# Patient Record
Sex: MALE | Race: WHITE
[De-identification: names, ages, dates, MRNs, and addresses within clinical notes are randomized per-mention and may not be internally consistent; named-entity substitution may affect disease eponyms.]

---

## 2017-04-20 ENCOUNTER — HOSPITAL ENCOUNTER (INPATIENT)
Dept: HOSPITAL 68 - ERH | Age: 72
LOS: 5 days | DRG: 190 | End: 2017-04-25
Admitting: INTERNAL MEDICINE
Payer: COMMERCIAL

## 2017-04-20 VITALS — HEIGHT: 72 IN | BODY MASS INDEX: 32.51 KG/M2 | WEIGHT: 240 LBS

## 2017-04-20 VITALS — SYSTOLIC BLOOD PRESSURE: 140 MMHG | DIASTOLIC BLOOD PRESSURE: 80 MMHG

## 2017-04-20 VITALS — DIASTOLIC BLOOD PRESSURE: 60 MMHG | SYSTOLIC BLOOD PRESSURE: 120 MMHG

## 2017-04-20 VITALS — SYSTOLIC BLOOD PRESSURE: 122 MMHG | DIASTOLIC BLOOD PRESSURE: 62 MMHG

## 2017-04-20 DIAGNOSIS — E03.9: ICD-10-CM

## 2017-04-20 DIAGNOSIS — I73.9: ICD-10-CM

## 2017-04-20 DIAGNOSIS — N18.3: ICD-10-CM

## 2017-04-20 DIAGNOSIS — F17.210: ICD-10-CM

## 2017-04-20 DIAGNOSIS — I13.0: ICD-10-CM

## 2017-04-20 DIAGNOSIS — J96.20: ICD-10-CM

## 2017-04-20 DIAGNOSIS — I25.10: ICD-10-CM

## 2017-04-20 DIAGNOSIS — J44.1: Primary | ICD-10-CM

## 2017-04-20 DIAGNOSIS — I25.5: ICD-10-CM

## 2017-04-20 DIAGNOSIS — I50.22: ICD-10-CM

## 2017-04-20 DIAGNOSIS — E66.01: ICD-10-CM

## 2017-04-20 DIAGNOSIS — Z95.1: ICD-10-CM

## 2017-04-20 LAB
ABSOLUTE GRANULOCYTE CT: 6.4 /CUMM (ref 1.4–6.5)
BASOPHILS # BLD: 0.1 /CUMM (ref 0–0.2)
BASOPHILS NFR BLD: 1 % (ref 0–2)
EOSINOPHIL # BLD: 0.2 /CUMM (ref 0–0.7)
EOSINOPHIL NFR BLD: 2.7 % (ref 0–5)
ERYTHROCYTE [DISTWIDTH] IN BLOOD BY AUTOMATED COUNT: 15.4 % (ref 11.5–14.5)
GRANULOCYTES NFR BLD: 79.5 % (ref 42.2–75.2)
HCT VFR BLD CALC: 39.3 % (ref 42–52)
LYMPHOCYTES # BLD: 0.8 /CUMM (ref 1.2–3.4)
MCH RBC QN AUTO: 33.1 PG (ref 27–31)
MCHC RBC AUTO-ENTMCNC: 32.9 G/DL (ref 33–37)
MCV RBC AUTO: 100.8 FL (ref 80–94)
MONOCYTES # BLD: 0.6 /CUMM (ref 0.1–0.6)
PLATELET # BLD: 161 /CUMM (ref 130–400)
PMV BLD AUTO: 7.3 FL (ref 7.4–10.4)
RED BLOOD CELL CT: 3.9 /CUMM (ref 4.7–6.1)
WBC # BLD AUTO: 8 /CUMM (ref 4.8–10.8)

## 2017-04-20 PROCEDURE — 2NASP: CPT

## 2017-04-20 NOTE — NUR
PT TO FLOOR VIA W/C WITH TRANSPORT. 
ALL PAPERWORK AND BELONGINGS SENT WITH PT.
CLINICAL STATUS UNCHANGED.

## 2017-04-20 NOTE — NUR
ASSUMED CARE, SITTING UP ON STRETCHER, AWAKE, ALERT.
PT IS ON 2.5L NC, SATS 94%. PT HAS NO PAIN/CHEST PAIN. VSS. AWAITING POC.
Informed waiting has been performed.

## 2017-04-20 NOTE — NUR
TRIAGE: PATIENT TO ER FROM HOME REPORTS "FEELING
TIRED AND WEAK," HX COPD, O2:86% RA, REPORTS "I
DON'T NEED ANY OXYGEN BUT I AM CLOSE TO IT MY
DOCTOR SAID." PATIENT REPORTS +EXERTIONAL SOB
TONIGHT AND AT BASELINE. PRODUCTIVE COUGH W/ CLEAR
/ YELLOW PRODUCTION. DENIES ANY CP. PLACED ON 2LC,
O2:91% W/ 2LNC. MD KOROMA TO BEDSIDE FOR EVAL
DURING TRIAGE.

## 2017-04-20 NOTE — NUR
IV EST #20 RIGHT HAND BY ELMIRA BOSS. BLOODWORK OBTAINED AND SENT TO LAB (LAV, SST,
BLUE, GREY, FIRST SET OF BLOOD CULTURES) BY ELMIRA BOSS. 
 
PER MD KOROMA DURING TRIAGE, POC: ADMIT TO HOSPITAL FOR COPD EXACERBATION.

## 2017-04-20 NOTE — NUR
PT ARRIVED TO FLOOR VIA WHEELCHAIR, AO, 2.5L NC, OOB INDEPENDENTLY, IV IN
PLACE, NO C/O PAIN, VSS, ADMISSION COMPLETE, ORIENTED TO ROOM AND CALL LIGHT.
WILL CONTINUE TO MONITOR.

## 2017-04-20 NOTE — NUR
PT C/O HEARTBURN, CALL PLACED TO INTERN TO MAKE AWARE, ORDER FOR TUMS PLACED
AND GIVEN AS ORDERED. PT CONTINUES TO C/O HEARTBURN "INDIGESTION" AFTER TUMS
GIVEN. 140/80 HR 74 RR 18 93% ON 2.5L DENIES SOB, DENIES CP, ASKING FOR
ANOTHER TUMS "ITS FROM MY SUPPER I HAD TONIGHT, IT HAPPENS AT HOME TOO" CALL
PLACED TO INTERN TO MAKE AWARE, NO NEW ORDERS AS OF RIGHT NOW. WILL CONTINUE
TO MONITOR

## 2017-04-20 NOTE — HISTORY & PHYSICAL
TRESSA GLASS,PEDRO 04/20/17 1154:
General Information and HPI
MD Statement:
I have seen and personally examined JULIO CÉSAR CRUZ JR and documented this H&P.
 
The patient is a 71 year old M who presented with a patient stated chief 
complaint of shortness of breath[].
 
Source of Information: patient, old records
Exam Limitations: no limitations
History of Present Illness:
70 YO M with PMH of 60 years of smoking, 1PPD now down to 6 cigarettes per day, 
CAD, CABG (1989), ischemic cardiomyopathy, HTN, CHF (EF 40%) s/p AICD, 
hypothyroidism, PVD s/p aortic aneurysm repair, carotid endarterectomy who 
presented to ED this morning from home complaing of shortness of breath for the 
past few weeks. Reports cough with minimal expectoration of scant yellow sputum.
 Denies fevers, chills, sick contacts. Denies chest pain, palpitations, 
lightheadness. Denies any leg swelling, orthopnea or pnd. This morning while at 
home with his wife his pulse oximetry was reading in the 80's which prompted his
wife to bring him to the hospital.
 
Allergies/Medications
Allergies:
Coded Allergies:
NO KNOWN ALLERGIES (04/05/15)
 
Home Med list
Acetaminophen 325 MG CAPSULE   1 CAP PO Q6 PRN PAIN  (Reported)
Albuterol Sulfate (Proair Hfa) 90 MCG HFA.AER.AD   2 PUF INH Q4 COPD
Aspirin (Aspirin*) 81 MG TAB.CHEW   1 TAB PO DAILY heart  (Reported)
Atorvastatin Calcium (Lipitor) 40 MG TABLET   1 TAB PO DAILY cholesterol  (
Reported)
Budesonide/Formoterol Fumarate (Symbicort 160-4.5 Mcg Inhaler) 160 MCG-4.5 MCG/
ACTUATION HFA.AER.AD   2 PUF INH BID COPD  (Reported)
Carvedilol (Coreg) 3.125 MG TABLET   6.25 MG PO BID HEART
Clopidogrel Bisulfate (Clopidogrel) 75 MG TABLET   1 TAB PO DAILY BLOOD THINNER 
(Reported)
Furosemide 20 MG TABLET   20 MG PO Q48 fluid overload
Gabapentin (Neurontin) 800 MG TABLET   1 TAB PO TID BACK PAIN  (Reported)
Isosorbide Mononitrate (Isosorbide Mononitrate ER) 60 MG TAB.ER.24H   1 TAB PO 
DAILY    (Reported)
Levothyroxine Sodium 0.1 MG TAB   1 TAB PO DAILY THYROID HEALTH  (Reported)
Ranolazine (Ranexa) 500 MG TAB.ER.12H   1 TAB PO BID heart  (Reported)
Tamsulosin HCl (Flomax) 0.4 MG CAP.ER.24H   1 CAP PO QHS prostate
     Please take at bed time to avoid low blood pressure during the day.
Tiotropium Bromide (Spiriva) 18 MCG CAP.W.DEV   1 CAP INH DAILY COPD  (Reported)
 
Compliance With Home Meds: GOOD
 
Past History
 
Travel History
Traveled to [place] past 21 day No
 
Medical History
Neurological: NONE
EENT: NONE
Cardiovascular: CARDIAC STENTS CABG CAROTID ARTERY BYPASS DEFIBRILLATOR
Respiratory: COPD
Gastrointestinal: NONE
Hepatic: NONE
Renal: NONE
Musculoskeletal: NONE
Psychiatric: NONE
Endocrine: hypothyroidism
Blood Disorders: DVT
Cancer(s): NONE
GYN/Reproductive: NONE
History of MRSA: No
History of VRE: No
History of CDIFF: No
Pneumonia Vaccine: 07/12/14
Influenza Vaccine: 10/01/16
 
Surgical History
Surgical History: CABG, CAROTID ARTECTOMY nerve thermoablation to reduce back 
pain 
 
ECHO Results (as available)
Date of last Echo 10/30/17
EF% 40
 
Past Family/Social History
 
Psychosocial History
Where do you live? Home
Who Do You Live With? spouse
Services at Home: None
Smoking Status: Current Everyday Smoker
ETOH Use: occasional use
 
Functional Ability
ADLs
Independent: dressing, eating, toileting, bathing. 
Ambulation: independent
 
Employment History
Employment Retired
Profession/Employer [employer]
 
Review of Systems
 
Review of Systems
Constitutional:
Denies: chills, fever, malaise, weakness. 
Cardiovascular:
Denies: chest pain, palpitations, peripheral edema. 
Respiratory:
Reports: cough, short of breath, sputum production, wheezing.  Denies: 
orthopnea. 
GI:
Reports: no symptoms. 
Genitourinary:
Reports: no symptoms. 
Musculoskeletal:
Reports: no symptoms. 
Skin:
Reports: no symptoms. 
Neurological/Psychological:
Reports: no symptoms. 
All Other Systems: Reviewed and Negative
 
Exam & Diagnostic Data
Last 24 Hrs of Vital Signs/I&O
 Vital Signs
 
 
Date Time Temp Pulse Resp B/P B/P Pulse O2 O2 Flow FiO2
 
     Mean Ox Delivery Rate 
 
04/20 1102 98.2 74 20 112/55  93 Nasal 2.5L 
 
       Cannula  
 
04/20 0851 98.2 69 20 108/60  94 Nasal 2.0L 
 
       Cannula  
 
04/20 0752      91 Nasal 0.5L 
 
       Cannula  
 
04/20 0726 98.7 69 20 110/62  94 Nasal 2.5L 
 
       Cannula  
 
04/20 0703      93 Nasal 2.0L 
 
       Cannula  
 
04/20 0652      97 Nasal 2.0L 
 
       Cannula  
 
04/20 0639 98.5 79 18 105/55  92 Nasal 2.0L 
 
       Cannula  
 
 
 Intake & Output
 
 
 04/20 1600 04/20 0800 04/20 0000
 
Intake Total   
 
Output Total   
 
Balance   
 
    
 
Patient  240 lb 
 
Weight   
 
Weight  Reported by Patient 
 
Measurement   
 
Method   
 
 
 
 
Physical Exam
General Appearance Alert, Oriented X3, Cooperative, No Acute Distress
Skin No Rashes, No Breakdown
HEENT Atraumatic, PERRLA, EOMI, Mucous Membr. moist/pink
Neck Supple, No JVD
Cardiovascular Regular Rate, Normal S1, Normal S2, No Murmurs
Lungs b/l wheeze
Abdomen Normal Bowel Sounds, Soft, No Tenderness
Neurological Normal Speech, Strength at 5/5 X4 Ext
Extremities Normal Pulses, trace edema
 
Diagnostic Data
EKG Results
SR 78, QTc 452
Unchanged nonspecific intraventricular delay
 
CXR Results
IMPRESSION:
Prominent cardiac silhouette and hilar vascular engorgement. No evidence of
overt edema.
 
 
Assessment/Plan
Assessment:
70 YO M with PMH of 60 years of smoking, 1PPD now down to 6 cigarettes per day, 
CAD, CABG (1989), ischemic cardiomyopathy, HTN, CHF (EF 40%) s/p AICD, 
hypothyroidism, PVD s/p aortic aneurysm repair, carotid endarterectomy who 
presented to ED this morning from home complaing of shortness of breath for the 
past few weeks. Found to be hypoxic in the field. He has started on treatment 
for his COPD and seems to be responding.
 
Admit to General Medicine
Will continue  IV Solumedrol 40mg daily
Monitor Oxygen saturations closely, keep O2 saturation >92%
TRC with nebulizers
recieved ceftriaxone and azithromycin in the ER, will hold off on further dosing
continue symbicort inhalers
education for smoking cessation
If symptoms do not improve, pulmonology consultation, Sees Dr. Rothman
 
Macrocytosis
appears to be at baseline
B12, 237, Folic Acid 9.4
Would check B12 levels
 
 
CKD
Appears to be at baseline
Has f/u appointment with nephrology next week
 
Hypothryoidism
continue synthroid
 
DVT ppx heparin sq
 
 
 
 
 
As Ranked By This Provider
Problem List:
 1. Chronic kidney disease
 
 2. Hypothyroidism
 
 3. COPD exacerbation
 
 
Core Measures/Miscellaneous
 
Acute Coronary Syndrome
ACS Diagnosis: No
 
Cerebrovascular Accident
CVA/TIA Diagnosis: No
 
Congestive Heart Failure
CHF Diagnosis: No
 
Venous Thromboembolism
VTE Risk Factors: Age > 40
[facility] d/t: No contraindications
No VTE Pharm Prophylaxis d/t: No contraindications
VTE Diagnosis: No
VTE Type: NONE
VTE Confirmed by (Test): NONE
 
Severe Sepsis
Severe Sepsis Present: No
 
Septic Shock
Septic Shock Present: No
 
Miscellaneous Documentation
Attending Case Discussed With:
ALLA COLORADO M.D
 
Primary Care Physician:
TRICIA GLASS,[facility]
 
Patient sees these Specialists
Pulmonology
Cardiology
Level of Patient Care: General Medicine
 
 
ALYSSIA GARCIA 04/20/17 1356:
Attending MD Review Statement
 
Attending Statement
Attending MD Statement: examined this patient, discuss w/resident/PA/NP, agreed 
w/resident/PA/NP, discussed with family, reviewed EMR data (avail), discussed 
with nursing, discussed with case mgmt, reviewed images, amended to note
Attending Assessment/Plan:
71 year old obese gentleman, current smoker, with past medical history 
significant for COPD, HTN, CAD s/p CABG with known vein graft occlusion, 
Ischemic CMP, chronic systolic CHF, s/p AICD  and PPM,  PVD s/p aortic aneurysm 
repair and femoral artery stent (2014), Right carotid endarterectomy, CKD stage 
3 comes with shortness of breath and repsond to steroid treatment. 
 
ASSESSMENT 
 
1. Acute on chronic respiratroy failure 
2. COPD exacerbation
3. Morbid obesity BMI 32.5
4. Tobacco abuse
5. HTN 
6. CAD s/p CABG
7. ischemic CMP
8. Chronic systolic CHF ef 35-40% compensated. 
9. PVD s/p aortic aneursym repair and femoral stent, right carotid 
endareterctomy.
10. Chronic kidney disease stage 3.
11  Elevated BNP. 
 
PLAN 
 
admit to inpatient medical services 
start i/v abx, oxygen supplemetation , TRC, nebs , symbicort. i/v steroids 40 
q12
serial cardiac enzymes , clinical signs suggestive of compensated Chf
resume home meds. Lasix 20 daily. 
gi/dvt prophyalxis
full code.

## 2017-04-20 NOTE — NUR
PLAN IS FOR ADMISSION.
RESTING COMFORTABLY.
OFFERS NO COMPLAINTS. DENIES ANY PAIN.
REMAINS ON 2.5L NC, SATS %.
Informed waiting has been performed.

## 2017-04-20 NOTE — RADIOLOGY REPORT
EXAMINATION:
XR PORTABLE CHEST
 
CLINICAL INFORMATION:
Dyspnea. Hypoxia.
 
COMPARISON:
Chest radiograph 09/27/2016.
 
TECHNIQUE:
Portable AP upright view of the chest was obtained.
 
FINDINGS:
The position of the left chest AICD is unchanged. Lung volumes are low. The
cardiac silhouette is enlarged which may be related to lung underexpansion
and AP technique. There is bilateral hilar vascular engorgement. No overt
interstitial or airspace edema. There are chronic changes of a median
sternotomy. Upper mediastinal contours are unremarkable. No acute osseous
finding.
 
IMPRESSION:
Prominent cardiac silhouette and hilar vascular engorgement. No evidence of
overt edema.

## 2017-04-20 NOTE — ED DYSPNEA/ASTHMA COMPLAINT
History of Present Illness
 
General
Chief Complaint: Dyspnea (COPD, CHF, Other)
Stated Complaint: PT C/O SOB 02 SAT 86%
Source: patient
Exam Limitations: no limitations
 
Vital Signs & Intake/Output
Vital Signs & Intake/Output
 Vital Signs
 
 
Date Time Temp Pulse Resp B/P B/P Pulse O2 O2 Flow FiO2
 
     Mean Ox Delivery Rate 
 
 0851 98.2 69 20 108/60  94 Nasal 2.0L 
 
       Cannula  
 
 0752      91 Nasal 0.5L 
 
       Cannula  
 
 0726 98.7 69 20 110/62  94 Nasal 2.5L 
 
       Cannula  
 
 0703      93 Nasal 2.0L 
 
       Cannula  
 
 0652      97 Nasal 2.0L 
 
       Cannula  
 
 0639 98.5 79 18 105/55  92 Nasal 2.0L 
 
       Cannula  
 
 
Allergies
Coded Allergies:
NO KNOWN ALLERGIES (04/05/15)
 
Reconcile Medications
Acetaminophen 325 MG CAPSULE   1 CAP PO Q6 PRN PAIN  (Reported)
Albuterol Sulfate (Proair Hfa) 90 MCG HFA.AER.AD   2 PUF INH Q4 COPD
Aspirin (Aspirin*) 81 MG TAB.CHEW   1 TAB PO DAILY heart  (Reported)
Atorvastatin Calcium (Lipitor) 40 MG TABLET   1 TAB PO DAILY cholesterol  (
Reported)
Budesonide/Formoterol Fumarate (Symbicort 160-4.5 Mcg Inhaler) 160 MCG-4.5 MCG/
ACTUATION HFA.AER.AD   2 PUF INH BID COPD  (Reported)
Carvedilol (Coreg) 3.125 MG TABLET   6.25 MG PO BID HEART
Clopidogrel Bisulfate (Clopidogrel) 75 MG TABLET   1 TAB PO DAILY BLOOD THINNER 
(Reported)
Furosemide 20 MG TABLET   20 MG PO Q48 fluid overload
Gabapentin (Neurontin) 800 MG TABLET   1 TAB PO TID BACK PAIN  (Reported)
Isosorbide Mononitrate (Isosorbide Mononitrate ER) 60 MG TAB.ER.24H   1 TAB PO 
DAILY    (Reported)
Levothyroxine Sodium 0.1 MG TAB   1 TAB PO DAILY THYROID HEALTH  (Reported)
Ranolazine (Ranexa) 500 MG TAB.ER.12H   1 TAB PO BID heart  (Reported)
Tamsulosin HCl (Flomax) 0.4 MG CAP.ER.24H   1 CAP PO QHS prostate
     Please take at bed time to avoid low blood pressure during the day.
Tiotropium Bromide (Spiriva) 18 MCG CAP.W.DEV   1 CAP INH DAILY COPD  (Reported)
 
Triage Nurses Notes Reviewed? yes
Onset: Gradual
Duration: hour(s):
Timing: recent history
Severity: moderate
Activities at Onset: none
Prior Episodes/Possible Cause: occasional episodes
Modifying Factors:
Improves With: rest. 
Associated Symptoms: cough, wheezing
HPI:
71-year-old gentleman with a past medical history of an ischemic cardiomyopathy 
(LVEF 45%) coronary artery disease status post bypass surgery with known vein 
graft occlusion, peripheral vascular disease, aortic aneurysm status post repair
, COPD, chronic CHF secondary to systolic dysfunction as well as an AICD 
implanted,
 
presents with dyspnea since last night.  He notes cough, productive of yellow 
phlegm, wheezing, but no fever, chest pain, chills, lower extremity edema.  He 
notes breathlessness and difficulty ambulating due to his dyspnea.
 
 
(GA GLASS,JARETT HEDRICK)
 
Past History
 
Medical History
Any Pertinent Medical History? see below for history
Neurological: NONE
EENT: NONE
Cardiovascular: CARDIAC STENTS CABG CAROTID ARTERY BYPASS DEFIBRILLATOR
Respiratory: COPD
Gastrointestinal: NONE
Hepatic: NONE
Renal: NONE
Musculoskeletal: NONE
Psychiatric: NONE
Endocrine: hypothyroidism
Blood Disorders: DVT
Cancer(s): NONE
GYN/Reproductive: NONE
History of MRSA: No
History of VRE: No
History of CDIFF: No
Pneumonia Vaccine: 14
Influenza Vaccine: 10/01/16
 
Surgical History
Surgical History: CABG, CAROTID ARTECTOMY nerve thermoablation to reduce back 
pain 
 
Psychosocial History
Who do you live with Spouse
Services at Home None
What is your primary language English
 
Family History
Hx Contributory? No
(JARETT NULL MD)
 
Review of Systems
 
 
Physical Exam
 
Physical Exam
General Appearance: well developed/nourished, mild distress
Head: atraumatic, normal appearance
Eyes:
Bilateral: normal appearance. 
Ears, Nose, Throat: normal pharynx, normal ENT inspection, perioral cyanosis
Neck: normal inspection, supple, full range of motion
Respiratory: chest non-tender, wheezing
Cardiovascular: regular rate/rhythm
Gastrointestinal: normal bowel sounds, soft, non-tender, no organomegaly
Extremities: normal inspection, normal capillary refill
Skin: distal cyanosis
 
Core Measures
ACS in differential dx? No
Severe Sepsis Present: No
Septic Shock Present: No
(GA GLASS,JARETT HEDRICK)
 
Progress
Differential Diagnosis: asthma, AMI, bronchitis, CHF, COPD, pneumonia
Plan of Care:
 Orders
 
 
Procedure Date/time Status
 
Heart Healthy Diet  L Active
 
BLOOD CULTURE 645 Active
 
BLOOD CULTURE 641 Active
 
TROPONIN LEVEL 634 Complete
 
COMPREHENSIVE METABOLIC PANEL 634 Complete
 
CBC WITHOUT DIFFERENTIAL 634 Complete
 
B-TYPE NATRIURETIC PEP (BNP) 634 Complete
 
 Active
 
 
 Laboratory Tests
 
 
 
17 0640:
Anion Gap 11, Estimated GFR 40  L, BUN/Creatinine Ratio 14.1, Glucose 141  H, 
Calcium 9.0, Total Bilirubin 0.6, AST 15  L, ALT 27, Alkaline Phosphatase 89, 
Troponin I 0.01, Pro-B-Natriuretic Pept 1560  H, Total Protein 6.8, Albumin 3.9,
Globulin 2.9, Albumin/Globulin Ratio 1.3, CBC w Diff NO MAN DIFF REQ, RBC 3.90  
L, .8  H, MCH 33.1  H, RDW 15.4  H, MPV 7.3  L, Gran % 79.5  H, 
Lymphocytes % 9.4  L, Monocytes % 7.4, Eosinophils % 2.7, Basophils % 1.0, 
Absolute Granulocytes 6.4, Absolute Lymphocytes 0.8  L, Absolute Monocytes 0.6, 
Absolute Eosinophils 0.2, Absolute Basophils 0.1, PUBS MCHC 32.9  L
 Microbiology
645  BLOOD: Blood Culture - RECD
640  BLOOD: Blood Culture - RECD
 
Diagnostic Imaging:
Viewed by Me: Radiology Read.  Discussed w/RAD: Radiology Read. 
Initial ED EKG: non specific conduction delay... no acute changes from prior
Hand-Off
   Endorsed To:
XAVI CORADO MD
   Endorsed Time: 0700
   Pending: labs, Xray
(GA GLASS,JARETT HEDRICK)
CXR Impression: PATIENT: JULIO CÉSAR CRUZ   MEDICAL RECORD NO: [id number] PRESENT 
AGE: 71  PATIENT ACCOUNT NO: [id number] : 43  LOCATION: [facility] ORDERING 
PHYSICIAN: JARETT NULL MD     SERVICE DATE:  EXAM TYPE: 
RAD - XRY-PORTABLE CHEST XRAY EXAMINATION: XR PORTABLE CHEST CLINICAL 
INFORMATION: Dyspnea. Hypoxia. COMPARISON: Chest radiograph 2016. 
TECHNIQUE: Portable AP upright view of the chest was obtained. FINDINGS: The 
position of the left chest AICD is unchanged. Lung volumes are low. The cardiac 
silhouette is enlarged which may be related to lung underexpansion and AP 
technique. There is bilateral hilar vascular engorgement. No overt interstitial 
or airspace edema. There are chronic changes of a median sternotomy. Upper 
mediastinal contours are unremarkable. No acute osseous finding. IMPRESSION: 
Prominent cardiac silhouette and hilar vascular engorgement. No evidence of 
overt edema. DICTATED BY: CAROLA ANDRE MD  DATE/TIME DICTATED:17 :RAD.[facility]  DATE/TIME TRANSCRIBED:17 
CONFIDENTIAL, DO NOT COPY WITHOUT APPROPRIATE AUTHORIZATION.  <Electronically 
signed in Other Vendor System>                                                  
                                     SIGNED BY: CAROLA ANDRE MD 17
Comments:
2017 7:31:15 AM patient signed out to me by Dr. Null at shift change 
over.
 
07:44 pt feels better and appears comfortable. lungs: bs diminished, no wheezes 
or rales. o2 sat 96% on 2lpm. weaned to 0.5 lpm with o2 sat stable. neb ordered.
question need to admit. will reeval. 
 
2017 8:30:29 AM patient's oxygen saturation dropped to 88% at rest on 0.5 L
/m.  I've increased him to 2 L with normalization O2 saturation.  Do not feel 
this patient is capable of returning home at this time given his hypoxia.
(MICKIE GLASS,XAVI CANSECO)
 
Departure
 
Departure
Disposition: STILL A PATIENT
Condition: Stable
Clinical Impression
Primary Impression: COPD exacerbation
Referrals:
NAI CLARKE MD (PCP/Family)
 
Departure Forms:
Customer Survey
General Discharge Information
(GA GLASS,JARETT HEDRICK)
 
Admission Note
Spoke With:
ALLA COLORADO M.D
Documentation of Exam:
Documentation of any treatments & extenuating circumstances including Concerns 
Regarding Discharge (functional status, medication knowledge or non-compliance, 
living conditions, etc.) that warrant an admission rather than observation: 
Patient has a history of COPD and presents with hypoxia and respiratory 
distress.  Despite nebulizer treatments he still requires oxygen supplementation
to maintain normal oxygen saturations.  He does not have home oxygen therapy at 
this time.  Given his hypoxia and dyspnea I do not feel he is a good candidate 
for outpatient management.  Compliance with outpatient treatment could worsen 
his hypoxia leading to chest pain and syncope, cardiac ischemia and respiratory 
arrest.  Feel he requires treatment with repeated bronchodilators, IV steroids 
and oxygen supplementation.  Pulmonary consultation should be considered.  This 
patient's advanced age and medical comorbidities I feel his treatment and 
recovery will likely be prolonged and complicated, requiring a multiple day 
hospitalization.
 
(MICKIE GLASS,XAVI CANSECO)
 
Critical Care Note
 
Critical Care Note
Critical Care Time: 30-74 min
(GA GLASS,JARETT HEDRICK)

## 2017-04-21 VITALS — SYSTOLIC BLOOD PRESSURE: 122 MMHG | DIASTOLIC BLOOD PRESSURE: 80 MMHG

## 2017-04-21 VITALS — DIASTOLIC BLOOD PRESSURE: 66 MMHG | SYSTOLIC BLOOD PRESSURE: 132 MMHG

## 2017-04-21 VITALS — SYSTOLIC BLOOD PRESSURE: 130 MMHG | DIASTOLIC BLOOD PRESSURE: 60 MMHG

## 2017-04-21 LAB
ABSOLUTE GRANULOCYTE CT: 11.2 /CUMM (ref 1.4–6.5)
BASOPHILS # BLD: 0 /CUMM (ref 0–0.2)
BASOPHILS NFR BLD: 0 % (ref 0–2)
EOSINOPHIL # BLD: 0 /CUMM (ref 0–0.7)
EOSINOPHIL NFR BLD: 0 % (ref 0–5)
ERYTHROCYTE [DISTWIDTH] IN BLOOD BY AUTOMATED COUNT: 14.9 % (ref 11.5–14.5)
GRANULOCYTES NFR BLD: 91 % (ref 42.2–75.2)
HCT VFR BLD CALC: 34.3 % (ref 42–52)
LYMPHOCYTES # BLD: 0.5 /CUMM (ref 1.2–3.4)
MCH RBC QN AUTO: 33.5 PG (ref 27–31)
MCHC RBC AUTO-ENTMCNC: 33 G/DL (ref 33–37)
MCV RBC AUTO: 101.3 FL (ref 80–94)
MONOCYTES # BLD: 0.6 /CUMM (ref 0.1–0.6)
PLATELET # BLD: 152 /CUMM (ref 130–400)
PMV BLD AUTO: 7.8 FL (ref 7.4–10.4)
RED BLOOD CELL CT: 3.39 /CUMM (ref 4.7–6.1)
WBC # BLD AUTO: 12.3 /CUMM (ref 4.8–10.8)

## 2017-04-21 NOTE — PN- HOUSESTAFF
[facility]
Subjective
Follow-up For:
COPD exacerbation
Subjective:
Afebrile, hemodynamically stable, no acute overnight every reported.  WBCs 
increased from 8 to 12, but that's most likely secondary to steroid.  Patient 
denies any other current active complaints
 
Review of Systems
Constitutional:
Reports: no symptoms. 
 
Objective
Last 24 Hrs of Vital Signs/I&O
 Vital Signs
 
 
Date Time Temp Pulse Resp B/P B/P Pulse O2 O2 Flow FiO2
 
     Mean Ox Delivery Rate 
 
 1331 98.3 80 20 132/66  92   
 
 1022 97.7 76 18 118/90     
 
 1021 97.7 76 18 118/90     
 
 1020 97.7 76 18 118/90     
 
 0800      90 Nasal 2.5L 
 
       Cannula  
 
 0757      94 Nasal 2.0L 
 
       Cannula  
 
 0621 97.9 87 20 130/60  93 Nasal 2.5L 
 
       Cannula  
 
 0000       Nasal 2.5L 
 
       Cannula  
 
 2210 98.3 81 20 140/80  90 Nasal  
 
       Cannula  
 
2053    140/80     
 
 205    140/80     
 
 2017      93 Nasal 2.0L 
 
       Cannula  
 
 
 Intake & Output
 
 
  1600  0800  0000
 
Intake Total 870 200 
 
Output Total 400 350 500
 
Balance 470 -150 -500
 
    
 
Intake,   
 
Intake, Oral 600 200 
 
Number 0  
 
Bowel   
 
Movements   
 
Output, Urine 400 350 500
 
 
 
 
Physical Exam
General Appearance: Alert, Oriented X3, Cooperative, No Acute Distress
Skin: No Rashes
HEENT: Atraumatic, PERRLA, EOMI, Mucous Membr. moist/pink
Cardiovascular: Regular Rate, Normal S1, Normal S2, No Murmurs
Lungs: wheezing over both lungs.
Abdomen: Normal Bowel Sounds, Soft, No Tenderness
Neurological: Normal Speech
Extremities: No Clubbing, No Cyanosis, No Edema
Current Medications:
 Current Medications
 
 
  Sig/Ly Start time  Last
 
Medication Dose Route Stop Time Status Admin
 
Acetaminophen 325 MG Q6P PRN  1145 AC 
 
  PO   
 
Albuterol Sulfate 3 ML EVERY 4 HRS/AWAKE  0800 AC 
 
  INH   1151
 
Albuterol Sulfate 3 ML EVERY 4 HRS/AWAKE ..  1404 DC 
 
  INH  0759  1408
 
Aspirin 81 MG DAILY  1134 AC 
 
  PO   1020
 
Atorvastatin Calcium 40 MG DAILY  1134 AC 
 
  PO   1021
 
Azithromycin 500 MG DAILY  1409 AC 
 
Sodium Chloride 250 ML IV   1033
 
Budesonide/ 2 PUF BID  1137 AC 
 
Formoterol Fumarate  INH   1036
 
Calcium Carbonate 500 MG DAILY  2045 AC 
 
  PO   205
 
Carvedilol 6.25 MG BID  1137 AC 
 
  PO   1021
 
Clopidogrel Bisulfate 75 MG DAILY  1135 AC 
 
  PO   1021
 
Furosemide 20 MG Q48  1000 AC 
 
  PO   
 
Gabapentin 800 MG Q12  2200 AC 
 
  PO   1021
 
Heparin Sodium  5,000 UNIT Q8  1400 AC 
 
(Porcine)  SC   1418
 
Isosorbide  60 MG DAILY  1137 AC 
 
Mononitrate  PO   1020
 
Levothyroxine Sodium 0.1 MG DAILY AC  1145 AC 
 
  PO   0558
 
Methylprednisolone 40 MG DAILY  1000 DC 
 
  IV   
 
Methylprednisolone 40 MG Q12  1000 AC 
 
  IV   1027
 
Omeprazole 20 MG ONCE ONE  2215 DC 
 
  PO  2216  2220
 
Patient Medication  1 ED .STK-MED ONE  1349 MN 
 
Teaching  ED  1350  
 
Ranolazine 500 MG BID  1135 AC 
 
  PO   1022
 
Tamsulosin HCl 0.4 MG AT BEDTIME 2200 AC 
 
  PO   
 
 
 
 
Last 24 Hrs of Lab/Kapil Results
Last 24 Hrs of Labs/Mics:
 Laboratory Tests
 
17 0615:
Anion Gap 6, Estimated GFR 40  L, BUN/Creatinine Ratio 18.2, CBC w Diff NO MAN 
DIFF REQ, RBC 3.39  L, .3  H, MCH 33.5  H, RDW 14.9  H, MPV 7.8, Gran % 
91.0  H, Lymphocytes % 4.2  L, Monocytes % 4.8, Eosinophils % 0, Basophils % 0  
L, Absolute Granulocytes 11.2  H, Absolute Lymphocytes 0.5  L, Absolute 
Monocytes 0.6, Absolute Eosinophils 0, Absolute Basophils 0, PUBS MCHC 33.0
 
17 2040:
Troponin I < 0.01
 
17 1715:
Troponin I < 0.01
 
 
Assessment/Plan
Assessment:
71/M with PMH of 60 years of smoking, 1PPD now down to 6 cigarettes per day, CAD
, CABG (), ischemic cardiomyopathy, HTN, CHF (EF 40%) s/p AICD, 
hypothyroidism, PVD s/p aortic aneurysm repair, carotid endarterectomy who 
presented to ED yesterday complaing of shortness of breath for the past few 
weeks. Found to be hypoxic in the field.
 
#COPD exacerbation
* Continue IV Solumedrol 40mg every 12
* Continue azithromycin IV to finish 5 days.
* Monitor Oxygen saturations closely, keep O2 saturation >92%
* TRC with nebulizers
* Counseled about smoking cessation
 
#CKD
At baseline
 
#Hypothryoidism
* continue synthroid
 
#Continue all other home meds
 
Regular diet
DVT ppx heparin sq
DNR/DNI
 
Problem List:
 1. COPD exacerbation
 
Pain Ratin
Pain Location:
na
Pain Goal: Remain pain free
Pain Plan:
NA
Tomorrow's Labs & Rationales:
CBC
 
 
ALYSSIA GARCIA 17 1118:
Attending MD Review Statement
 
Attending Statement
Attending MD Statement: examined this patient, discuss w/resident/PA/NP, agreed 
w/resident/PA/NP, discussed with family, reviewed EMR data (avail), discussed 
with nursing, discussed with case mgmt, reviewed images, amended to note
Attending Assessment/Plan:
71 year old obese gentleman, current smoker, with past medical history 
significant for COPD, HTN, CAD s/p CABG with known vein graft occlusion, 
Ischemic CMP, chronic systolic CHF, s/p AICD  and PPM,  PVD s/p aortic aneurysm 
repair and femoral artery stent (), Right carotid endarterectomy, CKD stage 
3 comes with shortness of breath and repsond to steroid treatment. 
 
ASSESSMENT 
 
1. Acute on chronic respiratroy failure 
2. COPD exacerbation
3. Morbid obesity BMI 32.5
4. Tobacco abuse
5. HTN 
6. CAD s/p CABG
7. ischemic CMP
8. Chronic systolic CHF ef 35-40% compensated. 
9. PVD s/p aortic aneursym repair and femoral stent, right carotid 
endareterctomy.
10. Chronic kidney disease stage 3.
11  Elevated BNP. 
12 Probable JEREMY
 
PLAN 
 
admit to inpatient medical services 
c/w i/v abx, oxygen supplemetation , TRC, nebs , symbicort. i/v steroids 40 q12 
and taper
serial cardiac enzymes , clinical signs suggestive of compensated Chf
resume home meds. Lasix 20 daily. 
trial of cpap at night
gi/dvt prophyalxis
full code.
 
Patient gives history that CPAP machine would come this week at home.

## 2017-04-22 VITALS — SYSTOLIC BLOOD PRESSURE: 120 MMHG | DIASTOLIC BLOOD PRESSURE: 60 MMHG

## 2017-04-22 VITALS — DIASTOLIC BLOOD PRESSURE: 68 MMHG | SYSTOLIC BLOOD PRESSURE: 130 MMHG

## 2017-04-22 VITALS — SYSTOLIC BLOOD PRESSURE: 110 MMHG | DIASTOLIC BLOOD PRESSURE: 70 MMHG

## 2017-04-22 NOTE — PN- HOUSESTAFF
ILENE GLASS,SARAH 17 1210:
Subjective
Follow-up For:
COPD exacerbation
Subjective:
Separation at bedside.  He was sitting up and on 2.5 L nasal cannula.  Please 
see that he felt better than yesterday however was still getting short of breath
with minimal exertion.  Patient sounds diffusely wheezy on physical exam.  We 
will resume IV steroids and continue to monitor.
 
Review of Systems
Constitutional:
Denies: diaphoresis, malaise, weakness. 
EENTM:
Reports: no symptoms. 
Cardiovascular:
Reports: no symptoms. 
Respiratory:
Reports: cough, short of breath, sputum production, wheezing. 
Gastrointestinal:
Reports: no symptoms. 
Genitourinary:
Reports: no symptoms. 
Musculoskeletal:
Reports: no symptoms. 
Skin:
Reports: no symptoms. 
 
Objective
Last 24 Hrs of Vital Signs/I&O
 Vital Signs
 
 
Date Time Temp Pulse Resp B/P B/P Pulse O2 O2 Flow FiO2
 
     Mean Ox Delivery Rate 
 
 1030  86  130/68     
 
 1030  86  130/68     
 
 1030  86  130/68     
 
 0820      96 Nasal 2.0L 
 
       Cannula  
 
 0800      94 Nasal 2.5L 
 
       Cannula  
 
 0710 98.1 86 24 130/68  92 Nasal 2.5L 
 
       Cannula  
 
 0000      90 Nasal 2.5L 
 
       Cannula  
 
 2320 97.1 83 20 122/80  90 Nasal  
 
       Cannula  
 
 2142    132/66     
 
 2142    132/66     
 
 1628      93 Nasal 2.0L 
 
       Cannula  
 
 1600      93 Nasal 2.5L 
 
       Cannula  
 
 1331 98.3 80 20 132/66  92   
 
 
 Intake & Output
 
 
  1600  0800  0000
 
Intake Total  200 300
 
Output Total  1375 
 
Balance  -1175 300
 
    
 
Intake, Oral  200 300
 
Output, Urine  1375 
 
 
 
 
Physical Exam
General Appearance: Alert, Oriented X3, Cooperative, No Acute Distress
HEENT: Atraumatic, PERRLA, EOMI
Neck: Supple
Cardiovascular: Regular Rate, Normal S1, Normal S2, No Murmurs
Lungs: diffuse end-expiratory wheezes.
Abdomen: Soft, No Tenderness
Neurological: Normal Speech
 
Assessment/Plan
Assessment:
71/M with PMH of 60 years of smoking, 1PPD now down to 6 cigarettes per day, CAD
, CABG (), ischemic cardiomyopathy, HTN, CHF (EF 40%) s/p AICD, 
hypothyroidism, PVD s/p aortic aneurysm repair, carotid endarterectomy who 
presented to ED yesterday complaing of shortness of breath for the past few 
weeks. Found to be hypoxic in the field.
 
#COPD exacerbation
* Pt on PO steroids 40mg--> Will escalate  back to IV solumedrol. 20mg NOW and 
40mg later tonight. Reassess tonight.
* Continue azithromycin IV to finish 5 days.
* Monitor Oxygen saturations closely, keep O2 saturation >92%
* TRC with nebulizers
* Counseled about smoking cessation
 
#CKD
At baseline
 
#Hypothryoidism
* continue synthroid
 
#Continue all other home meds
 
Regular diet
DVT ppx heparin sq
DNR/DNI
 
Problem List:
 1. COPD exacerbation
 
Pain Ratin
Pain Location:
none
Pain Goal: Remain pain free
Pain Plan:
none
Tomorrow's Labs & Rationales:
cbc
bep
 
 
 
RYLEY GLASS,TARIQ 17 1212:
Attending MD Review Statement
 
Attending Statement
Attending MD Statement: examined this patient, discuss w/resident/PA/NP, agreed 
w/resident/PA/NP, discussed with family, reviewed EMR data (avail), discussed 
with nursing, discussed with case mgmt, reviewed images, amended to note
Attending Assessment/Plan:
Patient resting in bed.  He is in mild distress.  Wasn't able to sleep last 
night because of cough and discomfort.  He still desaturating on oxygen on 
ambulation . Still wheezing on exam.  Leukocytosis noted, which is likely 
secondary to steroids.
Patient was switched to by mouth steroids.
Recommendations:
1.  Continue Zithromax.
2.  Follow-up cultures
3.  Switch to IV steroids 40 mg twice a day, we will reevaluate tomorrow.
4.  Continue with TRC nebs, oxygen by nasal cannula.

## 2017-04-23 VITALS — DIASTOLIC BLOOD PRESSURE: 70 MMHG | SYSTOLIC BLOOD PRESSURE: 140 MMHG

## 2017-04-23 VITALS — DIASTOLIC BLOOD PRESSURE: 64 MMHG | SYSTOLIC BLOOD PRESSURE: 118 MMHG

## 2017-04-23 VITALS — DIASTOLIC BLOOD PRESSURE: 60 MMHG | SYSTOLIC BLOOD PRESSURE: 124 MMHG

## 2017-04-23 LAB
ABSOLUTE GRANULOCYTE CT: 10.8 /CUMM (ref 1.4–6.5)
BASOPHILS # BLD: 0 /CUMM (ref 0–0.2)
BASOPHILS NFR BLD: 0.2 % (ref 0–2)
EOSINOPHIL # BLD: 0 /CUMM (ref 0–0.7)
EOSINOPHIL NFR BLD: 0.1 % (ref 0–5)
ERYTHROCYTE [DISTWIDTH] IN BLOOD BY AUTOMATED COUNT: 15.5 % (ref 11.5–14.5)
GRANULOCYTES NFR BLD: 93.8 % (ref 42.2–75.2)
HCT VFR BLD CALC: 35.7 % (ref 42–52)
LYMPHOCYTES # BLD: 0.4 /CUMM (ref 1.2–3.4)
MCH RBC QN AUTO: 33.6 PG (ref 27–31)
MCHC RBC AUTO-ENTMCNC: 33.2 G/DL (ref 33–37)
MCV RBC AUTO: 101.2 FL (ref 80–94)
MONOCYTES # BLD: 0.3 /CUMM (ref 0.1–0.6)
PLATELET # BLD: 154 /CUMM (ref 130–400)
PMV BLD AUTO: 7.8 FL (ref 7.4–10.4)
RED BLOOD CELL CT: 3.53 /CUMM (ref 4.7–6.1)
WBC # BLD AUTO: 11.5 /CUMM (ref 4.8–10.8)

## 2017-04-23 NOTE — PN- HOUSESTAFF
**See Addendum**
Subjective
Follow-up For:
COPD exacerbation
Subjective:
Afebrile, hemodynamically stable, no acute overnight every reported.  Patient 
denies any other current active complaints.  He is saturating lower 90s on room 
air at rest but dropped to 81% with ambulation.
 
Review of Systems
Constitutional:
Reports: no symptoms. 
 
Objective
Last 24 Hrs of Vital Signs/I&O
 Vital Signs
 
 
Date Time Temp Pulse Resp B/P B/P Pulse O2 O2 Flow FiO2
 
     Mean Ox Delivery Rate 
 
 0930  90  118/64     
 
 0930  90  118/64     
 
 0930  90  118/64     
 
 0846      95 Nasal 2.0L 
 
       Cannula  
 
 0800       Nasal 2.0L 
 
       Cannula  
 
 0629 98.2 90 22 118/64  91 Nasal  
 
       Cannula  
 
 0000      92 Nasal 2.0L 
 
       Cannula  
 
 2243 97.6 82 22 120/60  92 Nasal 2.0L 
 
       Cannula  
 
 2147  82  120/60     
 
 1635      90 Nasal 2.0L 
 
       Cannula  
 
 1559 97.6 84 22 110/70  93   
 
 1232      95 Nasal 2.0L 
 
       Cannula  
 
 
 Intake & Output
 
 
  1600  0800  0000
 
Intake Total  130 490
 
Output Total  500 1100
 
Balance  -370 -610
 
    
 
Intake, IV  10 10
 
Intake, Oral  120 480
 
Number  1 
 
Bowel   
 
Movements   
 
Output, Urine  500 1100
 
 
 
 
Physical Exam
General Appearance: Alert, Oriented X3, Cooperative, No Acute Distress
HEENT: Atraumatic, PERRLA, EOMI, Mucous Membr. moist/pink
Cardiovascular: Regular Rate, Normal S1, Normal S2, No Murmurs
Lungs: diminished air entry over both lungs, wheezing bilaterally but more in 
the right side
Abdomen: Normal Bowel Sounds, Soft, No Tenderness
Extremities: No Clubbing, No Cyanosis, No Edema
Current Medications:
 Current Medications
 
 
  Sig/Ly Start time  Last
 
Medication Dose Route Stop Time Status Admin
 
Acetaminophen 325 MG Q6P PRN  1145 AC 
 
  PO   
 
Albuterol Sulfate 3 ML EVERY 4 HRS/AWAKE  0800 AC 
 
  INH   0845
 
Aspirin 81 MG DAILY  1134 AC 
 
  PO   0930
 
Atorvastatin Calcium 40 MG DAILY  1134 AC 
 
  PO   0930
 
Azithromycin 500 MG DAILY  1409 AC 
 
Sodium Chloride 250 ML IV   0930
 
Budesonide/ 2 PUF BID  1137 AC 
 
Formoterol Fumarate  INH   0928
 
Calcium Carbonate 500 MG DAILY  2045 AC 
 
  PO   0929
 
Carvedilol 6.25 MG BID  1137 AC 
 
  PO   0930
 
Clopidogrel Bisulfate 75 MG DAILY  1135 AC 
 
  PO   0930
 
Furosemide 20 MG Q48  1000 AC 
 
  PO   1029
 
Gabapentin 800 MG Q12  2200 AC 
 
  PO   0930
 
Guaifenesin 600 MG Q12  1037 AC 
 
  PO   0930
 
Heparin Sodium  5,000 UNIT Q8  1400 AC 
 
(Porcine)  SC   0622
 
Isosorbide  60 MG DAILY  1137 AC 
 
Mononitrate  PO   0930
 
Levothyroxine Sodium 0.1 MG DAILY AC  1145 AC 
 
  PO   0622
 
Methylprednisolone 40 MG Q12  2200 AC 
 
  IV   0929
 
Methylprednisolone 20 MG ONCE ONE  1230 DC 
 
  IV  1231  1503
 
Prednisone 40 MG DAILY  1000 DC 
 
  PO   1029
 
Ranolazine 500 MG BID  1135 AC 
 
  PO   0930
 
Tamsulosin HCl 0.4 MG AT BEDTIME  220 AC 
 
  PO   2148
 
 
 
 
Last 24 Hrs of Lab/Kapil Results
Last 24 Hrs of Labs/Mics:
 Laboratory Tests
 
17 0735:
Anion Gap 6, Estimated GFR 43  L, BUN/Creatinine Ratio 21.9, CBC w Diff NO MAN 
DIFF REQ, RBC 3.53  L, .2  H, MCH 33.6  H, RDW 15.5  H, MPV 7.8, Gran % 
93.8  H, Lymphocytes % 3.1  L, Monocytes % 2.8, Eosinophils % 0.1, Basophils % 
0.2, Absolute Granulocytes 10.8  H, Absolute Lymphocytes 0.4  L, Absolute 
Monocytes 0.3, Absolute Eosinophils 0, Absolute Basophils 0, PUBS MCHC 33.2
 
 
Assessment/Plan
Assessment:
71/M with PMH of 60 years of smoking, 1PPD now down to 6 cigarettes per day, CAD
, CABG (), ischemic cardiomyopathy, HTN, CHF (EF 40%) s/p AICD, 
hypothyroidism, PVD s/p aortic aneurysm repair, carotid endarterectomy who 
presented to ED yesterday complaing of shortness of breath for the past few 
weeks. Found to be hypoxic in the field.
 
#COPD exacerbation
 Patient saturation drop to 80% with ambulation.
* Continue IV Solumedrol 40 mg twice a day
* Continue azithromycin IV to finish 5 days(tomorrow is the last day).
* Monitor Oxygen saturations closely, keep O2 saturation >92%
* TRC with nebulizers.
* Recheck oxygen saturation with ambulation, patient may need home oxygen
 
#CKD
At baseline
 
#Hypothryoidism
* continue synthroid
 
#Continue all other home meds
 
Regular diet
DVT ppx heparin sq
DNR/DNI
 
Problem List:
 1. COPD exacerbation
 
Pain Ratin
Pain Location:
NA
Pain Goal: Remain pain free
Pain Plan:
See A&P
Tomorrow's Labs & Rationales:
CBC

## 2017-04-23 NOTE — NUR
PT SITTING ON RA O2 @ 91%. AMBULATED PATIENT AROUND UNIT ON RA- O2 @ 81%. O2
PLACED ON TILL PATIENT O2@ 91% ON RA. MD FISCHER NOTIFIED. WILL CONTINUE TO
MONITOR.

## 2017-04-24 VITALS — DIASTOLIC BLOOD PRESSURE: 70 MMHG | SYSTOLIC BLOOD PRESSURE: 132 MMHG

## 2017-04-24 VITALS — DIASTOLIC BLOOD PRESSURE: 70 MMHG | SYSTOLIC BLOOD PRESSURE: 124 MMHG

## 2017-04-24 VITALS — DIASTOLIC BLOOD PRESSURE: 80 MMHG | SYSTOLIC BLOOD PRESSURE: 140 MMHG

## 2017-04-24 LAB
ABSOLUTE GRANULOCYTE CT: 8.3 /CUMM (ref 1.4–6.5)
BASOPHILS # BLD: 0 /CUMM (ref 0–0.2)
BASOPHILS NFR BLD: 0 % (ref 0–2)
EOSINOPHIL # BLD: 0 /CUMM (ref 0–0.7)
EOSINOPHIL NFR BLD: 0 % (ref 0–5)
ERYTHROCYTE [DISTWIDTH] IN BLOOD BY AUTOMATED COUNT: 15.4 % (ref 11.5–14.5)
GRANULOCYTES NFR BLD: 91.5 % (ref 42.2–75.2)
HCT VFR BLD CALC: 35.6 % (ref 42–52)
LYMPHOCYTES # BLD: 0.4 /CUMM (ref 1.2–3.4)
MCH RBC QN AUTO: 33.4 PG (ref 27–31)
MCHC RBC AUTO-ENTMCNC: 32.9 G/DL (ref 33–37)
MCV RBC AUTO: 101.5 FL (ref 80–94)
MONOCYTES # BLD: 0.4 /CUMM (ref 0.1–0.6)
PLATELET # BLD: 148 /CUMM (ref 130–400)
PMV BLD AUTO: 7.9 FL (ref 7.4–10.4)
RED BLOOD CELL CT: 3.51 /CUMM (ref 4.7–6.1)
WBC # BLD AUTO: 9.1 /CUMM (ref 4.8–10.8)

## 2017-04-24 NOTE — NUR
AT 0845 PT FOUND TO BE 87% AT REST ON 2L NC. INCREASED O2 TO 2.5L, PT SAT 93%.
RESPIRATORY IN ROOM TO GIVE NEB TREATMENT. AT 1100 PT FOUND TO BE 89% ON 2.5L
AT REST. INCREASED O2 TO 3L. WAITING FOR PT TO BE STABLE AT REST BEFORE
AMBULATORY SATS OBTAINED. WILL MONITOR.

## 2017-04-24 NOTE — DISCHARGE SUMMARY
Visit Information
 
Visit Dates
Admission Date:
04/20/17
 
Discharge Date:
04/25/17
 
 
Hospital Course
 
Course
Attending Physician:
ALYSSIA GARCIA MD
 
Primary Care Physician:
TRICIA GLASS,[facility] Course:
71 year old male with 60 year history of smoking of one PPD and PMH of CAD, CABG
(1989), ischemic cardiomyopathy, HTN, CHF (EF 40%) s/p AICD, hypothyroidism, PVD
s/p aortic aneurysm repair, carotid endarterectomy who presented to ED complaing
of progressive dyspnea that started few weeks prior to admission.  He was found 
to be hypoxic in the field prior to admission.
 
The following issue was addressed during this admission
 
#COPD exacerbation: Initially Patient was started on IV Solu-Medrol, IV 
azithromycin, nasal oxygen, TRC with nebulizer.  Soon after his symptoms started
to improve, we switch IV Solu Medrol to oral prednisone(tapering).  He finished 
a total 5 days of azithromycin.  On discharge patient was instructed to follow 
with his primary care doctor and with pulmonologist within 1 week.
 
#CKD: Creatinine was around baseline through out his admission.
 
#Hypothryoidism: We continue home dose of Synthroid
 
#We continued all other home meds
 
Allergies:
Coded Allergies:
NO KNOWN ALLERGIES (04/05/15)
 
 
Disposition Summary
 
Disposition
Principal Diagnosis:
COPD exacerbation
Additional Diagnosis:
Chronic kidney disease
Discharge Disposition: home or self care
 
Discharge Instructions
 
General Discharge Information
Code Status: Do Not Resucitate/Intubat
Patient's Diet:
Heart healthy
Patient's Activity:
As tolerated
Follow-Up Instructions/Appts:
Please follow-up with your primary care doctor within 1 week
Please follow-up with your pulmonologist within 1 week
 
 
Medications at Discharge
Discharge Medications:
Continue taking these medications:
Aspirin (Aspirin*) 81 MG TAB.CHEW
    1 Tablet ORAL DAILY
    Qty = 30
    Comments:
       Last Taken:4/25/17
             Time: 10AM
 
Atorvastatin Calcium (Lipitor) 40 MG TABLET
    1 Tablet ORAL DAILY
    Qty = 30
    Comments:
       Last Taken:4/25/17
             Time: 10AM
 
Ranolazine (Ranexa) 500 MG TAB.ER.12H
    1 Tablet ORAL TWICE DAILY
    Qty = 30
    Comments:
       Last Taken:4/25/17
             Time: 10AM
 
Clopidogrel Bisulfate (Clopidogrel) 75 MG TABLET
    1 Tablet ORAL DAILY
    Qty = 90
    Comments:
       Last Taken: 4/25/17
             Time: 10AM
 
Acetaminophen (Acetaminophen) 325 MG CAPSULE
    1 Capsule ORAL EVERY SIX HOURS as needed for PAIN
    Comments:
       Last Taken:NOT GIVEN THIS ADMISSION
             
 
Albuterol Sulfate (Proair Hfa) 90 MCG HFA.AER.AD
    2 Puff Inhale through mouth Every 4 hours
    Qty = 1
    Comments:
       NOT GIVEN THIS ADMISSION
 
Furosemide (Furosemide) 20 MG TABLET
    20 Milligram ORAL EVERY 48 HOURS (Every 2 days)
    Days = 30
    Comments:
       Last Taken:10/31/16
             Time:09:33A.M
 
Tamsulosin HCl (Flomax) 0.4 MG CAP.ER.24H
    1 Capsule ORAL TAKE AT BEDTIME
    Qty = 30
    Instructions:
       Please take at bed time to avoid low blood pressure during the day.
    Comments:
       Last Taken: 4/24/17
             Time: 10AM
 
Tiotropium Bromide (Spiriva) 18 MCG CAP.W.DEV
    1 Capsule Inhale through mouth DAILY
    Qty = 30
    Comments:
       Last Taken: NOT GIVEN THIS ADMISSION
             Time:
 
Carvedilol (Coreg) 3.125 MG TABLET
    6.25 Milligram ORAL TWICE DAILY
    Qty = 30
    Comments:
       Last Taken:4/25/17
             Time: 10AM
 
Isosorbide Mononitrate (Isosorbide Mononitrate ER) 60 MG TAB.ER.24H
    1 Tablet ORAL DAILY
    Qty = 90
    Comments:
       Last Taken: 4/25/17
             Time: 10AM
 
Budesonide/Formoterol Fumarate (Symbicort 160-4.5 Mcg Inhaler) 160 MCG-4.5 MCG/
ACTUATION HFA.AER.AD
    2 Puff Inhale through mouth TWICE DAILY
    Qty = 10
    Comments:
       Last Taken: 4/25/17
             Time: 10AM
 
Gabapentin (Neurontin) 800 MG TABLET
    1 Tablet ORAL THREE TIMES DAILY
    Qty = 90
    Comments:
       Last Taken: 4/25/17
             Time: 10AM
 
Start taking the following new medications:
Prednisone (Prednisone) 10 MG TABLET
    0 ORAL See Instructions
    Qty = 51
    No Refills
    Instructions:
       please take 60 mg on 4/26/17
       then take 50 mg for 3 days
       then take 40 mg for 3 days
       then take 30 mg for 3 days
       then take 20 mg for 3 days
       then take 10 mg for 3 days 
       then stop 
    Comments:
       Last Taken: 4/25/17
             Time: 10AM
 
 
Copies To:
YESSI GLASS,CHACHA MCGILL; TRICIA GLASS,NAI

## 2017-04-24 NOTE — NUR
PT HAS ABNORMALLY LARGE PURPLISH BRUISE TO R ABD. HEPARIN SQ DUE AT 2200.
INTERN KIIN MADE AWARE. PER INTERN, TO HOLD DOSE TONIGHT AND REASSESS IN AM.
WILL PASS ON TO NEXT SHIFT. WILL CONTINUE TO MONITOR.

## 2017-04-24 NOTE — NUR
OXYGEN SATURATION:
 
AT REST ON ROOM AIR PT SAT 84%. AFTER AMBULATING 15 FT PT SAT AT 79% AND
BECAME SHORT OF BREATH, NEEDED TO REST. PT 92% ON 3L O2 AT REST, ON AMBULATION
DESAT TO 88%, INCREASED TO 4LNC AND PT SAT REMAINED STABLE AT 90-91%. WILL
MONITOR.

## 2017-04-24 NOTE — PN- HOUSESTAFF
**See Addendum**
Subjective
Follow-up For:
COPD exacerbation
Subjective:
Afebrile, hemodynamically stable, no acute overnight every reported.  Patient 
denies any current complaints.  He is saturating lower 90s on room air at rest 
but dropped below 88% with ambulation.
 
Review of Systems
Constitutional:
Reports: no symptoms. 
 
Objective
Last 24 Hrs of Vital Signs/I&O
 Vital Signs
 
 
Date Time Temp Pulse Resp B/P B/P Pulse O2 O2 Flow FiO2
 
     Mean Ox Delivery Rate 
 
 1239      93 Nasal 3.0L 
 
       Cannula  
 
 0855    128/70     
 
 0855    128/70     
 
 0854    128/72     
 
 0800       Nasal 2.0L 
 
       Cannula  
 
 0650 98.0 68 22 124/70  95 Nasal 2.0L 
 
       Cannula  
 
 0000       Nasal 2.0L 
 
       Cannula  
 
 2251 98.8 75 22 124/60  91 Nasal  
 
       Cannula  
 
 2205    138/82     
 
 2204    138/82     
 
 2203    138/82     
 
 1820      90 Nasal 2.0L 
 
       Cannula  
 
 1553 97.9 67 20 140/70  96   
 
 
 Intake & Output
 
 
  1600  0800  0000
 
Intake Total   240
 
Output Total   
 
Balance   240
 
    
 
Intake, Oral   240
 
 
 
 
Physical Exam
General Appearance: Alert, Oriented X3, Cooperative, No Acute Distress
Skin: No Rashes
HEENT: Atraumatic, PERRLA, EOMI, Mucous Membr. moist/pink
Cardiovascular: Regular Rate, Normal S1, Normal S2, No Murmurs
Lungs: mild wheezing and decreased air entry over both lungs
Abdomen: Soft, No Tenderness
Neurological: Normal Speech
Extremities: No Clubbing, No Cyanosis, No Edema
Current Medications:
 Current Medications
 
 
  Sig/Ly Start time  Last
 
Medication Dose Route Stop Time Status Admin
 
Acetaminophen 325 MG Q6P PRN  1145 AC 
 
  PO   
 
Albuterol Sulfate 3 ML EVERY 4 HRS/AWAKE  0800 AC 
 
  INH   1237
 
Aspirin 81 MG DAILY  1134 AC 
 
  PO   0855
 
Atorvastatin Calcium 40 MG DAILY  1134 AC 
 
  PO   0855
 
Azithromycin 500 MG DAILY  1409 DC 
 
Sodium Chloride 250 ML IV   0854
 
Budesonide/ 2 PUF BID  1137 AC 
 
Formoterol Fumarate  INH   0907
 
Calcium Carbonate 500 MG DAILY  2045 AC 
 
  PO   0853
 
Carvedilol 6.25 MG BID  1137 AC 
 
  PO   0855
 
Clopidogrel Bisulfate 75 MG DAILY  1135 AC 
 
  PO   0854
 
Furosemide 20 MG Q48  1000 AC 
 
  PO   0855
 
Gabapentin 800 MG Q12  2200 AC 
 
  PO   0854
 
Guaifenesin 600 MG Q12  1037 AC 
 
  PO   0854
 
Heparin Sodium  5,000 UNIT Q8  1400 AC 
 
(Porcine)  SC   0520
 
Ipratropium [place] 2.5 ML EVERY 4 HRS/AWAKE  2000 AC 
 
  INH   1237
 
Isosorbide  60 MG DAILY  1137 AC 
 
Mononitrate  PO   0855
 
Levothyroxine Sodium 0.1 MG DAILY AC  1145 AC 
 
  PO   0521
 
Methylprednisolone 40 MG Q12  1000 DC 
 
  IV   0853
 
Methylprednisolone 40 MG Q8  2200 DC 
 
  IV   0520
 
Methylprednisolone 40 MG Q12  2200 DC 
 
  IV   0929
 
Prednisone 20 MG ONCE ONE  1045 DC 
 
  PO  1046  1153
 
Prednisone 60 MG ONCE ONE  1000 CAN 
 
  PO  1001  
 
Ranolazine 500 MG BID  1135 AC 
 
  PO   0854
 
Tamsulosin HCl 0.4 MG AT BEDTIME  2200 AC 
 
  PO   2204
 
 
 
 
Last 24 Hrs of Lab/Kapil Results
Last 24 Hrs of Labs/Mics:
 Laboratory Tests
 
17 0615:
Anion Gap 6, Estimated GFR 46  L, BUN/Creatinine Ratio 25.3  H, CBC w Diff NO 
MAN DIFF REQ, RBC 3.51  L, .5  H, MCH 33.4  H, RDW 15.4  H, MPV 7.9, Gran
% 91.5  H, Lymphocytes % 4.6  L, Monocytes % 3.9, Eosinophils % 0, Basophils % 0
 L, Absolute Granulocytes 8.3  H, Absolute Lymphocytes 0.4  L, Absolute 
Monocytes 0.4, Absolute Eosinophils 0, Absolute Basophils 0, PUBS MCHC 32.9  L
 
 
Assessment/Plan
Assessment:
71/M with PMH of 60 years of smoking, 1PPD now down to 6 cigarettes per day, CAD
, CABG (), ischemic cardiomyopathy, HTN, CHF (EF 40%) s/p AICD, 
hypothyroidism, PVD s/p aortic aneurysm repair, carotid endarterectomy who 
presented to ED yesterday complaing of shortness of breath for the past few 
weeks. Found to be hypoxic in the field.
 
#COPD exacerbation
 Patient saturation drop below 88% with ambulation.
* Switch IV Solumedrol 40 mg twice a day to prednisone 60 mg daily
* DC azithromycin 
* Monitor Oxygen saturations closely, keep O2 saturation >92%
* TRC with nebulizers.
* Recheck oxygen saturation with ambulation, patient may need home oxygen.
 
#CKD
At baseline
 
#Hypothryoidism
* continue synthroid
 
#Continue all other home meds
 
Regular diet
DVT ppx heparin sq
DNR/DNI
 
Problem List:
 1. COPD exacerbation
 
Pain Ratin
Pain Location:
NA
Pain Goal: Remain pain free
Pain Plan:
See A&P
Tomorrow's Labs & Rationales:
none as patient most likely will be discharged later today

## 2017-04-25 ENCOUNTER — HOSPITAL ENCOUNTER (EMERGENCY)
Dept: HOSPITAL 68 - ERH | Age: 72
End: 2017-04-25
Payer: COMMERCIAL

## 2017-04-25 VITALS — DIASTOLIC BLOOD PRESSURE: 76 MMHG | SYSTOLIC BLOOD PRESSURE: 122 MMHG

## 2017-04-25 VITALS — SYSTOLIC BLOOD PRESSURE: 148 MMHG | DIASTOLIC BLOOD PRESSURE: 76 MMHG

## 2017-04-25 VITALS — WEIGHT: 240 LBS | BODY MASS INDEX: 32.51 KG/M2 | HEIGHT: 72 IN

## 2017-04-25 VITALS — SYSTOLIC BLOOD PRESSURE: 122 MMHG | DIASTOLIC BLOOD PRESSURE: 76 MMHG

## 2017-04-25 DIAGNOSIS — R09.02: Primary | ICD-10-CM

## 2017-04-25 NOTE — PN- HOUSESTAFF
TOMÁS GLASS,KATHARINE 17 0657:
Subjective
Follow-up For:
COPD exacerbation 
Subjective:
Pt seen today, he was laying comfortably in bed.
 
Nurse reported that he desat to 84% when he walked to the bathroom without 
oxygen last night. He was placed back on 3L NC and maintained o2 sat of 92%. 
 
Nurse ambulated him today with 3l o2 via nc, he sat in the low 90s except when 
he started dancing around and he desat to 88%. he will go home with oxygen.
 
he did not use cpap to sleep last night.
 
will give 60 mg prednisone today and taper 10mg every 3 days. instructed pt to f
/u with pulm, Dr. Harris. 
 
 
Review of Systems
Constitutional:
Denies: chills, fever. 
EENTM:
Denies: visual changes. 
Cardiovascular:
Denies: chest pain. 
Respiratory:
Reports: cough, short of breath. 
Gastrointestinal:
Denies: abdominal pain. 
 
Objective
Last 24 Hrs of Vital Signs/I&O
 Vital Signs
 
 
Date Time Temp Pulse Resp B/P B/P Pulse O2 O2 Flow FiO2
 
     Mean Ox Delivery Rate 
 
 0904  90  122/ 0903  90  122/76     
 
 0903  90  122/76     
 
 0800       Nasal 3.0L 
 
       Cannula  
 
 0748      95 Nasal 3.0L 
 
       Cannula  
 
 0600 98.5 90 20 122/76  95 Nasal  
 
       Cannula  
 
 0000      93 Nasal 3.0L 
 
       Cannula  
 
 2200 98.2 88 20 140/80  93 Nasal 3.0L 
 
       Cannula  
 
 2052  88  140/70     
 
 2051  88  140/70     
 
 2051  88  140/70     
 
 1605      93 Nasal 3.0L 
 
       Cannula  
 
 1600       Nasal 3.0L 
 
       Cannula  
 
 1446 97.0 67 20 132/70  92 Nasal 3.0L 
 
       Cannula  
 
 1239      93 Nasal 3.0L 
 
       Cannula  
 
 
 Intake & Output
 
 
  1600  0800  0000
 
Intake Total  300 800
 
Output Total  550 400
 
Balance  -250 400
 
    
 
Intake, IV  0 
 
Intake, Oral  300 800
 
Number  0 
 
Bowel   
 
Movements   
 
Output, Urine  550 400
 
Patient 108.862 kg  
 
Weight   
 
 
 
 
Physical Exam
General Appearance: Alert, Oriented X3, Cooperative, No Acute Distress
Cardiovascular: Regular Rate, Normal S1, Normal S2
Lungs: mild exp wheezing 
Abdomen: Normal Bowel Sounds, Soft, No Tenderness
Current Medications:
 Current Medications
 
 
  Sig/Ly Start time  Last
 
Medication Dose Route Stop Time Status Admin
 
Acetaminophen 325 MG Q6P PRN  1145 AC 
 
  PO   
 
Albuterol Sulfate 3 ML EVERY 4 HRS/AWAKE  0800 AC 
 
  INH   1120
 
Aspirin 81 MG DAILY  1134 AC 
 
  PO   0903
 
Atorvastatin Calcium 40 MG DAILY  1134 AC 
 
  PO   0903
 
Budesonide/ 2 PUF BID  1137 AC 
 
Formoterol Fumarate  INH   0903
 
Calcium Carbonate 500 MG DAILY  2045 AC 
 
  PO   0904
 
Carvedilol 6.25 MG BID  1137 AC 
 
  PO   0903
 
Clopidogrel Bisulfate 75 MG DAILY  1135 AC 
 
  PO   0904
 
Furosemide 20 MG Q48  1000 AC 
 
  PO   0855
 
Gabapentin 800 MG Q12  2200 AC 
 
  PO   0904
 
Guaifenesin 600 MG Q12  1037 AC 
 
  PO   0903
 
Heparin Sodium  5,000 UNIT Q8  1400 AC 
 
(Porcine)  SC   1420
 
Ipratropium [place] 2.5 ML EVERY 4 HRS/AWAKE  2000 AC 
 
  INH   1120
 
Isosorbide  60 MG DAILY  1137 AC 
 
Mononitrate  PO   0903
 
Levothyroxine Sodium 0.1 MG DAILY AC  1145 AC 
 
  PO   0634
 
Patient Medication  1 ED .STK-MED ONE  1409 MA 
 
Teaching  ED  1410  
 
Prednisone 60 MG DAILY  1000 AC 
 
  PO   0904
 
Ranolazine 500 MG BID  1135 AC 
 
  PO   0904
 
Tamsulosin HCl 0.4 MG AT BEDTIME  2200 AC 
 
  PO   2051
 
 
 
 
Last 24 Hrs of Lab/Kapil Results
Last 24 Hrs of Labs/Mics:
 Laboratory Tests
 
17 0715:
Anion Gap 4  L, Estimated GFR 46  L, BUN/Creatinine Ratio 26.0  H
 
 
Assessment/Plan
Assessment:
71/M with PMH of 60 years of smoking, 1PPD now down to 6 cigarettes per day, CAD
, CABG (), ischemic cardiomyopathy, HTN, CHF (EF 40%) s/p AICD, 
hypothyroidism, PVD s/p aortic aneurysm repair, carotid endarterectomy who 
presented to ED yesterday complaing of shortness of breath for the past few 
weeks. Found to be hypoxic in the field.
 
#COPD exacerbation
- Patient saturation drop below 88% with ambulation.
* Continue prednisone 60 mg daily. Taper 10 mg every 3 days 
* DC azithromycin 
* Monitor Oxygen saturations closely, keep O2 saturation >92%
* TRC with nebulizers.
* Will go home with oxygen 
 
#CKD
At baseline
 
#Hypothryoidism
* continue synthroid
 
#Continue all other home meds
 
Regular diet
DVT ppx heparin sq and alps 
DNR/DNI
 
Problem List:
 1. COPD exacerbation
 
Pain Ratin
Pain Location:
none 
Pain Goal: Remain pain free
Pain Plan:
none 
Tomorrow's Labs & Rationales:
none 
DVT/Prophylaxis: mechanical, pharmacological
 
 
ALYSSIA GARCIA 17 1022:
Attending MD Review Statement
 
Attending Statement
Attending MD Statement: examined this patient, discuss w/resident/PA/NP, agreed 
w/resident/PA/NP, discussed with family, reviewed EMR data (avail), discussed 
with nursing, discussed with case mgmt, reviewed images, amended to note
Attending Assessment/Plan:
71 year old obese gentleman, current smoker, with past medical history 
significant for COPD, HTN, CAD s/p CABG with known vein graft occlusion, 
Ischemic CMP, chronic systolic CHF, s/p AICD  and PPM,  PVD s/p aortic aneurysm 
repair and femoral artery stent (), Right carotid endarterectomy, CKD stage 
3 comes with shortness of breath and repsond to steroid treatment. 
 
ASSESSMENT 
 
1. Acute on chronic respiratroy failure 
2. COPD exacerbation
3. Morbid obesity BMI 32.5
4. Tobacco abuse
5. HTN 
6. CAD s/p CABG
7. ischemic CMP
8. Chronic systolic CHF ef 35-40% compensated. 
9. PVD s/p aortic aneursym repair and femoral stent, right carotid 
endareterctomy.
10. Chronic kidney disease stage 3.
11  Elevated BNP. 
12 Probable JEREMY
 
PLAN 
 
admit to inpatient medical services 
complete abx, oxygen supplemetation , TRC, nebs , symbicort. taper steroids
serial cardiac enzymes negative, clinical signs suggestive of compensated Chf
resume home meds. Lasix 20 daily. 
trial of cpap at night
gi/dvt prophyalxis
full code.
walking pulse oximetry and possible d/c soon.
F/U O/P PCP and Pulmonary.

## 2017-04-25 NOTE — NUR
PT AT REST, 02 @ 90% ON 3L- AMBULATED PATIENT ON 3L NC O2 @ 90%. MD NOTIFIED,
WILL CONTINUE TO MONITOR.

## 2017-07-18 ENCOUNTER — HOSPITAL ENCOUNTER (INPATIENT)
Dept: HOSPITAL 68 - ERH | Age: 72
LOS: 3 days | DRG: 191 | End: 2017-07-21
Admitting: INTERNAL MEDICINE
Payer: COMMERCIAL

## 2017-07-18 VITALS — WEIGHT: 240 LBS | HEIGHT: 72 IN | BODY MASS INDEX: 32.51 KG/M2

## 2017-07-18 DIAGNOSIS — E03.9: ICD-10-CM

## 2017-07-18 DIAGNOSIS — Z95.5: ICD-10-CM

## 2017-07-18 DIAGNOSIS — Z95.810: ICD-10-CM

## 2017-07-18 DIAGNOSIS — R91.1: ICD-10-CM

## 2017-07-18 DIAGNOSIS — F17.210: ICD-10-CM

## 2017-07-18 DIAGNOSIS — K59.00: ICD-10-CM

## 2017-07-18 DIAGNOSIS — I25.5: ICD-10-CM

## 2017-07-18 DIAGNOSIS — I25.10: ICD-10-CM

## 2017-07-18 DIAGNOSIS — E87.1: ICD-10-CM

## 2017-07-18 DIAGNOSIS — I13.0: ICD-10-CM

## 2017-07-18 DIAGNOSIS — I35.0: ICD-10-CM

## 2017-07-18 DIAGNOSIS — N18.9: ICD-10-CM

## 2017-07-18 DIAGNOSIS — K57.90: ICD-10-CM

## 2017-07-18 DIAGNOSIS — I50.22: ICD-10-CM

## 2017-07-18 DIAGNOSIS — J44.1: Primary | ICD-10-CM

## 2017-07-18 DIAGNOSIS — I73.9: ICD-10-CM

## 2017-07-18 DIAGNOSIS — R55: ICD-10-CM

## 2017-07-18 DIAGNOSIS — Z95.1: ICD-10-CM

## 2017-07-18 LAB
ABSOLUTE GRANULOCYTE CT: 9.9 /CUMM (ref 1.4–6.5)
BASOPHILS # BLD: 0.1 /CUMM (ref 0–0.2)
BASOPHILS NFR BLD: 0.7 % (ref 0–2)
EOSINOPHIL # BLD: 0 /CUMM (ref 0–0.7)
EOSINOPHIL NFR BLD: 0.2 % (ref 0–5)
ERYTHROCYTE [DISTWIDTH] IN BLOOD BY AUTOMATED COUNT: 16.4 % (ref 11.5–14.5)
GRANULOCYTES NFR BLD: 85.9 % (ref 42.2–75.2)
HCT VFR BLD CALC: 39.4 % (ref 42–52)
LYMPHOCYTES # BLD: 0.7 /CUMM (ref 1.2–3.4)
MCH RBC QN AUTO: 33.1 PG (ref 27–31)
MCHC RBC AUTO-ENTMCNC: 33.4 G/DL (ref 33–37)
MCV RBC AUTO: 98.9 FL (ref 80–94)
MONOCYTES # BLD: 0.8 /CUMM (ref 0.1–0.6)
PLATELET # BLD: 189 /CUMM (ref 130–400)
PMV BLD AUTO: 7.3 FL (ref 7.4–10.4)
RED BLOOD CELL CT: 3.98 /CUMM (ref 4.7–6.1)
WBC # BLD AUTO: 11.5 /CUMM (ref 4.8–10.8)

## 2017-07-18 PROCEDURE — G0480 DRUG TEST DEF 1-7 CLASSES: HCPCS

## 2017-07-18 PROCEDURE — C8929 TTE W OR WO FOL WCON,DOPPLER: HCPCS

## 2017-07-18 NOTE — ED CARDIAC/CP/PALPITATIONS
History of Present Illness
 
General
Chief Complaint: Dyspnea (COPD, CHF, Other)
Stated Complaint: SOB
Source: patient
Exam Limitations: no limitations
Allergies
Coded Allergies:
NO KNOWN ALLERGIES (04/05/15)
 
Reconcile Medications
Acetaminophen 325 MG CAPSULE   1 CAP PO Q6 PRN PAIN  (Reported)
Albuterol Sulfate (Proair Hfa) 90 MCG HFA.AER.AD   2 PUF INH Q4 COPD
Aspirin (Aspirin*) 81 MG TAB.CHEW   1 TAB PO DAILY heart  (Reported)
Atorvastatin Calcium (Lipitor) 40 MG TABLET   1 TAB PO DAILY cholesterol  (
Reported)
Budesonide/Formoterol Fumarate (Symbicort 160-4.5 Mcg Inhaler) 160 MCG-4.5 MCG/
ACTUATION HFA.AER.AD   2 PUF INH BID COPD  (Reported)
Carvedilol (Coreg) 3.125 MG TABLET   6.25 MG PO BID HEART
Clopidogrel Bisulfate (Clopidogrel) 75 MG TABLET   1 TAB PO DAILY BLOOD THINNER 
(Reported)
Furosemide (Lasix) 20 MG TABLET   1 TAB PO DAILY DIURETIC  (Reported)
Gabapentin (Neurontin) 800 MG TABLET   1 TAB PO TID BACK PAIN  (Reported)
Isosorbide Mononitrate (Isosorbide Mononitrate ER) 60 MG TAB.ER.24H   1 TAB PO 
DAILY    (Reported)
Levothyroxine Sodium 100 MCG TABLET   1 TAB PO DAILY THYROID  (Reported)
Nitroglycerin (Nitrostat) 0.4 MG TAB.SUBL   1 TAB SL AD PRN CHEST PAIN  (
Reported)
     1st sign of attack; may repeat every 5 minutes until relief; if pain 
persists after 3 tablets in 15 minutes, prompt medical att
Ranolazine (Ranexa) 500 MG TAB.ER.12H   1 TAB PO BID heart  (Reported)
Tamsulosin HCl (Flomax) 0.4 MG CAP.ER.24H   1 CAP PO QHS prostate
     Please take at bed time to avoid low blood pressure during the day.
Tiotropium Bromide (Spiriva) 18 MCG CAP.W.DEV   1 CAP INH DAILY COPD  (Reported)
 
Triage Note:
PT TO ED FOR MULTIPLE COMPLAINTS, REPORTING SOB
 WHEN HE WAS ATTEMPTING TO WATER HIS WIFE'S GARDEN,
 STATING THAT HAS MOSTLY RESOLVED SINCE ARRIVING TO
 ED, CONSTIPATION X 5 DAYS AS WELL AS A FALL APPROX
 1 WEEK AGO. BRUISING NOTED TO R SIDE OF ABD.
Triage Nurses Notes Reviewed? yes
Onset: Gradual
Duration: getting worse
Timing: recent history
Quality/Severity: moderate
Radiation: no radiation
HPI:
Patient is a 71-year-old male with a past medical history of CAD, CABG in , 
ischemic cardiomyopathy, CHF, hypertension, AICD placement, hypothyroidism, PVD 
carotid endarterectomy who was recently admitted in 2017 for concerns of 
COPD to [facility].  Patient presents emergency room stating that 2 days 
ago patient believes he had a syncopal episode after coughing multiple episodes 
in the bathroom where subs, he was noted to be on the ground from a standing 
position.  Patient woke up and noted pain to the right side of his chest and 
abdomen and left hip and left ankle.  Patient notes bruising afterwards to the 
area of the chest and abdomen.  Patient also presents with worsening shortness 
of breath and dyspnea on exertion.
 
 
 
It is noted on initial presentation the patient was alert and oriented in no 
apparent distress and answering questions appropriately however intermediate through 
the exam patient had an episode of unresponsiveness where his eyes were open 
however patient was a phasic for approximately 3 minutes.  Patient then began 
mumbling his words and which stroke alert was called immediately.  Blood glucose
was noted to be at the time 131.  Patient then began having symptoms of WERNICKE
'S encephalopathy-aphasia where he was repeating words and his verbiage was 
incoherent and not making any sense.
 
Patient went to CT which he received noncontrast head and contrasted abdomen 
pelvis for concerns of recent trauma.
 
 
 
After approximately 5-7 minutes patient has made a full recovery however he does
not remember this episode.
 
 
(ALISON GREEN,LENIN)
 
Vital Signs & Intake/Output
Vital Signs & Intake/Output
 Vital Signs
 
 
Date Time Temp Pulse Resp B/P B/P Pulse O2 O2 Flow FiO2
 
     Mean Ox Delivery Rate 
 
 1126       Nasal 2.0L 
 
       Cannula  
 
 1125      97 Nasal 2.0L 
 
       Cannula  
 
 0934    11460     
 
 0934    114/60     
 
 0934    11460     
 
 0801 97.8 78 20 114/60  93 Nasal  
 
       Cannula  
 
 0000      95 Nasal 2.0L 
 
       Cannula  
 
 223  86  122/60     
 
2  86  122/60     
 
 2232  86  122/60     
 
 2123 98.2 84 20 106/60  91   
 
 1758 97.9 85 20 140/80  92 Nasal 2.0L 
 
       Cannula  
 
 1502 97.9 73 18 137/79  94 Nasal 2.0L 
 
       Cannula  
 
 1150 98.4 96  145/86  96 Nasal  
 
       Cannula  
 
 
 ED Intake and Output
 
 
  0000  1200
 
Intake Total 250 
 
Output Total 450 
 
Balance -200 
 
   
 
Intake, Oral 250 
 
Number 0 
 
Bowel  
 
Movements  
 
Output, Urine 450 
 
Patient  240 lb
 
Weight  
 
Weight  Reported by Patient
 
Measurement  
 
Method  
 
 
 
Past History
 
Travel History
Traveled to [place] past 21 day No
 
Medical History
Any Pertinent Medical History? see below for history
Neurological: NONE
EENT: NONE
Cardiovascular: CARDIAC STENTS CABG CAROTID ARTERY BYPASS DEFIBRILLATOR
Respiratory: COPD
Gastrointestinal: NONE
Hepatic: NONE
Renal: NONE
Musculoskeletal: NONE
Psychiatric: NONE
Endocrine: hypothyroidism
Blood Disorders: DVT
Cancer(s): NONE
GYN/Reproductive: NONE
History of MRSA: No
History of VRE: No
History of CDIFF: No
 
Surgical History
Surgical History: CABG, CAROTID ARTECTOMY nerve thermoablation to reduce back 
pain 
 
Psychosocial History
Who do you live with Spouse
Services at Home None
What is your primary language English
Tobacco Use: Current Daily Use
Daily Tobacco Use Amount/Type: => 5 Cigarettes daily
ETOH Use: denies use
Illicit Drug Use: denies illicit drug use
 
Family History
Hx Contributory? No
(LENIN CALDERON)
 
Review of Systems
 
Review of Systems
Constitutional:
Reports: no symptoms. 
EENTM:
Reports: no symptoms. 
Respiratory:
Reports: see HPI, cough, wheezing. 
Cardiovascular:
Reports: see HPI. 
GI:
Reports: no symptoms. 
Genitourinary:
Reports: no symptoms. 
Musculoskeletal:
Reports: no symptoms. 
Skin:
Reports: no symptoms. 
Neurological/Psychological:
Reports: no symptoms. 
Hematologic/Endocrine:
Reports: no symptoms. 
Immunologic/Allergic:
Reports: no symptoms. 
All Other Systems: Reviewed and Negative
(LENIN CALDERON)
 
Physical Exam
 
Physical Exam
General Appearance: no apparent distress, obese
Respiratory: no respiratory distress, wheezing
Cardiovascular: regular rate/rhythm
Comments:
Well-developed well-nourished person in no acute distress
HEENT: Normal EENT exam,  
Neck: Supple, no lymphadenopathy, normal range of motion without pain or 
tenderness
Back: Nontender, no CVA tenderness.
Cardiovascular: Regular rate and rhythms no murmurs rubs or gallops, normal JVP
Abdomen: Soft,  nondistended, no appreciable organomegaly. Normal bowel sounds. 
No ascites
Extremity: 
Left hip normal inspection GENERALIZED point tenderness noted
Left ankle normal inspection generalized point tenderness noted
No edema, no calf tenderness to palpation, normal and equal pulses.
Neuro: Alert 
Psych: Mood and affect is normal, memory and judgment is normal.
 
Diagram
Chest, Abdomen, Back:
 
  1) Noted right sided intercostal and right upper quadrant abdominal point 
tenderness and ecchymosis
No rebound tenderness
 
Core Measures
ACS in differential dx? No
Severe Sepsis Present: No
Septic Shock Present: No
(ALISON GREEN,LENIN)
 
Progress
Differential Diagnosis: AMI, aortic dissection, atrial fibrillation, 
cholecystitis, CHF/pulm edema, costochondritis, hyperkalemia, hypovolemia, 
hyperthyroid, hyperventilation, intracranial hemorrhage, musculoskeletal pain, 
myocarditis, pancreatitis, pericarditis, pneumonia, pneumothorax, PSVT, 
pulmonary embolism, PUD/GERD, PVCs/PACs, respiratory failure, rib fracture, 
sepsis, unstable angina, V-fib/V-Tach, WPW syndrome, TIA, CVA, WERNICKE'S,ETOH W
/D POLYSUBSTANCE ABUSE MENINGITIS
Diagnostic Imaging:
Viewed by Me: CT Scan. 
Radiology Impression: SEE COMMENTS
Initial ED EKG: normal p-waves, normal QRS complex, normal sinus rhythm, 85 BPM
Comments:
PATIENT: JULIO CÉSAR CRUZ JR  MEDICAL RECORD NO: [id number]
PRESENT AGE: 71  PATIENT ACCOUNT NO: [id number]
: 41  LOCATION: [facility]
ORDERING PHYSICIAN: LENIN GREEN  
 
  SERVICE DATE: 
EXAM TYPE: CAT - CT ABD & PELVIS W IV CONTRAST; CT CHEST W IV CONTRAST
 
EXAMINATION:
CT CHEST, ABDOMEN AND PELVIS WITH CONTRAST
 
CLINICAL INFORMATION:
Pain following trauma. Right-sided pain after fall.
 
COMPARISON:
2015.
 
TECHNIQUE:
Contiguous axial thin section helical images of the chest, abdomen and pelvis
were performed following the administration of 95 mL of intravenous Optiray
320. The data set was reformatted in the coronal and sagittal planes and
reviewed on an independent workstation.
 
DLP: 827 mGy-cm.
 
FINDINGS:
The heart is of normal size. There is no pericardial effusion. There is
neither mediastinal, hilar nor axillary lymphadenopathy. There are no chest
wall masses.
 
Review of lung windows demonstrates that there are neither pleural effusions
nor pneumothoraces. There are mild manifestations of emphysema, probably
within the lung apices. There are no consolidations.
 
Within the left lower lobe on image 307/1104, there is a 1.7 cm irregular
shaped nodule.
 
There is a small hiatal hernia.
 
The liver is of normal size and attenuation without focal lesions nor
intrahepatic biliary ductal dilation. A normal gallbladder is identified.
There is no wall thickening or discernible pericholecystic fluid.
 
The spleen, pancreas, adrenal glands are unremarkable.
 
Both kidneys are of normal size and attenuation without hydronephrosis. There
is a 6 mm nonobstructive calculus within the lower pole of the left kidney.
Following the administration of IV contrast, prompt symmetric nephrograms are
displayed.
 
There is no abdominal free fluid. There is neither mesenteric nor
retroperitoneal lymphadenopathy.
 
Again identified is aneurysmal dilation to the abdominal aorta. This is
unchanged from prior exam measuring 5.7 cm in greatest sagittal AP dimension.
A stent graft is in place.
 
There is sigmoid diverticulosis without evidence of diverticulitis.
Otherwise, unremarkable unopacified loops of small and large bowel are
identified. There is no pelvic free fluid. The urinary bladder is
unremarkable. There is neither pelvic nor inguinal lymphadenopathy.
 
Bone windows: Neither sclerotic nor lytic bone lesions are identified.
 
IMPRESSION:
 
No evidence for acute traumatic injury.
 
1.7 cm irregular shaped left lower lobe nodule. Especially in the setting of
mild emphysema, this is suspicious for a lung neoplasm. Recommendation is for
correlation with PET/CT for further tissue characterization.
 
Sigmoid diverticulosis without evidence of diverticulitis.
 
Aortobiiliac stent graft in place with aneurysmal dilation of the infrarenal
abdominal aorta unchanged from prior exam.
 
DICTATED BY: JANELLE GLASS,ALVINO 
(LNEIN CALDERON)
Plan of Care:
 Orders
 
 
Procedure Date/time Status
 
RT: Evaluation  1124 Active
 
BASIC ELECTROLYTES PLUS BUN&CR  0600 Complete
 
THERAPIST ORDERS   UNK Complete
 
OXYGEN SETUP (GEN)   UNK Complete
 
Therapeutic Activity   UNK Complete
 
OT EVAL LOW COMPLEX 30 MIN   UNK Complete
 
ADL/SelfCare   UNK Complete
 
Change service to  1402 Active
 
Therapeutic Activities   UNK Complete
 
PT EVAL LOW COMPLEX 20 MIN   UNK Complete
 
Medtronic Device Interrogation   UNK Active
 
 
 Current Medications
 
 
  Sig/Ly Start time  Last
 
Medication Dose  Stop Time Status Admin
 
Prednisone 10 MG DAILY  1000 AC 
 
    1001  
 
Prednisone 20 MG DAILY  1000 AC 
 
    1001  
 
Albuterol Sulfate 3 ML BID  2200 AC 
 
(Proventil)     1117
 
Tamsulosin HCl 0.4 MG AT BEDTIME  2200 AC 
 
(Flomax)     2232
 
Senna/Docusate Sodium 2 TAB DAILY PRN  1945 AC 
 
(Senokot S)     2229
 
Polyethylene Glycol 17 GM DAILY  1935 AC 
 
(Miralax)     0934
 
Aspirin 81 MG DAILY  1000 AC 
 
(Aspirin)     0915
 
Carvedilol 6.25 MG BID  1000 AC 
 
(Coreg)     0934
 
Clopidogrel Bisulfate 75 MG DAILY  1000 AC 
 
(Plavix)     0934
 
Isosorbide  60 MG DAILY  1000 AC 
 
Mononitrate     0934
 
(Imdur)     
 
Ranolazine 500 MG BID  1000 AC 
 
(Ranexa)     0934
 
Tiotropium [place] 1 PUF DAILY  1000 AC 
 
(Spiriva)     0934
 
Levothyroxine Sodium 0.1 MG DAILY AC  0700 AC 
 
(Synthroid)     0649
 
Albuterol Sulfate 2 PUF Q4P PRN  0200 AC 
 
(Ventolin)     
 
Budesonide/ 2 PUF BID  0033 AC 
 
Formoterol Fumarate     0934
 
(Symbicort)     
 
Acetaminophen 650 MG Q6P PRN  0030 AC 
 
(Tylenol)     
 
Acetaminophen 1,000 MG Q12P PRN  0030 AC 
 
(Ofirmev)     
 
Heparin Sodium  5,000 UNIT Q8  0024 AC 
 
(Porcine)     0649
 
 
 Laboratory Tests
 
 
 
17 0636:
Anion Gap 8, Estimated GFR 50  L, BUN/Creatinine Ratio 18.6
After patient had complete resolution of his acute delirium episode it was noted
that patient was intermittently coherent while in the emergency room.
Urine drug screen is pending.  Plan at this time during admission there is no 
clear etiology of patient's altered mental state 
 
ABG was unremarkable
 
Patient still believes his 1992 and is unaware of month
He does know he is at [facility]
 
Patient did pass a swallow evaluation had intact gag reflex and was able to 
swallow 3 ounces of water
 
It is also noted that Dr. ARGUELLO discussed altered mental state with neurology
 
There was also no focal deficits on patient's exam for concerns of CVA/TIA
(ALISON GREEN,LENIN)
 
Departure
 
Departure
Disposition: STILL A PATIENT
Condition: Guarded
Clinical Impression
Primary Impression: Altered mental state
Secondary Impressions: Abdominal contusion, COPD (chronic obstructive pulmonary 
disease), Fall, Left ankle pain, Left hip pain, Syncope
Referrals:
NAI CLARKE MD (PCP/Family)
 
Departure Forms:
Customer Survey
General Discharge Information
 
Admission Note
Spoke With:
JATINDER GLASS,DARIANARACHEL
Documentation of Exam:
Documentation of any treatments & extenuating circumstances including Concerns 
Regarding Discharge (functional status, medication knowledge or non-compliance, 
living conditions, etc.) that warrant an admission rather than observation: [
Discussed patient with  who agrees with telemetry admission for 
concerns of altered mental status which patient requires MRI, repeat labs, 
pulmonary consultation, NEB treatments, IV steroids neurology consultation and 
outpatient treatment at this time would be medically harmful]
 
(ALISON GREEN,LNEIN)
 
PA/NP Co-Sign Statement
Statement:
ED Attending supervision documentation-
 
[x] I saw and evaluated the patient. I have also reviewed all the pertinent lab 
results and diagnostic results. I agree with the findings and the plan of care 
as documented in the PA's/NP's documentation. pt stable clinically. c/o left 
calf pain with tenderness laterally. nt over bone. likely calf pull. pt told 
needs to be npo until cat scan results known. 
 
[] I have reviewed the ED Record and agree with the PA's/NP's documentation.
 
[] Additions or exceptions (if any) to the PAs/NP's note and plan are 
summarized below:
[]
 
(MICKIE GLASS,XAVI CANSECO)
 
PA/NP Co-Sign Statement
Statement:
 
ED Attending supervision documentation-
 
[] I saw and evaluated the patient. I have also reviewed all the pertinent lab 
results and diagnostic results. I agree with the findings and the plan of care 
as documented in the PA's/NP's documentation. 
 
[] I have reviewed the ED Record and agree with the PA's/NP's documentation.
 
[] Additions or exceptions (if any) to the PAs/NP's note and plan are 
summarized below:
[]
 
 
 
Seen and personally examined the patient.  He is a 71-year-old man who had a 
syncopal episode and came to the emergency department.  While he was being 
interviewed in the emergency department he had a transient alteration in his 
consciousness.  He was immediately taken to CT scan which was negative for 
intracranial bleed.  Over time his neurological exam return to baseline.  During
the altered mental status he was confused.  However he had no facial weakness.  
Cranial nerves II through XII are intact.  There was no focal weakness.  He does
have ecchymosis to the right anterior abdomen.  Pan CT scan was ordered.
(SAUNDRA CHARLTON,XAVI WATTERS)
 
Critical Care Note
 
Critical Care Note
Critical Care Time: non-applicable
(ALISON GREEN,LENIN)

## 2017-07-18 NOTE — HISTORY & PHYSICAL
[id number]:
General Information and HPI
MD Statement:
I have seen and personally examined JULIO CÉSAR CRUZ LAI LAUGHLIN and documented this H&P.
 
The patient is a 71 year old M smoker with PMH of CAD, CABG, ischemic 
cardiomyopathy, HTN, CHF, AICD placement, carotid endarterectomy and 
hypothyroidism who presented with chief complaint of shortnes of breath. When he
presented to ED this evening he was alert, oriented and answering the questions 
when they were getting history from him but during the interviewing he became 
unresponsive. His eyes were open but he was mute and this stage remined for 
couple of minutes and stroke protocal was called during that time. After couple 
of minutes patient started talking but he was disoriented and uttering 
inappropriate words. [].
During the interview he also reported that he was admitted to hospital in 2017 due to COPD. He also reported that he came to ED two days back because he 
belives he had a syncopal episode after multiple boutes of cough and he fell 
down. When he regained the consciousness he was feeling  pain in right side of 
his chest, abdomen and also left hip and ankle. He noticed a bruise on right 
side of his chest and abdomen.
History of Present Illness:
The patient is a 71 year old M smoker with PMH of CAD, CABG, ischemic 
cardiomyopathy, HTN, CHF, AICD placement, carotid endarterectomy and 
hypothyroidism who presented with chief complaint of shortnes of breath. When he
presented to ED this evening he was alert, oriented and answering the questions 
when they were getting history from him but during the interviewing he became 
unresponsive. His eyes were open but he was mute and this stage remined for 
couple of minutes and stroke protocal was called during that time. After couple 
of minutes patient started talking but he was disoriented and uttering 
inappropriate words. 
During the interview he also reported that he was admitted to hospital in 2017 due to COPD. He also reported that he came to ED two days back because he 
belives he had a syncopal episode after multiple boutes of cough and he fell 
down. When he regained the consciousness he was feeling  pain in right side of 
his chest, abdomen and also left hip and ankle. He noticed a bruise on right 
side of his chest and abdomen.
Chest CT scan in ED showed 1.7cm irregular mass in left lower lobe.
 
Allergies/Medications
Allergies:
Coded Allergies:
NO KNOWN ALLERGIES (04/05/15)
 
Home Med list
Acetaminophen 325 MG CAPSULE   1 CAP PO Q6 PRN PAIN  (Reported)
Albuterol Sulfate (Proair Hfa) 90 MCG HFA.AER.AD   2 PUF INH Q4 COPD
Aspirin (Aspirin*) 81 MG TAB.CHEW   1 TAB PO DAILY heart  (Reported)
Atorvastatin Calcium (Lipitor) 40 MG TABLET   1 TAB PO DAILY cholesterol  (
Reported)
Budesonide/Formoterol Fumarate (Symbicort 160-4.5 Mcg Inhaler) 160 MCG-4.5 MCG/
ACTUATION HFA.AER.AD   2 PUF INH BID COPD  (Reported)
Carvedilol (Coreg) 3.125 MG TABLET   6.25 MG PO BID HEART
Clopidogrel Bisulfate (Clopidogrel) 75 MG TABLET   1 TAB PO DAILY BLOOD THINNER 
(Reported)
Furosemide (Lasix) 20 MG TABLET   1 TAB PO DAILY DIURETIC  (Reported)
Gabapentin (Neurontin) 800 MG TABLET   1 TAB PO TID BACK PAIN  (Reported)
Isosorbide Mononitrate (Isosorbide Mononitrate ER) 60 MG TAB.ER.24H   1 TAB PO 
DAILY    (Reported)
Levothyroxine Sodium 100 MCG TABLET   1 TAB PO DAILY THYROID  (Reported)
Nitroglycerin (Nitrostat) 0.4 MG TAB.SUBL   1 TAB SL AD PRN CHEST PAIN  (
Reported)
     1st sign of attack; may repeat every 5 minutes until relief; if pain 
persists after 3 tablets in 15 minutes, prompt medical att
Ranolazine (Ranexa) 500 MG TAB.ER.12H   1 TAB PO BID heart  (Reported)
Tamsulosin HCl (Flomax) 0.4 MG CAP.ER.24H   1 CAP PO QHS prostate
     Please take at bed time to avoid low blood pressure during the day.
Tiotropium Bromide (Spiriva) 18 MCG CAP.W.DEV   1 CAP INH DAILY COPD  (Reported)
 
 
Past History
 
Travel History
Traveled to [place] past 21 day No
 
Medical History
Neurological: NONE
EENT: NONE
Cardiovascular: CARDIAC STENTS CABG CAROTID ARTERY BYPASS DEFIBRILLATOR
Respiratory: COPD
Gastrointestinal: NONE
Hepatic: NONE
Renal: NONE
Musculoskeletal: NONE
Psychiatric: NONE
Endocrine: hypothyroidism
Blood Disorders: DVT
Cancer(s): NONE
GYN/Reproductive: NONE
History of MRSA: No
History of VRE: No
History of CDIFF: No
 
Surgical History
Surgical History: CABG, CAROTID ARTECTOMY nerve thermoablation to reduce back 
pain 
 
Past Family/Social History
 
Psychosocial History
Who Do You Live With? spouse
Services at Home: None
ETOH Use: denies use
Illicit Drug Use: denies illicit drug use
 
Functional Ability
ADLs
Independent: dressing, eating, toileting, bathing. 
Ambulation: independent
 
Exam & Diagnostic Data
Last 24 Hrs of Vital Signs/I&O
 Vital Signs
 
 
Date Time Temp Pulse Resp B/P B/P Pulse O2 O2 Flow FiO2
 
     Mean Ox Delivery Rate 
 
 2249 97.5 90 18 129/61  97 Nasal 3.0L 
 
       Cannula  
 
 2135      79   
 
 2103 98.6 86 18 133/68  96 Nasal 3.0L 
 
       Cannula  
 
8 97.9 86 24 132/68  97 Room Air  
 
 1816      94 Room Air  
 
 1634 97.2 87 18 131/79  94 Room Air  
 
 
 Intake & Output
 
 
  0800  0000  1600
 
Intake Total   
 
Output Total   
 
Balance   
 
    
 
Patient  240 lb 
 
Weight   
 
Weight  Reported by Patient 
 
Measurement   
 
Method   
 
 
 
 
Physical Exam
General Appearance Alert
Skin No Rashes
Skin Temp/Moisture Exam: Warm/Dry
HEENT Atraumatic, PERRLA, EOMI
Neck Supple
Cardiovascular Regular Rate
Abdomen Normal Bowel Sounds
Neurological Strength at 5/5 X4 Ext, Normal Tone, Cranial Nerves 3-12 NL, 
Reflexes 2+
 
Assessment/Plan
Assessment:
Accoring to patient history and physical , labs were ordered in ED and CT scan 
without contrast was negative. We will admit the patient to follow the stroke 
protocal and also assess for cardiac causes of syncopal episode.
We tried to interview the patient before we admit the patient to floor but he 
was hesitent to reply to questions and the we tried to call to her wife so that 
we could get some information about his medical condition but the phone was 
continuously busy.
Chest CT scan showed 1.7cm irregular mass in left lower lobe. We will get 
consulatation from pulmonology and oncology for that.
 
 
Core Measures/Miscellaneous
 
Acute Coronary Syndrome
ACS Diagnosis: No
 
Cerebrovascular Accident
CVA/TIA Diagnosis: Yes
NIH Stroke Scale:
Total 0
Neurological S/S of CVA: Incoherent Speech
Symptom Start Date: 17
Symptom Start Time: 
Bedside Swallow Eval Done: Yes
Result of Evaluation: Pass
Days in Hospital:
full admission
Antithrombotic: No
AFIB: No
Aflutter: No
Anticoagulant: Yes
Evidence of Atherosclerosis: Yes
LDL Assessed Within 24 Hours: No
Currently on Statin: Yes
Rehab Needs Assessed: Medical Eval for Rehab
PT Consult Ordered: No
Comment: we will order later
 
Congestive Heart Failure
CHF Diagnosis: No
 
VTE (View Protocol)
VTE Risk Factors: Age > 40, Smoking
[facility] d/t: VTE low risk (we will give)
No VTE Pharm Prophylaxis d/t: VTE low risk (he is on heparin)
VTE Diagnosis: No
VTE Type: NONE
VTE Confirmed by (Test): NONE
 
Sepsis (View Protocol)
Severe Sepsis Present: No
 
Septic Shock
Septic Shock Present: No
 
Miscellaneous Documentation
Attending Case Discussed With:
JATINDER GLASS,LISA GARCIA 17 [id number]:
Review of Systems
 
Review of Systems
Constitutional:
Denies: chills, diaphoresis. 
 
Assessment/Plan
 
As Ranked By This Provider
Problem List:
 1. Altered mental state
 
 
Core Measures/Miscellaneous
 
Miscellaneous Documentation
[facility]
 
Patient sees these Specialists
none
Level of Patient Care: Telemetry
 
Resident Review Statement
Resident Statement: examined this patient, discussed with intern, agreed with 
intern
Other Findings:
Patient is 71-year-old  gentleman with past medical history significant
for coronary artery disease status post CABG in , ischemic cardiomyopathy, 
congestive heart failure, status post AICD placement, hypertension, 
hyperthyroidism, peripheral vascular disease, history of carotid endarterectomy,
status post aortic stent placement, recently admitted at [facility] in 
April for COPD exacerbation came to emergency room with chief complaint of 
worsening shortness of breath and had a syncopal episode almost 2 days ago.  At 
that point patient suddenly collapsed and fell onto traveled resulted abdominal/
right-sided reducing it wound.  While the patient was seen by physician 
assistant in ER when he was alert and oriented and answering his questions  
appropriately and all of a sudden he started stating and unresponsive for good 3
minutes events stroke alert was activated.  All facet and patient came back 
responsive but his speech was garbled and his sentences were not making any 
sense that condition persist for a couple of minutes and after that he was back 
to his baseline.  Head CT was done which was negative.  By the time we saw the 
patient he was very sleepy and was not able to give us lot of history and 
continuously mentioning that he wanted to sleep and come in the morning.  He 
denied any chest pain, palpitations, any headache, any urinary or bowel 
complaints.  He was very confused and doesn't know why he was here and  was not 
oriented to time person or place.  His speech was at times didn't make any sense
and he wasn't answering her questions appropriately.
Of note I tried multiple times to call prior if on No given in chart but
Was continuously unreachable
 
Vital signs on admission were temperature 97.2, pulse 87, respiratory rate 18, 
blood pressure 131/79, pulse ox 94 on room air.
 
Labs were WBC count 11.5, hemoglobin 13.2, hematocrit 39.4, platelet count 189, 
ABGs were pH 7.41/45/96/28, sodium 132, potassium 4.0, BUNs 19, creatinine 1.5, 
urine toxicology was negative.
 
X-ray hip, x-ray of ankle were negative.
Head CT was negative for any acute intracranial pathology
CT abdomen and chest showed 1.7 cm irregular shaped left lower lobe nodule PET 
CT is recommended.  Sigmoid diverticulosis without evidence of diverticulitis.
 
Physical examination
Sleepy but not oriented to time person and place
Head atraumatic
Neck supple
Chest bilateral wheezes
Heart S1-S2 normal with no added sounds
Abdomen showed right upper quadrant Bruise, normal bowel sounds
Extremities showed no edema or cyanosis
Patient was not following commands appropriately but apparently no motor 
neurological deficit noted
 
Assessment and plan
71-year-old  male with history of CAD status post CABG, status post 
AICD placement, COPD, hypertension came with dyspnea/history of syncopal episode
2 days prior to admission and constipation and was noted to be altered while in 
the emergency room which could be due to TIA/stroke/seizure and will rule out 
cardiac causes for syncope as well
Problem list
1.  History of CAD status post CABG
2.  History of hypertension
3.  History of dyslipidemia
4.  Altered mental status could be due to TIA/stroke
5.  Elevated proBNP might be element of CHF
6.  1.7 cm irregular lung nodules
8.  Recent episodes of syncope
Plan
We will admit patient on telemetry floor
We will check serial troponins and EKGs to rule out ACS
Echocardiogram
Cardiology evaluation in a.m.
Neurology evaluation in a.m. for TIA/stroke evaluation
PTOT consultation
Head CT was negative but we will do MRI in a.m.
Carotid Doppler ultrasounds to rule out hemodynamic significant stenosis given 
his history of peripheral vascular disease/hypertension
We will confirm CMR in the morning but we will continue all his important 
medications
Patient passed bedside evaluation if needed or if his condition deteriorate we 
will request formal swallow evaluation
Neuro checks every 4 hours
Neurology evaluation in a.m.
We will check EEG to rule out seizures
 
Heart healthy diet
Pharmacological DVT prophylaxis
Patient is full code
 
 
JATINDER GLASS, [facility] 17 0319:
Attending MD Review Statement
 
Attending Statement
Attending MD Statement: examined this patient, discuss w/resident/PA/NP, agreed 
w/resident/PA/NP
Attending Assessment/Plan:
70 yo M with h/o COPD, JEREMY, HTN, CAD s/p CABG, ischemic CMP, chronic systolic 
CHF (EF 35-40%), s/p AICD + PPM and PVD s/p aortic aneurysm repair and femoral 
artery stent (), Rt carotid endarterectomy, CBP, CKD stage 3B, last admitted
to [facility] (2017) for COPDE, is brought in by family for dyspnea, 
constipation and unwitnessed syncopal episode 2 days ago. History is as obtained
from ER notes, we were unable to reach family and patient was altered, 
uncooperative. 
Per ER records, patient reports having coughing spells that resulted in a 
syncopal episode 2 days ago with resultant fall and bruising to right chest. 
Patient was alert, oriented on initial ER evaluation, however he had a 5-7 
minute episode of unresponsiveness where his eyes were open but he was aphasic 
and then kept repeating same words again and again with garbled speech. Blood 
glucose was 131, stroke alert was called. He made full recovery in the ER with 
no recollection of events. Dr. Banegas spoke with Dr. Bishop and since patient had 
complete recovery, no reason for tPA. However, on my evaluation, patient was 
uncooperative and wanted to sleep. After persistent requests, he sat up for us, 
answered a few questions but could not give an appropriate response as to why he
was at the hospital. He was incoherent at times. 
 
VSS. Limited neuro exam, incoherent speech, disoriented, otherwise power and 
sensation intact, no obvious cranial nerve deficit. Unable to perform gait and 
cerebellar signs as patient not cooperative. Chest b/l reduced air entry, with 
expiratory wheeze. Labs: WBC 11.5, H/H 13.2/39.4, macrocytic anemia, Na 132, 
creat 1.5 (baseline), Mag 1.5, ammonia <9, LFTs normal, trop neg, thyroid 
functions normal. UA neg, Utox neg. Alcohol <10. AB.1/45/96/28. CT head: no 
acute pathology. CT chest/abd/pelvis: LLL 1.7 cm irregular nodule ?lung 
neoplasm. Left ankle/hip Xray: neg. EKG: SR, nonspecific IVCD. Echo (2016): EF 
35-40%, mild to moderate AS, moderate MR and TR with mild to mod pulm htn.
 
1. Altered mental status, acute delirium of unclear etiology. Acute stroke vs. 
Seizure are in the differential. Syncope ?vasovagal vs. Cardiogenic. ?Metabolic 
encephalopathy. Thyroid functions are normal. Utox is negative. No clear source 
of infection. Tele admit, monitor for arrhythmias, neurochecks, MRI head in AM, 
carotid dopplers, rule out ACS, echo, EEG, Neuro and Cardio consult. We need to 
obtain more collateral information from family. Possible need for AICD and PPM 
interoggation. Continue aspirin, plavix and high dose statin. PT/OT/ speech 
eval. Patient passed bedside swallow done in ER. Replete electrolytes  
magnesium > 2.0 and K > 4.0. Check B12 and folic acid levels. 
2. Mild COPD exacerbation. TRC nebs, rapid prednisone taper 40--> 30-->20-->10 
then stop. Continue spiriva and symbicort. Patient will need eventual outpatient
Pulm follow up for further evaluation of the LLL nodule (new).
3. Leukocytosis likely reactive. No UTI or pneumonia. Panculture, monitor off 
antibiotics. 
DVT ppx Hep SC. Full code (this needs to be discussed as well).
*** CMR needs to be confirmed in AM.

## 2017-07-18 NOTE — RADIOLOGY REPORT
EXAMINATION:
XR ANKLE, LEFT
 
CLINICAL INFORMATION:
Fall 2 days ago. Pain.
 
COMPARISON:
None
 
TECHNIQUE:
3 views of the left ankle.
 
FINDINGS:
The bones and soft tissues are normal. No fracture. Alignment is anatomic.
Joint spaces are maintained. No joint effusion.
 
IMPRESSION:
Normal left ankle.

## 2017-07-18 NOTE — RADIOLOGY REPORT
EXAMINATION:
XR HIP, LEFT
 
CLINICAL INFORMATION:
Fall 2 days ago. Left hip pain.
 
COMPARISON:
None
 
TECHNIQUE:
Two views of the left hip.
 
FINDINGS:
No fracture of pelvis or left hip. Mild degenerative joint narrowing and
superior lateral acetabular small spurs of both hips. Patient has had prior
stent of the aorta iliac arteries. Stent in the proximal left thigh. Surgical
clips in the right and left groin.
Contrast within the collecting system from IV contrast injected for CT chest
today.
 
IMPRESSION:
No fracture of left hip. Mild degenerative joint disease of hips.

## 2017-07-18 NOTE — CT SCAN REPORT
EXAMINATION:
CT HEAD WITHOUT CONTRAST
 
CLINICAL INFORMATION:
Altered mental status.
 
COMPARISON:
CT head 10/18/2016
 
TECHNIQUE:
Contiguous axial imaging was performed from the skull base to vertex without
intravenous administration of contrast.
 
DLP:
630.37 mGy-cm
 
FINDINGS:
There is no evidence of acute intracranial hemorrhage or territorial
infarction. No abnormal mass effect or midline shift is seen. Gray to white
matter differentiation is well preserved. No extra-axial fluid collections
are identified.
 
There is atrophy with prominence of the ventricles and the sulci and
hypodensity of the periventricular white matter due to chronic small vessel
ischemic disease. There is vascular calcifications of the internal carotid
arteries and vertebral arteries bilaterally.
 
The osseous structures and soft tissues are normal. The mastoid air cells
and visualized portions of the paranasal sinuses are well aerated.
 
IMPRESSION:
No acute intracranial pathology.
 
This critical result was discussed with Dr. Cunningham on 07/18/2017, 7:20 PM
and it was ascertained that the content and urgency of the report was
understood at the time of direct communication.

## 2017-07-18 NOTE — CT SCAN REPORT
EXAMINATION:
CT CHEST, ABDOMEN AND PELVIS WITH CONTRAST
 
CLINICAL INFORMATION:
Pain following trauma. Right-sided pain after fall.
 
COMPARISON:
09/29/2015.
 
TECHNIQUE:
Contiguous axial thin section helical images of the chest, abdomen and pelvis
were performed following the administration of 95 mL of intravenous Optiray
320. The data set was reformatted in the coronal and sagittal planes and
reviewed on an independent workstation.
 
DLP: 827 mGy-cm.
 
FINDINGS:
The heart is of normal size. There is no pericardial effusion. There is
neither mediastinal, hilar nor axillary lymphadenopathy. There are no chest
wall masses.
 
Review of lung windows demonstrates that there are neither pleural effusions
nor pneumothoraces. There are mild manifestations of emphysema, probably
within the lung apices. There are no consolidations.
 
Within the left lower lobe on image 307/1104, there is a 1.7 cm irregular
shaped nodule.
 
There is a small hiatal hernia.
 
The liver is of normal size and attenuation without focal lesions nor
intrahepatic biliary ductal dilation. A normal gallbladder is identified.
There is no wall thickening or discernible pericholecystic fluid.
 
The spleen, pancreas, adrenal glands are unremarkable.
 
Both kidneys are of normal size and attenuation without hydronephrosis. There
is a 6 mm nonobstructive calculus within the lower pole of the left kidney.
Following the administration of IV contrast, prompt symmetric nephrograms are
displayed.
 
There is no abdominal free fluid. There is neither mesenteric nor
retroperitoneal lymphadenopathy.
 
Again identified is aneurysmal dilation to the abdominal aorta. This is
unchanged from prior exam measuring 5.7 cm in greatest sagittal AP dimension.
A stent graft is in place.
 
There is sigmoid diverticulosis without evidence of diverticulitis.
Otherwise, unremarkable unopacified loops of small and large bowel are
identified. There is no pelvic free fluid. The urinary bladder is
unremarkable. There is neither pelvic nor inguinal lymphadenopathy.
 
Bone windows: Neither sclerotic nor lytic bone lesions are identified.
 
IMPRESSION:
 
No evidence for acute traumatic injury.
 
1.7 cm irregular shaped left lower lobe nodule. Especially in the setting of
mild emphysema, this is suspicious for a lung neoplasm. Recommendation is for
correlation with PET/CT for further tissue characterization.
 
Sigmoid diverticulosis without evidence of diverticulitis.
 
Aortobiiliac stent graft in place with aneurysmal dilation of the infrarenal
abdominal aorta unchanged from prior exam.

## 2017-07-19 VITALS — DIASTOLIC BLOOD PRESSURE: 80 MMHG | SYSTOLIC BLOOD PRESSURE: 140 MMHG

## 2017-07-19 VITALS — DIASTOLIC BLOOD PRESSURE: 60 MMHG | SYSTOLIC BLOOD PRESSURE: 106 MMHG

## 2017-07-19 LAB
ABSOLUTE GRANULOCYTE CT: 6.7 /CUMM (ref 1.4–6.5)
BASOPHILS # BLD: 0 /CUMM (ref 0–0.2)
BASOPHILS NFR BLD: 0.2 % (ref 0–2)
EOSINOPHIL # BLD: 0 /CUMM (ref 0–0.7)
EOSINOPHIL NFR BLD: 0 % (ref 0–5)
ERYTHROCYTE [DISTWIDTH] IN BLOOD BY AUTOMATED COUNT: 15.8 % (ref 11.5–14.5)
GRANULOCYTES NFR BLD: 94.8 % (ref 42.2–75.2)
HCT VFR BLD CALC: 38.3 % (ref 42–52)
LYMPHOCYTES # BLD: 0.3 /CUMM (ref 1.2–3.4)
MCH RBC QN AUTO: 32.9 PG (ref 27–31)
MCHC RBC AUTO-ENTMCNC: 33.6 G/DL (ref 33–37)
MCV RBC AUTO: 97.8 FL (ref 80–94)
MONOCYTES # BLD: 0 /CUMM (ref 0.1–0.6)
PLATELET # BLD: 178 /CUMM (ref 130–400)
PMV BLD AUTO: 7.5 FL (ref 7.4–10.4)
RED BLOOD CELL CT: 3.91 /CUMM (ref 4.7–6.1)
WBC # BLD AUTO: 7.1 /CUMM (ref 4.8–10.8)

## 2017-07-19 NOTE — ECHOCARDIOGRAM REPORT
JULIO CÉSAR CRUZ 
 
 Age:    71     :    1945      Gender:     M 
 
 MRN:    [id number] 
 
 Exam Date:     2017  
                08:31 
 
 Exam Location: ER 
 
 Ht (in):     72      Wt (lb):      240     BSA:    2.38 
 
 BP:          140     /     79 
 
 Ordering Physician:        LISA BATRES MD 
 
 Referring Physician:       LISA BATRES MD 
 
 Technologist:              Rishi Padron DAMIEN 
 
 Room Number:               16 
 
 Indications:       Hypertension 
 
 Rhythm: 
 
 Technical Quality:      Technically difficult study 
 
 FINDINGS 
 
 Left Ventricle 
 Mild left ventricular dilatation. Left ventricular wall thickness  
 mildly increased. Moderate global hypokinesis with severe  
 hypokinesis of the inferior wall and anteroseptum. Left ventricular  
 ejection fraction is estimated at 20 %. 
 
 Right Ventricle 
 Normal right ventricular size. Mildly reduced right ventricular  
 global systolic function. Catheter/pacemaker wire in the right  
 ventricular cavity. 
 
 Right Atrium 
 Normal right atrial size. 
 
 Left Atrium 
 Mild left atrial dilatation. 
 
 Mitral Valve 
 Mild mitral annular calcification. No mitral stenosis. Mild mitral  
 regurgitation. 
 
 Aortic Valve 
 Trileaflet aortic valve. Moderate aortic stenosis (MICHELLE 1.2 cm2; mean  
 gradient 12 mmHg). Trace aortic regurgitation. 
 
 Tricuspid Valve 
 Structurally normal tricuspid valve. Mild tricuspid regurgitation.  
 Unable to estimate the right ventricular systolic pressure. 
 
 Pulmonic Valve 
 Pulmonic valve not well visualized, grossly normal. 
 
 Pericardium 
 No pericardial effusion. 
 
 Great Vessels 
 Normal size aortic root and proximal ascending aorta. 
 
 CONCLUSIONS 
 Technically difficult study. Definity contrast was used.  
 Mild left ventricular dilatation. Left ventricular wall thickness  
 mildly increased. Moderate global hypokinesis with severe  
 hypokinesis of the inferior wall and anteroseptum. Left ventricular  
 ejection fraction is estimated at 20 %.  
 Normal right ventricular size. Mildly reduced right ventricular  
 global systolic function.  
 Catheter/pacemaker wire in the right ventricular cavity.  
 Mild left atrial dilatation.  
 Moderate aortic stenosis (MICHELLE 1.2 cm2; mean gradient 12 mmHg). 
 
 Regis Mc M.D. 
 (Electronically Signed) 
 Final Date:      2017  
                  11:02 
 
 MEASUREMENTS  (Male / Female) Normal Values 
 
 2D ECHO 
 LV Diastolic Diameter PLAX        6.2 cm                4.2 - 5.9 / 3.9 - 5.3 
cm 
 LV Systolic Diameter PLAX         5.8 cm                2.1 - 4.0 cm 
 LV Fractional Shortening PLAX     6.5 %                 25 - 46  % 
 LV Ejection Fraction 2D Teich     14.1 %                 
 IVS Diastolic Thickness           1.4 cm                 
 LVPW Diastolic Thickness          1.2 cm                 
 LV Relative Wall Thickness        0.4                    
 RV Internal Dim ED PLAX           3.5 cm                1.9 - 3.8 cm 
 LVOT Diameter                     2.2 cm                 
 Aortic Root Diameter              3.1 cm                 
 LA Systolic Diameter LX           4.5 cm                3.0 - 4.0 / 2.7 - 3.8 
cm 
 LV Ejection Fraction MOD BP       31.9 %                >= 55  % 
 LV Diastolic Length 4C            8.1 cm                6.9 - 10.3 cm 
 LV Diastolic Area 4C              37.5 cm               
 LV Diastolic Volume MOD 4C        143.0 cm              
 LV Ejection Fraction MOD 4C       31.5 %                 
 LV Stroke Volume MOD 4C           45.0 cm               
 LV Systolic Length 4C             7.7 cm                 
 LV Systolic Area 4C               29.9 cm               
 LV Systolic Volume MOD 4C         98.0 cm               
 LV Ejection Fraction MOD 2C       32.8 %                 
 LV Diastolic Volume 4C AL         147.0 cm             85 - 139 / 69 - 109 cm
 
 LV Systolic Volume 4C AL          98.7 cm               
 LV Ejection Fraction 4C AL        32.9 %                 
 LV Stroke Volume 4C AL            48.3 cm               
 LV Ejection Fraction 2C AL        31.0 %                 
 LA Volume                         48.0 cm              18 - 58 / 22 - 52 cm 
 Ascending Aorta Diameter          3.4 cm                 
 
 DOPPLER 
 AV Peak Velocity                  245.0 cm/s             
 AV Peak Gradient                  24.0 mmHg              
 AV Mean Velocity                  160.0 cm/s             
 AV Mean Gradient                  12.0 mmHg              
 AV Velocity Time Integral         46.6 cm                
 LVOT Peak Velocity                75.9 cm/s              
 LVOT Peak Gradient                2.3 mmHg               
 LVOT Mean Velocity                47.1 cm/s              
 LVOT Mean Gradient                1.0 mmHg               
 LVOT Velocity Time Integral       15.2 cm                
 LVOT Stroke Volume                57.8 cm               
 AV Area Cont Eq vti               1.2 cm                
 AV Area Cont Eq pk                1.2 cm                
 MV Peak Velocity                  128.0 cm/s             
 MV Peak Gradient                  6.6 mmHg               
 MV Mean Velocity                  78.6 cm/s              
 MV Mean Gradient                  3.0 mmHg               
 Mitral E Point Velocity           107.0 cm/s             
 MV PHT Velocity                   128.0 cm/s             
 MV Deceleration [place]             560.0 cm/s            
 MV Pressure Half Time             68.6 ms                
 MV Area PHT                       3.2 cm                
 MV Deceleration Time              141.0 ms               
 TR Peak Velocity                  267.0 cm/s             
 TR Peak Gradient                  28.5 mmHg              
 Right Atrial Pressure             10.0 mmHg              
 Pulmonary Artery Systolic Pressu  38.5 mmHg              
 Right Ventricular Systolic Press  38.5 mmHg              
 PV Peak Velocity                  131.0 cm/s             
 PV Peak Gradient                  6.9 mmHg               
 PV Mean Velocity                  86.2 cm/s              
 PV Mean Gradient                  4.0 mmHg               
 PV Velocity Time Integral         21.4 cm                
 LV E' Lateral Velocity            6.7 cm/s               
 Mitral E to LV E' Lateral Ratio   15.9                   
 LV E' Septal Velocity             3.1 cm/s               
 Mitral E to LV E' Septal Ratio    34.3

## 2017-07-19 NOTE — PN- HOUSESTAFF
SHEYLA GLASS,LYRIC 17 0927:
Subjective
Follow-up For:
Syncope
Lung nodules
CHF
Hypertension
Complaints: pain scale (0-10)
Subjective:
She was seen and examined bedside.  He admits to a wheeze, but denies chest pain
or blurry vision headache.  He notes that he has left-sided leg and hip pain 
from his previous syncope at home.  He also notes that he has a right-sided 
bruise from his fall on the right side of his upper abdomen.
 
Review of Systems
Constitutional:
Reports: weakness. 
EENTM:
Reports: no symptoms. 
Cardiovascular:
Reports: no symptoms. 
Respiratory:
Reports: wheezing. 
Gastrointestinal:
Reports: no symptoms. 
Genitourinary:
Reports: no symptoms. 
Musculoskeletal:
Reports: no symptoms. 
Skin:
Reports: no symptoms. 
Neurological/Psychological:
Reports: no symptoms. 
Hematologic/Endocrine:
Reports: no symptoms. 
Immunologic/Allergic:
Reports: other. 
 
Objective
Last 24 Hrs of Vital Signs/I&O
 Vital Signs
 
 
Date Time Temp Pulse Resp B/P B/P Pulse O2 O2 Flow FiO2
 
     Mean Ox Delivery Rate 
 
 1502 97.9 73 18 137/79  94 Nasal 2.0L 
 
       Cannula  
 
 1150 98.4 96  145/86  96 Nasal  
 
       Cannula  
 
 0946 99.1 100 20 128/71     
 
 0946 99.1 100 20 120/71     
 
 0945 99.1 100 20 128/71     
 
 0734 99.2 104 18 126/64  92 Room Air  
 
 0538 97.1 100 22 140/79  93 Room Air  
 
 2249 97.5 90 18 129/61  97 Nasal 3.0L 
 
       Cannula  
 
 2135      79   
 
 2103 98.6 86 18 133/68  96 Nasal 3.0L 
 
       Cannula  
 
8 97.9 86 24 132/68  97 Room Air  
 
 1816      94 Room Air  
 
 
 Intake & Output
 
 
  1600  0800  0000
 
Intake Total   
 
Output Total   
 
Balance   
 
    
 
Patient 240 lb  240 lb
 
Weight   
 
Weight Reported by Patient  Reported by Patient
 
Measurement   
 
Method   
 
 
 
 
Physical Exam
General Appearance: Alert, Oriented X3, Cooperative, No Acute Distress
Skin: No Rashes, No Breakdown, No Significant Lesion
Skin Temp/Moisture Exam: Warm/Dry
Sepsis Skin Exam (color): Normal for Ethnicity
HEENT: Atraumatic, PERRLA, EOMI, Mucous Membr. moist/pink
Neck: Supple
Cardiovascular: Regular Rate, Normal S1, Normal S2, No Murmurs, Gallops, Rubs
Lungs: scattered wheezes
Abdomen: Normal Bowel Sounds, Soft, No Hepatospenomegaly
Neurological: Normal Speech, Strength at 5/5 X4 Ext, Sensation Intact, Cranial 
Nerves 3-12 NL, Reflexes 2+
Extremities: No Edema, Normal Pulses
Vascular: Normal Pulses
Sepsis Peripheral Pulse Location: Radial
Sepsis Peripheral Pulse Exam: Normal
Current Medications:
 Current Medications
 
 
  Sig/Ly Start time  Last
 
Medication Dose Route Stop Time Status Admin
 
Acetaminophen 650 MG Q6P PRN  003 AC 
 
  PO   
 
Acetaminophen 1,000 MG Q12P PRN  0030 AC 
 
  IV   
 
Albuterol Sulfate 2 PUF Q4P PRN  0200 AC 
 
  INH   
 
Albuterol Sulfate 3 ML ONCE ONE  2100 DC 
 
  INH 2101  213
 
Aspirin 81 MG DAILY  1000 AC 
 
  PO   0946
 
Budesonide/ 2 PUF BID  0033 AC 
 
Formoterol Fumarate  INH   0945
 
Carvedilol 6.25 MG BID  1000 AC 
 
  PO   0946
 
Clopidogrel Bisulfate 75 MG DAILY  1000 AC 
 
  PO   0946
 
Heparin Sodium  0 .STK-MED ONE  0106 DC 
 
(Porcine)  .ROUTE   
 
Heparin Sodium  5,000 UNIT Q8  0024 AC 
 
(Porcine)  SC   1415
 
Ipratropium Bromide 2.5 ML ONCE ONE  2100 DC 
 
  INH 2101  2133
 
Isosorbide  60 MG DAILY  1000 AC 
 
Mononitrate  PO   0946
 
Levothyroxine Sodium 0.1 MG DAILY AC  0700 AC 
 
  PO   0712
 
Magnesium Sulfate 1 GM Q2H  0430 DC 
 
Dextrose/Water 100 ML IV  0829  0608
 
Methylprednisolone 0 .STK-MED ONE  2110 DC 
 
  .ROUTE   
 
Methylprednisolone 125 MG ONCE ONE  2100 DC 
 
  IV 2101  210
 
Prednisone 10 MG DAILY  1000 AC 
 
  PO  1001  
 
Prednisone 20 MG DAILY  1000 AC 
 
  PO  1001  
 
Prednisone 30 MG DAILY  1000 AC 
 
  PO  1001  
 
Prednisone 40 MG DAILY  1000 CAN 
 
  PO   
 
Prednisone 40 MG DAILY  1000 CAN 
 
  PO  0959  
 
Prednisone 40 MG DAILY  1000 DC 
 
  PO  1001  0947
 
Ranolazine 500 MG BID  1000 AC 
 
  PO   0945
 
Tamsulosin HCl 0.4 MG AT BEDTIME 0 AC 
 
  PO   
 
Tiotropium Bromide 1 PUF DAILY  1000 AC 
 
  INH   0945
 
 
 
 
Last 24 Hrs of Lab/K[place] Results
Last 24 Hrs of Labs/Mics:
 Laboratory Tests
 
17 0730:
CBC w Diff MAN DIFF ORDERED, RBC 3.91  L, MCV 97.8  H, MCH 32.9  H, RDW 15.8  H,
MPV 7.5, Gran % 94.8  H, Lymphocytes % 4.7  L, Monocytes % 0.3  L, Eosinophils %
0, Basophils % 0.2, Absolute Granulocytes 6.7  H, Absolute Lymphocytes 0.3  L, 
Absolute Monocytes 0  L, Absolute Eosinophils 0, Absolute Basophils 0, Platelet 
Estimate ADEQUATE, Normochromic RBCs VERIFIED, Polychromasia   , Anisocytosis 1+
, PUBS MCHC 33.6
 
17 0616:
Anion Gap 10, Estimated GFR 54  L, BUN/Creatinine Ratio 15.4, Troponin I 0.05, 
Vitamin B12 371, Folate 16.9
 
17 0031:
Troponin I Cancelled
 
17 2306:
Urine Opiates Screen < 100.00, Methadone Screen < 40, Barbiturate Screen < 60, 
Ur Phencyclidine Scrn < 6.00, Amphetamines Screen < 100, U Benzodiazepines Scrn 
< 85, Urine Cocaine Screen < 50, Urine Cannabis Screen < 5.00, Urine Color YEL, 
Urine Clarity CLEAR, Urine pH 6.0, Ur Specific Gravity 1.010, Urine Protein NEG,
Urine Ketones NEG, Urine Nitrite NEG, Urine Bilirubin NEG, Urine Urobilinogen 
1.0, Ur Leukocyte Esterase MOD  H, Ur Microscopic SEDIMENT EXAMINED, Urine RBC 
FEW  H, Urine WBC 1-3  H, Urine Hemoglobin TRACE-INTACT  H, Urine Glucose NEG
 
17:
pH 7.41, pCO2 45, pO2 96, HCO3 28, ABG O2 Sat (Measured) 96.0, Carboxyhemoglobin
1  L, O2 Concentration % 3L, Temperature 98.6, O2 Delivery Method NC, Phlebotomy
Draw Site RIGHT RADIAL
 
07/18/17 1912:
Ammonia < 9  L
 Microbiology
 2306  URINE ROUT: Urine Culture - RES
 2100  BLOOD: Blood Culture - RES
 193  BLOOD: Blood Culture - RES
 185  LOWER RESP: Respiratory Culture - CAN
                Cancelled: SPECIMEN NOT RECEIVED IN LABORATORY
1855  LOWER RESP: Gram Stain - CAN
                Cancelled: SPECIMEN NOT RECEIVED IN LABORATORY
 
 
 
Assessment/Plan
Assessment:
Assessment
 
Patient is 71-year-old  man with past medical history significant for 
coronary artery disease status post CABG in , ischemic cardiomyopathy, 
congestive heart failure, status post AICD placement, hypertension, 
hyperthyroidism, peripheral vascular disease, history of carotid endarterectomy,
status post aortic stent placement, chronic kidney disease, recently admitted at
[facility] in April for COPD exacerbation who came to the emergency room 
with chief complaint of worsening shortness of breath and had a syncopal 
episode.  In the ED while being questioned he became unresponsive.  He is being 
worked up in telemetry for syncope.
 
Plan
 
1.  Syncope and altered mental status: due to acute delirium versus stroke 
versus seizure versus vasovagal or cardiogenic syncope versus metabolic 
encephalopathy.
* Vitals are normal and bedside pulse ox is around 95 on 2 L of air.
* Thyroid functions are normal. 
* Utox is negative. 
* No clear source of infection.  WBCs were 11.5 last night and today are 7.1.  
* He has a hyponatremia of 132
* Iron and B12 is normal at 371, folate is normal at 16.9
* Patient's creatinine is 1.3, his normal values are around the high ones.
* His glucose is 134
* Blood gas is normal.
* Serial troponins and EKG are normal
* Monitor for arrhythmias, neurochecks
* Echo shows left ventricular ejection fraction to be 20%, moderate aortic 
stenosis but is a technically difficult study.  As per cardiology there is no 
evidence of decompensated congestive heart failure at this time
* Dopplers show no evidence to suggest hemodynamically significant stenosis of 
greater than 50% diameter reduction
* He will need an MRI, EEG, neuro consult
* As per cardiology his initial syncope in the setting of cough was more 
consistent with a vasovagal episode with his syncope in the ER remaining 
unclear.  Order put in for [employer] ICD interrogation in [facility].
* Continue aspirin, plavix and high dose statin. 
* Per cardiology add an ace inhibitor or ARB given the severity of his ischemic 
cardiomyopathy if no objection from his nephrologist.
* PT/OT/ speech eval. 
* Patient passed bedside swallow done in ER. 
* Replete electrolytes  magnesium > 2.0 and K > 4.0.
 
2. Mild COPD exacerbation:  Was seen by Dr. Wood in the past.  
* TRC nebs, rapid prednisone taper 40--> 30-->20-->10 then stop. 
* Continue spiriva and symbicort. 
* Patient will need eventual outpatient Pulm follow up for further evaluation of
the LLL nodule.
 
3. Leukocytosis likely reactive:   Panculture, monitor off antibiotics. 
 
DVT ppx Hep SC. 
Full code 
Problem List:
 1. COPD exacerbation
 
 2. Syncope
 
Pain Ratin
Pain Location:
.
Pain Goal: Pain 4 or less
Pain Plan:
.
Tomorrow's Labs & Rationales:
.
 
 
ALVINO LANGSTON MD 17 9714:
Attending MD Review Statement
 
Attending Statement
Attending MD Statement: examined this patient, discuss w/resident/PA/NP, agreed 
w/resident/PA/NP, reviewed EMR data (avail), reviewed images, amended to note
Attending Assessment/Plan:
The patient was seen and discussed with house staff. Agree with plan of care as 
above. Appreciate Cardiology input.

## 2017-07-19 NOTE — CONS- CARDIOLOGY
**See Addendum**
General Information and HPI
 
Consulting Request
Date of Consult: 07/19/17
Requested By:
JUSTUS TOBIAS MD
 
Reason for Consult:
Syncope
 
Primary cardiologist: Dr. Bowers
Source of Information: patient, old records
History of Present Illness:
 
This is a 71-year-old male with a past medical history of ischemic 
cardiomyopathy with prior CABG, prior PCI, known occluded vein grafts, carotid 
artery disease, peripheral arterial disease, chronic systolic congestive heart 
failure, prior AICD, aortic aneurysm, nicotine dependence, aortic stenosis, CKD 
and COPD who presents to [facility] with a chief complaint of syncope.  
The patient tells me he was in his usual state of health but started coughing 
and then had a syncopal episode which was unwitnessed.  He does not note having 
any chest pain, dyspnea, or palpitations prior to the event. Denies loss of 
bowel or bladder. Per the medical record the patient had an episode in the 
emergency room where he was unresponsive and a stroke alert was called. CT scan 
was reported as no evidence of acute intracranial pathology. On my bedside 
interview with the patient he was mildly somnolent but appeared to answer 
questions appropriately although his recollection of the events was somewhat 
limited. He denied subjective fever, orthopnea, paroxysmal nocturnal dyspnea, 
nausea, vomiting, or diaphoresis.
 
 
 
Allergies/Medications
Allergies:
Coded Allergies:
NO KNOWN ALLERGIES (04/05/15)
 
Home Med List:
Acetaminophen 325 MG CAPSULE   1 CAP PO Q6 PRN PAIN  (Reported)
Albuterol Sulfate (Proair Hfa) 90 MCG HFA.AER.AD   2 PUF INH Q4 COPD
Aspirin (Aspirin*) 81 MG TAB.CHEW   1 TAB PO DAILY heart  (Reported)
Atorvastatin Calcium (Lipitor) 40 MG TABLET   1 TAB PO DAILY cholesterol  (
Reported)
Budesonide/Formoterol Fumarate (Symbicort 160-4.5 Mcg Inhaler) 160 MCG-4.5 MCG/
ACTUATION HFA.AER.AD   2 PUF INH BID COPD  (Reported)
Carvedilol (Coreg) 3.125 MG TABLET   6.25 MG PO BID HEART
Clopidogrel Bisulfate (Clopidogrel) 75 MG TABLET   1 TAB PO DAILY BLOOD THINNER 
(Reported)
Furosemide (Lasix) 20 MG TABLET   1 TAB PO DAILY DIURETIC  (Reported)
Gabapentin (Neurontin) 800 MG TABLET   1 TAB PO TID BACK PAIN  (Reported)
Isosorbide Mononitrate (Isosorbide Mononitrate ER) 60 MG TAB.ER.24H   1 TAB PO 
DAILY    (Reported)
Levothyroxine Sodium 100 MCG TABLET   1 TAB PO DAILY THYROID  (Reported)
Nitroglycerin (Nitrostat) 0.4 MG TAB.SUBL   1 TAB SL AD PRN CHEST PAIN  (
Reported)
     1st sign of attack; may repeat every 5 minutes until relief; if pain 
persists after 3 tablets in 15 minutes, prompt medical att
Ranolazine (Ranexa) 500 MG TAB.ER.12H   1 TAB PO BID heart  (Reported)
Tamsulosin HCl (Flomax) 0.4 MG CAP.ER.24H   1 CAP PO QHS prostate
     Please take at bed time to avoid low blood pressure during the day.
Tiotropium Bromide (Spiriva) 18 MCG CAP.W.DEV   1 CAP INH DAILY COPD  (Reported)
 
Current Medications:
 Current Medications
 
 
  Sig/Ly Start time  Last
 
Medication Dose Route Stop Time Status Admin
 
Acetaminophen 650 MG Q6P PRN 07/19 0030 AC 
 
  PO   
 
Acetaminophen 1,000 MG Q12P PRN 07/19 0030 AC 
 
  IV   
 
Albuterol Sulfate 2 PUF Q4P PRN 07/19 0200 AC 
 
  INH   
 
Albuterol Sulfate 3 ML ONCE ONE 07/18 2100 DC 07/18
 
  INH 07/18 2101  2134
 
Aspirin 81 MG DAILY 07/19 1000 AC 07/19
 
  PO   0946
 
Budesonide/ 2 PUF BID 07/19 0033 AC 07/19
 
Formoterol Fumarate  INH   0945
 
Carvedilol 6.25 MG BID 07/19 1000 AC 07/19
 
  PO   0946
 
Clopidogrel Bisulfate 75 MG DAILY 07/19 1000 AC 07/19
 
  PO   0946
 
Heparin Sodium  0 .STK-MED ONE 07/19 0106 DC 
 
(Porcine)  .ROUTE   
 
Heparin Sodium  5,000 UNIT Q8 07/19 0024 AC 07/19
 
(Porcine)  SC   0100
 
Ipratropium Bromide 2.5 ML ONCE ONE 07/18 2100 DC 07/18
 
  INH 07/18 2101  2133
 
Isosorbide  60 MG DAILY 07/19 1000 AC 07/19
 
Mononitrate  PO   0946
 
Levothyroxine Sodium 0.1 MG DAILY AC 07/19 0700 AC 07/19
 
  PO   0712
 
Magnesium Sulfate 1 GM Q2H 07/19 0430 DC 07/19
 
Dextrose/Water 100 ML IV 07/19 0829  0608
 
Methylprednisolone 0 .STK-MED ONE 07/18 2110 DC 
 
  .ROUTE   
 
Methylprednisolone 125 MG ONCE ONE 07/18 2100 DC 07/18
 
  IV 07/18 2101  2106
 
Prednisone 10 MG DAILY 07/22 1000 AC 
 
  PO 07/22 1001  
 
Prednisone 20 MG DAILY 07/21 1000 AC 
 
  PO 07/21 1001  
 
Prednisone 30 MG DAILY 07/20 1000 AC 
 
  PO 07/20 1001  
 
Prednisone 40 MG DAILY 07/19 1000 CAN 
 
  PO   
 
Prednisone 40 MG DAILY 07/19 1000 CAN 
 
  PO 07/23 0959  
 
Prednisone 40 MG DAILY 07/19 1000 DC 07/19
 
  PO 07/19 1001  0947
 
Ranolazine 500 MG BID 07/19 1000 AC 07/19
 
  PO   0945
 
Tamsulosin HCl 0.4 MG AT BEDTIME 07/19 2200 AC 
 
  PO   
 
Tiotropium Bromide 1 PUF DAILY 07/19 1000 AC 07/19
 
  INH   0945
 
 
 
 
Review of Systems
Review of Systems:
Review of systems as per HPI. The remainder of a 10 point review of systems was 
reviewed and was otherwise negative.
 
Past History
 
Travel History
Traveled to [place] past 21 day No
 
Medical History
Neurological: NONE
EENT: NONE
Cardiovascular: CARDIAC STENTS CABG CAROTID ARTERY BYPASS DEFIBRILLATOR
Respiratory: COPD
Gastrointestinal: NONE
Hepatic: NONE
Renal: NONE
Musculoskeletal: NONE
Psychiatric: NONE
Endocrine: hypothyroidism
Blood Disorders: DVT
Cancer(s): NONE
GYN/Reproductive: NONE
 
Surgical History
Surgical History: CABG, CAROTID ARTECTOMY nerve thermoablation to reduce back 
pain 
 
Psychosocial History
Who Do You Live With? spouse
Services at Home: None
ETOH Use: denies use
Illicit Drug Use: denies illicit drug use
 
Functional Ability
ADLs
Independent: dressing, eating, toileting, bathing. 
Ambulation: independent
 
ECHO Results (as available)
Report:
Left ventricular cavity size normal. Left ventricular wall thickness
 mildly increased. Moderate to severe hypokinesis of the mid to basal
 inferior wall. Mild distal anteroseptal/anteroapical hypokinesis.
 Left ventricular ejection fraction is estimated at 45 %.
 Catheter/pacemaker wire in the right ventricular cavity. Normal
 right ventricular size and function.
 Mild left atrial dilatation.
 Moderate mitral regurgitation.
 Mild-to-moderate aortic stenosis (MICHELLE 1.4 cm2 by continuity with a
 mean gradient of 13 mmHg).
 Moderate tricuspid regurgitation. RVSP > 45 mmHg.
 
 
Exam & Diagnostic Data
Vital Signs and I&O
Vital Signs
 
 
Date Time Temp Pulse Resp B/P B/P Pulse O2 O2 Flow FiO2
 
     Mean Ox Delivery Rate 
 
07/19 0946 99.1 100 20 128/71     
 
07/19 0946 99.1 100 20 120/71     
 
07/19 0945 99.1 100 20 128/71     
 
07/19 0734 99.2 104 18 126/64  92 Room Air  
 
07/19 0538 97.1 100 22 140/79  93 Room Air  
 
07/18 2249 97.5 90 18 129/61  97 Nasal 3.0L 
 
       Cannula  
 
07/18 2135      79   
 
07/18 2103 98.6 86 18 133/68  96 Nasal 3.0L 
 
       Cannula  
 
07/18 2058 97.9 86 24 132/68  97 Room Air  
 
07/18 1816      94 Room Air  
 
07/18 1634 97.2 87 18 131/79  94 Room Air  
 
 
 Intake & Output
 
 
 07/19 1600 07/19 0800 07/19 0000 07/18 1600 07/18 0800 07/18 0000
 
Intake Total      
 
Output Total      
 
Balance      
 
       
 
Patient   240 lb   
 
Weight      
 
Weight   Reported by Patient   
 
Measurement      
 
Method      
 
 
 
Physical Exam:
 
General: no apparent distress. Alert.  
Eyes: No obvious scleral icterus.
HEENT: No jugular venous distention or abnormal jugular venous pulsations.
Cardiovascular: Normal intensity S1/S2. AICD noted. One out of 6 systolic 
murmur.
Respiratory: Lungs clear to auscultation bilaterally.
Abdomen: Soft, nontender with no guarding or rebound tenderness.
Musculoskeletal: No clubbing or cyanosis noted, no edema
Skin: Warm
Neurologic: No gross focal deficits noted.
Lymph: No gross lymphadenopathy. 
Labs/[place] Results:
 Laboratory Tests
 
 
 07/19 07/19 07/19
 
 0730 0616 0031
 
Chemistry   
 
  Sodium (137 - 145 mmol/L)  132  L 
 
  Potassium (3.5 - 5.1 mmol/L)  4.3 
 
  Chloride (98 - 107 mmol/L)  96  L 
 
  Carbon Dioxide (22 - 30 mmol/L)  26 
 
  Anion Gap (5 - 16)  10 
 
  BUN (9 - 20 mg/dL)  20 
 
  Creatinine (0.7 - 1.2 mg/dL)  1.3  H 
 
  Estimated GFR (>60 ml/min)  54  L 
 
  BUN/Creatinine Ratio (7 - 25 %)  15.4 
 
  Troponin I (<0.11 ng/ml)  0.05 Cancelled
 
  Vitamin B12 (239 - 931 pg/mL)  371 
 
  Folate (2.76 - 20.0 ng/mL)  16.9 
 
Hematology   
 
  CBC w Diff MAN DIFF ORDERED  
 
  WBC (4.8 - 10.8 /CUMM) 7.1  
 
  RBC (4.70 - 6.10 /CUMM) 3.91  L  
 
  Hgb (14.0 - 18.0 G/DL) 12.9  L  
 
  Hct (42 - 52 %) 38.3  L  
 
  MCV (80.0 - 94.0 FL) 97.8  H  
 
  MCH (27.0 - 31.0 PG) 32.9  H  
 
  RDW (11.5 - 14.5 %) 15.8  H  
 
  Plt Count (130 - 400 /CUMM) 178  
 
  MPV (7.4 - 10.4 FL) 7.5  
 
  Gran % (42.2 - 75.2 %) 94.8  H  
 
  Lymphocytes % (20.5 - 51.1 %) 4.7  L  
 
  Monocytes % (1.7 - 9.3 %) 0.3  L  
 
  Eosinophils % (0 - 5 %) 0  
 
  Basophils % (0.0 - 2.0 %) 0.2  
 
  Absolute Granulocytes (1.4 - 6.5 /CUMM) 6.7  H  
 
  Absolute Lymphocytes (1.2 - 3.4 /CUMM) 0.3  L  
 
  Absolute Monocytes (0.10 - 0.60 /CUMM) 0  L  
 
  Absolute Eosinophils (0.0 - 0.7 /CUMM) 0  
 
  Absolute Basophils (0.0 - 0.2 /CUMM) 0  
 
  Platelet Estimate (ADEQUATE) ADEQUATE  
 
  Normochromic RBCs VERIFIED  
 
  Polychromasia     
 
  Anisocytosis 1+  
 
  PUBS MCHC (33.0 - 37.0 G/DL) 33.6  
 
 
 
 
 07/18 07/18 2306 1920
 
Blood Gas  
 
  pH (7.35 - 7.45 PH)  7.41
 
  pCO2 (35 - 45 TORR)  45
 
  pO2 (80 - 100 TORR)  96
 
  HCO3 (21 - 28 MEQ/L)  28
 
  ABG O2 Sat (Measured) (>96.0 %)  96.0
 
  Carboxyhemoglobin (1.5 - 5.0 %)  1  L
 
  O2 Concentration %  3L
 
  Temperature (97.0 - 100.0 FARH)  98.6
 
  O2 Delivery Method  NC
 
Miscellaneous  
 
  Phlebotomy Draw Site  RIGHT RADIAL
 
Toxicology  
 
  Urine Opiates Screen (>2000 NG/ML) < 100.00 
 
  Methadone Screen (>300 NG/ML) < 40 
 
  Barbiturate Screen (>200 NG/ML) < 60 
 
  Ur Phencyclidine Scrn (>25 NG/ML) < 6.00 
 
  Amphetamines Screen (>1000 NG/ML) < 100 
 
  U Benzodiazepines Scrn (>200 NG/ML) < 85 
 
  Urine Cocaine Screen (>300 NG/ML) < 50 
 
  Urine Cannabis Screen (>50 NG/ML) < 5.00 
 
Urines  
 
  Urine Color (YEL,AMB,STR) YEL 
 
  Urine Clarity (CLEAR) CLEAR 
 
  Urine pH (5.0 - 8.0) 6.0 
 
  Ur Specific Gravity (1.001 - 1.035) 1.010 
 
  Urine Protein (NEG,<30 MG/DL) NEG 
 
  Urine Ketones (NEG) NEG 
 
  Urine Nitrite (NEG) NEG 
 
  Urine Bilirubin (NEG) NEG 
 
  Urine Urobilinogen (0.1  -  1.0 EU/dl) 1.0 
 
  Ur Leukocyte Esterase (NEG) MOD  H 
 
  Ur Microscopic SEDIMENT EXAMINED 
 
  Urine RBC (0 - 5 /HPF) FEW  H 
 
  Urine WBC (0 - 2 /HPF) 1-3  H 
 
  Urine Hemoglobin (NEG) TRACE-INTACT  H 
 
  Urine Glucose (N MG/DL) NEG 
 
 
 
 
 07/18 07/18 1912 1637
 
Chemistry  
 
  Sodium (137 - 145 mmol/L)  132  L
 
  Potassium (3.5 - 5.1 mmol/L)  4.0
 
  Chloride (98 - 107 mmol/L)  92  L
 
  Carbon Dioxide (22 - 30 mmol/L)  29
 
  Anion Gap (5 - 16)  11
 
  BUN (9 - 20 mg/dL)  19
 
  Creatinine (0.7 - 1.2 mg/dL)  1.5  H
 
  Estimated GFR (>60 ml/min)  46  L
 
  BUN/Creatinine Ratio (7 - 25 %)  12.7
 
  Glucose (65 - 99 mg/dL)  134  H
 
  Calcium (8.4 - 10.2 mg/dL)  9.4
 
  Magnesium (1.6 - 2.3 mg/dL)  1.5  L
 
  Total Bilirubin (0.2 - 1.3 mg/dL)  1.3
 
  AST (17 - 59 U/L)  20
 
  ALT (21 - 72 U/L)  25
 
  Alkaline Phosphatase (< 127 U/L)  92
 
  Ammonia (9 - 30 umol/L) < 9  L 
 
  Troponin I (<0.11 ng/ml)  0.02
 
  Pro-B-Natriuretic Pept (<125 pg/mL)  3160  H
 
  Total Protein (6.3 - 8.2 g/dL)  6.5
 
  Albumin (3.5 - 5.0 g/dL)  4.2
 
  Globulin (1.9 - 4.2 gm/dL)  2.3
 
  Albumin/Globulin Ratio (1.1 - 2.2 %)  1.8
 
  TSH (0.270 - 4.200 uIU/mL)  1.700
 
  Thyroxine (T4) (4.5 - 10.9 ug/dL)  10.9
 
Hematology  
 
  CBC w Diff  NO MAN DIFF REQ
 
  WBC (4.8 - 10.8 /CUMM)  11.5  H
 
  RBC (4.70 - 6.10 /CUMM)  3.98  L
 
  Hgb (14.0 - 18.0 G/DL)  13.2  L
 
  Hct (42 - 52 %)  39.4  L
 
  MCV (80.0 - 94.0 FL)  98.9  H
 
  MCH (27.0 - 31.0 PG)  33.1  H
 
  RDW (11.5 - 14.5 %)  16.4  H
 
  Plt Count (130 - 400 /CUMM)  189
 
  MPV (7.4 - 10.4 FL)  7.3  L
 
  Gran % (42.2 - 75.2 %)  85.9  H
 
  Lymphocytes % (20.5 - 51.1 %)  6.5  L
 
  Monocytes % (1.7 - 9.3 %)  6.7
 
  Eosinophils % (0 - 5 %)  0.2
 
  Basophils % (0.0 - 2.0 %)  0.7
 
  Absolute Granulocytes (1.4 - 6.5 /CUMM)  9.9  H
 
  Absolute Lymphocytes (1.2 - 3.4 /CUMM)  0.7  L
 
  Absolute Monocytes (0.10 - 0.60 /CUMM)  0.8  H
 
  Absolute Eosinophils (0.0 - 0.7 /CUMM)  0
 
  Absolute Basophils (0.0 - 0.2 /CUMM)  0.1
 
  PUBS MCHC (33.0 - 37.0 G/DL)  33.4
 
Toxicology  
 
  Serum Alcohol (<10 MG/DL)  < 10.0
 
 
 
 
Diagnostic Data
EKG Results
Tracing was personally reviewed and shows sinus tachycardia 104 bpm with 
borderline intraventricular conduction delay and poor R-wave progression
Other Results
Chest CT:
No evidence for acute traumatic injury.
1.7 cm irregular shaped left lower lobe nodule. Especially in the setting of
mild emphysema, this is suspicious for a lung neoplasm. Recommendation is for
correlation with PET/CT for further tissue characterization.
Sigmoid diverticulosis without evidence of diverticulitis.
Aortobiiliac stent graft in place with aneurysmal dilation of the infrarenal
abdominal aorta unchanged from prior exam.
 
Head CT:
IMPRESSION:
No acute intracranial pathology.
 
Echo:
 Technically difficult study. Definity contrast was used.  
 Mild left ventricular dilatation. Left ventricular wall thickness  
 mildly increased. Moderate global hypokinesis with severe  
 hypokinesis of the inferior wall and anteroseptum. Left ventricular  
 ejection fraction is estimated at 20 %.  
 Normal right ventricular size. Mildly reduced right ventricular  
 global systolic function.  
 Catheter/pacemaker wire in the right ventricular cavity.  
 Mild left atrial dilatation.  
 Moderate aortic stenosis (MICHELLE 1.2 cm2; mean gradient 12 mmHg). 
 
 Regis Mc M.D. 
 (Electronically Signed) 
 Final Date:      19 July 2017  
                  11:02 
 
Assessment/Plan
Assessment/Plan
 
1.  Syncope/AMS
2.  COPD with active smoking
3.  Chronic systolic congestive heart failure, compensated
4.  History of coronary artery disease with prior CABG and prior PCI
5.  History of AICD, aproaching KARTIK 
6.  Hx of Carotid artery disease/aortic aneurysm and peripheral vascular disease
7.  History of ischemic cardiomyopathy with aortic stenosis
8.  Chronic renal insufficiency 
9.  Lung nodule on chest CT
 
 
The patient's initial syncope in the setting of cough was more consistent with a
vasovagal event although the reported episode in the emergency room is of 
unclear etiology. Please put in an order for [employer] ICD interogation in 
[employer]; the telemetry nurses can then do a [facility] transmission so we can 
look for any arrhythmic events. No evidence of acute coronary syndrome or 
decompensated congestive heart failure at this time. Agree with neurology 
consultation.  He should be continued on carvedilol. Recommend adding ACE 
inhibitor or ARB therapy given the severity of his the ischemic cardiomyopathy (
if no objection from his nephrologist). Critical importance of smoking cessation
was discussed. Defer to the medical team regarding additional evaluation of the 
suspicious lung nodule seen on chest CT.
 
 
James Mc MD [facility]
 
 
 
Consult Acknowledgment
- Thank you for your consult request.

## 2017-07-19 NOTE — ULTRASOUND REPORT
EXAMINATION:
DUPLEX BILATERAL CAROTID ULTRASOUND
 
CLINICAL INFORMATION:
Stroke
 
COMPARISON:
None.
 
TECHNIQUE:
Duplex bilateral carotid US was performed using real-time ultrasound and
Doppler techniques (integrating B-mode 2D vascular images, Doppler spectral
analysis and color flow Doppler imaging). These techniques were utilized to
interrogate the extracranial carotid and vertebral arteries bilaterally. The
degree of stenosis is based off criteria similar to NASCET.
 
FINDINGS:
1. On the right: Plaque is present at the carotid bifurcation but velocity
measurements are normal and do not suggest a stenosis of greater than 50%
diameter reduction in the right ICA.  The right ECA demonstrates a mild
stenosis with peak systolic velocity of under 200 cm/s. The vertebral artery
is patent demonstrating antegrade flow.
 
2. On the left: Plaque is present at the carotid bifurcation but velocity
measurements are normal and do not suggest a stenosis of greater than 50%
diameter reduction in the left ICA.  The left external carotid artery  shows
no significant stenosis. The vertebral artery is patent demonstrating
antegrade flow.
 
IMPRESSION:
Plaque is present in the internal carotid arteries but velocity measurements
are normal and there is no evidence to suggest a hemodynamically significant
stenosis of greater than 50% diameter reduction.

## 2017-07-19 NOTE — ADMISSION CERTIFICATION
Admission Certification
 
Certification Statement
- As attending physician, I certify that at the time of
- admission, based on clinical presentation, severity of
- symptoms, need for further diagnostic testing and
- therapeutic interventions, and risk of adverse outcomes
- without in-hospital treatment, in my clinical assessment,
- this patient requires an acute hospital stay for a minimum
- of two nights or longer. I have also considered psychsocial
- factors such as support system, advanced age, financial
- issues, cognitive issues, and failed out-patient treatments,
- past re-admission history, safety of patient, and lack of
- compliance as applicable.
Specific rationale supporting this admission is:
Altered mental status.

## 2017-07-20 VITALS — SYSTOLIC BLOOD PRESSURE: 148 MMHG | DIASTOLIC BLOOD PRESSURE: 80 MMHG

## 2017-07-20 VITALS — DIASTOLIC BLOOD PRESSURE: 64 MMHG | SYSTOLIC BLOOD PRESSURE: 120 MMHG

## 2017-07-20 VITALS — SYSTOLIC BLOOD PRESSURE: 114 MMHG | DIASTOLIC BLOOD PRESSURE: 60 MMHG

## 2017-07-20 NOTE — ELECTROENCEPHALOGRAM REPORT
Electroencephalogram Report
 
Electroencephalogram Results
Date of service: 07/20/17
Attending MD:
ALVINO LANGSTON MD
 
Technician: jourdan Jimenez
EEG Number: [id number]
Test Utilizes:
10-20 system, 21 lead 18 channel digital recording
 
 
 
Pertinent Hx/Physical/Neuro Findings/Clin Diagnosis:
r/o seizure
Inpatient Medications:
 Current Medications
 
 
  Sig/Ly Start time  Last
 
Medication Dose Route Stop Time Status Admin
 
Acetaminophen 650 MG Q6P PRN 07/19 0030 AC 
 
  PO   
 
Acetaminophen 1,000 MG Q12P PRN 07/19 0030 AC 
 
  IV   
 
Albuterol Sulfate 3 ML BID 07/20 2200 AC 07/20
 
  INH   1117
 
Albuterol Sulfate 2 PUF Q4P PRN 07/19 0200 AC 
 
  INH   
 
Aspirin 81 MG DAILY 07/19 1000 AC 07/20
 
  PO   0915
 
Bisacodyl 10 MG ONCE ONE 07/20 0915 DC 07/20
 
  [place] 07/20 0916  0933
 
Budesonide/ 2 PUF BID 07/19 0033 AC 07/20
 
Formoterol Fumarate  INH   0934
 
Carvedilol 6.25 MG BID 07/19 1000 AC 07/20
 
  PO   0934
 
Clopidogrel Bisulfate 75 MG DAILY 07/19 1000 AC 07/20
 
  PO   0934
 
Heparin Sodium  5,000 UNIT Q8 07/19 0024 AC 07/20
 
(Porcine)  SC   0649
 
Isosorbide  60 MG DAILY 07/19 1000 AC 07/20
 
Mononitrate  PO   0934
 
Levothyroxine Sodium 0.1 MG DAILY AC 07/19 0700 AC 07/20
 
  PO   0649
 
Polyethylene Glycol 17 GM DAILY 07/19 1935 AC 07/20
 
  PO   0934
 
Prednisone 10 MG DAILY 07/22 1000 AC 
 
  PO 07/22 1001  
 
Prednisone 20 MG DAILY 07/21 1000 AC 
 
  PO 07/21 1001  
 
Prednisone 30 MG DAILY 07/20 1000 DC 07/20
 
  PO 07/20 1001  0934
 
Ranolazine 500 MG BID 07/19 1000 AC 07/20
 
  PO   0934
 
Senna/Docusate Sodium 2 TAB DAILY PRN 07/19 1945 AC 07/19
 
  PO   2229
 
Tamsulosin HCl 0.4 MG AT BEDTIME 07/19 2200 AC 07/19
 
  PO   2232
 
Tiotropium [place] 1 PUF DAILY 07/19 1000 AC 07/20
 
  INH   0934
 
 
 
Interpretation:
Predominant posterior background rhythym is that of poorly modulated, medium 
voltage 6-7 CPS activity. Intermittent movement and muscle artifact is present .
No focal or paroxysmal features are seen.Hyperventilation deferred. Photic 
stimulation revealed no further abnormalities.
Impression:
Abnormal EEG due to generalized slowing of the background rhythm consistent with
diffuse cerebral dysfunction. No epileptiform features are noted.

## 2017-07-20 NOTE — EVENT NOTE
Event Note
Event Note:
Notified by [facility] about 1 positive blood culture for gram + cocci.  
Patient was afebrile and WBC is within normal limits we will watch the patient 
off antibiotics for now

## 2017-07-20 NOTE — PN- CARDIOLOGY
Subjective
Subjective:
Telemetry reviewed.  Sinus rhythm throughout.  Noted one blood cultures positive
for gram-positive cocci.  However no elevated white blood count or other signs 
of infection.  Patient is comfortable "trying to get some sleep".  Feeling a 
little cold.
 
Objective
Vital Signs and I&Os
Vital Signs
 
 
Date Time Temp Pulse Resp B/P B/P Pulse O2 O2 Flow FiO2
 
     Mean Ox Delivery Rate 
 
07/20 0801 97.8 78 20 114/60  93 Nasal  
 
       Cannula  
 
07/20 0000      95 Nasal 2.0L 
 
       Cannula  
 
07/19 2232  86  122/60     
 
07/19 2232  86  122/60     
 
07/19 2232  86  122/60     
 
07/19 2123 98.2 84 20 106/60  91   
 
07/19 1758 97.9 85 20 140/80  92 Nasal 2.0L 
 
       Cannula  
 
07/19 1502 97.9 73 18 137/79  94 Nasal 2.0L 
 
       Cannula  
 
07/19 1150 98.4 96  145/86  96 Nasal  
 
       Cannula  
 
07/19 0946 99.1 100 20 128/71     
 
07/19 0946 99.1 100 20 120/71     
 
07/19 0945 99.1 100 20 128/71     
 
 
 Intake & Output
 
 
 07/20 1600 07/20 0800 07/20 0000 07/19 1600 07/19 0800 07/19 0000
 
Intake Total  100 250   
 
Output Total  650 450   
 
Balance  -550 -200   
 
       
 
Intake, Oral  100 250   
 
Number   0   
 
Bowel      
 
Movements      
 
Output, Urine  650 450   
 
Patient    240 lb  240 lb
 
Weight      
 
Weight    Reported by Patient  Reported by Patient
 
Measurement      
 
Method      
 
 
 
Physical Exam:
On physical exam patient comfortable
Head normocephalic atraumatic
Eyes sclera anicteric conjunctiva showed no pallor extraocular muscles were 
normal
Neck no jugular venous distention no thyroid masses carotid endarterectomy scar 
no thyroid masses no palpable nodes
Chest lungs distant air entry bilaterally scattered wheezes
Heart regular rhythm with a grade 1-2/6 systolic murmur and somewhat distant 
heart sounds.
Abdomen protuberant soft no organomegaly
Extremities no clubbing cyanosis or pedal edema
Neurological no gross motor or sensory deficits
Current Medications:
 Current Medications
 
 
  Sig/Ly Start time  Last
 
Medication Dose Route Stop Time Status Admin
 
Acetaminophen 650 MG Q6P PRN 07/19 0030 AC 
 
  PO   
 
Acetaminophen 1,000 MG Q12P PRN 07/19 0030 AC 
 
  IV   
 
Albuterol Sulfate 2 PUF Q4P PRN 07/19 0200 AC 
 
  INH   
 
Aspirin 81 MG DAILY 07/19 1000 AC 07/19
 
  PO   0946
 
Budesonide/ 2 PUF BID 07/19 0033 AC 07/19
 
Formoterol Fumarate  INH   2229
 
Carvedilol 6.25 MG BID 07/19 1000 AC 07/19
 
  PO   2232
 
Clopidogrel Bisulfate 75 MG DAILY 07/19 1000 AC 07/19
 
  PO   0946
 
Heparin Sodium  5,000 UNIT Q8 07/19 0024 AC 07/20
 
(Porcine)  SC   0649
 
Isosorbide  60 MG DAILY 07/19 1000 AC 07/19
 
Mononitrate  PO   0946
 
Levothyroxine Sodium 0.1 MG DAILY AC 07/19 0700 AC 07/20
 
  PO   0649
 
Polyethylene Glycol 17 GM DAILY 07/19 1935 AC 07/19
 
  PO   2229
 
Prednisone 10 MG DAILY 07/22 1000 AC 
 
  PO 07/22 1001  
 
Prednisone 20 MG DAILY 07/21 1000 AC 
 
  PO 07/21 1001  
 
Prednisone 30 MG DAILY 07/20 1000 AC 
 
  PO 07/20 1001  
 
Prednisone 40 MG DAILY 07/19 1000 DC 07/19
 
  PO 07/19 1001  0947
 
Ranolazine 500 MG BID 07/19 1000 AC 07/19
 
  PO   2232
 
Senna/Docusate Sodium 2 TAB DAILY PRN 07/19 1945 AC 07/19
 
  PO   2229
 
Tamsulosin HCl 0.4 MG AT BEDTIME 07/19 2200 AC 07/19
 
  PO   2232
 
Tiotropium [place] 1 PUF DAILY 07/19 1000 AC 07/19
 
  INH   0945
 
 
 
 
Results
Last 48 Hrs of Labs/Mics:
 Laboratory Tests
 
07/20/17 0636:
Anion Gap 8, Estimated GFR 50  L, BUN/Creatinine Ratio 18.6
 
07/19/17 0730:
CBC w Diff MAN DIFF ORDERED, RBC 3.91  L, MCV 97.8  H, MCH 32.9  H, RDW 15.8  H,
MPV 7.5, Gran % 94.8  H, Lymphocytes % 4.7  L, Monocytes % 0.3  L, Eosinophils %
0, Basophils % 0.2, Absolute Granulocytes 6.7  H, Absolute Lymphocytes 0.3  L, 
Absolute Monocytes 0  L, Absolute Eosinophils 0, Absolute Basophils 0, Platelet 
Estimate ADEQUATE, Normochromic RBCs VERIFIED, Polychromasia   , Anisocytosis 1+
, PUBS MCHC 33.6
 
07/19/17 0616:
Anion Gap 10, Estimated GFR 54  L, BUN/Creatinine Ratio 15.4, Troponin I 0.05, 
Vitamin B12 371, Folate 16.9
 
07/19/17 0031:
Troponin I Cancelled
 
07/18/17 2306:
Urine Opiates Screen < 100.00, Methadone Screen < 40, Barbiturate Screen < 60, 
Ur Phencyclidine Scrn < 6.00, Amphetamines Screen < 100, U Benzodiazepines Scrn 
< 85, Urine Cocaine Screen < 50, Urine Cannabis Screen < 5.00, Urine Color YEL, 
Urine Clarity CLEAR, Urine pH 6.0, Ur Specific Gravity 1.010, Urine Protein NEG,
Urine Ketones NEG, Urine Nitrite NEG, Urine Bilirubin NEG, Urine Urobilinogen 
1.0, Ur Leukocyte Esterase MOD  H, Ur Microscopic SEDIMENT EXAMINED, Urine RBC 
FEW  H, Urine WBC 1-3  H, Urine Hemoglobin TRACE-INTACT  H, Urine Glucose NEG
 
07/18/17 1920:
pH 7.41, pCO2 45, pO2 96, HCO3 28, ABG O2 Sat (Measured) 96.0, Carboxyhemoglobin
1  L, O2 Concentration % 3L, Temperature 98.6, O2 Delivery Method NC, Phlebotomy
Draw Site RIGHT RADIAL
 
07/18/17 1912:
Ammonia < 9  L
 
07/18/17 1637:
Anion Gap 11, Estimated GFR 46  L, BUN/Creatinine Ratio 12.7, Glucose 134  H, 
Calcium 9.4, Magnesium 1.5  L, Total Bilirubin 1.3, AST 20, ALT 25, Alkaline 
Phosphatase 92, Troponin I 0.02, Pro-B-Natriuretic Pept 3160  H, Total Protein 
6.5, Albumin 4.2, Globulin 2.3, Albumin/Globulin Ratio 1.8, TSH 1.700, Thyroxine
(T4) 10.9, CBC w Diff NO MAN DIFF REQ, RBC 3.98  L, MCV 98.9  H, MCH 33.1  H, 
RDW 16.4  H, MPV 7.3  L, Gran % 85.9  H, Lymphocytes % 6.5  L, Monocytes % 6.7, 
Eosinophils % 0.2, Basophils % 0.7, Absolute Granulocytes 9.9  H, Absolute 
Lymphocytes 0.7  L, Absolute Monocytes 0.8  H, Absolute Eosinophils 0, Absolute 
Basophils 0.1, PUBS MCHC 33.4, Serum Alcohol < 10.0
 Microbiology
07/18 1930  BLOOD: Blood Culture - COMP
                STAPH COAGULASE NEGATIVE
 
 
Recent Imaging Studies:
Carotid ultrasound revealed
Carotid ultrasound revealedPlaque is present in the internal carotid arteries 
but velocity measurements
are normal and there is no evidence to suggest a hemodynamically significant
stenosis of greater than 50% diameter reduction.
 
A CT scan of the head revealedNo acute intracranial pathology
 
CT scan of the chest suggestNo evidence for acute traumatic injury.
 
1.7 cm irregular shaped left lower lobe nodule. Especially in the setting of
mild emphysema, this is suspicious for a lung neoplasm. Recommendation is for
correlation with PET/CT for further tissue characterization.
 
Sigmoid diverticulosis without evidence of diverticulitis.
 
Aortobiiliac stent graft in place with aneurysmal dilation of the infrarenal
abdominal aorta unchanged from prior exam.
 
Assessment/Plan
Assessment/Plan
In summary this 71-year-old gentleman has the following problems
1.  Syncope/AMS
2.  COPD with active smoking
3.  Chronic systolic congestive heart failure, compensated
4.  History of coronary artery disease with prior CABG and prior PCI
5.  History of AICD, aproaching KARTIK 
6.  Hx of Carotid artery disease/aortic aneurysm and peripheral vascular disease
7.  History of ischemic cardiomyopathy with aortic stenosis
8.  Chronic renal insufficiency 
9.  Lung nodule on chest CT
 
I suspect the syncope was posttussive and therefore related to vasovagal 
physiology.  However pacemaker interrogation is pending.  His left ventricular 
function as seen significant deterioration out rejection fraction of 20%.  I 
believe we should start him on Sacubitril-Valsartan 24/26 mg twice a day.  I may
consider ischemic testing as an outpatient if ejection fraction remains 
depressed.  May be a candidate for Corlanor, if we cannot increase beta blockers
as he has a resting heart rate of 80-90.  For the time being workup for positive
blood culture, and notification to his pulmonologist of possible lung nodule and
need for follow-up is suggested.  Will maximize medications for cardiomyopathy 
as he will tolerate.  No clinical or CT scan evidence of congestive heart 
failure at the moment.
Continue telemetry? Yes

## 2017-07-20 NOTE — PATIENT DISCHARGE INSTRUCTIONS
Discharge Instructions
 
General Discharge Information
You were seen/treated for:
syncope
altered mental status
Special Instructions:
1.please f/u with cardiologist after 1 week of discharge.
2. please f/u with your pcp within 1 week of discharge.
3. please f/u with a pulmonologist after d/c for the pulmonary nodules.
 
Diet
Recommended Diet: Heart Healthy
 
Activity
Full Activity/No Limits: Yes (as tolerated)
 
Acute Coronary Syndrome
 
Inclusion Criteria
At DC or during hospital stay patient has or had the following:
ACS DIAGNOSIS No
 
Discharge Core Measures
Meds if any: Prescribed or Continued at Discharge
Meds if any: NOT Prescribed or Continued at Discharge
 
Congestive Heart Failure
 
Inclusion Criteria
At DC or during hospital stay patient has or had the following:
CHF DIAGNOSIS No
 
Discharge Core Measures
Meds if any: Prescribed or Continued at Discharge
Meds if any: NOT Prescribed or Continued at Discharge
 
Cerebrovascular accident
 
Inclusion Criteria
At DC or during hospital stay patient has or had the following:
CVA/TIA Diagnosis No
 
Discharge Core Measures
Meds if any: Prescribed or Continued at Discharge
Meds if any: NOT Prescribed or Continued at Discharge
 
Venous thromboembolism
 
Inclusion Criteria
VTE Diagnosis No
VTE Type NONE
VTE Confirmed by (Test) NONE
 
Discharge Core Measures
- Per Current guidelines, there needs to be overlap
- treatment for the first 5 days of Warfarin therapy.
- If discharged on Warfarin prior to 5 days of
- overlap therapy, the patient will need to be
- assessed for post discharge needs including
- *Post discharge parental anticoagulation
- *Warfarin and/or parental anticoagulation education
- *Follow up date to check INR post discharge
At least 5 days overlap therapy as Inpatient No
Meds if any: Prescribed or Continued at Discharge
Note: Overlap Therapy is Warfarin and Anticoagulant
Meds if any: NOT Prescribed or Continued at Discharge

## 2017-07-20 NOTE — PN- HOUSESTAFF
SHEYLA GLASS,LYRIC 17 1117:
Subjective
Follow-up For:
Syncope
COPD exacerbation
CAD
Complaints: patient states that he has not moved his bowels in 5 days
Tele-Events Since Last Visit:
Overnight patient was in normal sinus rhythm 72-84.  No events
Subjective:
Patient states that he is feeling tired, cold, hungry.  He states that he is 
sore on the left side of his body from the fall.  He states that he hasn't moved
his bowels in 5 days.  He denies headache or stomachache weakness diarrhea.
 
Review of Systems
Constitutional:
Reports: no symptoms. 
Cardiovascular:
Reports: no symptoms. 
Respiratory:
Reports: cough, wheezing. 
Gastrointestinal:
Reports: constipation. 
Musculoskeletal:
Reports: muscle pain. 
 
Objective
Last 24 Hrs of Vital Signs/I&O
 Vital Signs
 
 
Date Time Temp Pulse Resp B/P B/P Pulse O2 O2 Flow FiO2
 
     Mean Ox Delivery Rate 
 
 0934    114/60     
 
 0934    114/60     
 
 0934    114/60     
 
 0801 97.8 78 20 114/60  93 Nasal  
 
       Cannula  
 
 0000      95 Nasal 2.0L 
 
       Cannula  
 
 2232  86  122/60     
 
 2232  86  122/60     
 
 2232  86  122/60     
 
 2123 98.2 84 20 106/60  91   
 
 1758 97.9 85 20 140/80  92 Nasal 2.0L 
 
       Cannula  
 
 1502 97.9 73 18 137/79  94 Nasal 2.0L 
 
       Cannula  
 
 1150 98.4 96  145/86  96 Nasal  
 
       Cannula  
 
 
 Intake & Output
 
 
  1600  0800  0000
 
Intake Total  100 250
 
Output Total  650 450
 
Balance  -550 -200
 
    
 
Intake, Oral  100 250
 
Number   0
 
Bowel   
 
Movements   
 
Output, Urine  650 450
 
 
 
 
Physical Exam
General Appearance: Alert, Oriented X3, Cooperative, No Acute Distress
Skin: No Rashes, No Breakdown, large bruise on the right side of his abdomen.
Skin Temp/Moisture Exam: Warm/Dry
HEENT: Atraumatic, EOMI, Mucous Membr. moist/pink
Cardiovascular: Regular Rate, Normal S1, Normal S2, No Murmurs, Gallops, Rubs
Lungs: scattered expiratory wheezes.
Abdomen: Normal Bowel Sounds, Soft, No Tenderness, No Masses
Neurological: Normal Speech
Extremities: No Edema, tender left lower extremity from his fall.
Vascular: Normal Pulses, Pulses Symmetrical
Current Medications:
 Current Medications
 
 
  Sig/Ly Start time  Last
 
Medication Dose Route Stop Time Status Admin
 
Acetaminophen 650 MG Q6P PRN  0030 AC 
 
  PO   
 
Acetaminophen 1,000 MG Q12P PRN  0030 AC 
 
  IV   
 
Albuterol Sulfate 3 ML BID  2200 AC 
 
  INH   1117
 
Albuterol Sulfate 2 PUF Q4P PRN  0200 AC 
 
  INH   
 
Aspirin 81 MG DAILY  1000 AC 
 
  PO   0915
 
Bisacodyl 10 MG ONCE ONE  0915 DC 
 
  AK  0916  0933
 
Budesonide/ 2 PUF BID  0033 AC 
 
Formoterol Fumarate  INH   0934
 
Carvedilol 6.25 MG BID  1000 AC 
 
  PO   0934
 
Clopidogrel Bisulfate 75 MG DAILY  1000 AC 
 
  PO   0934
 
Heparin Sodium  5,000 UNIT Q8  0024 AC 
 
(Porcine)  SC   0649
 
Isosorbide  60 MG DAILY  1000 AC 
 
Mononitrate  PO   0934
 
Levothyroxine Sodium 0.1 MG DAILY AC  0700 AC 
 
  PO   0649
 
Polyethylene Glycol 17 GM DAILY  1935 AC 
 
  PO   0934
 
Prednisone 10 MG DAILY  1000 AC 
 
  PO  1001  
 
Prednisone 20 MG DAILY  1000 AC 
 
  PO  1001  
 
Prednisone 30 MG DAILY  1000 DC 
 
  PO  1001  0934
 
Ranolazine 500 MG BID  1000 AC 
 
  PO   0934
 
Senna/Docusate Sodium 2 TAB DAILY PRN  1945 AC 
 
  PO   2229
 
Tamsulosin HCl 0.4 MG AT BEDTIME  220 AC 
 
  PO   2232
 
Tiotropium [place] 1 PUF DAILY  1000 AC 
 
  INH   0934
 
 
 
 
Last 24 Hrs of Lab/[place] Results
Last 24 Hrs of Labs/Mics:
 Laboratory Tests
 
17 0636:
Anion Gap 8, Estimated GFR 50  L, BUN/Creatinine Ratio 18.6
 
 
Assessment/Plan
Assessment:
Assessment
 
Patient is 71-year-old  man with past medical history significant for 
coronary artery disease status post CABG in , ischemic cardiomyopathy, 
congestive heart failure, status post AICD placement, hypertension, 
hyperthyroidism, peripheral vascular disease, history of carotid endarterectomy,
status post aortic stent placement, chronic kidney disease, recently admitted at
 in April for COPD exacerbation who came to the emergency room 
with chief complaint of worsening shortness of breath and had a syncopal 
episode.  In the ED while being questioned he became unresponsive.  He is being 
worked up in telemetry for syncope.
 
Plan
 
1.  Syncope and altered mental status: due to acute delirium versus stroke 
versus seizure versus vasovagal or cardiogenic syncope versus metabolic 
encephalopathy.
* Vitals are normal and bedside pulse ox is around 97 on 2 L of air.
* Thyroid functions are normal. 
* Utox is negative. 
* He has a hyponatremia of 136
* Iron and B12 is normal at 371, folate is normal at 16.9
* Patient's creatinine is 1.4, his normal values are around the high ones.
* His glucose is 136
* Blood gas is normal.
* Serial troponins and EKG are normal
* Monitor for arrhythmias, neurochecks
* Echo shows left ventricular ejection fraction to be 20%, moderate aortic 
stenosis but is a technically difficult study.  As per cardiology there is no 
evidence of decompensated congestive heart failure at this time.
* Dopplers show no evidence to suggest hemodynamically significant stenosis of 
greater than 50% diameter reduction
* Currently no neuro note.  He will need an MRI, EEG, possibly outpatient if his
mental status continues to be good.
* As per cardiology his initial syncope in the setting of cough was more 
consistent with a vasovagal episode with his syncope in the ER remaining 
unclear.  Order put in for [employer] ICD interrogation in [facility].
* As per cardiology, start him on Sacubitril-Valsartan 24/26 mg twice a day and 
consider ischemic testing as an outpatient if ejection fraction remains 
depressed.  May be a candidate for Corlanor, if we cannot increase beta blockers
as he has a resting heart rate of 80-90.
* Continue aspirin, plavix and high dose statin. 
* PT/OT/ speech eval. 
* Patient passed bedside swallow done in ER. 
* Potassium 4.2.  Replete electrolytes  magnesium > 2.0 and K > 4.0.
 
2. Mild COPD exacerbation:  Was seen by Dr. Wood in the past.  
* TRC nebs, rapid prednisone taper 40--> 30-->20-->10 then stop.  Today he is to
receive 30 mg prednisone.
* Continue spiriva and symbicort. 
* Patient will need eventual outpatient Pulm follow up for further evaluation of
the LLL nodule.
 
3.  Resolved leukocytosis likely reactive:   Panculture, monitor off 
antibiotics. 
* Patient is growing gram-positive cocci in clusters in one blood culture.  WBCs
are 7.1 and he is afebrile so we will watch him off antibiotics at this time.
 
DVT ppx Hep SC. 
Full code 
Problem List:
 1. Left hip pain
 
 2. Left ankle pain
 
 3. Abdominal contusion
 
 4. Altered mental state
 
 5. COPD exacerbation
 
 6. History of automatic internal cardiac defibrillator (AICD)
 
 7. Syncope
 
Pain Ratin
Pain Location:
Left shin and left hip
Pain Goal: Pain 4 or less
Pain Plan:
Tylenol for pain
Tomorrow's Labs & Rationales:
cbc
DVT/Prophylaxis: mechanical, pharmacological
 
 
ALVINO LANGSTON MD 17:
Attending MD Review Statement
 
Attending Statement
Attending MD Statement: examined this patient, discuss w/resident/PA/NP, agreed 
w/resident/PA/NP, reviewed EMR data (avail), discussed with nursing, discussed 
with case mgmt, reviewed images, amended to note
Attending Assessment/Plan:
The patient was seen and discussed with house staff. On my exam the patient had 
significant rhonchi and wheezes c/w COPD exacerbation with exam worsening since 
admission. He c/o dyspnea and fatigue. Dr. Bowers had suggested starting 
Entresto, however we discovered that the patient had been on Entresto in the 
past. He stated that Dr. Bowers had stopped it approximately 6 months ago due 
to "side effect".  I spoke with Dr. Boewrs and he will be checking his records 
and signing it out to the rounding cardiologist for tomorrow. Will hold Entresto
at present. Suspect it may have caused worsening renal failure. 
 
With worsening respiratory status, will begin IV Solumedrol this evening. 
Continue aerosol and add Mucinex. Will re-assess in morning. If significantly 
improved will consider conversion to Prednisone. Will check pulse ox on RA and 
with ambulation tomorrow if significantly improved. 
 
Patient was advised that EEG was negative.

## 2017-07-20 NOTE — CONS- NEUROLOGY
General Information and HPI
 
Consulting Request
Date of Consult: 07/20/17
Requested By:
ALVINO LANGSTON MD
 
Reason for Consult:
brief loss of consciousness
History of Present Illness:
71 y.o.male with hx. of COPD, HTN and prior CEA had recent brief loss of 
consciousness at home in setting of cough. Presented to ER with c/o SOB on day 
of admission. When being interviewed, had brief alteration in awareness and 
speech arrest without involuntary movements. Stroke alert was called. No TPA 
given. Now back to baseline. No hx. of seizure or stroke
 
EEG without paroxysmal features. CT head negative.
 
Allergies/Medications
Allergies:
Coded Allergies:
NO KNOWN ALLERGIES (04/05/15)
 
Home Med List:
Acetaminophen 325 MG CAPSULE   1 CAP PO Q6 PRN PAIN  (Reported)
Albuterol Sulfate (Proair Hfa) 90 MCG HFA.AER.AD   2 PUF INH Q4 COPD
Aspirin (Aspirin*) 81 MG TAB.CHEW   1 TAB PO DAILY heart  (Reported)
Atorvastatin Calcium (Lipitor) 40 MG TABLET   1 TAB PO DAILY cholesterol  (
Reported)
Budesonide/Formoterol Fumarate (Symbicort 160-4.5 Mcg Inhaler) 160 MCG-4.5 MCG/
ACTUATION HFA.AER.AD   2 PUF INH BID COPD  (Reported)
Carvedilol (Coreg) 3.125 MG TABLET   6.25 MG PO BID HEART
Clopidogrel Bisulfate (Clopidogrel) 75 MG TABLET   1 TAB PO DAILY BLOOD THINNER 
(Reported)
Furosemide (Lasix) 20 MG TABLET   1 TAB PO DAILY DIURETIC  (Reported)
Gabapentin (Neurontin) 800 MG TABLET   1 TAB PO TID BACK PAIN  (Reported)
Isosorbide Mononitrate (Isosorbide Mononitrate ER) 60 MG TAB.ER.24H   1 TAB PO 
DAILY    (Reported)
Levothyroxine Sodium 100 MCG TABLET   1 TAB PO DAILY THYROID  (Reported)
Nitroglycerin (Nitrostat) 0.4 MG TAB.SUBL   1 TAB SL AD PRN CHEST PAIN  (
Reported)
     1st sign of attack; may repeat every 5 minutes until relief; if pain 
persists after 3 tablets in 15 minutes, prompt medical att
Ranolazine (Ranexa) 500 MG TAB.ER.12H   1 TAB PO BID heart  (Reported)
Sacubitril/Valsartan (Entresto 24 MG-26 MG Tablet) 24 MG-26 MG TABLET   1 TAB PO
BID heart health
Tamsulosin HCl (Flomax) 0.4 MG CAP.ER.24H   1 CAP PO QHS prostate
     Please take at bed time to avoid low blood pressure during the day.
Tiotropium Bromide (Spiriva) 18 MCG CAP.W.DEV   1 CAP INH DAILY COPD  (Reported)
 
 
Review of Systems
Review of Systems:
+ BRYANT, pain from recent fall. No diplopia, dysarthria, chest pain, fever, chills
, rash, abnormal bleeeding, weigt loss, vomiting
 
Past History
 
Travel History
Traveled to [place] past 21 day No
 
Medical History
Neurological: NONE
EENT: NONE
Cardiovascular: CARDIAC STENTS CABG CAROTID ARTERY BYPASS DEFIBRILLATOR
Respiratory: COPD
Gastrointestinal: NONE
Hepatic: NONE
Renal: NONE
Musculoskeletal: NONE
Psychiatric: NONE
Endocrine: hypothyroidism
Blood Disorders: DVT
Cancer(s): NONE
GYN/Reproductive: NONE
 
Surgical History
Surgical History: CABG, CAROTID ARTECTOMY nerve thermoablation to reduce back 
pain 
 
Psychosocial History
Who Do You Live With? spouse
Services at Home: None
Smoking Status: Current Everyday Smoker
ETOH Use: denies use
Illicit Drug Use: denies illicit drug use
 
Functional Ability
ADLs
Independent: dressing, eating, toileting, bathing. 
Ambulation: independent
 
Exam & Diagnostic Data
Vital Signs and I&O
Vital Signs
 
 
Date Time Temp Pulse Resp B/P B/P Pulse O2 O2 Flow FiO2
 
     Mean Ox Delivery Rate 
 
07/20 1126       Nasal 2.0L 
 
       Cannula  
 
07/20 1125      97 Nasal 2.0L 
 
       Cannula  
 
07/20 0934    114/60     
 
07/20 0934    114/60     
 
07/20 0934    114/60     
 
07/20 0900      93 Nasal 2.0L 
 
       Cannula  
 
07/20 0801 97.8 78 20 114/60  93 Nasal  
 
       Cannula  
 
07/20 0000      95 Nasal 2.0L 
 
       Cannula  
 
07/19 2232  86  122/60     
 
07/19 2232  86  122/60     
 
07/19 2232  86  122/60     
 
07/19 2123 98.2 84 20 106/60  91   
 
07/19 1758 97.9 85 20 140/80  92 Nasal 2.0L 
 
       Cannula  
 
07/19 1502 97.9 73 18 137/79  94 Nasal 2.0L 
 
       Cannula  
 
 
 Intake & Output
 
 
 07/20 1600 07/20 0800 07/20 0000
 
Intake Total 240 100 250
 
Output Total 300 650 450
 
Balance -60 -550 -200
 
    
 
Intake, Oral 240 100 250
 
Number 1  0
 
Bowel   
 
Movements   
 
Output, Urine 300 650 450
 
 
Elderly male in NAD. Head NC/AT. Speech fluent. Pupils equal. EOM's full. Face 
symmetric.
No focal or lateralizing weakness. DTR's symmetric. MISSY's equal.Joint position 
intact. Gait untested.
 
Assessment/Plan
Assessment:
#1 Transient alteration of awareness/ consciouness. Doubt TIA or seizure. Back 
to baseline. Exam non focal
#2 Recent cough syncope
Recommendations:
No further neurodiagnostic studies currently anticipated
Empirically suggest low dose ASA
Please call if further questions.
 
 
Consult Acknowledgment
- Thank you for your consult request.

## 2017-07-21 VITALS — DIASTOLIC BLOOD PRESSURE: 94 MMHG | SYSTOLIC BLOOD PRESSURE: 158 MMHG

## 2017-07-21 VITALS — SYSTOLIC BLOOD PRESSURE: 158 MMHG | DIASTOLIC BLOOD PRESSURE: 74 MMHG

## 2017-07-21 NOTE — PN- HOUSESTAFF
SHEYLA GLASS,LYRIC 17 0903:
Subjective
Follow-up For:
copd exacerbation
syncopal episode
Complaints: pain scale (0-10)
Tele-Events Since Last Visit:
patient was in normal sinus rhythm rate 69-85.
Subjective:
Patient was seen and examined bedside.  Patient complained of left-sided body 
pain from his fall.  He also complains of his right-sided abdominal bruise but 
says it feels a little bit better.  He complains of wheezing but he says this is
his baseline.
 
Review of Systems
Constitutional:
Reports: no symptoms. 
EENTM:
Reports: no symptoms. 
Cardiovascular:
Reports: no symptoms. 
Respiratory:
Reports: wheezing. 
Gastrointestinal:
Reports: constipation. 
Genitourinary:
Reports: no symptoms. 
Musculoskeletal:
Reports: no symptoms. 
Hematologic/Endocrine:
Reports: bruising. 
 
Objective
Last 24 Hrs of Vital Signs/I&O
 Vital Signs
 
 
Date Time Temp Pulse Resp B/P B/P Pulse O2 O2 Flow FiO2
 
     Mean Ox Delivery Rate 
 
 0728 98.9 83 16 158/94  92 Room Air  
 
 0000      91 CPAP  
 
 2213 98.1  16   91 Room Air  
 
 2159  76    95   
 
 2115  83  148/80     
 
 2111  85  148/80     
 
 2111  85  148/80     
 
 2111  85  148/80     
 
 1839      92 Room Air Room Air 
 
 1600      93 Nasal 2.0L 
 
       Cannula  
 
 1521 97.8 87 18 120/64  92 Room Air  
 
 1126       Nasal 2.0L 
 
       Cannula  
 
 1125      97 Nasal 2.0L 
 
       Cannula  
 
 0934    114/60     
 
 0934    114/60     
 
 0934    11460     
 
 
 Intake & Output
 
 
  1600  0800  0000
 
Intake Total  200 480
 
Output Total  700 
 
Balance  -500 480
 
    
 
Intake, Oral  200 480
 
Number   2
 
Bowel   
 
Movements   
 
Output, Urine  700 
 
 
 
 
Physical Exam
General Appearance: Alert, Oriented X3, Cooperative, No Acute Distress
Skin: No Rashes, No Breakdown, large right-sided abdominal bruise.
Skin Temp/Moisture Exam: Warm/Dry
Sepsis Skin Exam (color): Normal for Ethnicity
Cardiovascular: Regular Rate, Normal S1, Normal S2, No Murmurs, Gallops, Rubs
Lungs: scattered wheezes
Abdomen: Normal Bowel Sounds, Soft, No Tenderness, No Hepatospenomegaly, No 
Masses
Neurological: Normal Speech
Extremities: No Edema, Normal Pulses, left leg tenderness
Vascular: Normal Pulses
Sepsis Peripheral Pulse Location: Radial
Current Medications:
 Current Medications
 
 
  Sig/Ly Start time  Last
 
Medication Dose Route Stop Time Status Admin
 
Acetaminophen 650 MG Q6P PRN  0030 AC 
 
  PO   
 
Acetaminophen 1,000 MG Q12P PRN  0030 AC 
 
  IV   
 
Albuterol Sulfate 3 ML BID  2200 AC 
 
  INH   1839
 
Albuterol Sulfate 2 PUF Q4P PRN  0200 AC 
 
  INH   
 
Aspirin 81 MG DAILY  1627 CAN 
 
  PO   
 
Aspirin 81 MG DAILY  1000 AC 
 
  PO   0915
 
Bisacodyl 10 MG ONCE ONE  0915 DC 
 
  [place]  0916  0933
 
Budesonide/ 2 PUF BID  0033 AC 
 
Formoterol Fumarate  INH   2111
 
Carvedilol 6.25 MG BID  1000 AC 
 
  PO   2111
 
Clopidogrel Bisulfate 75 MG DAILY  1000 AC 
 
  PO   0934
 
Guaifenesin 600 MG Q12  2200 AC 
 
  PO   1852
 
Heparin Sodium  5,000 UNIT Q8  0024 AC 
 
(Porcine)  SC   0633
 
Isosorbide  60 MG DAILY  1000 AC 
 
Mononitrate  PO   0934
 
Levothyroxine Sodium 0.1 MG DAILY AC  0700 AC 
 
  PO   0633
 
Methylprednisolone 40 MG BID  2200 AC 
 
  IV   1852
 
Patient Medication  1 ED .STK-MED ONE  1400 IA 
 
Teaching  ED  1401  
 
Polyethylene Glycol 17 GM DAILY  1935 AC 
 
  PO   0934
 
Prednisone 10 MG DAILY  1000 CAN 
 
  PO  1001  
 
Prednisone 20 MG DAILY  1000 CAN 
 
  PO  1001  
 
Prednisone 30 MG DAILY  1000 DC 
 
  PO  1001  0934
 
Ranolazine 500 MG BID  1000 AC 
 
  PO   2111
 
Senna/Docusate Sodium 2 TAB DAILY PRN  1945 AC 
 
  PO   2229
 
Tamsulosin HCl 0.4 MG AT BEDTIME  2200 AC 
 
  PO   2111
 
Tiotropium [place] 1 PUF DAILY  1000 AC 
 
  INH   0934
 
 
 
 
Orders
ECHO Findings:
 Mild left ventricular dilatation. Left ventricular wall thickness  
 mildly increased. Moderate global hypokinesis with severe  
 hypokinesis of the inferior wall and anteroseptum. Left ventricular  
 ejection fraction is estimated at 20 %.  
Radiology Findings:
Abnormal EEG due to generalized slowing of the background rhythm consistent with
diffuse cerebral dysfunction. No epileptiform features are noted.
 
 
Assessment/Plan
Assessment:
Assessment
 
Patient is 71-year-old  man with past medical history significant for 
coronary artery disease status post CABG in , ischemic cardiomyopathy, 
congestive heart failure, status post AICD placement, hypertension, 
hyperthyroidism, peripheral vascular disease, history of carotid endarterectomy,
status post aortic stent placement, chronic kidney disease, recently admitted at
[facility] in April for COPD exacerbation who came to the emergency room 
with chief complaint of worsening shortness of breath and had a syncopal 
episode.  In the ED while being questioned he became unresponsive.  He is being 
worked up in telemetry for syncope.
 
Plan
 
1.  Syncope and altered mental status: due to acute delirium versus stroke 
versus seizure versus vasovagal or cardiogenic syncope versus metabolic 
encephalopathy.
* Vitals are normal and bedside pulse ox is around 97 on 2 L of air.
* Thyroid functions are normal. 
* Utox is negative. 
* He has a hyponatremia of 136
* Iron and B12 is normal at 371, folate is normal at 16.9
* Patient's creatinine is 1.4, his normal values are around the high ones.
* Blood gas is normal.
* Serial troponins and EKG are normal
* Monitor for arrhythmias, neurochecks
* Echo shows left ventricular ejection fraction to be 20%, moderate aortic 
stenosis but is a technically difficult study.  As per cardiology there is no 
evidence of decompensated congestive heart failure at this time.
* Dopplers show no evidence to suggest hemodynamically significant stenosis of 
greater than 50% diameter reduction
* Currently no neuro note.  He will need an MRI, EEG, possibly outpatient if his
mental status continues to be good.
* As per cardiology his initial syncope in the setting of cough was more 
consistent with a vasovagal episode with his syncope in the ER remaining 
unclear.  Order put in for [employer] ICD interrogation in noodls.
* As per cardiology, he should be on ACE-I due to his ischemic cardiomyopathy. 
Start him on Sacubitril-Valsartan 24/26 mg twice a day and consider ischemic 
testing as an outpatient if ejection fraction remains depressed.  May be a 
candidate for Corlanor, if we cannot increase beta blockers as he has a resting 
heart rate of 80-90.  HOWEVER HE WAS FOUND TO HAVE HAD A PRIOR REACTION SO HE 
WILL NOT BE STARTED ON ENTRESTA.  
* Check with cardiology regarding CHF medication.
* Patients is on nitrates but not hydralazine (this combination is second-line 
for CHF)
* Continue aspirin, plavix and high dose statin. 
* PT/OT/ speech eval. 
* Patient passed bedside swallow done in ER. 
* Potassium 4.2.  Replete electrolytes  magnesium > 2.0 and K > 4.0.
 
2. Mild COPD exacerbation:  Was seen by Dr. Wood in the past.  
* Will discharge on long PO prednisone taper.
* Continue spiriva and symbicort. 
* Need PT to check ambulation O2 - walking pulse ox.  If less than 88 can 
qualify for home oxygen.
* Smoking cessation education.
* Patient will need outpatient Pulm follow up for further evaluation of the LLL 
nodule.
 
3.  Resolved leukocytosis likely reactive:   Panculture, monitor off 
antibiotics. 
* Patient is growing gram-positive cocci in clusters in one blood culture.  WBCs
are 7.1 and he is afebrile so we will watch him off antibiotics at this time.
 
DVT ppx Hep SC. 
Full code 
Problem List:
 1. Syncope
 
 2. Left ankle pain
 
 3. Left hip pain
 
 4. Abdominal contusion
 
 5. Fall
 
 6. Altered mental state
 
 7. COPD exacerbation
 
Pain Ratin
Pain Location:
Left leg and left hip
Pain Goal: Pain 4 or less
Pain Plan:
Tylenol
Tomorrow's Labs & Rationales:
Patient stable for discharge today.
 
 
ALYSSIA GARCIA 17 [id number]:
Attending MD Review Statement
 
Attending Statement
Attending MD Statement: examined this patient, discuss w/resident/PA/NP, agreed 
w/resident/PA/NP, discussed with family, reviewed EMR data (avail), discussed 
with nursing, discussed with case mgmt, reviewed images, amended to note
Attending Assessment/Plan:
The patient was seen and discussed with house staff.  Dr. Bowers had suggested 
starting Entresto, however we discovered that the patient had been on Entresto 
in the past. He stated that Dr. Bwoers had stopped it approximately 6 months 
ago due to "side effect". Cardiology recommend adding ARB candesartan 4mg daily.
 
Patient significantly improved will consider conversion to Prednisone. Will 
check pulse ox on RA and with ambulation tomorrow if significantly improved.

## 2017-07-21 NOTE — PN- CARDIOLOGY
Subjective
Subjective:
 
No dyspnea at rest.  Has a nonproductive cough.  No chills.  Complains of some 
pain in his left leg without swelling.
 
Objective
Vital Signs and I&Os
Vital Signs
 
 
Date Time Temp Pulse Resp B/P B/P Pulse O2 O2 Flow FiO2
 
     Mean Ox Delivery Rate 
 
07/21 1000    158/74     
 
07/21 1000    158/74     
 
07/21 0959    158/74     
 
07/21 0926      87 Room Air Room Air 
 
07/21 0728 98.9 83 16 158/94  92 Room Air  
 
07/21 0000      91 CPAP  
 
07/20 2213 98.1  16   91 Room Air  
 
07/20 2159  76    95   
 
07/20 2115  83  148/80     
 
07/20 2111  85  148/80     
 
07/20 2111  85  148/80     
 
07/20 2111  85  148/80     
 
07/20 1839      92 Room Air Room Air 
 
07/20 1600      93 Nasal 2.0L 
 
       Cannula  
 
07/20 1521 97.8 87 18 120/64  92 Room Air  
 
 
 Intake & Output
 
 
 07/21 1600 07/21 0800 07/21 0000 07/20 1600 07/20 0800 07/20 0000
 
Intake Total  200 480 720 100 250
 
Output Total  700  300 650 450
 
Balance  -500 480 420 -550 -200
 
       
 
Intake, Oral  200 480 720 100 250
 
Number   2 2  0
 
Bowel      
 
Movements      
 
Output, Urine  700  300 650 450
 
 
 
Physical Exam:
 
 
General: no apparent distress. Alert.  
Eyes: No obvious scleral icterus.
HEENT: No jugular venous distention or abnormal jugular venous pulsations.
Cardiovascular: Normal intensity S1/S2. AICD noted. One out of 6 systolic 
murmur.
Respiratory: Scattered bilateral wheezes
Abdomen: Soft, nontender with no guarding or rebound tenderness.
Musculoskeletal: No clubbing or cyanosis noted, no edema
Skin: Warm
Neurologic: No gross focal deficits noted.
Lymph: No gross lymphadenopathy. 
Current Medications:
 Current Medications
 
 
  Sig/Ly Start time  Last
 
Medication Dose Route Stop Time Status Admin
 
Acetaminophen 650 MG Q6P PRN 07/19 0030 AC 
 
  PO   
 
Acetaminophen 1,000 MG Q12P PRN 07/19 0030 AC 
 
  IV   
 
Albuterol Sulfate 3 ML BID 07/20 2200 AC 07/21
 
  INH   0925
 
Albuterol Sulfate 2 PUF Q4P PRN 07/19 0200 AC 
 
  INH   
 
Aspirin 81 MG DAILY 07/20 1627 CAN 
 
  PO   
 
Aspirin 81 MG DAILY 07/19 1000 AC 07/21
 
  PO   0959
 
Budesonide/ 2 PUF BID 07/19 0033 AC 07/21
 
Formoterol Fumarate  INH   1002
 
Carvedilol 6.25 MG BID 07/19 1000 AC 07/21
 
  PO   0959
 
Clopidogrel Bisulfate 75 MG DAILY 07/19 1000 AC 07/21
 
  PO   1000
 
Guaifenesin 600 MG Q12 07/20 2200 AC 07/21
 
  PO   1000
 
Heparin Sodium  5,000 UNIT Q8 07/19 0024 AC 07/21
 
(Porcine)  SC   0633
 
Isosorbide  60 MG DAILY 07/19 1000 AC 07/21
 
Mononitrate  PO   1000
 
Levothyroxine Sodium 0.1 MG DAILY AC 07/19 0700 AC 07/21
 
  PO   0633
 
Methylprednisolone 40 MG BID 07/20 2200 DC 07/20
 
  IV   1852
 
Patient Medication  1 ED .STK-MED ONE 07/20 1400 [place] 
 
Teaching  ED 07/20 1401  
 
Polyethylene Glycol 17 GM DAILY 07/19 1935 AC 07/21
 
  PO   1001
 
Prednisone 10 MG DAILY 07/22 1000 CAN 
 
  PO 07/22 1001  
 
Prednisone 20 MG DAILY 07/21 1000 CAN 
 
  PO 07/21 1001  
 
Prednisone 60 MG DAILY 07/21 1000 AC 07/21
 
  PO 07/22 1001  1002
 
Ranolazine 500 MG BID 07/19 1000 AC 07/21
 
  PO   1000
 
Senna/Docusate Sodium 2 TAB DAILY PRN 07/19 1945 AC 07/19
 
  PO   2229
 
Tamsulosin HCl 0.4 MG AT BEDTIME 07/19 2200 AC 07/20
 
  PO   2111
 
Tiotropium [place] 1 PUF DAILY 07/19 1000 AC 07/21
 
  INH   0959
 
 
 
 
Results
Last 48 Hrs of Labs/Mics:
 Laboratory Tests
 
07/20/17 0636:
Anion Gap 8, Estimated GFR 50  L, BUN/Creatinine Ratio 18.6
 
Recent Imaging Studies:
 
ICD interrogation report was personally reviewed and showed no evidence of 
significant sustained arrhythmia
 
Assessment/Plan
Assessment/Plan
 
1.  Syncope/AMS
2.  COPD with active smoking
3.  Chronic systolic congestive heart failure, compensated
4.  History of coronary artery disease with prior CABG and prior PCI
5.  History of AICD, aproaching KARTIK 
6.  Hx of Carotid artery disease/aortic aneurysm and peripheral vascular disease
7.  History of ischemic cardiomyopathy with aortic stenosis; EF 20%
8.  Chronic renal insufficiency 
9.  Lung nodule on chest CT
 
 
 
ICD interrogation confirms no detected arrhythmias/defibrillations. Blood 
pressure is well above goal given the patient's severe cardiomyopathy. He 
apparently had side effects from Entresto in the past. I think he clearly should
benefit from ARB addition for improved blood pressure control and treatment of 
his severe cardiomyopathy. If no objection from nephrology I recommend adding 
candesartan 4 mg daily with plan for additional up titration as an outpatient as
tolerated. Consider pulmonary consultation given the suspicious lung nodule seen
on CT scan. If no objection from pulmonary we may want to consider up titrating 
his carvedilol in the future as well although he is currently bronchospastic. 
The positive blood culture appears consistent with a contaminant.
 
 
 
James Mc MD [facility]
Continue telemetry? No

## 2017-07-21 NOTE — ULTRASOUND REPORT
EXAMINATION:
US TRIPLEX LOWER EXTREMITY, LEFT
 
CLINICAL INFORMATION:
Left leg swelling.
 
COMPARISON:
Venous ultrasound dated 10/28/2016.
 
TECHNIQUE:
Color-flow triplex imaging with spectral analysis and compression Doppler
were performed on the left lower extremity.
 
FINDINGS:
Respiratory variation, normal compression and augmented flow are noted
throughout the left lower extremity. The visualized common femoral vein,
proximal greater saphenous vein, femoral vein, profunda femoral vein,
popliteal vein and visualized mid calf venous segments show no evidence of
deep venous thrombosis.
 
There is no Baker's cyst.
 
IMPRESSION:
Normal triplex scan without evidence of deep venous thrombosis involving the
left lower extremity.

## 2017-07-24 NOTE — DISCHARGE SUMMARY
Visit Information
 
Visit Dates
Admission Date:
07/18/17
 
Discharge Date:
07/21/17
 
 
Hospital Course
 
Course
Attending Physician:
ALVINO LANGSTON MD
 
Primary Care Physician:
TRICIA GLASS,[facility] Course:
The patient is a 71 year old M smoker with PMH of CAD, CABG, ischemic 
cardiomyopathy, HTN, CHF, AICD placement, carotid endarterectomy and 
hypothyroidism who presented with chief complaint of shortness of breath. When 
he presented to ED this evening he was alert, oriented, answering questions when
they were getting history from him.  However during the interviewing he became 
unresponsive. His eyes were open but he was mute and he remained at this stage 
for several minutes and stroke protocal was called during that time. After a few
minutes patient started talking but he was disoriented to time person and place 
and uttering inappropriate words. During the interview he also reported that he 
was admitted to hospital in April 2017 due to COPD. He also reported that he 
came to ED two days back because he belives he had a syncopal episode after 
multiple bouts of cough and a subsequent fall in which he fell on and injured 
the left side of his leg and right abdomen. 
Vital signs on admission were temperature 97.2, pulse 87, respiratory rate 18, 
blood pressure 131/79, pulse ox 94 on room air.
 
Labs were WBC count 11.5, hemoglobin 13.2, hematocrit 39.4, platelet count 189, 
ABGs were pH 7.41/45/96/28, sodium 132, potassium 4.0, BUNs 19, creatinine 1.5, 
urine toxicology was negative.
 
X-ray hip, x-ray of ankle were negative.
Head CT was negative for any acute intracranial pathology
CT abdomen and chest showed 1.7 cm suspicious irregularly shaped left lower lobe
nodule.  Sigmoid diverticulosis without evidence of diverticulitis.
 
The patient was admitted to the tele floor for close monitoring mental status
Serial troponins and EKGs were negative.  Echocardiogram showed left ventricular
ejection fraction to be 20%, moderate aortic stenosis but a technically 
difficult study.  As per cardiology there was no evidence of decompensated 
congestive heart failure at this time.  Dopplers showed no evidence to suggest 
hemodynamically significant stenosis of greater than 50% diameter reduction.  
Neurology was consulted for an MRI which was negative. Patient passed bedside 
evaluation.
 
 
Neuro checks every 4 hours
Neurology evaluation in a.m.
We will check EEG to rule out seizures
 
Allergies:
Coded Allergies:
NO KNOWN ALLERGIES (04/05/15)
 
 
Disposition Summary
 
Disposition
Principal Diagnosis:
Syncopal episode 
Additional Diagnosis:
COPD
HTN
CHF
Lung nodule of uknown significance
Discharge Disposition: home or self care
 
Discharge Instructions
 
General Discharge Information
Code Status: Full Code
Patient's Diet:
Heart Healthy
Patient's Activity:
As tolerated
Follow-Up Instructions/Appts:
1.please f/u with cardiologist after 1 week of discharge.
2. please f/u with your pcp within 1 week of discharge.
3. please f/u with a pulmonologist after d/c for the pulmonary nodules.
 
Medications at Discharge
Discharge Medications:
Continue taking these medications:
Aspirin (Aspirin*) 81 MG TAB.CHEW
    1 Tablet ORAL DAILY
    Qty = 30
    Comments:
       Last Taken:7/21/17
             Time: 10AM
 
Atorvastatin Calcium (Lipitor) 40 MG TABLET
    1 Tablet ORAL DAILY
    Qty = 30
    Comments:
       Last Taken:7/20/17
             Time: 5PM
 
Ranolazine (Ranexa) 500 MG TAB.ER.12H
    1 Tablet ORAL TWICE DAILY
    Qty = 30
    Comments:
       Last Taken:7/21/17
             Time: 10AM
 
Clopidogrel Bisulfate (Clopidogrel) 75 MG TABLET
    1 Tablet ORAL DAILY
    Qty = 90
    Comments:
       Last Taken: 7/21/17
             Time: 10AM
 
Acetaminophen (Acetaminophen) 325 MG CAPSULE
    1 Capsule ORAL EVERY SIX HOURS as needed for PAIN
    Comments:
       Last Taken:NOT GIVEN THIS ADMISSION
             
 
Albuterol Sulfate (Proair Hfa) 90 MCG HFA.AER.AD
    2 Puff Inhale through mouth Every 4 hours
    Qty = 1
    Comments:
       NOT GIVEN THIS ADMISSION
 
Tamsulosin HCl (Flomax) 0.4 MG CAP.ER.24H
    1 Capsule ORAL TAKE AT BEDTIME
    Qty = 30
    Instructions:
       Please take at bed time to avoid low blood pressure during the day.
    Comments:
       Last Taken: 7/20/17
             Time: 10PM
 
Tiotropium Bromide (Spiriva) 18 MCG CAP.W.DEV
    1 Capsule Inhale through mouth DAILY
    Qty = 30
    Comments:
       Last Taken: 7/21/17
             Time:10AM
 
Carvedilol (Coreg) 3.125 MG TABLET
    6.25 Milligram ORAL TWICE DAILY
    Qty = 30
    Comments:
       Last Taken:7/21/17
             Time: 10AM
 
Isosorbide Mononitrate (Isosorbide Mononitrate ER) 60 MG TAB.ER.24H
    1 Tablet ORAL DAILY
    Qty = 90
    Comments:
       Last Taken: 7/21/17
             Time: 10AM
 
Budesonide/Formoterol Fumarate (Symbicort 160-4.5 Mcg Inhaler) 160 MCG-4.5 MCG/
ACTUATION HFA.AER.AD
    2 Puff Inhale through mouth TWICE DAILY
    Qty = 10
    Comments:
       Last Taken: 7/21/17
             Time: 10AM
 
Gabapentin (Neurontin) 800 MG TABLET
    1 Tablet ORAL THREE TIMES DAILY
    Qty = 90
    Comments:
       Last Taken: 4/25/17
             Time: 10AM
 
Levothyroxine Sodium (Levothyroxine Sodium) 100 MCG TABLET
    1 Tablet ORAL DAILY
    Qty = 90
    Comments:
       Last Taken:7/21/17
             Time:0700
 
Furosemide (Lasix) 20 MG TABLET
    1 Tablet ORAL DAILY
 
Nitroglycerin (Nitrostat) 0.4 MG TAB.SUBL
    1 Tablet SUBLINGUAL As Directed as needed for CHEST PAIN
    Instructions:
       1st sign of attack; may repeat every 5 minutes until relief; if pain 
persists after 3 tablets in 15 minutes, prompt medical att
 
Start taking the following new medications:
Candesartan Cilexetil (Candesartan Cilexetil) 4 MG TABLET
    1 Tablet ORAL DAILY
    Qty = 30
    No Refills
 
Prednisone (Prednisone) 10 MG TABLET
    1 Tablet ORAL SEE INSTRUCTIONS
    Qty = 60
    No Refills
    Instructions:
       60 MG( 6 TABS) ON 07/22 - 07/23
       50 MG (5 TABS) ON 07/24- 07/26
       40 MG( 4 TABS) 0N 07/27 - 07/29
       30 MG( 3 TABS) ON 07/30 - 08/01
       20 MG( 2 TABS) ON 08/02 - 08/04
       10 MG ( 1 TAB) ON 08/05 - 08/07
    Comments:
       Last Taken:7/21/17
             Time:1000 60MGS GIVEN
 
 
Copies To:
IZZY GLASS,CESAR SILVERIO; YESSI GLASS,CHACHA MCGILL; ALVINO LANGSTON MD; TRICIA GLASS,
NAI; JATINDER GLASS,DARIANARACHEL

## 2018-02-12 ENCOUNTER — HOSPITAL ENCOUNTER (INPATIENT)
Dept: HOSPITAL 68 - ERH | Age: 73
LOS: 5 days | DRG: 291 | End: 2018-02-17
Attending: INTERNAL MEDICINE | Admitting: INTERNAL MEDICINE
Payer: COMMERCIAL

## 2018-02-12 VITALS — WEIGHT: 247.56 LBS | HEIGHT: 72 IN | BODY MASS INDEX: 33.53 KG/M2

## 2018-02-12 DIAGNOSIS — Z79.82: ICD-10-CM

## 2018-02-12 DIAGNOSIS — N18.3: ICD-10-CM

## 2018-02-12 DIAGNOSIS — Z95.810: ICD-10-CM

## 2018-02-12 DIAGNOSIS — I13.0: Primary | ICD-10-CM

## 2018-02-12 DIAGNOSIS — I44.0: ICD-10-CM

## 2018-02-12 DIAGNOSIS — J20.9: ICD-10-CM

## 2018-02-12 DIAGNOSIS — I25.10: ICD-10-CM

## 2018-02-12 DIAGNOSIS — Z95.1: ICD-10-CM

## 2018-02-12 DIAGNOSIS — R00.0: ICD-10-CM

## 2018-02-12 DIAGNOSIS — I35.0: ICD-10-CM

## 2018-02-12 DIAGNOSIS — G47.33: ICD-10-CM

## 2018-02-12 DIAGNOSIS — I25.5: ICD-10-CM

## 2018-02-12 DIAGNOSIS — Z79.51: ICD-10-CM

## 2018-02-12 DIAGNOSIS — I50.23: ICD-10-CM

## 2018-02-12 DIAGNOSIS — J44.0: ICD-10-CM

## 2018-02-12 DIAGNOSIS — J44.1: ICD-10-CM

## 2018-02-12 DIAGNOSIS — Z85.118: ICD-10-CM

## 2018-02-12 DIAGNOSIS — Z85.89: ICD-10-CM

## 2018-02-12 DIAGNOSIS — Z87.891: ICD-10-CM

## 2018-02-12 DIAGNOSIS — Z79.52: ICD-10-CM

## 2018-02-12 DIAGNOSIS — E03.9: ICD-10-CM

## 2018-02-12 DIAGNOSIS — I73.9: ICD-10-CM

## 2018-02-12 DIAGNOSIS — Z92.3: ICD-10-CM

## 2018-02-12 DIAGNOSIS — J96.01: ICD-10-CM

## 2018-02-12 LAB
ABSOLUTE GRANULOCYTE CT: 6.7 /CUMM (ref 1.4–6.5)
BASOPHILS # BLD: 0 /CUMM (ref 0–0.2)
BASOPHILS NFR BLD: 0.3 % (ref 0–2)
EOSINOPHIL # BLD: 0.2 /CUMM (ref 0–0.7)
EOSINOPHIL NFR BLD: 2.3 % (ref 0–5)
ERYTHROCYTE [DISTWIDTH] IN BLOOD BY AUTOMATED COUNT: 15.4 % (ref 11.5–14.5)
GRANULOCYTES NFR BLD: 81.4 % (ref 42.2–75.2)
HCT VFR BLD CALC: 30.9 % (ref 42–52)
LYMPHOCYTES # BLD: 0.7 /CUMM (ref 1.2–3.4)
MCH RBC QN AUTO: 34.4 PG (ref 27–31)
MCHC RBC AUTO-ENTMCNC: 33.8 G/DL (ref 33–37)
MCV RBC AUTO: 101.7 FL (ref 80–94)
MONOCYTES # BLD: 0.6 /CUMM (ref 0.1–0.6)
PLATELET # BLD: 152 /CUMM (ref 130–400)
PMV BLD AUTO: 7.9 FL (ref 7.4–10.4)
RED BLOOD CELL CT: 3.04 /CUMM (ref 4.7–6.1)
WBC # BLD AUTO: 8.2 /CUMM (ref 4.8–10.8)

## 2018-02-12 NOTE — RADIOLOGY REPORT
EXAMINATION:
CHEST 2 VIEWS
 
CLINICAL INFORMATION:
Dyspnea.
 
COMPARISON:
09/26/2017.
 
TECHNIQUE:
PA and lateral views of the chest were obtained.
 
FINDINGS:
The cardiac silhouette is prominent, though stable. Intact midline sternal
wires are present. A single lead pacer is in unchanged position. The
mediastinal and hilar contours are unremarkable. There are neither pleural
effusions nor pneumothoraces. There are no consolidations. The lungs are
hyperinflated. The osseous structures are stable.
 
IMPRESSION:
Stable cardiomegaly. No acute airspace disease.

## 2018-02-12 NOTE — HISTORY & PHYSICAL
General Information and HPI
 
Allergies/Medications
Allergies:
Coded Allergies:
NO KNOWN ALLERGIES (04/05/15)
 
Home Med list
Acetaminophen 325 MG CAPSULE   1 CAP PO Q6 PRN PAIN  (Reported)
Albuterol Sulfate (Proair Hfa) 90 MCG HFA.AER.AD   2 PUF INH Q4 COPD
Aspirin (Aspirin*) 81 MG TAB.CHEW   1 TAB PO DAILY heart  (Reported)
Atorvastatin Calcium (Lipitor) 40 MG TABLET   1 TAB PO DAILY cholesterol  (
Reported)
Budesonide/Formoterol Fumarate (Symbicort 160-4.5 Mcg Inhaler) 160 MCG-4.5 MCG/
ACTUATION HFA.AER.AD   2 PUF INH BID COPD  (Reported)
Candesartan Cilexetil 4 MG TABLET   1 TAB PO DAILY CARDIOMYOPATHY
Carvedilol (Coreg) 3.125 MG TABLET   6.25 MG PO BID HEART
Clopidogrel Bisulfate (Clopidogrel) 75 MG TABLET   1 TAB PO DAILY BLOOD THINNER 
(Reported)
Furosemide (Lasix) 20 MG TABLET   1 TAB PO DAILY DIURETIC  (Reported)
Gabapentin (Neurontin) 800 MG TABLET   1 TAB PO TID BACK PAIN  (Reported)
Isosorbide Mononitrate (Isosorbide Mononitrate ER) 60 MG TAB.ER.24H   1 TAB PO 
DAILY    (Reported)
Levothyroxine Sodium 100 MCG TABLET   1 TAB PO DAILY THYROID  (Reported)
Nitroglycerin (Nitrostat) 0.4 MG TAB.SUBL   1 TAB SL AD PRN CHEST PAIN  (
Reported)
     1st sign of attack; may repeat every 5 minutes until relief; if pain 
persists after 3 tablets in 15 minutes, prompt medical att
Prednisone 10 MG TABLET   1 TAB PO SEE ADMIN CRITERIA COPD EXACERBATION
     60 MG( 6 TABS) ON 07/22 - 07/23
     50 MG (5 TABS) ON 07/24- 07/26
     40 MG( 4 TABS) 0N 07/27 - 07/29
     30 MG( 3 TABS) ON 07/30 - 08/01
     20 MG( 2 TABS) ON 08/02 - 08/04
     10 MG ( 1 TAB) ON 08/05 - 08/07
Ranolazine (Ranexa) 500 MG TAB.ER.12H   1 TAB PO BID heart  (Reported)
Tamsulosin HCl (Flomax) 0.4 MG CAP.ER.24H   1 CAP PO QHS prostate
     Please take at bed time to avoid low blood pressure during the day.
Tiotropium Bromide (Spiriva) 18 MCG CAP.W.DEV   1 CAP INH DAILY COPD  (Reported)
 
 
Past History
 
Travel History
Traveled to [place] past 21 day No
 
Medical History
Neurological: NONE
EENT: NONE
Cardiovascular: CARDIAC STENTS CABG CAROTID ARTERY BYPASS DEFIBRILLATOR
Respiratory: COPD
Gastrointestinal: NONE
Hepatic: NONE
Renal: NONE
Musculoskeletal: NONE
Psychiatric: NONE
Endocrine: hypothyroidism
Blood Disorders: DVT
Cancer(s): NONE
GYN/Reproductive: NONE
History of MRSA: No
History of VRE: No
History of CDIFF: No
 
Surgical History
Surgical History: CABG, CAROTID ARTECTOMY nerve thermoablation to reduce back 
pain 
 
Past Family/Social History
 
Psychosocial History
Who Do You Live With? spouse
Services at Home: None
 
Functional Ability
ADLs
Independent: dressing, eating, toileting, bathing. 
Ambulation: independent
 
 
Core Measures/Misc (9/17)
 
Cerebrovascular Accident
CVA/TIA Diagnosis: No

## 2018-02-12 NOTE — ADMISSION CERTIFICATION
Admission Certification
 
Certification Statement
- As attending physician, I certify that at the time of
- admission, based on clinical presentation, severity of
- symptoms, need for further diagnostic testing and
- therapeutic interventions, and risk of adverse outcomes
- without in-hospital treatment, in my clinical assessment,
- this patient requires an acute hospital stay for a minimum
- of two nights or longer. I have also considered psychsocial
- factors such as support system, advanced age, financial
- issues, cognitive issues, and failed out-patient treatments,
- past re-admission history, safety of patient, and lack of
- compliance as applicable.
Specific rationale supporting this admission is:
Acute hypoxic respiratory failure, COPD exacerbation, mild CHF exacerbation, 
recent defibrillator change.

## 2018-02-12 NOTE — ED DYSPNEA/ASTHMA COMPLAINT
History of Present Illness
 
General
Chief Complaint: Dyspnea (COPD, CHF, Other)
Stated Complaint: SOB HX COPD
Source: patient, old records
Exam Limitations: no limitations
 
Vital Signs & Intake/Output
Vital Signs & Intake/Output
 Vital Signs
 
 
Date Time Temp Pulse Resp B/P B/P Pulse O2 O2 Flow FiO2
 
     Mean Ox Delivery Rate 
 
2100 98.4 74 20 131/70  100 Room Air  
 
2002      97 Nasal 2.0L 
 
       Cannula  
 
1942 99.0 88 18 121/63  94 Nasal 2.0L 
 
       Cannula  
 
1941 99.0 88 18 115/63  89 Room Air  
 
 1737 98.3 68 20 109/63  91 Room Air  
 
 
 
Allergies
Coded Allergies:
NO KNOWN ALLERGIES (04/05/15)
 
Reconcile Medications
Acetaminophen 325 MG CAPSULE   1 CAP PO Q6 PRN PAIN  (Reported)
Albuterol Sulfate (Proair Hfa) 90 MCG HFA.AER.AD   2 PUF INH Q4 COPD
Aspirin (Aspirin*) 81 MG TAB.CHEW   1 TAB PO DAILY heart  (Reported)
Atorvastatin Calcium (Lipitor) 40 MG TABLET   1 TAB PO DAILY cholesterol  (
Reported)
Budesonide/Formoterol Fumarate (Symbicort 160-4.5 Mcg Inhaler) 160 MCG-4.5 MCG/
ACTUATION HFA.AER.AD   2 PUF INH BID COPD  (Reported)
Candesartan Cilexetil 4 MG TABLET   1 TAB PO DAILY CARDIOMYOPATHY
Carvedilol (Coreg) 3.125 MG TABLET   6.25 MG PO BID HEART
Clopidogrel Bisulfate (Clopidogrel) 75 MG TABLET   1 TAB PO DAILY BLOOD THINNER 
(Reported)
Furosemide (Lasix) 20 MG TABLET   1 TAB PO DAILY DIURETIC  (Reported)
Gabapentin (Neurontin) 800 MG TABLET   1 TAB PO TID BACK PAIN  (Reported)
Isosorbide Mononitrate (Isosorbide Mononitrate ER) 60 MG TAB.ER.24H   1 TAB PO 
DAILY    (Reported)
Levothyroxine Sodium 100 MCG TABLET   1 TAB PO DAILY THYROID  (Reported)
Nitroglycerin (Nitrostat) 0.4 MG TAB.SUBL   1 TAB SL AD PRN CHEST PAIN  (
Reported)
     1st sign of attack; may repeat every 5 minutes until relief; if pain 
persists after 3 tablets in 15 minutes, prompt medical att
Prednisone 10 MG TABLET   1 TAB PO SEE ADMIN CRITERIA COPD EXACERBATION
     60 MG( 6 TABS) ON  - 
     50 MG (5 TABS) ON - 
     40 MG( 4 TABS) 0N  - 
     30 MG( 3 TABS) ON  - 
     20 MG( 2 TABS) ON  - 
     10 MG ( 1 TAB) ON  - 
Ranolazine (Ranexa) 500 MG TAB.ER.12H   1 TAB PO BID heart  (Reported)
Tamsulosin HCl (Flomax) 0.4 MG CAP.ER.24H   1 CAP PO QHS prostate
     Please take at bed time to avoid low blood pressure during the day.
Tiotropium Bromide (Spiriva) 18 MCG CAP.W.DEV   1 CAP INH DAILY COPD  (Reported)
 
Triage Note:
RECEIVED 73 YO MALE S/P DEFIBRILLATOR REPLACED 2
WEEKS AGO WITH HX OF COPD. PT REPORTS CHRONIC SOB,
MUCH WORSE SINCE SURGURY 2 WEEKS AGO. PT USES CPAP
AT NIGHT, UNABLE TO SLEEP MORE THAN ONE HOUR. O2
SATS 91% ON ROON AIR, NO C/O ACUTE CHEST PAIN. SOB
ON EXERTION AND RECLINING. EKG DONE UPON ARRIVAL
TO THE ED.
Triage Nurses Notes Reviewed? yes
Onset: Abrupt
Duration: week(s): (2), constant
Timing: recent history
Severity: moderate, severe
Activities at Onset: activity
Associated Symptoms: cough, wheezing
HPI:
72 year old male with PMH of copd not on home o2, CAD, CABG, ischemic 
cardiomyopathy, HTN, CHF, AICD placement, carotid endarterectomy and 
hypothyroidism former smoker presents to the ER for evaluation stating that for 
the past 2 weeks she's had progressive worsening shortness of breath unable to 
sleep and lay flat secondary to his symptoms.  He denies cough fever chills no 
chest pain.  He had his defibrillator changed at [facility] 2 weeks ago
prior to the symptoms beginning ear is not on home oxygen he quit smoking one 
month ago.  His cardiologist is Dr. elder. no leg swelling. he does not have a 
neb at home
 
 
(Manohar Wilder)
 
Past History
 
Travel History
Traveled to [place] past 21 day No
 
Medical History
Any Pertinent Medical History? see below for history
Neurological: NONE
EENT: NONE
Cardiovascular: CARDIAC STENTS CABG CAROTID ARTERY BYPASS DEFIBRILLATOR
Respiratory: COPD
Gastrointestinal: NONE
Hepatic: NONE
Renal: NONE
Musculoskeletal: NONE
Psychiatric: NONE
Endocrine: hypothyroidism
Blood Disorders: DVT
Cancer(s): NONE
GYN/Reproductive: NONE
History of MRSA: No
History of VRE: No
History of CDIFF: No
 
Surgical History
Surgical History: CABG, CAROTID ARTECTOMY nerve thermoablation to reduce back 
pain 
 
Psychosocial History
Who do you live with Spouse
Services at Home None
What is your primary language English
Tobacco Use: Quit >30 days ago
 
Family History
Hx Contributory? No
(Manohar Wilder)
 
Review of Systems
 
Review of Systems
Constitutional:
Reports: see HPI. 
Comments
Review of systems: See HPI, All other systems negative.
Constitutional, no chills no fever
HEENT:  no sore throat no congestion
Cardiovascular: No chest pain 
Skin:  no rashes, no change in skin
Respiratory:  dyspnea no cough no  sputum
GI: No nausea no vomiting, no diarrhea, 
: No dysuria No hematuria, no frequency
Muscle skeletal: No joint pain, no back pain, no neck pain,
Neurologic: , no headache
Psych: No stress 
Heme/endocrine: No bruising 
Immunology: No lymphadenopathy
(Manohar Wilder)
 
Physical Exam
 
Physical Exam
General Appearance: well developed/nourished, no apparent distress, alert, awake
Respiratory: decreased breath sounds, wheezing
Comments:
Well-developed well-nourished person in no acute distress
HEENT: Normal EENT exam; PERRL, EOMI, HEAD is atraumatic. moist mucous 
membranes.  
Neck: Supple, normal range of motion 
Back: Full range of motion
Cardiovascular: Regular rate and rhythms no murmurs rub
Respiratory: Chest nontender.There were no bony deformities, no asymmetry. No 
respiratory distress.  .diminished breath sounds, wheezing b/l
Abdomen: Soft, nontender nondistended, no appreciable organomegaly. Normal bowel
sounds. No rebound/guarding,  No ascites.
Extremity: No edema, full range of motion of extremities,
Neuro: Alert oriented x3, motor sensory normal, There were no obvious focal 
neurologic abnormalities.
Skin: No appreciable rash on exposed skin, skin is warm and dry.
Psych: Mood and affect is normal, memory and judgment is normal.
 
Core Measures
ACS in differential dx? Yes
CVA/TIA Diagnosis No
Sepsis Present: No
Sepsis Focused Exam Completed? No
(Manohar Wilder)
 
Progress
Differential Diagnosis: asthma, AMI, bronchitis, costochondritis, CHF, COPD, 
pericarditis, pulmonary embolism, pneumonia, pneumothorax, unstable angina
Plan of Care:
 Orders
 
 
Procedure Date/time Status
 
Heart Healthy Diet  B Active
 
TROPONIN LEVEL  2300 Active
 
EKG  230 Active
 
Pathway - chart 2257 Active
 
House Staff 2257 Active
 
Patient Data 2257 Active
 
ED Holding Orders 2141 Active
 
Admit to inpatient 2141 Active
 
Vital Signs 2141 Active
 
Code Status 2141 Active
 
Intake & Output 2025 Active
 
RAPID VIRAL INFLUENZA A  1939 Complete
 
Add-on Test (ER Only)  1756 Active
 
B-TYPE NATRIURETIC PEP (BNP)  1652 Complete
 
TROPONIN LEVEL  1638 Complete
 
COMPREHENSIVE METABOLIC PANEL  1638 Complete
 
CBC WITHOUT DIFFERENTIAL  1638 Complete
 
EKG  1634 Active
 
VTE Mechanical Prophylaxis   UNK Active
 
 
 Current Medications
 
 
  Sig/Ly Start time  Last
 
Medication Dose  Stop Time Status Admin
 
Heparin Sodium  5,000 UNIT Q8 2255 AC 
 
(Porcine)     
 
Methylprednisolone 125 MG ONCE ONE 1945 CAN 
 
(Solu Medrol)   1946  
 
 
 Laboratory Tests
 
 
 
182:
Anion Gap 10, Estimated GFR 42  L, BUN/Creatinine Ratio 17.5, Glucose 141  H, 
Calcium 9.1, Total Bilirubin 0.4, AST 15  L, ALT 32, Alkaline Phosphatase 112, 
Troponin I 0.02, Pro-B-Natriuretic Pept 3450  H, Total Protein 5.8  L, Albumin 
3.5, Globulin 2.3, Albumin/Globulin Ratio 1.5, CBC w Diff NO MAN DIFF REQ, RBC 
3.04  L, .7  H, MCH 34.4  H, MCHC 33.8, RDW 15.4  H, MPV 7.9, Gran % 81.4
 H, Lymphocytes % 8.5  L, Monocytes % 7.5, Eosinophils % 2.3, Basophils % 0.3, 
Absolute Granulocytes 6.7  H, Absolute Lymphocytes 0.7  L, Absolute Monocytes 
0.6, Absolute Eosinophils 0.2, Absolute Basophils 0
 Microbiology
1955  NASOPHARYN: Influenza Virus A & B Rapid Smear - COMP
 
duoneb and solumedrol 125mg iv ordered. d/w pt all of his labs and xrays-88-89%
on ra. 95% on 2L. 
 
 
pt feeling improved after tx, d/w plan of care and need for admission which he 
is in agreement with. 
 
case d/w dr banegas agrees with plan
Diagnostic Imaging:
Viewed by Me: Radiology Read.  Discussed w/RAD: Radiology Read. 
Radiology Impression: PATIENT: JULIO CÉSAR CRUZ JR  MEDICAL RECORD NO: [id number] 
PRESENT AGE: 72  PATIENT ACCOUNT NO: [id number] : 43  LOCATION: [facility] 
ORDERING PHYSICIAN: Umm GREEN     SERVICE DATE: 18- EXAM 
TYPE: RAD - XRY-CHEST XRAY, TWO VIEWS EXAMINATION: CHEST 2 VIEWS CLINICAL 
INFORMATION: Dyspnea. COMPARISON: 2017. TECHNIQUE: PA and lateral views of
the chest were obtained. FINDINGS: The cardiac silhouette is prominent, though 
stable. Intact midline sternal wires are present. A single lead pacer is in 
unchanged position. The mediastinal and hilar contours are unremarkable. There 
are neither pleural effusions nor pneumothoraces. There are no consolidations. 
The lungs are hyperinflated. The osseous structures are stable. IMPRESSION: 
Stable cardiomegaly. No acute airspace disease. DICTATED BY: Fawad Sales MD  
DATE/TIME DICTATED:18 :RAD.[facility]KES  DATE/TIME 
TRANSCRIBED:18 CONFIDENTIAL, DO NOT COPY WITHOUT APPROPRIATE 
AUTHORIZATION.  <Electronically signed in Other Vendor System>                  
                                                                     SIGNED BY: 
Fawad Sales MD 18 184
Initial ED EKG: normal intervals, normal p-waves, normal QRS complex, normal 
sinus rhythm, pvc
Prior EKG: unchanged
Rhythm Strip: normal sinus rhythm
(Manohar Wilder)
 
Departure
 
Departure
Time of Disposition: 
Disposition: STILL A PATIENT
Condition: Stable
Clinical Impression
Primary Impression: COPD exacerbation
Secondary Impressions: Hypoxia
Referrals:
Durga Plata MD (PCP/Family)
 
Departure Forms:
Customer Survey
General Discharge Information
 
Admission Note
Spoke With:
Shamir Giles MD
Documentation of Exam:
Documentation of any treatments & extenuating circumstances including Concerns 
Regarding Discharge (functional status, medication knowledge or non-compliance, 
living conditions, etc.) that warrant an admission rather than observation: pulm
consult, resp tx, iv steroids, trend labs, premature discharge would be 
medically harmful
 
(Manohar Wilder)
 
PA/NP Co-Sign Statement
Statement:
ED Attending supervision documentation-
 
[X] I saw and evaluated the patient. I have also reviewed all the pertinent lab 
results and diagnostic results. I agree with the findings and the plan of care 
as documented in the PA's/NP's documentation. 
 
[] I have reviewed the ED Record and agree with the PA's/NP's documentation.
 
[] Additions or exceptions (if any) to the PAs/NP's note and plan are 
summarized below:
[]
 
(Neftaly Banegas DO)
 
Critical Care Note
 
Critical Care Note
Critical Care Time: non-applicable
(Daniel GREEN,Manohar)

## 2018-02-12 NOTE — HISTORY & PHYSICAL
Carlos Oliveros MD 02/12/18 3150:
General Information and HPI
MD Statement:
I have seen and personally examined JULIO CÉSAR CRUZ JR and documented this H&P.
 
The patient is a 72 year old M who presented with a patient stated chief 
complaint of [dyspnea on exertion and when supine].
 
Source of Information: patient, old records
Exam Limitations: no limitations
History of Present Illness:
Patient is a 72 year-old-male with a PMH significant for Lung cancer s/p 3 
rounds of radiation completed 2 months ago, COPD, CAD s/p CABG and PCI with 
stend placement, ischemic cardiomyopathy, CKD, hypothyroidism, aortic stenosis, 
peripheral arterial disease, JEREMY on CPAP, who presents complaining of a two-week
history of worsening dyspnea on exertion and orthopnea.  Patient states that 
approximately 6 weeks ago he underwent replacement of his ICD and since that 
time he has had shortness of breath while lying flat, and worsening shortness of
breath with exertion.  He states that he can no longer walk more than 
approximately 20-30 feet without feeling out of breath, and he is only able to 
sleep for 23 hours at a time.  He endorses waking up from sleep gasping for 
air.  He has a cough which is mostly nonproductive but occasionally brings up 
clear sputum, this cough is worse while lying down.  He has chronic lower 
extremity swelling however this is unchanged from baseline.  He also endorses 
wheezing but this also is at his baseline.  He states that he has had associated
intermittent palpitations describing it as a feeling of his heart racing which 
terminated spontaneously.  He has no associated chest pain or chest discomfort 
not associated diaphoresis, nausea, vomiting.  He endorses intermittent chills 
but no subjective fever.  
 
Allergies/Medications
Allergies:
Coded Allergies:
NO KNOWN ALLERGIES (04/05/15)
 
Home Med list
Acetaminophen 325 MG CAPSULE   1 CAP PO Q6 PRN PAIN  (Reported)
Albuterol Sulfate (Proair Hfa) 90 MCG HFA.AER.AD   2 PUF INH Q4 COPD
Aspirin (Aspirin*) 81 MG TAB.CHEW   1 TAB PO DAILY heart  (Reported)
Atorvastatin Calcium (Lipitor) 40 MG TABLET   1 TAB PO DAILY cholesterol  (
Reported)
Budesonide/Formoterol Fumarate (Symbicort 160-4.5 Mcg Inhaler) 160 MCG-4.5 MCG/
ACTUATION HFA.AER.AD   2 PUF INH BID COPD  (Reported)
Candesartan Cilexetil 4 MG TABLET   1 TAB PO DAILY CARDIOMYOPATHY
Carvedilol (Coreg) 3.125 MG TABLET   6.25 MG PO BID HEART
Clopidogrel Bisulfate (Clopidogrel) 75 MG TABLET   1 TAB PO DAILY BLOOD THINNER 
(Reported)
Furosemide (Lasix) 20 MG TABLET   1 TAB PO DAILY DIURETIC  (Reported)
Gabapentin (Neurontin) 800 MG TABLET   1 TAB PO TID BACK PAIN  (Reported)
Isosorbide Mononitrate (Isosorbide Mononitrate ER) 60 MG TAB.ER.24H   1 TAB PO 
DAILY    (Reported)
Levothyroxine Sodium 100 MCG TABLET   1 TAB PO DAILY THYROID  (Reported)
Nitroglycerin (Nitrostat) 0.4 MG TAB.SUBL   1 TAB SL AD PRN CHEST PAIN  (
Reported)
     1st sign of attack; may repeat every 5 minutes until relief; if pain 
persists after 3 tablets in 15 minutes, prompt medical att
Prednisone 10 MG TABLET   1 TAB PO SEE ADMIN CRITERIA COPD EXACERBATION
     60 MG( 6 TABS) ON 07/22 - 07/23
     50 MG (5 TABS) ON 07/24- 07/26
     40 MG( 4 TABS) 0N 07/27 - 07/29
     30 MG( 3 TABS) ON 07/30 - 08/01
     20 MG( 2 TABS) ON 08/02 - 08/04
     10 MG ( 1 TAB) ON 08/05 - 08/07
Ranolazine (Ranexa) 500 MG TAB.ER.12H   1 TAB PO BID heart  (Reported)
Tamsulosin HCl (Flomax) 0.4 MG CAP.ER.24H   1 CAP PO QHS prostate
     Please take at bed time to avoid low blood pressure during the day.
Tiotropium Bromide (Spiriva) 18 MCG CAP.W.DEV   1 CAP INH DAILY COPD  (Reported)
 
 
Past History
 
Travel History
Traveled to [place] past 21 day No
 
Medical History
Neurological: NONE
EENT: NONE
Cardiovascular: CARDIAC STENTS CABG CAROTID ARTERY BYPASS DEFIBRILLATOR
Respiratory: COPD
Gastrointestinal: NONE
Hepatic: NONE
Renal: NONE
Musculoskeletal: NONE
Psychiatric: NONE
Endocrine: hypothyroidism
Blood Disorders: DVT
Cancer(s): NONE
GYN/Reproductive: NONE
History of MRSA: No
History of VRE: No
History of CDIFF: No
 
Surgical History
Surgical History: CABG, CAROTID ARTECTOMY nerve thermoablation to reduce back 
pain 
 
Past Family/Social History
 
Psychosocial History
Where do you live? Home
Who Do You Live With? spouse
Services at Home: None
Primary Language: English
Smoking Status: Former Smoker (60 pack years)
ETOH Use: denies use
Illicit Drug Use: denies illicit drug use
Living Will? no
 
Functional Ability
ADLs
Independent: dressing, eating, toileting, bathing. 
Ambulation: independent
 
Review of Systems
 
Review of Systems
Constitutional:
Reports: chills.  Denies: diaphoresis, fever. 
EENTM:
Denies: blurred vision, double vision, visual changes. 
Cardiovascular:
Reports: orthopena, palpitations.  Denies: chest pain, syncope. 
Respiratory:
Reports: cough, orthopnea, short of breath, wheezing (chronic). 
GI:
Reports: no symptoms. 
Genitourinary:
Reports: no symptoms. 
Musculoskeletal:
Reports: no symptoms. 
Skin:
Reports: no symptoms. 
Neurological/Psychological:
Reports: no symptoms. 
 
Exam & Diagnostic Data
Last 24 Hrs of Vital Signs/I&O
 Vital Signs
 
 
Date Time Temp Pulse Resp B/P B/P Pulse O2 O2 Flow FiO2
 
     Mean Ox Delivery Rate 
 
02/13 0002 97.7 79 22 127/65  94 Nasal 2.0L 
 
       Cannula  
 
02/12 2100 98.4 74 20 131/70  100 Room Air  
 
02/12 2002      97 Nasal 2.0L 
 
       Cannula  
 
02/12 1942 99.0 88 18 121/63  94 Nasal 2.0L 
 
       Cannula  
 
02/12 1941 99.0 88 18 115/63  89 Room Air  
 
02/12 1737 98.3 68 20 109/63  91 Room Air  
 
 
 Intake & Output
 
 
 02/13 0800 02/13 0000 02/12 1600
 
Intake Total  0 
 
Output Total  600 
 
Balance  -600 
 
    
 
Intake, Oral  0 
 
Output, Urine  600 
 
Patient  250 lb 
 
Weight   
 
Weight  Estimated 
 
Measurement   
 
Method   
 
 
 
 
Physical Exam
General Appearance Alert, Oriented X3, Cooperative, No Acute Distress
Skin Temp/Moisture Exam: Warm/Dry
Sepsis Skin Exam (color): Normal for Ethnicity
HEENT Atraumatic, PERRLA, EOMI, Mucous Membr. moist/pink
Neck Supple, No JVD, +2 Carotid Pulse wo Bruit
Cardiovascular Regular Rate, Normal S1, Normal S2, surgical scar in the L upper 
chest with palpable ICD 
Lungs scant wheezing, no cackles
Abdomen Normal Bowel Sounds, Soft, obese, mild TTP in periumbilical area
Neurological Normal Gait, Normal Speech, Strength at 5/5 X4 Ext, Normal Tone, 
Sensation Intact, Cranial Nerves 3-12 NL
Extremities No Clubbing, No Cyanosis, 2+ pitting edema of the distal LE's 
bilaterally, pt states this is his baseline
Last 24 Hrs of Labs/Kapil:
 Laboratory Tests
 
02/12/18 2314:
Troponin I 0.02
 
02/12/18 1652:
Anion Gap 10, Estimated GFR 42  L, BUN/Creatinine Ratio 17.5, Glucose 141  H, 
Calcium 9.1, Total Bilirubin 0.4, AST 15  L, ALT 32, Alkaline Phosphatase 112, 
Troponin I 0.02, Pro-B-Natriuretic Pept 3450  H, Total Protein 5.8  L, Albumin 
3.5, Globulin 2.3, Albumin/Globulin Ratio 1.5, CBC w Diff NO MAN DIFF REQ, RBC 
3.04  L, .7  H, MCH 34.4  H, MCHC 33.8, RDW 15.4  H, MPV 7.9, Gran % 81.4
 H, Lymphocytes % 8.5  L, Monocytes % 7.5, Eosinophils % 2.3, Basophils % 0.3, 
Absolute Granulocytes 6.7  H, Absolute Lymphocytes 0.7  L, Absolute Monocytes 
0.6, Absolute Eosinophils 0.2, Absolute Basophils 0
 Microbiology
02/12 1955  NASOPHARYN: Influenza Virus A & B Rapid Smear - COMP
 
 
 
Diagnostic Data
EKG Results
NSR, HR 78, QTc 497, no significant ST-T segment changes
CXR Results
Stable cardiomegaly. No acute airspace disease.
 
 
Assessment/Plan
Assessment:
Patient is a 72 year-old-male with a PMH significant for Lung cancer s/p 3 
rounds of radiation completed 2 months ago, COPD, CAD s/p CABG and PCI with 
stend placement, ischemic cardiomyopathy, CKD, hypothyroidism, aortic stenosis, 
peripheral arterial disease, JEREMY on CPAP, who presents complaining of a two-week
history of worsening dyspnea on exertion and orthopnea.  He endorses PND. He 
also is wheezing which is appreciated on exam.  Having a mostly nonproductive 
cough worsened by lying in a supine position.  He has no sick contacts and 
recently had his ICD replaced 2 weeks ago around the onset of the symptoms.   
Shortness of breath is likely due to a COPD exacerbation however a CHF 
exacerbation may also be playing a factor.  ProBNP is elevated at 3450 however 
this is not significantly elevated from the patient's baseline.  He does not 
have crackles on exam and although he has lower extremity edema is not 
significantly worsened from his baseline.  Patient has EF of 20% and global 
hypokinesia seen on echocardiogram in July 2017.
 
Patient reported minimal relief of symptoms after receiving IV Solu-Medrol, IV 
Lasix, and a breathing treatment in the ED.
 
Problem list
#Worsening dyspnea on exertion and orthopnea likely COPD exacerbation or CHF 
exacerbation must be ruled out
#Chronic medical problems including CAD, ischemic cardiomyopathy, CKD ALS, JEREMY 
on CPAP
 
Plan
-Admit to telemetry
-Continuous telemetry monitoring
-TRC/nebs
-Prednisone taper and IV azithromycin
-ACS has been ruled out with negative troponins and EKG
-Echocardiogram
-Pulmonary consult in the a.m. with Guero Rodas MD
-Cardiology consult in the a.m. with Dr. Bowers
-Continue all medications including aspirin, statin, Lasix, gabapentin, 
isosorbide mononitrate, carvedilol,  Ranexa, tamsulosin, levothyroxine
-Heart healthy diet
-DVT prophylaxis: Subcutaneous heparin and ALPS
-CODE STATUS: Full code
 
As Ranked By This Provider
Problem List:
 1. COPD exacerbation
 
 
Core Measures/Misc (9/17)
 
Acute Coronary Syndrome
ACS Diagnosis: No
 
Congestive Heart Failure
Congestive Heart Failure Diagnosis No
 
Cerebrovascular Accident
CVA/TIA Diagnosis: No
 
VTE (View Protocol)
VTE Risk Factors VTE (Previous)
No Mechanical VTE Prophylaxis d/t N/A MechProphylax Ordered
No VTE Pharm Prophylaxis d/t NA PharmProphylax ordered
 
Sepsis (View protocol)
Sepsis Present: No
 
 
Chan GLASS, [facility] 02/12/18 2312:
Attending MD Review Statement
 
Attending Statement
Attending MD Statement: examined this patient, discuss w/resident/PA/NP, agreed 
w/resident/PA/NP, reviewed images, amended to note
Attending Assessment/Plan:
73 yo M with h/o recently diagnosed NSCLC s/p radiation, COPD, JEREMY on CPAP, HTN,
CAD s/p CABG, CKD, PVD, s/p AA repair, ischemic CMP, chronic systolic CHF (EF 35
-40%), s/p AICD + PPM, underwent defibrillator replacement (was 8 yrs old) at 
[facility] (Dr. Barrera) 2 weeks ago and since then has noticed 
progressive dyspnea on exertion, orthopnea and wheezing. PND+. C/o dry cough and
intermittent palpitations. Denies chest pain or lightheadedness. He has chronic 
lower extremity edema which has not changed. He quit smoking 1 month ago. No 
sick contacts. 
 
Vitals stable, except for sats 88-89% RA --> 94% on 2L. 
Exam: AAO, in mild respiratory distress, Neck supple, no JVD, Chest b/l reduced 
air entry with diffuse wheezes, left basilar crackles+, ACID noted, Heart S1S2 
regular, systolic murmur+, LE: 2+ pitting pedal edema.
Labs: no leukocytosis, macrocytic anemia, BUN 28, creat 1.6 (baseline), glucose 
141, trop neg, proBNP 3450. Rapid flu negative. 
CXR: stable cardiomegaly, hyperinflated lungs, no airspace disease. 
Echo (2017): EF 20%, moderate aortic stenosis. 
EKG: sinus rhythm, PVC's, , Qtc 499.
 
Assessment and plan:
1. Acute hypoxic respiratory failure  multifactorial
2. COPD exacerbation
3. Acute exacerbation of chronic systolic heart failure
4. Moderate aortic stenosis
5. Recent defibrillator replacement with intermittent palpitations
6. H/o CAD, non small cell lung and sleep apnea
7. CKD stage 3 stable
 
- Admit to Telemetry
- Monitor for arrhythmias
- Serial EKG and troponin
- Replete electrolytes to keep K > 4.0 amd Mag > 2.0
- IV lasix 20 mg once, followed by PO 20 mg from AM
- Check TSH, free T4
- Obtain Echo
- Cardio consult
- Consider pacemaker/defibrillator interrogation
- TRC nebs scheduled and as needed
- IV solumedrol
- IV azithromycin
- Pulm consult Dr. Rodas
- Nocturnal CPAP
- Consider CT chest in AM if his symptoms do not improve
- Continue aspirin, statin, plavix, imdur, synthroid, nitro, ranolazine and 
coreg.
DVT ppx Hep SC. Full code.
 
 
 
Merritt GLASS,Lora 02/13/18 0428:
Resident Review Statement
Resident Statement: examined this patient, discussed with intern
Other Findings:
H: Ms Araya is a 72 year-old-male with a PMH significant for Lung cancer, COPD, 
CAD s/p CABG and PCI with stend placement, ischemic cardiomyopathy, CKD, 
hypothyroidism, aortic stenosis, peripheral arterial disease, JEREMY on CPAP who 
presented to the emergency department complaining of dyspnea and insomnia due to
worsening shortness of breath.  
Patient states that since thereplacement of his defibrillator approximately 2 
weeks ago he has been unable to lay flat.  This is subsequently has affected his
sleep.  States that he is only able to sleep for 2 hours and then is up again.  
Patient must remain upright for the rest of the night for symptomatic relief.  
States that his symptoms are getting worse and initially felt that this could be
managed independently however since his symptoms have worsened, he decided to 
come to the emergency department for additional workup.  The pateints 
defibrillator was changed on 02/01/2018.  
Patient denies being exposed to any sick contacts or any changes to his 
medications.  
The patient is also s/p 3 rounds of radiation completed 2 months ago. This was 
done under the care of Dr Nathan Davalos. 
 
R: Patient endorses some chills although denied any fevers.  Patient also 
endorsed persistent wheezing although denied any shortness of breath.  Denies 
any LOC.  Denied any dysuria or urinary changes.
 
 
E 
Temperature 98.3.  Respirations 20.  Blood pressure 109/62.  Heart rate 91.
General Appearance Alert, Oriented X3, Cooperative, No Acute Distress
Skin Temp/Moisture Exam: Warm/Dry
HEENT Atraumatic, PERRLA, EOMI, Mucous Membr. moist/pink
Neck Supple, No JVD, 
Cardiovascular Regular Rate, Normal S1, Normal S2, ICD Noted. 
Lungs Ausculatory wheezing noted. 
Abdomen Normal Bowel Sounds, Soft, obese, mild TTP in periumbilical area
Neurological Normal Gait, Normal Speech, Strength at 5/5 X4 Ext, Normal Tone, 
Sensation Intact, Cranial Nerves 3-12 NL
Extremities No Clubbing, No Cyanosis, 
Pitting Edema, 2+ 
 
L: WBC 8.2.  H&H 10.4 and 30.9.  Platelets 152.  Sodium 139.  Potassium 4.4.  
BUN 28.  Creatinine 1.6.
 
 
I As above.
 
 
A/P
 
Ms Araya is a 72 year-old-male with a PMH significant for Lung cancer, COPD, CAD
s/p CABG and PCI with stend placement, ischemic cardiomyopathy, CKD, 
hypothyroidism, aortic stenosis, peripheral arterial disease, JEREMY on CPAP who 
presented to the emergency department complaining of dyspnea and insomnia due to
worsening shortness of breath.  Shortness of breath is likely attributed to 
worsening COPD exacerbation.
 
Acute hypoxic respiratory failure.
COPD Exacerbation
CHF exacerbation.
History of coronary artery disease.
 
Admit patient to telemetry.
Rule out ACS with serial EKGs and troponins.
Continue gentle diuresis with Lasix.
Check thyroid studies.  
Obtain echocardiogram.  
Obtain cardiac urology consultation in a.m.
Have pacemaker interrogated.
Continue IV Solu-Medrol.  IV azithromycin for anti-inflammatory properties.
Obtain pulmonary consultation in a.m.
Continue home medications.  
 
 
DVT PPX; Heparin
 
Patient is a full code.

## 2018-02-13 VITALS — DIASTOLIC BLOOD PRESSURE: 70 MMHG | SYSTOLIC BLOOD PRESSURE: 120 MMHG

## 2018-02-13 VITALS — DIASTOLIC BLOOD PRESSURE: 60 MMHG | SYSTOLIC BLOOD PRESSURE: 115 MMHG

## 2018-02-13 NOTE — PN- HOUSESTAFF
Trav GLASS,[facility] 18 0947:
Subjective
Follow-up For:
CHF Exacerbation
COPD Exacerbation
 
Subjective:
Patient continues to have cough and SOB, denies any sore throat, PND, CP, 
palpitations or fever . 
 
Review of Systems
Constitutional:
Reports: no symptoms. 
EENTM:
Reports: no symptoms. 
Cardiovascular:
Reports: peripheral edema. 
Respiratory:
Reports: cough, short of breath, sputum production. 
Gastrointestinal:
Reports: no symptoms. 
Genitourinary:
Reports: no symptoms. 
Musculoskeletal:
Reports: no symptoms. 
Skin:
Reports: no symptoms. 
Neurological/Psychological:
Reports: no symptoms. 
Hematologic/Endocrine:
Reports: no symptoms. 
Immunologic/Allergic:
Reports: no symptoms. 
 
Objective
Last 24 Hrs of Vital Signs/I&O
 Vital Signs
 
 
Date Time Temp Pulse Resp B/P B/P Pulse O2 O2 Flow FiO2
 
     Mean Ox Delivery Rate 
 
 1435      97 Nasal 2.0L 
 
       Cannula  
 
 0930  80  124/62     
 
 0930  80  124/62     
 
 0930  80  124/62     
 
 0820 98.0 80 20 124/62  95 Nasal 2.0L 
 
       Cannula  
 
 0635 98.5 82 20 139/74  96 Nasal 2.0L 
 
       Cannula  
 
 0538 98.0 80 20 117/72  95 Nasal 2.0L 
 
       Cannula  
 
 0507 96.9 67 20 129/74  96 Room Air  
 
 0309 98.6 72 16 107/58  95 Nasal 2.0L 
 
       Cannula  
 
 0307 98.6 72 16 107/58     
 
 0138 98.7 79 20 102/56     
 
 0138 98.7 79 20 102/56     
 
 0002 97.7 79 22 127/65  94 Nasal 2.0L 
 
       Cannula  
 
 2100 98.4 74 20 131/70  100 Room Air  
 
 2002      97 Nasal 2.0L 
 
       Cannula  
 
 194 99.0 88 18 121/63  94 Nasal 2.0L 
 
       Cannula  
 
 194 99.0 88 18 115/63  89 Room Air  
 
 1737 98.3 68 20 109/63  91 Room Air  
 
 
 Intake & Output
 
 
  1600  0800  0000
 
Intake Total   0
 
Output Total  600 600
 
Balance  -600 -600
 
    
 
Intake, Oral   0
 
Output, Urine  600 600
 
Patient 250 lb  250 lb
 
Weight   
 
Weight   Estimated
 
Measurement   
 
Method   
 
 
 
 
Physical Exam
General Appearance: Oriented X3
Skin: No Rashes, No Breakdown
HEENT: Atraumatic, EOMI, Mucous Membr. moist/pink
Neck: Supple, No JVD, No thryomegaly
Cardiovascular: Regular Rate, Normal S1, Normal S2, ICD palpable on left side of
chest 
Lungs: Wheezing bilaterally
Abdomen: Normal Bowel Sounds, Soft, No Tenderness
Extremities: +2 pitting edema bilaterally 
Current Medications:
 Current Medications
 
 
  Sig/Ly Start time  Last
 
Medication Dose Route Stop Time Status Admin
 
Acetaminophen 325 MG Q6P PRN  0015 AC 
 
  PO   
 
Albuterol Sulfate 3 ML ONCE ONE 1945 DC 
 
  INH  194  194
 
Aspirin 81 MG DAILY  1000 AC 
 
  PO   0930
 
Atorvastatin Calcium 40 MG 1700  1700 AC 
 
  PO   
 
Azithromycin 500 MG DAILY  1000 AC 
 
Dextrose/Water 250 ML IV   1004
 
Azithromycin 500 MG ONCE ONE  2100 DC 
 
Dextrose/Water 250 ML IV  2159  2141
 
Budesonide/ 2 PUF BID  1000 AC 
 
Formoterol Fumarate  INH   1004
 
Carvedilol 6.25 MG BID  0007 AC 
 
  PO   0930
 
Clopidogrel Bisulfate 75 MG DAILY  1000 AC 
 
  PO   0930
 
Furosemide 20 MG DAILY  1000 DC 
 
  PO   
 
Furosemide 20 MG DAILY  1500 AC 
 
  IV   
 
Furosemide 20 MG DAILY  1115 DC 
 
  PO   1116
 
Furosemide 20 MG DAILY  1000 CAN 
 
  PO   
 
Furosemide 20 MG DAILY  1000 DC 
 
  IV   
 
Furosemide 0 .STK-MED ONE  2341 DC 
 
  IV   
 
Furosemide 20 MG ONCE ONE  2245 DC 
 
  IV  2246  0001
 
Heparin Sodium  0 .STK-MED ONE  0635 DC 
 
(Porcine)  .ROUTE   
 
Heparin Sodium  0 .STK-MED ONE  2341 DC 
 
(Porcine)  .ROUTE   
 
Heparin Sodium  5,000 UNIT Q8 5 AC 
 
(Porcine)  SC   0633
 
Ipratropium Bromide 2.5 ML ONCE ONE 1945 DC 
 
  INH 1946  1943
 
Isosorbide  60 MG DAILY  1000 AC 
 
Mononitrate  PO   0930
 
Levothyroxine Sodium 0.1 MG DAILY AC  0700 AC 
 
  PO   0633
 
Losartan Potassium 25 MG DAILY  1457 AC 
 
  PO   
 
Losartan Potassium 0 .STK-MED ONE  0024 DC 
 
  PO   
 
Methylprednisolone 40 MG Q12  1000 AC 
 
  IV   1004
 
Methylprednisolone 0 .STK-MED ONE 2008 DC 
 
  .ROUTE   
 
Methylprednisolone 125 MG ONCE ONE 1945 CAN 
 
  IV 1946  
 
Methylprednisolone 125 MG ONCE ONE  193 DC 
 
  IV 
 
Nitroglycerin 0.4 MG SEE ADMIN CRITERIA..  001 AC 
 
  SL   
 
Ranolazine 500 MG BID  0007 AC 
 
  PO   0930
 
Tamsulosin HCl 0.4 MG AT BEDTIME  0015 AC 
 
  PO   0307
 
Tiotropium [place] 1 PUF DAILY  1000 AC 
 
  INH   1004
 
 
 
 
Last 24 Hrs of Lab/Kapil Results
Last 24 Hrs of Labs/Mics:
 Laboratory Tests
 
18 1105:
Anion Gap 10, Estimated GFR 50  L, BUN/Creatinine Ratio 19.3
 
18 2314:
Magnesium 1.7, Troponin I 0.02, TSH 3.190, Free T4 1.14
 
18 1652:
Anion Gap 10, Estimated GFR 42  L, BUN/Creatinine Ratio 17.5, Glucose 141  H, 
Calcium 9.1, Total Bilirubin 0.4, AST 15  L, ALT 32, Alkaline Phosphatase 112, 
Troponin I 0.02, Pro-B-Natriuretic Pept 3450  H, Total Protein 5.8  L, Albumin 
3.5, Globulin 2.3, Albumin/Globulin Ratio 1.5, CBC w Diff NO MAN DIFF REQ, RBC 
3.04  L, .7  H, MCH 34.4  H, MCHC 33.8, RDW 15.4  H, MPV 7.9, Gran % 81.4
 H, Lymphocytes % 8.5  L, Monocytes % 7.5, Eosinophils % 2.3, Basophils % 0.3, 
Absolute Granulocytes 6.7  H, Absolute Lymphocytes 0.7  L, Absolute Monocytes 
0.6, Absolute Eosinophils 0.2, Absolute Basophils 0
 Microbiology
 1459  LOWER RESP: Respiratory Culture - ORD
1459  LOWER RESP: Gram Stain - ORD
1955  NASOPHARYN: Influenza Virus A & B Rapid Smear - COMP
 
 
 
Assessment/Plan
Assessment:
Ms Araya is a 72 year-old-male with a PMH significant for Lung cancer, COPD, CAD
s/p CABG and PCI with stend placement, ischemic cardiomyopathy, CKD, 
hypothyroidism, aortic stenosis, peripheral arterial disease, JEREMY on CPAP who 
presented to the emergency department complaining of dyspnea and insomnia due to
worsening shortness of breath.  Shortness of breath is likely attributed to 
worsening COPD exacerbation.
 
#Acute hypoxic respiratory failure.
#COPD Exacerbation
#CHF exacerbation.
#History of coronary artery disease.
 
Admit patient to telemetry.
Ruled out ACS with serial EKGs and troponins.
Continue gentle diuresis with Lasix. 
Echocardiogram pending.  
Awaiting cardiology recommendations. 
Continue IV Solu-Medrol. 
Change IV azithromycin to PO.
Await Pulmonology recommendations. 
Continue home medications.  
 
 
DVT PPX; Heparin
Patient is a full code.
Problem List:
 1. COPD exacerbation
 
 2. CHF exacerbation
 
Pain Ratin
Pain Location:
None
Pain Goal: Remain pain free
Pain Plan:
Pain Pathway
Tomorrow's Labs & Rationales:
BEP(Hyponatremia)
 
 
Robinson GLASS,Jessie 18 1606:
Attending MD Review Statement
 
Attending Statement
Attending MD Statement: examined this patient, discuss w/resident/PA/NP, agreed 
w/resident/PA/NP, reviewed EMR data (avail), discussed with nursing, reviewed 
images
Attending Assessment/Plan:
72-year-old male with history of COPD, history of lung sign cancer in the past, 
hypertension CAD, AICD/pacemaker.  He is here with acute systolic heart failure 
and a COPD exacerbation.  At this point we are diuresing him with IV Lasix per 
cardiology while continuing IV steroids per pulmonary.  We have him on by mouth 
azithromycin for a COPD exacerbation.  No evidence of pneumonia and we are 
treating with azithromycin for a bacterial bronchitis.  He has underlying CKD 
and we need to keep a close watch on his BUN and creatinine in view of the 
diuretics.  The arb is on hold given the UCHE and will follow closely.

## 2018-02-13 NOTE — CONS- PULMONARY
General Information and HPI
 
Consulting Request
Date of Consult: 02/13/18
Requested By:
Reid
Reason for Consult:
Shortness of breath
History of Present Illness:
Patient is a history coronary artery disease lung cancer status post radiation 
COPD recently had AICD replaced and is felt shortness of breath with cough 
productive of light yellow sputum and increasing lower extremity edema.  He is 
found now requiring oxygen which she had not previously required.  He said no 
chest pain.  He said no hemoptysis.
 
Allergies/Medications
Allergies:
Coded Allergies:
NO KNOWN ALLERGIES (04/05/15)
 
Home Med List:
Acetaminophen 325 MG CAPSULE   1 CAP PO Q6 PRN PAIN  (Reported)
Albuterol Sulfate (Proair Hfa) 90 MCG HFA.AER.AD   2 PUF INH Q4 COPD
Aspirin (Aspirin*) 81 MG TAB.CHEW   1 TAB PO DAILY heart  (Reported)
Atorvastatin Calcium (Lipitor) 40 MG TABLET   1 TAB PO DAILY cholesterol  (
Reported)
Budesonide/Formoterol Fumarate (Symbicort 160-4.5 Mcg Inhaler) 160 MCG-4.5 MCG/
ACTUATION HFA.AER.AD   2 PUF INH BID COPD  (Reported)
Candesartan Cilexetil 4 MG TABLET   1 TAB PO DAILY CARDIOMYOPATHY
Carvedilol (Coreg) 3.125 MG TABLET   6.25 MG PO BID HEART
Clopidogrel Bisulfate (Clopidogrel) 75 MG TABLET   1 TAB PO DAILY BLOOD THINNER 
(Reported)
Furosemide (Lasix) 20 MG TABLET   1 TAB PO DAILY DIURETIC  (Reported)
Gabapentin (Neurontin) 800 MG TABLET   1 TAB PO TID BACK PAIN  (Reported)
Isosorbide Mononitrate (Isosorbide Mononitrate ER) 60 MG TAB.ER.24H   1 TAB PO 
DAILY    (Reported)
Levothyroxine Sodium 100 MCG TABLET   1 TAB PO DAILY THYROID  (Reported)
Nitroglycerin (Nitrostat) 0.4 MG TAB.SUBL   1 TAB SL AD PRN CHEST PAIN  (
Reported)
     1st sign of attack; may repeat every 5 minutes until relief; if pain 
persists after 3 tablets in 15 minutes, prompt medical att
Prednisone 10 MG TABLET   1 TAB PO SEE ADMIN CRITERIA COPD EXACERBATION
     60 MG( 6 TABS) ON 07/22 - 07/23
     50 MG (5 TABS) ON 07/24- 07/26
     40 MG( 4 TABS) 0N 07/27 - 07/29
     30 MG( 3 TABS) ON 07/30 - 08/01
     20 MG( 2 TABS) ON 08/02 - 08/04
     10 MG ( 1 TAB) ON 08/05 - 08/07
Ranolazine (Ranexa) 500 MG TAB.ER.12H   1 TAB PO BID heart  (Reported)
Tamsulosin HCl (Flomax) 0.4 MG CAP.ER.24H   1 CAP PO QHS prostate
     Please take at bed time to avoid low blood pressure during the day.
Tiotropium Bromide (Spiriva) 18 MCG CAP.W.DEV   1 CAP INH DAILY COPD  (Reported)
 
 
Review of Systems
 
Review of Systems
Constitutional:
Reports: chills.  Denies: fever. 
Cardiovascular:
Reports: peripheral edema.  Denies: chest pain. 
Respiratory:
Reports: cough, short of breath, sputum production.  Denies: hemoptysis. 
GI:
Denies: abdominal pain, diarrhea, melena. 
 
Past History
 
Travel History
Traveled to [place] past 21 day No
 
Medical History
Neurological: NONE
EENT: NONE
Cardiovascular: CARDIAC STENTS CABG CAROTID ARTERY BYPASS DEFIBRILLATOR
Respiratory: COPD
Gastrointestinal: NONE
Hepatic: NONE
Renal: NONE
Musculoskeletal: NONE
Psychiatric: NONE
Endocrine: hypothyroidism
Blood Disorders: DVT
Cancer(s): NONE
GYN/Reproductive: NONE
 
Surgical History
Surgical History: CABG, CAROTID ARTECTOMY nerve thermoablation to reduce back 
pain 
 
Psychosocial History
Where Do You Live? Home
Who Do You Live With? spouse
Services at Home: None
Primary Language: English
Smoking Status: Former Smoker (60 pack years)
ETOH Use: denies use
Illicit Drug Use: denies illicit drug use
Living Will? no
 
Functional Ability
ADLs
Independent: dressing, eating, toileting, bathing. 
Ambulation: independent
 
Exam & Diagnostic Data
Last 24 Hrs of Vital Signs/I&O
 Vital Signs
 
 
Date Time Temp Pulse Resp B/P B/P Pulse O2 O2 Flow FiO2
 
     Mean Ox Delivery Rate 
 
02/13 0820 98.0 80 20 124/62  95 Nasal 2.0L 
 
       Cannula  
 
02/13 0635 98.5 82 20 139/74  96 Nasal 2.0L 
 
       Cannula  
 
02/13 0538 98.0 80 20 117/72  95 Nasal 2.0L 
 
       Cannula  
 
02/13 0507 96.9 67 20 129/74  96 Room Air  
 
02/13 0309 98.6 72 16 107/58  95 Nasal 2.0L 
 
       Cannula  
 
02/13 0307 98.6 72 16 107/58     
 
02/13 0138 98.7 79 20 102/56     
 
02/13 0138 98.7 79 20 102/56     
 
02/13 0002 97.7 79 22 127/65  94 Nasal 2.0L 
 
       Cannula  
 
02/12 2100 98.4 74 20 131/70  100 Room Air  
 
02/12 2002      97 Nasal 2.0L 
 
       Cannula  
 
02/12 1942 99.0 88 18 121/63  94 Nasal 2.0L 
 
       Cannula  
 
02/12 1941 99.0 88 18 115/63  89 Room Air  
 
02/12 1737 98.3 68 20 109/63  91 Room Air  
 
 
 Intake & Output
 
 
 02/13 1600 02/13 0800 02/13 0000
 
Intake Total   0
 
Output Total  600 600
 
Balance  -600 -600
 
    
 
Intake, Oral   0
 
Output, Urine  600 600
 
Patient   250 lb
 
Weight   
 
Weight   Estimated
 
Measurement   
 
Method   
 
 
Oxygen saturation 2 L 95% exam of his chest showed scattered diffuse faint 
expiratory wheezing cardiac exam shows a regular S1 and S2 with soft systolic 
ejection murmur abdomen is soft nontender he has 2+ symmetrical pitting edema
Last 48 Hrs of Labs/[place]:
 Laboratory Tests
 
02/12/18 2314:
Magnesium Pending, Troponin I 0.02, TSH Pending, Free T4 Pending
 
02/12/18 1652:
Anion Gap 10, Estimated GFR 42  L, BUN/Creatinine Ratio 17.5, Glucose 141  H, 
Calcium 9.1, Total Bilirubin 0.4, AST 15  L, ALT 32, Alkaline Phosphatase 112, 
Troponin I 0.02, Pro-B-Natriuretic Pept 3450  H, Total Protein 5.8  L, Albumin 
3.5, Globulin 2.3, Albumin/Globulin Ratio 1.5, CBC w Diff NO MAN DIFF REQ, RBC 
3.04  L, .7  H, MCH 34.4  H, MCHC 33.8, RDW 15.4  H, MPV 7.9, Gran % 81.4
 H, Lymphocytes % 8.5  L, Monocytes % 7.5, Eosinophils % 2.3, Basophils % 0.3, 
Absolute Granulocytes 6.7  H, Absolute Lymphocytes 0.7  L, Absolute Monocytes 
0.6, Absolute Eosinophils 0.2, Absolute Basophils 0
 Microbiology
02/12 1955  NASOPHARYN: Influenza Virus A & B Rapid Smear - COMP
 
 
 
Assessment/Plan
Impression/Plan:
72-year-old gentleman with COPD cardiomyopathy AICD sleep apnea status post 
radiation for lung cancer is admitted with increasing shortness of breath.  This
is likely multifactorial related to acute bronchitis and possible degree of 
volume overload with increased edema and elevated BNP
Recommendations:
IV steroids as ordered.  Sputum culture.  Taper FiO2 with improved saturations. 
Mild negative fluid balance monitoring renal function closely.
 
 
Consult Acknowledgment
- Thank you for your consult request.

## 2018-02-13 NOTE — CONS- CARDIOLOGY
General Information and HPI
 
Consulting Request
Date of Consult: 02/13/18
Requested By:
Chan GLASS,Shamir
 
Reason for Consult:
Shortness of breath
Source of Information: patient
Exam Limitations: no limitations
History of Present Illness:
The patient is 72-year-old male with a history of ischemic cardiomyopathy EF 20%
status post AICD, or coronary bypass surgery with PCI, known occluded vein graft
, carotid artery disease, peripheral arterial disease, chronic systolic heart 
failure, aortic aneurysm, chronic renal insufficiency, who now presents with 
increasing shortness of breath and cough.  Patient states that he had a 
generator change apparently 6 weeks ago and since then he's been noticing 
increasing shortness of breath.  He does have a cough only productive of scant 
sputum first thing in the morning.  He denies chest discomfort.  He has noted 
mild edema of his extremities but this is chronic.
 
He states that now he is noted dyspnea upon minimal exertion along with PND and 
orthopnea.  He has been compliant with his medications.
 
Allergies/Medications
Allergies:
Coded Allergies:
NO KNOWN ALLERGIES (04/05/15)
 
Home Med List:
Acetaminophen 325 MG CAPSULE   1 CAP PO Q6 PRN PAIN  (Reported)
Albuterol Sulfate (Proair Hfa) 90 MCG HFA.AER.AD   2 PUF INH Q4 COPD
Aspirin (Aspirin*) 81 MG TAB.CHEW   1 TAB PO DAILY heart  (Reported)
Atorvastatin Calcium (Lipitor) 40 MG TABLET   1 TAB PO DAILY cholesterol  (
Reported)
Budesonide/Formoterol Fumarate (Symbicort 160-4.5 Mcg Inhaler) 160 MCG-4.5 MCG/
ACTUATION HFA.AER.AD   2 PUF INH BID COPD  (Reported)
Candesartan Cilexetil 4 MG TABLET   1 TAB PO DAILY CARDIOMYOPATHY
Carvedilol (Coreg) 3.125 MG TABLET   6.25 MG PO BID HEART
Clopidogrel Bisulfate (Clopidogrel) 75 MG TABLET   1 TAB PO DAILY BLOOD THINNER 
(Reported)
Furosemide (Lasix) 20 MG TABLET   1 TAB PO DAILY DIURETIC  (Reported)
Gabapentin (Neurontin) 800 MG TABLET   1 TAB PO TID BACK PAIN  (Reported)
Isosorbide Mononitrate (Isosorbide Mononitrate ER) 60 MG TAB.ER.24H   1 TAB PO 
DAILY    (Reported)
Levothyroxine Sodium 100 MCG TABLET   1 TAB PO DAILY THYROID  (Reported)
Nitroglycerin (Nitrostat) 0.4 MG TAB.SUBL   1 TAB SL AD PRN CHEST PAIN  (
Reported)
     1st sign of attack; may repeat every 5 minutes until relief; if pain 
persists after 3 tablets in 15 minutes, prompt medical att
Prednisone 10 MG TABLET   1 TAB PO SEE ADMIN CRITERIA COPD EXACERBATION
     60 MG( 6 TABS) ON 07/22 - 07/23
     50 MG (5 TABS) ON 07/24- 07/26
     40 MG( 4 TABS) 0N 07/27 - 07/29
     30 MG( 3 TABS) ON 07/30 - 08/01
     20 MG( 2 TABS) ON 08/02 - 08/04
     10 MG ( 1 TAB) ON 08/05 - 08/07
Ranolazine (Ranexa) 500 MG TAB.ER.12H   1 TAB PO BID heart  (Reported)
Tamsulosin HCl (Flomax) 0.4 MG CAP.ER.24H   1 CAP PO QHS prostate
     Please take at bed time to avoid low blood pressure during the day.
Tiotropium Bromide (Spiriva) 18 MCG CAP.W.DEV   1 CAP INH DAILY COPD  (Reported)
 
Current Medications:
 Current Medications
 
 
  Sig/Ly Start time  Last
 
Medication Dose Route Stop Time Status Admin
 
Acetaminophen 325 MG Q6P PRN 02/13 0015 AC 
 
  PO   
 
Albuterol Sulfate 3 ML ONCE ONE 02/12 1945 DC 02/12
 
  INH 02/12 1946  1944
 
Aspirin 81 MG DAILY 02/13 1000 AC 
 
  PO   
 
Atorvastatin Calcium 40 MG 1700 02/13 1700 AC 
 
  PO   
 
Azithromycin 500 MG DAILY 02/13 1000 AC 
 
Dextrose/Water 250 ML IV   
 
Azithromycin 500 MG ONCE ONE 02/12 2100 DC 02/12
 
Dextrose/Water 250 ML IV 02/12 2159  2141
 
Budesonide/ 2 PUF BID 02/13 1000 AC 
 
Formoterol Fumarate  INH   
 
Carvedilol 6.25 MG BID 02/13 0007 AC 02/13
 
  PO   0138
 
Clopidogrel Bisulfate 75 MG DAILY 02/13 1000 AC 
 
  PO   
 
Furosemide 20 MG DAILY 02/13 1000 CAN 
 
  PO   
 
Furosemide 20 MG DAILY 02/13 1000 UNVr 
 
  IV   
 
Furosemide 0 .STK-MED ONE 02/12 2341 DC 
 
  IV   
 
Furosemide 20 MG ONCE ONE 02/12 2245 DC 02/13
 
  IV 02/12 2246  0001
 
Heparin Sodium  0 .STK-MED ONE 02/13 0635 DC 
 
(Porcine)  .ROUTE   
 
Heparin Sodium  0 .STK-MED ONE 02/12 2341 DC 
 
(Porcine)  .ROUTE   
 
Heparin Sodium  5,000 UNIT Q8 02/12 2255 AC 02/13
 
(Porcine)  SC   0633
 
Ipratropium Bromide 2.5 ML ONCE ONE 02/12 1945 DC 02/12
 
  INH 02/12 1946  1943
 
Isosorbide  60 MG DAILY 02/13 1000 AC 
 
Mononitrate  PO   
 
Levothyroxine Sodium 0.1 MG DAILY AC 02/13 0700 AC 02/13
 
  PO   0633
 
Losartan Potassium 0 .STK-MED ONE 02/13 0024 DC 
 
  PO   
 
Methylprednisolone 40 MG Q12 02/13 1000 AC 
 
  IV   
 
Methylprednisolone 0 .STK-MED ONE 02/12 2008 DC 
 
  .ROUTE   
 
Methylprednisolone 125 MG ONCE ONE 02/12 1945 CAN 
 
  IV 02/12 1946  
 
Methylprednisolone 125 MG ONCE ONE 02/12 1930 DC 02/12
 
  IV 02/12 1931 2021
 
Nitroglycerin 0.4 MG SEE ADMIN CRITERIA.. 02/13 0015 AC 
 
  SL   
 
Ranolazine 500 MG BID 02/13 0007 AC 02/13
 
  PO   0138
 
Tamsulosin HCl 0.4 MG AT BEDTIME 02/13 0015 AC 02/13
 
  PO   0307
 
Tiotropium [place] 1 PUF DAILY 02/13 1000 AC 
 
  INH   
 
 
 
 
Review of Systems
Review of Systems:
Eyes no blurred or double vision
Ears no deafness or ringing
Nose and throat no recurrent sinusitis
Lungs per history of present illness
Heart per history of present illness
Abdomen no nausea vomiting
Musculoskeletal occasional muscle and joint pains
Psych no anxiety or depression
Neuro without recurrent headache or seizures
Endocrine no heat or cold intolerance
 
Past History
 
Travel History
Traveled to [place] past 21 day No
 
Medical History
Neurological: NONE
EENT: NONE
Cardiovascular: CARDIAC STENTS CABG CAROTID ARTERY BYPASS DEFIBRILLATOR
Respiratory: COPD
Gastrointestinal: NONE
Hepatic: NONE
Renal: NONE
Musculoskeletal: NONE
Psychiatric: NONE
Endocrine: hypothyroidism
Blood Disorders: DVT
Cancer(s): NONE
GYN/Reproductive: NONE
 
Surgical History
Surgical History: CABG, CAROTID ARTECTOMY nerve thermoablation to reduce back 
pain 
 
Psychosocial History
Where Do You Live? Home
Who Do You Live With? spouse
Services at Home: None
Primary Language: English
Smoking Status: Former Smoker (60 pack years)
ETOH Use: denies use
Illicit Drug Use: denies illicit drug use
Living Will? no
 
Functional Ability
ADLs
Independent: dressing, eating, toileting, bathing. 
Ambulation: independent
 
ECHO Results (as available)
Report:
Left ventricular cavity size normal. Left ventricular wall thickness
 mildly increased. Moderate to severe hypokinesis of the mid to basal
 inferior wall. Mild distal anteroseptal/anteroapical hypokinesis.
 Left ventricular ejection fraction is estimated at 45 %.
 Catheter/pacemaker wire in the right ventricular cavity. Normal
 right ventricular size and function.
 Mild left atrial dilatation.
 Moderate mitral regurgitation.
 Mild-to-moderate aortic stenosis (MICHELLE 1.4 cm2 by continuity with a
 mean gradient of 13 mmHg).
 Moderate tricuspid regurgitation. RVSP > 45 mmHg.
 
 
Exam & Diagnostic Data
Vital Signs and I&O
Vital Signs
 
 
Date Time Temp Pulse Resp B/P B/P Pulse O2 O2 Flow FiO2
 
     Mean Ox Delivery Rate 
 
02/13 0820 98.0 80 20 124/62  95 Nasal 2.0L 
 
       Cannula  
 
02/13 0635 98.5 82 20 139/74  96 Nasal 2.0L 
 
       Cannula  
 
02/13 0538 98.0 80 20 117/72  95 Nasal 2.0L 
 
       Cannula  
 
02/13 0507 96.9 67 20 129/74  96 Room Air  
 
02/13 0309 98.6 72 16 107/58  95 Nasal 2.0L 
 
       Cannula  
 
02/13 0307 98.6 72 16 107/58     
 
02/13 0138 98.7 79 20 102/56     
 
02/13 0138 98.7 79 20 102/56     
 
02/13 0002 97.7 79 22 127/65  94 Nasal 2.0L 
 
       Cannula  
 
02/12 2100 98.4 74 20 131/70  100 Room Air  
 
02/12 2002      97 Nasal 2.0L 
 
       Cannula  
 
02/12 1942 99.0 88 18 121/63  94 Nasal 2.0L 
 
       Cannula  
 
02/12 1941 99.0 88 18 115/63  89 Room Air  
 
02/12 1737 98.3 68 20 109/63  91 Room Air  
 
 
 Intake & Output
 
 
 02/13 1600 02/13 0800 02/13 0000 02/12 1600 02/12 0800 02/12 0000
 
Intake Total   0   
 
Output Total  600 600   
 
Balance  -600 -600   
 
       
 
Intake, Oral   0   
 
Output, Urine  600 600   
 
Patient   250 lb   
 
Weight      
 
Weight   Estimated   
 
Measurement      
 
Method      
 
 
 
Physical Exam:
Patient is a well-developed well-nourished male appearing in moderate 
respiratory distress
HEENT is unremarkable
Neck is supple there is no JVD
Lungs diffuse rhonchi and wheezes bilaterally
Heart regular rhythm S1 and S2 are normal no gallops or rubs 1/6 systolic 
ejection murmur at the left sternal border
Abdomen bowel sounds positive
Extremities 1+ edema
 
 
Labs/[place] Results:
 Laboratory Tests
 
 
 02/12 02/12
 
 2314 1652
 
Chemistry  
 
  Sodium (137 - 145 mmol/L)  139
 
  Potassium (3.5 - 5.1 mmol/L)  4.4
 
  Chloride (98 - 107 mmol/L)  102
 
  Carbon Dioxide (22 - 30 mmol/L)  27
 
  Anion Gap (5 - 16)  10
 
  BUN (9 - 20 mg/dL)  28  H
 
  Creatinine (0.7 - 1.2 mg/dL)  1.6  H
 
  Estimated GFR (>60 ml/min)  42  L
 
  BUN/Creatinine Ratio (7 - 25 %)  17.5
 
  Glucose (65 - 99 mg/dL)  141  H
 
  Calcium (8.4 - 10.2 mg/dL)  9.1
 
  Magnesium (1.6 - 2.3 mg/dL) Pending 
 
  Total Bilirubin (0.2 - 1.3 mg/dL)  0.4
 
  AST (17 - 59 U/L)  15  L
 
  ALT (21 - 72 U/L)  32
 
  Alkaline Phosphatase (< 127 U/L)  112
 
  Troponin I (<0.11 ng/ml) 0.02 0.02
 
  Pro-B-Natriuretic Pept (<125 pg/mL)  3450  H
 
  Total Protein (6.3 - 8.2 g/dL)  5.8  L
 
  Albumin (3.5 - 5.0 g/dL)  3.5
 
  Globulin (1.9 - 4.2 gm/dL)  2.3
 
  Albumin/Globulin Ratio (1.1 - 2.2 %)  1.5
 
  TSH (0.270 - 4.200 uIU/mL) Pending 
 
  Free T4 (0.78 - 2.44 ng/dL) Pending 
 
Hematology  
 
  CBC w Diff  NO MAN DIFF REQ
 
  WBC (4.8 - 10.8 /CUMM)  8.2
 
  RBC (4.70 - 6.10 /CUMM)  3.04  L
 
  Hgb (14.0 - 18.0 G/DL)  10.4  L
 
  Hct (42 - 52 %)  30.9  L
 
  MCV (80.0 - 94.0 FL)  101.7  H
 
  MCH (27.0 - 31.0 PG)  34.4  H
 
  MCHC (33.0 - 37.0 G/DL)  33.8
 
  RDW (11.5 - 14.5 %)  15.4  H
 
  Plt Count (130 - 400 /CUMM)  152
 
  MPV (7.4 - 10.4 FL)  7.9
 
  Gran % (42.2 - 75.2 %)  81.4  H
 
  Lymphocytes % (20.5 - 51.1 %)  8.5  L
 
  Monocytes % (1.7 - 9.3 %)  7.5
 
  Eosinophils % (0 - 5 %)  2.3
 
  Basophils % (0.0 - 2.0 %)  0.3
 
  Absolute Granulocytes (1.4 - 6.5 /CUMM)  6.7  H
 
  Absolute Lymphocytes (1.2 - 3.4 /CUMM)  0.7  L
 
  Absolute Monocytes (0.10 - 0.60 /CUMM)  0.6
 
  Absolute Eosinophils (0.0 - 0.7 /CUMM)  0.2
 
  Absolute Basophils (0.0 - 0.2 /CUMM)  0
 
 
 
 
Diagnostic Data
EKG Results
Sinus rhythm and complete left bundle branch block
CXR Results
 IMPRESSION:
Stable cardiomegaly. No acute airspace disease.
 
Assessment/Plan
Assessment/Plan
1.  Shortness of breath most likely a combination of acute exacerbation of COPD 
along with acute on chronic systolic heart failure with elevated BNP and edema.
2.  Ischemic cardiomyopathy ejection fraction 20% status post AICD
3.  Coronary artery disease status post coronary bypass surgery known occluded 
vein grafts
4.  Hypertension
5.  Chronic COPD
6.  Carcinoma of the lung status post radiation therapy
7.  Chronic renal insufficiency
 
Recommendations
1.  I will have his AICD interrogated to assess function
2.  Would change Lasix to IV 20 mg daily if he received his Lasix this morning I
would give him another 20 mg IV
3.  Since his renal function appears to be at baseline I would resume his ARB
4.  Monitor on telemetry
5.  Aggressive pulmonary management per Dr. Harris
6.  Continue aspirin, statin, Imdur, Plavix and beta blocker
7.  Would repeat an echocardiogram to reassess LV function
Thank you for allowing [facility] to participate in the care 
of your patient.
 
 
Consult Acknowledgment
- Thank you for your consult request.

## 2018-02-14 VITALS — DIASTOLIC BLOOD PRESSURE: 76 MMHG | SYSTOLIC BLOOD PRESSURE: 120 MMHG

## 2018-02-14 VITALS — SYSTOLIC BLOOD PRESSURE: 114 MMHG | DIASTOLIC BLOOD PRESSURE: 70 MMHG

## 2018-02-14 VITALS — DIASTOLIC BLOOD PRESSURE: 68 MMHG | SYSTOLIC BLOOD PRESSURE: 118 MMHG

## 2018-02-14 PROCEDURE — 5A09357 ASSISTANCE WITH RESPIRATORY VENTILATION, LESS THAN 24 CONSECUTIVE HOURS, CONTINUOUS POSITIVE AIRWAY PRESSURE: ICD-10-PCS | Performed by: INTERNAL MEDICINE

## 2018-02-14 NOTE — PN- CARDIOLOGY
Subjective
Subjective:
 
Still feels congested.  Lower extremity edema is improving.  Denies chest pain 
or palpitations.
 
Objective
Vital Signs and I&Os
Vital Signs
 
 
Date Time Temp Pulse Resp B/P B/P Pulse O2 O2 Flow FiO2
 
     Mean Ox Delivery Rate 
 
02/14 0909    124/70     
 
02/14 0909    124/70     
 
02/14 0908    124/70     
 
02/14 0908    124/74     
 
02/14 0837      95 Nasal 3.0L 
 
       Cannula  
 
02/14 0830      89 Room Air  
 
02/14 0643 97.6 89 12 120/76  95 Nasal 4.0L 
 
       Cannula  
 
02/14 0000       Nasal 3.0L 
 
       Cannula  
 
02/13 2236 97.8 84 20 120/70  93   
 
02/13 2038  77  104/60     
 
02/13 2038  77  104/60     
 
02/13 2038  77  104/60     
 
02/13 1856       Nasal 2.0L 
 
       Cannula  
 
02/13 1748    120/62     
 
02/13 1715      94 Nasal 2.0L 
 
       Cannula  
 
02/13 1601 97.7 78 20 115/60  96 Room Air  
 
02/13 1435      97 Nasal 2.0L 
 
       Cannula  
 
 
 Intake & Output
 
 
 02/14 1600 02/14 0800 02/14 0000 02/13 1600 02/13 0800 02/13 0000
 
Intake Total  110 110   0
 
Output Total  725 400  600 600
 
Balance  -615 -290  -600 -600
 
       
 
Intake, IV  10 10   
 
Intake, Oral  100 100   0
 
Output, Urine  725 400  600 600
 
Patient  245 lb 244 lb 250 lb  250 lb
 
Weight      
 
Weight  Bed scale    Estimated
 
Measurement      
 
Method      
 
 
 
Physical Exam:
 
General: no apparent distress. Alert.
Eyes: No obvious scleral icterus.
HEENT: No jugular venous distention or abnormal jugular venous pulsations.
Cardiovascular: Normal intensity S1/S2.  ICD noted
Respiratory: Trace basilar crackles
Abdomen: Soft, nontender with no guarding or rebound tenderness.
Musculoskeletal: No clubbing or cyanosis noted; 1+ lower extremity edema
Skin: No obvious rashes or ulcerations.
Neurologic: No gross focal deficits noted.
Lymph: No gross lymphadenopathy. 
Current Medications:
 Current Medications
 
 
  Sig/Ly Start time  Last
 
Medication Dose Route Stop Time Status Admin
 
Acetaminophen 325 MG Q6P PRN 02/13 0015 AC 
 
  PO   
 
Albuterol Sulfate 3 ML TID 02/13 2200 AC 02/14
 
  INH   0836
 
Aspirin 81 MG DAILY 02/13 1000 AC 02/14
 
  PO   0909
 
Atorvastatin Calcium 40 MG 1700 02/13 1700 AC 02/13
 
  PO   1748
 
Azithromycin 500 MG DAILY 02/14 1000 AC 02/14
 
  PO   0909
 
Azithromycin 500 MG DAILY 02/13 1000 DC 02/13
 
Dextrose/Water 250 ML IV   1004
 
Budesonide/ 2 PUF BID 02/13 1000 AC 02/14
 
Formoterol Fumarate  INH   0910
 
Carvedilol 6.25 MG BID 02/13 0007 AC 02/14
 
  PO   0908
 
Clopidogrel Bisulfate 75 MG DAILY 02/13 1000 AC 02/14
 
  PO   0909
 
Furosemide 20 MG BID 02/14 2200 AC 
 
  IV   
 
Furosemide 20 MG DAILY 02/13 1500 DC 02/14
 
  IV   0911
 
Furosemide 20 MG DAILY 02/13 1115 DC 02/13
 
  PO   1116
 
Heparin Sodium  5,000 UNIT Q8 02/12 2255 AC 02/14
 
(Porcine)  SC   0552
 
Isosorbide  60 MG DAILY 02/13 1000 AC 02/14
 
Mononitrate  PO   0909
 
Levothyroxine Sodium 0.1 MG DAILY AC 02/13 0700 AC 02/14
 
  PO   0552
 
Losartan Potassium 25 MG DAILY 02/13 1457 AC 02/14
 
  PO   0908
 
Methylprednisolone 40 MG Q12 02/13 1000 AC 02/14
 
  IV   0908
 
Nitroglycerin 0.4 MG SEE ADMIN CRITERIA.. 02/13 0015 AC 
 
  SL   
 
Ranolazine 500 MG BID 02/13 0007 AC 02/14
 
  PO   0909
 
Tamsulosin HCl 0.4 MG AT BEDTIME 02/13 0015 AC 02/13
 
  PO   2038
 
Tiotropium [place] 1 PUF DAILY 02/13 1000 AC 02/14
 
  INH   0909
 
 
 
 
Results
Last 48 Hrs of Labs/Mics:
 Laboratory Tests
 
02/14/18 0619:
Anion Gap 10, Estimated GFR 40  L, BUN/Creatinine Ratio 21.2
 
02/13/18 1105:
Anion Gap 10, Estimated GFR 50  L, BUN/Creatinine Ratio 19.3, Phosphorus 3.8, 
Magnesium 1.8, Vitamin B12 243, Folate 12.7
 
02/12/18 2314:
Magnesium 1.7, Troponin I 0.02, TSH 3.190, Free T4 1.14
 
02/12/18 1652:
Anion Gap 10, Estimated GFR 42  L, BUN/Creatinine Ratio 17.5, Glucose 141  H, 
Calcium 9.1, Total Bilirubin 0.4, AST 15  L, ALT 32, Alkaline Phosphatase 112, 
Troponin I 0.02, Pro-B-Natriuretic Pept 3450  H, Total Protein 5.8  L, Albumin 
3.5, Globulin 2.3, Albumin/Globulin Ratio 1.5, CBC w Diff NO MAN DIFF REQ, RBC 
3.04  L, .7  H, MCH 34.4  H, MCHC 33.8, RDW 15.4  H, MPV 7.9, Gran % 81.4
 H, Lymphocytes % 8.5  L, Monocytes % 7.5, Eosinophils % 2.3, Basophils % 0.3, 
Absolute Granulocytes 6.7  H, Absolute Lymphocytes 0.7  L, Absolute Monocytes 
0.6, Absolute Eosinophils 0.2, Absolute Basophils 0
 Microbiology
02/12 1955  NASOPHARYN: Influenza Virus A & B Rapid Smear - COMP
 
 
Recent Imaging Studies:
 
Telemetry tracings were personally reviewed and shows sinus rhythm with a run of
nonsustained ventricular tachycardia
 
ICD interrogation personally reviewed and shows normal device function
 
Assessment/Plan
Assessment/Plan
 
1.  Shortness of breath most likely a combination of acute exacerbation of COPD 
along with acute on chronic systolic heart failure with elevated BNP and edema.
2.  Ischemic cardiomyopathy ejection fraction 20% status post AICD
3.  Coronary artery disease status post coronary bypass surgery known occluded 
vein grafts
4.  Hypertension
5.  Chronic COPD with nicotine dependence in remission
6.  Carcinoma of the lung status post radiation therapy
7.  Chronic renal insufficiency
8.  Hx of aortic stenosis
9.  Hx of Carotid artery disease/aortic aneurysm and peripheral vascular disease
 
 
Patient is improving with diuresis but given that he still has volume overload 
we have increased his Lasix to 20 mg IV twice a day. Continue on daily ARB 
therapy while monitoring the potassium levels and kidney function. Would 
increase the carvedilol to 12.5 mg by mouth twice a day given the NSVT. Repeat 
echocardiogram is pending. ICD interrogation showed normal device function.
 
 
James Mc MD [facility]
Continue telemetry? Yes

## 2018-02-14 NOTE — ECHOCARDIOGRAM REPORT
JULIO CÉSAR CRUZ 
 
 Age:    72     :    1945      Gender:     M 
 
 MRN:    [id number] 
 
 Exam Date:     2018  
                10:35 
 
 Exam Location: 1  
 [place] 
 
 Ht (in):     72      Wt (lb):      244     BSA:    2.40 
 
 BP:          124     /     62 
 
 Ordering Physician:        Lora Bang MD 
 
 Referring Physician:       Lora Bang MD 
 
 Technologist:              Rishi Padron [facility] 
 
 Room Number:               177-1 
 
 Indications:       Hypertension 
 
 Rhythm: 
 
 Technical Quality:      Fair 
 
 FINDINGS 
 
 Left Ventricle 
 Moderate left ventricular dilatation. Left ventricular wall  
 thickness mildly increased. There is moderate global hypokinesis  
 with akinesis of the inferior wall. Left ventricular ejection  
 fraction is estimated at 20-25 %. 
 
 Right Ventricle 
 Normal right ventricular size and function. Catheter/pacemaker wire  
 in the right ventricular cavity. 
 
 Right Atrium 
 Normal right atrial size. 
 
 Left Atrium 
 Mild left atrial dilatation. 
 
 Mitral Valve 
 No mitral stenosis. Mild mitral annular calcification. Mild mitral  
 regurgitation. 
 
 Aortic Valve 
 Diffuse thickening of the aortic valve cusps with reduced excursion.  
 Mild-to-moderate aortic stenosis (mean gradient 15 mmHg). 
 
 Tricuspid Valve 
 Structurally normal tricuspid valve. Mild tricuspid regurgitation.  
 Unable to estimate the right ventricular systolic pressure. 
 
 Pulmonic Valve 
 Pulmonic valve not well visualized. 
 
 Pericardium 
 No pericardial effusion. 
 
 Great Vessels 
 Normal size aortic root. 
 
 CONCLUSIONS 
 Moderate left ventricular dilatation. Left ventricular wall  
 thickness mildly increased.  
 There is moderate global hypokinesis with akinesis of the inferior  
 wall.  
 Left ventricular ejection fraction is estimated at 20-25 %.  
 Normal right ventricular size and function. Catheter/pacemaker wire  
 in the right ventricular cavity.  
 Mild left atrial dilatation.  
 Mild-to-moderate aortic stenosis (mean gradient 15 mmHg).  
 Unable to estimate the right ventricular systolic pressure.  
 
 Regis Mc M.D. 
 (Electronically Signed) 
 Final Date:      2018  
                  15:30 
 
 MEASUREMENTS  (Male / Female) Normal Values 
 
 2D ECHO 
 LV Diastolic Diameter PLAX        6.7 cm                4.2 - 5.9 / 3.9 - 5.3 
cm 
 LV Systolic Diameter PLAX         6.1 cm                2.1 - 4.0 cm 
 LV Fractional Shortening PLAX     9.0 %                 25 - 46  % 
 LV Ejection Fraction 2D Teich     19.2 %                 
 IVS Diastolic Thickness           1.3 cm                 
 LVPW Diastolic Thickness          1.1 cm                 
 LV Relative Wall Thickness        0.4                    
 RV Internal Dim ED PLAX           3.6 cm                1.9 - 3.8 cm 
 LVOT Diameter                     2.1 cm                 
 Aortic Root Diameter              2.8 cm                 
 LA Volume                         87.0 cm              18 - 58 / 22 - 52 cm 
 Ascending Aorta Diameter          3.4 cm                 
 
 DOPPLER 
 AV Peak Velocity                  249.0 cm/s             
 AV Peak Gradient                  24.8 mmHg              
 AV Mean Velocity                  177.0 cm/s             
 AV Mean Gradient                  15.0 mmHg              
 AV Velocity Time Integral         57.9 cm                
 LVOT Peak Velocity                52.6 cm/s              
 LVOT Peak Gradient                1.1 mmHg               
 LVOT Mean Velocity                32.8 cm/s              
 LVOT Mean Gradient                1.0 mmHg               
 LVOT Velocity Time Integral       11.2 cm                
 LVOT Stroke Volume                38.8 cm               
 AV Area Cont Eq vti               0.7 cm                
 AV Area Cont Eq pk                0.7 cm                
 MV Peak Velocity                  83.7 cm/s              
 MV Peak Gradient                  2.8 mmHg               
 MV Mean Velocity                  58.0 cm/s              
 MV Mean Gradient                  2.0 mmHg               
 Mitral E Point Velocity           95.3 cm/s              
 Mitral A Point Velocity           94.3 cm/s              
 Mitral E to A Ratio               1.0                    
 MV PHT Velocity                   90.1 cm/s              
 MV Deceleration [place]             260.0 cm/s            
 MV Pressure Half Time             104.0 ms               
 MV Area PHT                       2.1 cm                
 MV Deceleration Time              201.0 ms               
 TR Peak Velocity                  250.0 cm/s             
 TR Peak Gradient                  25.0 mmHg              
 Right Atrial Pressure             5.0 mmHg               
 Pulmonary Artery Systolic Pressu  30.0 mmHg              
 Right Ventricular Systolic Press  30.0 mmHg              
 PV Peak Velocity                  127.0 cm/s             
 PV Peak Gradient                  6.5 mmHg               
 PV Mean Velocity                  86.8 cm/s              
 PV Mean Gradient                  3.0 mmHg               
 PV Velocity Time Integral         23.8 cm

## 2018-02-14 NOTE — PN- PULMONARY
Subjective
HPI/Critical Care Issues:
Patient continues to feel short of breath oxygen requirements persist he is 
diuresed 2 L
 
Objective
Current Medications:
 Current Medications
 
 
  Sig/Ly Start time  Last
 
Medication Dose Route Stop Time Status Admin
 
Acetaminophen 325 MG Q6P PRN 02/13 0015 AC 
 
  PO   
 
Albuterol Sulfate 3 ML TID 02/13 2200 AC 
 
  INH   
 
Aspirin 81 MG DAILY 02/13 1000 AC 02/13
 
  PO   0930
 
Atorvastatin Calcium 40 MG 1700 02/13 1700 AC 02/13
 
  PO   1748
 
Azithromycin 500 MG DAILY 02/14 1000 AC 
 
  PO   
 
Azithromycin 500 MG DAILY 02/13 1000 DC 02/13
 
Dextrose/Water 250 ML IV   1004
 
Budesonide/ 2 PUF BID 02/13 1000 AC 02/13
 
Formoterol Fumarate  INH   2135
 
Carvedilol 6.25 MG BID 02/13 0007 AC 02/13
 
  PO   2038
 
Clopidogrel Bisulfate 75 MG DAILY 02/13 1000 AC 02/13
 
  PO   0930
 
Furosemide 20 MG DAILY 02/14 1000 DC 
 
  PO   
 
Furosemide 20 MG DAILY 02/13 1500 AC 02/13
 
  IV   1748
 
Furosemide 20 MG DAILY 02/13 1115 DC 02/13
 
  PO   1116
 
Furosemide 20 MG DAILY 02/13 1000 CAN 
 
  PO   
 
Furosemide 20 MG DAILY 02/13 1000 DC 
 
  IV   
 
Heparin Sodium  5,000 UNIT Q8 02/12 2255 AC 02/14
 
(Porcine)  SC   0552
 
Isosorbide  60 MG DAILY 02/13 1000 AC 02/13
 
Mononitrate  PO   0930
 
Levothyroxine Sodium 0.1 MG DAILY AC 02/13 0700 AC 02/14
 
  PO   0552
 
Losartan Potassium 25 MG DAILY 02/13 1457 AC 02/13
 
  PO   1748
 
Methylprednisolone 40 MG Q12 02/13 1000 AC 02/13
 
  IV   2035
 
Nitroglycerin 0.4 MG SEE ADMIN CRITERIA.. 02/13 0015 AC 
 
  SL   
 
Ranolazine 500 MG BID 02/13 0007 AC 02/13
 
  PO   2038
 
Tamsulosin HCl 0.4 MG AT BEDTIME 02/13 0015 AC 02/13
 
  PO   2038
 
Tiotropium [place] 1 PUF DAILY 02/13 1000 AC 02/13
 
  INH   1004
 
 
 
 
Vital Signs & I&O
Last 24 Hrs of Vitals and I&O:
 Vital Signs
 
 
Date Time Temp Pulse Resp B/P B/P Pulse O2 O2 Flow FiO2
 
     Mean Ox Delivery Rate 
 
02/14 0643 97.6 89 12 120/76  95 Nasal 4.0L 
 
       Cannula  
 
02/14 0000       Nasal 3.0L 
 
       Cannula  
 
02/13 2236 97.8 84 20 120/70  93   
 
02/13 2038  77  104/60     
 
02/13 2038  77  104/60     
 
02/13 2038  77  104/60     
 
02/13 1856       Nasal 2.0L 
 
       Cannula  
 
02/13 1748    120/62     
 
02/13 1715      94 Nasal 2.0L 
 
       Cannula  
 
02/13 1601 97.7 78 20 115/60  96 Room Air  
 
02/13 1435      97 Nasal 2.0L 
 
       Cannula  
 
02/13 0930  80  124/62     
 
02/13 0930  80  124/62     
 
02/13 0930  80  124/62     
 
02/13 0820 98.0 80 20 124/62  95 Nasal 2.0L 
 
       Cannula  
 
 
 Intake & Output
 
 
 02/14 0800 02/14 0000 02/13 1600
 
Intake Total 110 110 
 
Output Total 725 400 
 
Balance -615 -290 
 
    
 
Intake, IV 10 10 
 
Intake, Oral 100 100 
 
Output, Urine 725 400 
 
Patient  244 lb 250 lb
 
Weight   
 
Oxygen saturation 4 L 95% exam of his chest shows diminished breath sounds rare 
crackles there are no wheezes
Cardiac exam shows regular S1 and S2 without murmurs there is persistent edema
 
Impression/Plan
 
Impression/Plan
Impression/Plan:
72-year-old gentleman with COPD cardiomyopathy AICD sleep apnea status post 
radiation for lung cancer is admitted with increasing shortness of breath.  This
is likely multifactorial related to acute bronchitis and possible degree of 
volume overload with increased edema and elevated BNP.  Patient continues to 
diurese with stable renal function
Recommendations:
DC IV steroids begin by mouth prednisone Sputum culture.  Taper FiO2   Mild 
negative fluid balance monitoring renal function closely.

## 2018-02-14 NOTE — PN- HOUSESTAFF
Trav GLASS,[facility], [place] 18 0712:
Subjective
Follow-up For:
COPD Exacerbation
CHF Exacerbation
Tele-Events Since Last Visit:
Sinus rhythm with first-degree AV block
Heart rate between 85-89
Patient had a 16 beat run of V. tach last night.
Subjective:
Patient continues to have shortness of breath, worse when lying down and cough 
with yellow colored sputum production.  Reports no improvement in his symptoms 
since his admission to the hospital.  Denies any aspirin, palpitations, fever/
chills or sore throat.
 
Review of Systems
Constitutional:
Reports: no symptoms. 
EENTM:
Reports: no symptoms. 
Cardiovascular:
Reports: orthopena. 
Respiratory:
Reports: cough, short of breath, sputum production. 
Gastrointestinal:
Reports: no symptoms. 
Genitourinary:
Reports: no symptoms. 
Musculoskeletal:
Reports: no symptoms. 
Skin:
Reports: no symptoms. 
Neurological/Psychological:
Reports: no symptoms. 
Hematologic/Endocrine:
Reports: no symptoms. 
Immunologic/Allergic:
Reports: no symptoms. 
 
Objective
Last 24 Hrs of Vital Signs/I&O
 Vital Signs
 
 
Date Time Temp Pulse Resp B/P B/P Pulse O2 O2 Flow FiO2
 
     Mean Ox Delivery Rate 
 
 1425 98.1 90 20 118/68  95 Nasal 3.0L 
 
       Cannula  
 
 0909    124/70     
 
 0909    124/70     
 
 0908    124/70     
 
 0908    124/74     
 
 0837      95 Nasal 3.0L 
 
       Cannula  
 
 0830      89 Room Air  
 
 0643 97.6 89 12 120/76  95 Nasal 4.0L 
 
       Cannula  
 
 0000       Nasal 3.0L 
 
       Cannula  
 
 2236 97.8 84 20 120/70  93   
 
 203  77  104/60     
 
 2038  77  104/60     
 
 203  77  104/60     
 
 1856       Nasal 2.0L 
 
       Cannula  
 
 1748    120/62     
 
 1715      94 Nasal 2.0L 
 
       Cannula  
 
 1601 97.7 78 20 115/60  96 Room Air  
 
 
 Intake & Output
 
 
  1600  0800  0000
 
Intake Total 400 110 110
 
Output Total 950 725 400
 
Balance -550 -615 -290
 
    
 
Intake, IV  10 10
 
Intake, Oral 400 100 100
 
Output, Urine 950 725 400
 
Patient  245 lb 244 lb
 
Weight   
 
Weight  Bed scale 
 
Measurement   
 
Method   
 
 
 
 
Physical Exam
General Appearance: Alert, Oriented X3, Cooperative
Skin: No Rashes, No Breakdown
Cardiovascular: Regular Rate, Normal S1, Normal S2, No Murmurs
Lungs: trace bibasilar crackles
Abdomen: Normal Bowel Sounds, Soft, No Tenderness
Extremities: No Clubbing, No Cyanosis, +1 pitting edema bilaterally
Current Medications:
 Current Medications
 
 
  Sig/Ly Start time  Last
 
Medication Dose Route Stop Time Status Admin
 
Acetaminophen 325 MG Q6P PRN  0015 AC 
 
  PO   
 
Albuterol Sulfate 3 ML TID  2200 AC 
 
  INH   1439
 
Aspirin 81 MG DAILY  1000 AC 
 
  PO   0909
 
Atorvastatin Calcium 40 MG 1700  1700 AC 
 
  PO   1748
 
Azithromycin 500 MG DAILY  1000 AC 
 
  PO   0909
 
Azithromycin 500 MG DAILY  1000 DC 
 
Dextrose/Water 250 ML IV   1004
 
Budesonide/ 2 PUF BID  1000 AC 
 
Formoterol Fumarate  INH   0910
 
Carvedilol 12.5 MG BID  2200 AC 
 
  PO   
 
Carvedilol 6.25 MG BID  0007 DC 
 
  PO   0908
 
Clopidogrel Bisulfate 75 MG DAILY  1000 AC 
 
  PO   0909
 
Furosemide 20 MG BID  2200 AC 
 
  IV   
 
Furosemide 20 MG DAILY  1500 DC 
 
  IV   0911
 
Heparin Sodium  5,000 UNIT Q8  2255 AC 
 
(Porcine)  SC   0552
 
Isosorbide  60 MG DAILY  1000 AC 
 
Mononitrate  PO   0909
 
Levothyroxine Sodium 0.1 MG DAILY AC  0700 AC 
 
  PO   0552
 
Losartan Potassium 25 MG DAILY  1457 AC 
 
  PO   0908
 
Methylprednisolone 40 MG Q12  1000 DC 
 
  IV   0908
 
Nitroglycerin 0.4 MG SEE ADMIN CRITERIA..  0015 AC 
 
  SL   
 
Prednisone 40 MG DAILY 02/15 1000 AC 
 
  PO   
 
Ranolazine 500 MG BID  0007 AC 
 
  PO   0909
 
Tamsulosin HCl 0.4 MG AT BEDTIME  0015 AC 
 
  PO   2038
 
Tiotropium [place] 1 PUF DAILY  1000 AC 
 
  INH   0909
 
 
 
 
Last 24 Hrs of Lab/Kapil Results
Last 24 Hrs of Labs/Mics:
 Laboratory Tests
 
18 0619:
Anion Gap 10, Estimated GFR 40  L, BUN/Creatinine Ratio 21.2
 
 
Assessment/Plan
Assessment:
Ms Araya is a 72 year-old-male with a PMH significant for Lung cancer, COPD, CAD
s/p CABG and PCI with stend placement, ischemic cardiomyopathy, CKD, 
hypothyroidism, aortic stenosis, peripheral arterial disease, JEREMY on CPAP who 
presented to the emergency department complaining of dyspnea and insomnia due to
worsening shortness of breath.  Shortness of breath is likely attributed to 
worsening COPD exacerbation.
 
#Acute hypoxic respiratory failure.
#COPD Exacerbation
#CHF exacerbation.
#History of coronary artery disease.
 
CHF exacerbation;
- We'll increase his IV Lasix from 20 mg daily to twice a day. 
- Will continue losartan 25 mg daily.
- Will monitor electrolytes and renal function while on Lasix and losartan.
- Will increase his carvedilol from 6.25-12.5 mg twice daily since patient had a
16 beat run of nonsustained V. tach last night.
- Okay with systolic blood pressure between  since patient had an EF of 20
-30% on his last echocardiogram.
- Repeat echocardiogram pending.
 
COPD exacerbation;
- Will change IV Solu-Medrol to prednisone per pulmonology recommendations.
- Continue by mouth azithromycin.
- Follow-up sputum culture. 
 
Chronic medical conditions;
Continue home medications.  
 
DVT PPX; Heparin
Patient is a full code.
Problem List:
 1. CHF exacerbation
 
 2. COPD exacerbation
 
Pain Ratin
Pain Location:
None
Pain Goal: Remain pain free
Pain Plan:
Pain pathway
Tomorrow's Labs & Rationales:
BEP(on Lasix)
 
 
Robinson GLASS,Jessie 18 1359:
Attending MD Review Statement
 
Attending Statement
Attending MD Statement: examined this patient, discuss w/resident/PA/NP, agreed 
w/resident/PA/NP, reviewed EMR data (avail), discussed with nursing, discussed 
with case mgmt, reviewed images
Attending Assessment/Plan:
Appreciate cardiology follow-up.  Acute systolic heart failure with runs of 
nonsustained VT.  Plan to increase the diuresis while watching the renal 
function closely.  Also increase the beta blocker and keep the Arb.  Patient's 
normal systolic blood pressure is  and as long as he is asymptomatic we 
need to maximize medical therapy.

## 2018-02-15 VITALS — DIASTOLIC BLOOD PRESSURE: 56 MMHG | SYSTOLIC BLOOD PRESSURE: 90 MMHG

## 2018-02-15 VITALS — DIASTOLIC BLOOD PRESSURE: 70 MMHG | SYSTOLIC BLOOD PRESSURE: 118 MMHG

## 2018-02-15 VITALS — DIASTOLIC BLOOD PRESSURE: 62 MMHG | SYSTOLIC BLOOD PRESSURE: 94 MMHG

## 2018-02-15 NOTE — PN- HOUSESTAFF
Trav GLASS,[facility] 02/15/18 0716:
Subjective
Follow-up For:
COPD Exacerbation
CHF Exacerbation
Tele-Events Since Last Visit:
NSR
Heart rate 64-77
Subjective:
Patient was getting a breathing treatment when I went to see him this morning.  
Reports improvement in his cough and shortness of breath.  Denies any chest pain
, palpitations, lightheadedness/dizziness, fever/chills or overnight events.
 
Review of Systems
Constitutional:
Reports: no symptoms. 
EENTM:
Reports: no symptoms. 
Cardiovascular:
Reports: orthopena. 
Respiratory:
Reports: cough, short of breath, sputum production. 
Gastrointestinal:
Reports: no symptoms. 
Genitourinary:
Reports: no symptoms. 
Musculoskeletal:
Reports: no symptoms. 
Skin:
Reports: no symptoms. 
Neurological/Psychological:
Reports: no symptoms. 
Hematologic/Endocrine:
Reports: no symptoms. 
Immunologic/Allergic:
Reports: no symptoms. 
 
Objective
Last 24 Hrs of Vital Signs/I&O
 Vital Signs
 
 
Date Time Temp Pulse Resp B/P B/P Pulse O2 O2 Flow FiO2
 
     Mean Ox Delivery Rate 
 
02/15 0818  80  94/62     
 
02/15 0818  80  /62     
 
02/15 0817  80  94/62     
 
02/15 0817  80  94/62     
 
02/15 0800       Nasal 3.0L 
 
       Cannula  
 
02/15 0754      96 Nasal 3.0L 
 
       Cannula  
 
02/15 0712 98.2 80 18 94/62  95 Nasal  
 
       Cannula  
 
02/15 0414  78    98   
 
02/15 0000       Nasal 3.0L 
 
       Cannula  
 
 2223  80    96   
 
 2221 98.4 87 18 114/70  91   
 
 2134  88  / 2133  88  98/ 2133  88  / Nasal 3.0L 
 
       Cannula  
 
 1600      95 Nasal 2.0L 
 
       Cannula  
 
 1425 98.1 90 20 118/68  95 Nasal 3.0L 
 
       Cannula  
 
 
 Intake & Output
 
 
 02/15 1600 02/15 0800 02/15 0000
 
Intake Total  480 400
 
Output Total  750 950
 
Balance  -270 -550
 
    
 
Intake, Oral  480 400
 
Number  0 0
 
Bowel   
 
Movements   
 
Output, Urine  750 950
 
Patient 247 lb  
 
Weight   
 
Weight Bed scale  
 
Measurement   
 
Method   
 
 
 
 
Physical Exam
General Appearance: Alert, Oriented X3, Cooperative, No Acute Distress
Skin: No Rashes, No Breakdown
Cardiovascular: Regular Rate, Normal S1, Normal S2
Lungs: decreased breath sounds, mild bibasilar crackles.
Abdomen: Normal Bowel Sounds, Soft, No Tenderness
Extremities: No Clubbing, No Cyanosis, +1 pitting edema.
Current Medications:
 Current Medications
 
 
  Sig/Ly Start time  Last
 
Medication Dose Route Stop Time Status Admin
 
Acetaminophen 325 MG Q6P PRN  0015 AC 
 
  PO   
 
Albuterol Sulfate 3 ML TID  2200 AC 02/15
 
  INH   1332
 
Aspirin 81 MG DAILY  1000 AC 02/15
 
  PO   0817
 
Atorvastatin Calcium 40 MG 1700  1700 AC 
 
  PO   1724
 
Azithromycin 500 MG DAILY  1000 AC 02/15
 
  PO   0817
 
Budesonide/ 2 PUF BID  1000 AC 02/15
 
Formoterol Fumarate  INH   0818
 
Calcium Carbonate 500 MG ONCE ONE  2000 DC 
 
  PO 
 
Carvedilol 12.5 MG BID  2200 AC 02/15
 
  PO   0818
 
Carvedilol 6.25 MG BID  0007 DC 
 
  PO   0908
 
Clopidogrel Bisulfate 75 MG DAILY  1000 AC 02/15
 
  PO   0817
 
Furosemide 20 MG BID  2200 AC 02/15
 
  IV   0823
 
Heparin Sodium  5,000 UNIT Q8  2255 AC 02/15
 
(Porcine)  SC   0555
 
Isosorbide  60 MG DAILY  1000 AC 02/15
 
Mononitrate  PO   0818
 
Levothyroxine Sodium 0.1 MG DAILY AC  0700 AC 02/15
 
  PO   0559
 
Losartan Potassium 25 MG DAILY  1457 AC 02/15
 
  PO   0817
 
Methylprednisolone 40 MG Q12  1000 DC 
 
  IV   0908
 
Nitroglycerin 0.4 MG SEE ADMIN CRITERIA..  0015 [facility]   
 
Patient Medication  1 ED ONE ONE 02/15 1145 CT 
 
Teaching  ED 02/15 1146  
 
Prednisone 10 MG DAILY  1000 AC 
 
  PO  0959  
 
Prednisone 20 MG DAILY  1000 AC 
 
  PO  0959  
 
Prednisone 30 MG DAILY  1000 CAN 
 
  PO  0959  
 
Prednisone 30 MG DAILY  1000 AC 
 
  PO  0959  
 
Prednisone 40 MG DAILY 02/15 1000 DC 02/15
 
  PO   0817
 
Ranolazine 500 MG BID  0007 AC 02/15
 
  PO   0817
 
Tamsulosin HCl 0.4 MG AT BEDTIME  0015 AC 
 
  PO   2133
 
Tiotropium [place] 1 PUF DAILY  1000 AC 02/15
 
  INH   0818
 
 
 
 
Last 24 Hrs of Lab/Kapil Results
Last 24 Hrs of Labs/Mics:
 Laboratory Tests
 
02/15/18 0626:
Anion Gap 8, Estimated GFR 40  L, BUN/Creatinine Ratio 21.8
 
 
Assessment/Plan
Assessment:
Ms Araya is a 72 year-old-male with a PMH significant for Lung cancer, COPD, CAD
s/p CABG and PCI with stend placement, ischemic cardiomyopathy, CKD, 
hypothyroidism, aortic stenosis, peripheral arterial disease, JEREMY on CPAP who 
presented to the emergency department complaining of dyspnea and insomnia due to
worsening shortness of breath.  Shortness of breath is likely attributed to 
worsening COPD exacerbation.
 
#Acute hypoxic respiratory failure.
#COPD Exacerbation
#CHF exacerbation.
#History of coronary artery disease.
 
CHF exacerbation;
- Continue IV Lasix from 20 mg daily twice a day. 
- Continue losartan 25 mg daily.
- Will monitor electrolytes and renal function while on Lasix and losartan.
- Continue increased dose of carvedilol 12.5 mg twice daily for episode NSVT.  
No further episodes of NSVT noted overnight.
-Recent echocardiogram showed an ejection fraction of 20-25%, with mildly 
increased left ventricular and moderate global hypokinesis with akinesis of the 
inferior wall.
 
COPD exacerbation;
-Quick prednisone taper..
- Continue by mouth azithromycin. 
 
Chronic medical conditions;
Continue home medications.  
 
DVT PPX; Heparin
Patient is a full code.
Problem List:
 1. COPD exacerbation
 
 2. CHF exacerbation
 
Pain Ratin
Pain Location:
None
Pain Goal: Remain pain free
Pain Plan:
None
Tomorrow's Labs & Rationales:
BEP(on Lasix)
 
 
Robinson GLASS,Jessie 02/15/18 1328:
Attending MD Review Statement
 
Attending Statement
Attending MD Statement: examined this patient, discuss w/resident/PA/NP, agreed 
w/resident/PA/NP, reviewed EMR data (avail), discussed with nursing, discussed 
with case mgmt, reviewed images
Attending Assessment/Plan:
Patient is doing okay.  He still fairly short of breath.  He is a 72-year-old 
fairly complex male with acute systolic heart failure we are actively diuresing 
with IV Lasix with recently increased dose of beta blocker and and reinstituted 
arm.  He also has underlying COPD and a history of non-small cell lung CA.  We 
are treating the COPD exacerbation and will do a rapid prednisone taper and a 
short course of by mouth azithromycin.  Will need to watch his BUN and 
creatinine closely given the AK I.

## 2018-02-15 NOTE — PN- PULMONARY
Subjective
HPI/Critical Care Issues:
Respiratory status is improving continues to diurese
 
Objective
Current Medications:
 Current Medications
 
 
  Sig/Ly Start time  Last
 
Medication Dose Route Stop Time Status Admin
 
Acetaminophen 325 MG Q6P PRN 02/13 0015 AC 
 
  PO   
 
Albuterol Sulfate 3 ML TID 02/13 2200 AC 02/15
 
  INH   0751
 
Aspirin 81 MG DAILY 02/13 1000 AC 02/14
 
  PO   0909
 
Atorvastatin Calcium 40 MG 1700 02/13 1700 AC 02/14
 
  PO   1724
 
Azithromycin 500 MG DAILY 02/14 1000 AC 02/14
 
  PO   0909
 
Budesonide/ 2 PUF BID 02/13 1000 AC 02/14
 
Formoterol Fumarate  INH   2134
 
Calcium Carbonate 500 MG ONCE ONE 02/14 2000 DC 02/14
 
  PO 02/14 2001 2004
 
Carvedilol 12.5 MG BID 02/14 2200 AC 02/14
 
  PO   2134
 
Carvedilol 6.25 MG BID 02/13 0007 DC 02/14
 
  PO   0908
 
Clopidogrel Bisulfate 75 MG DAILY 02/13 1000 AC 02/14
 
  PO   0909
 
Furosemide 20 MG BID 02/14 2200 AC 02/14
 
  IV   1920
 
Furosemide 20 MG DAILY 02/13 1500 DC 02/14
 
  IV   0911
 
Heparin Sodium  5,000 UNIT Q8 02/12 2255 AC 02/15
 
(Porcine)  SC   0555
 
Isosorbide  60 MG DAILY 02/13 1000 AC 02/14
 
Mononitrate  PO   0909
 
Levothyroxine Sodium 0.1 MG DAILY AC 02/13 0700 AC 02/15
 
  PO   0559
 
Losartan Potassium 25 MG DAILY 02/13 1457 AC 02/14
 
  PO   0908
 
Methylprednisolone 40 MG Q12 02/13 1000 DC 02/14
 
  IV   0908
 
Nitroglycerin 0.4 MG SEE ADMIN CRITERIA.. 02/13 0015 AC 
 
  SL   
 
Prednisone 40 MG DAILY 02/15 1000 AC 
 
  PO   
 
Ranolazine 500 MG BID 02/13 0007 AC 02/14
 
  PO   2133
 
Tamsulosin HCl 0.4 MG AT BEDTIME 02/13 0015 AC 02/14
 
  PO   2133
 
Tiotropium [place] 1 PUF DAILY 02/13 1000 AC 02/14
 
  INH   0909
 
 
 
 
Vital Signs & I&O
Last 24 Hrs of Vitals and I&O:
 Vital Signs
 
 
Date Time Temp Pulse Resp B/P B/P Pulse O2 O2 Flow FiO2
 
     Mean Ox Delivery Rate 
 
02/15 0754      96 Nasal 3.0L 
 
       Cannula  
 
02/15 0712 98.2 80 18 94/62  95 Nasal  
 
       Cannula  
 
02/15 0414  78    98   
 
02/15 0000       Nasal 3.0L 
 
       Cannula  
 
02/14 2223  80    96   
 
02/14 2221 98.4 87 18 114/70  91   
 
02/14 2134  88  98/62     
 
02/14 2133  88  98/62     
 
02/14 2133  88  98/62     
 
02/14 2015 91 Nasal 3.0L 
 
       Cannula  
 
02/14 1600      95 Nasal 2.0L 
 
       Cannula  
 
02/14 1425 98.1 90 20 118/68  95 Nasal 3.0L 
 
       Cannula  
 
02/14 0909    124/70     
 
02/14 0909    124/70     
 
02/14 0908    124/70     
 
02/14 0908    124/74     
 
02/14 0837      95 Nasal 3.0L 
 
       Cannula  
 
02/14 0830      89 Room Air  
 
 
 Intake & Output
 
 
 02/15 0800 02/15 0000 02/14 1600
 
Intake Total 480 400 400
 
Output Total 750 950 950
 
Balance -270 -550 -550
 
    
 
Intake, Oral 480 400 400
 
Number 0 0 
 
Bowel   
 
Movements   
 
Output, Urine 750 950 950
 
 
Oxygen saturation 3 L 96% exam for chest shows clear lung fields are no wheezes 
cardiac exam shows regular S1 and S2 without murmurs
 
Impression/Plan
 
Impression/Plan
Impression/Plan:
72-year-old gentleman with COPD cardiomyopathy AICD sleep apnea status post 
radiation for lung cancer is admitted with increasing shortness of breath.  This
is likely multifactorial related to acute bronchitis and possible degree of 
volume overload with increased edema and elevated BNP.  Patient continues to 
diurese with stable renal function, patient's respiratory function is improving 
with diuresis.
Recommendations:
Taper FiO2 rapidly Taper prednisone by 10 mg a day

## 2018-02-16 VITALS — SYSTOLIC BLOOD PRESSURE: 132 MMHG | DIASTOLIC BLOOD PRESSURE: 62 MMHG

## 2018-02-16 VITALS — DIASTOLIC BLOOD PRESSURE: 68 MMHG | SYSTOLIC BLOOD PRESSURE: 116 MMHG

## 2018-02-16 VITALS — SYSTOLIC BLOOD PRESSURE: 94 MMHG | DIASTOLIC BLOOD PRESSURE: 60 MMHG

## 2018-02-16 NOTE — DISCHARGE SUMMARY
Visit Information
 
Visit Dates
Admission Date:
02/12/18
 
Discharge Date:
02/17/18
 
 
Hospital Course
 
Course
Attending Physician:
Robinson GLASS,Jessie PARKINSON
 
Primary Care Physician:
Sherlyn GLASS,[facility] Course:
Ms Araya is a 72 year-old-male with a PMH significant for Lung cancer, COPD, CAD
s/p CABG and PCI with stend placement, ischemic cardiomyopathy, CKD, 
hypothyroidism, aortic stenosis, peripheral arterial disease, JEREMY on CPAP who 
presented to the emergency department complaining of dyspnea and insomnia due to
worsening shortness of breath.  Shortness of breath is likely attributed to 
worsening COPD exacerbation.
 
Patient was admitted to the telemetry floor and following issues were addressed;
 
 
#Acute hypoxic respiratory failure.
#Acute on chronic CHF
#COPD Exacerbation
#History of coronary artery disease.
# 3 of ischemic cardiomyopathy with an ejection fraction of 25-30%.
 
Patient was started on IV Lasix later changed to by mouth per cardiology 
recommendations.  Electrolytes and renal function were closely monitor while 
being on Lasix. Echocardiogram was done showing an ejection fraction of 20-25%, 
with mildly increased left ventricular and moderate global hypokinesis with 
akinesis of the inferior wall. ICD was interogated, no issues were noted.  
Patient was supposed to be on candesartan according to the previous admission 
notes but patient has not been taking it at home.  Losartan was resumed at a 
dose of 25 mg daily.Carvedilol was increased to 12.5 mg twice daily for episode 
NSVT, with no further episodes of NSVT noted afterwards.
Patient was started on IV Solu-Medrol, changed to prednisone at the time of 
discharge. Also received azithromycin for a total of 5 days for COPD 
exacerbation.
Rest of the home medications were continued. 
 
Allergies:
Coded Allergies:
NO KNOWN ALLERGIES (04/05/15)
 
Significant Procedures:
XRY-CHEST XRAY, TWO VIEWS
IMPRESSION:
Stable cardiomegaly. No acute airspace disease.
 
ECHOCARDIOGRAM
CONCLUSIONS 
 Moderate left ventricular dilatation. Left ventricular wall  
 thickness mildly increased.  
 There is moderate global hypokinesis with akinesis of the inferior  
 wall.  
 Left ventricular ejection fraction is estimated at 20-25 %.  
 Normal right ventricular size and function. Catheter/pacemaker wire  
 in the right ventricular cavity.  
 Mild left atrial dilatation.  
 Mild-to-moderate aortic stenosis (mean gradient 15 mmHg).  
 Unable to estimate the right ventricular systolic pressure.  
 
 
 
Disposition Summary
 
Disposition
Principal Diagnosis:
Acute on chronic congestive heart failure
Additional Diagnosis:
COPD exacerbation
Discharge Disposition: home or self care
 
Discharge Instructions
 
General Discharge Information
Code Status: Full Code
Patient's Diet:
Heart healthy
Patient's Activity:
As tolerated
Follow-Up Instructions/Appts:
Please follow up with your cardiologist within a week after discharge. 
 
Please follow up with your Pulmonologist within a week after discharge.
 
Medications at Discharge
Discharge Medications:
Stop taking the following medications:
Carvedilol (Coreg) 3.125 MG TABLET ORAL TWICE DAILY Qty = 30
 
Prednisone (Prednisone) 10 MG TABLET ORAL SEE INSTRUCTIONS Qty = 60
 
Continue taking these medications:
Aspirin (Aspirin*) 81 MG TAB.CHEW
    1 Tablet ORAL DAILY
    Qty = 30
    Comments:
       Last Taken:2/17/18
             Time: 9:42A.M
 
Atorvastatin Calcium (Lipitor) 40 MG TABLET
    1 Tablet ORAL DAILY
    Qty = 30
    Comments:
       Last Taken:2/16/18
             Time: 5:30P.M
 
Ranolazine (Ranexa) 500 MG TAB.ER.12H
    1 Tablet ORAL TWICE DAILY
    Qty = 30
    Comments:
       Last Taken:2/17/18
             Time: 9:44A.M
 
Clopidogrel Bisulfate (Clopidogrel) 75 MG TABLET
    1 Tablet ORAL DAILY
    Qty = 90
    Comments:
       Last Taken: 2/17/18
             Time: 9:44A.M
 
Acetaminophen (Acetaminophen) 325 MG CAPSULE
    1 Capsule ORAL EVERY SIX HOURS as needed for PAIN
    Comments:
       Last Taken:NOT GIVEN THIS ADMISSION
             
 
Albuterol Sulfate (Proair Hfa) 90 MCG HFA.AER.AD
    2 Puff Inhale through mouth Every 4 hours
    Qty = 1
    Comments:
       NOT GIVEN THIS ADMISSION
 
Tamsulosin HCl (Flomax) 0.4 MG CAP.ER.24H
    1 Capsule ORAL TAKE AT BEDTIME
    Qty = 30
    Instructions:
       Please take at bed time to avoid low blood pressure during the day.
    Comments:
       Last Taken: 2/17/18
             Time: 9:44A.M
 
Tiotropium Bromide (Spiriva) 18 MCG CAP.W.DEV
    1 Capsule Inhale through mouth DAILY
    Qty = 30
    Comments:
       Last Taken: 2/17/18
             Time:9:44A.M
 
Isosorbide Mononitrate (Isosorbide Mononitrate ER) 60 MG TAB.ER.24H
    1 Tablet ORAL DAILY
    Qty = 90
    Comments:
       Last Taken:2/17/18
             Time:9:44A.M
 
Budesonide/Formoterol Fumarate (Symbicort 160-4.5 Mcg Inhaler) 160 MCG-4.5 MCG/
ACTUATION HFA.AER.AD
    2 Puff Inhale through mouth TWICE DAILY
    Qty = 10
    Comments:
       Last Taken: 2/17/18
             Time: 9:44A.M
 
Gabapentin (Neurontin) 800 MG TABLET
    1 Tablet ORAL THREE TIMES DAILY
    Qty = 90
    Comments:
       NOT GIVEN THIS ADMISSION
 
Levothyroxine Sodium (Levothyroxine Sodium) 100 MCG TABLET
    1 Tablet ORAL DAILY
    Qty = 90
    Comments:
       Last Taken:2/17/18
             Time:5:33A.M
 
Nitroglycerin (Nitrostat) 0.4 MG TAB.SUBL
    1 Tablet SUBLINGUAL As Directed as needed for CHEST PAIN
    Instructions:
       1st sign of attack; may repeat every 5 minutes until relief; if pain 
persists after 3 tablets in 15 minutes, prompt medical att
    Comments:
       NOT GIVEN THIS ADMISSION
 
Candesartan Cilexetil (Candesartan Cilexetil) 4 MG TABLET
    1 Tablet ORAL DAILY
    Qty = 30
    Comments:
       NOT GIVEN THIS ADMISSION
 
Start taking the following new medications:
Carvedilol (Coreg) 12.5 MG TABLET
    1 Tablet ORAL TWICE DAILY
    Qty = 30
    No Refills
    Instructions:
       .
    Comments:
       Last Taken:2/17/18
             Time:9:44A.M
 
Prednisone (Prednisone) 10 MG TABLET
    0 ORAL DAILY
    Qty = 3
    No Refills
    Instructions:
       20MG 02/17
       10NG 02/18
       STOP.
    Comments:
       Last Taken:2/17/18
             Time:9:44A.M
 
Azithromycin (Azithromycin) 500 MG TABLET
    1 Milligram ORAL DAILY
    Qty = 1
    No Refills
 
The following medications have been changed:
Old:
Furosemide (Lasix) 20 MG TABLET
    1 Tablet ORAL TWICE DAILY
    Qty = 60
 
New:
Furosemide (Lasix) 20 MG TABLET
    2 Tablet ORAL DAILY
    Qty = 60
    Comments:
       Last Taken:2/17/18
             Time:9:44A.M
 
 
Copies To:
Robinson GLASS,Jason SILVERIO; Kimberly GLASS,Guero MCGILL; Sherlyn GLASS,Durga

## 2018-02-16 NOTE — PN- CARDIOLOGY
Subjective
Subjective:
 
Complaining of a nonproductive cough today.  No chest pain or palpitations.
 
Objective
Vital Signs and I&Os
Vital Signs
 
 
Date Time Temp Pulse Resp B/P B/P Pulse O2 O2 Flow FiO2
 
     Mean Ox Delivery Rate 
 
02/16 1435 98.1 85 18 116/68  93 Nasal 1.0L 
 
       Cannula  
 
02/16 1200 97.4 74 20 94/60  90 Nasal 1.0L 
 
       Cannula  
 
02/16 0844  90  118/70     
 
02/16 0809      90 Room Air Room Air 
 
02/16 0800      91 Room Air Room Air 
 
02/15 2255 98.1 90 18 118/70  90 Nasal  
 
       Cannula  
 
02/15 2225  80    95   
 
02/15 2053      93 Nasal 1.0L 
 
       Cannula  
 
02/15 2035  84  112/64     
 
02/15 2035  84  112/64     
 
02/15 2034  84  112/64     
 
02/15 1920      92 Nasal 1.0L 
 
       Cannula  
 
02/15 1600      93 Room Air  
 
 
 Intake & Output
 
 
 02/16 1600 02/16 0800 02/16 0000 02/15 1600 02/15 0800 02/15 0000
 
Intake Total 480  480 400
 
Output Total 1000 675 550 650 750 950
 
Balance -520 -375 -250 570 -270 -550
 
       
 
Intake, IV 30   20  
 
Intake, Oral 450  480 400
 
Number   1  0 0
 
Bowel      
 
Movements      
 
Output, Urine 1000 675 550 650 750 950
 
Patient  248 lb  247 lb  
 
Weight      
 
Weight  Bed scale  Bed scale  
 
Measurement      
 
Method      
 
 
 
Physical Exam:
 
General: no apparent distress. Alert.
Eyes: No obvious scleral icterus.
HEENT: No jugular venous distention or abnormal jugular venous pulsations.
Cardiovascular: Normal intensity S1/S2.  ICD noted
Respiratory:  scattered wheezes
Abdomen: Soft, nontender with no guarding or rebound tenderness.
Musculoskeletal: No clubbing or cyanosis noted; 1+ lower extremity edema
Skin: No obvious rashes or ulcerations.
Neurologic: No gross focal deficits noted.
Lymph: No gross lymphadenopathy. 
Current Medications:
 Current Medications
 
 
  Sig/Ly Start time  Last
 
Medication Dose Route Stop Time Status Admin
 
Acetaminophen 325 MG Q6P PRN 02/13 0015 AC 
 
  PO   
 
Albuterol Sulfate 3 ML TID 02/13 2200 AC 02/16
 
  INH   1330
 
Aspirin 81 MG DAILY 02/13 1000 AC 02/16
 
  PO   0844
 
Atorvastatin Calcium 40 MG 1700 02/13 1700 AC 02/15
 
  PO   1723
 
Azithromycin 500 MG DAILY 02/14 1000 AC 02/16
 
  PO   0845
 
Budesonide/ 2 PUF BID 02/13 1000 AC 02/16
 
Formoterol Fumarate  INH   0849
 
Calcium Carbonate 500 MG ONCE ONE 02/15 2045 DC 02/15
 
  PO 02/15 2046  2043
 
Carvedilol 12.5 MG BID 02/14 2200 AC 02/16
 
  PO   0844
 
Clopidogrel Bisulfate 75 MG DAILY 02/13 1000 AC 02/16
 
  PO   0845
 
Furosemide 20 MG BID 02/14 2200 AC 02/16
 
  IV   0849
 
Guaifenesin 10 ML Q6-PRN PRN 02/16 1045 AC 
 
  PO   
 
Guaifenesin 10 ML Q6P PRN 02/15 2345 DC 02/16
 
  PO   0514
 
Heparin Sodium  5,000 UNIT Q8 02/12 2255 AC 02/16
 
(Porcine)  SC   1405
 
Isosorbide  60 MG DAILY 02/13 1000 AC 02/16
 
Mononitrate  PO   0844
 
Levothyroxine Sodium 0.1 MG DAILY AC 02/13 0700 AC 02/16
 
  PO   0513
 
Losartan Potassium 25 MG DAILY 02/13 1457 AC 02/16
 
  PO   0844
 
Nitroglycerin 0.4 MG SEE ADMIN CRITERIA.. 02/13 0015 AC 
 
  SL   
 
Prednisone 10 MG DAILY 02/18 1000 AC 
 
  PO 02/19 0959  
 
Prednisone 20 MG DAILY 02/17 1000 AC 
 
  PO 02/18 0959  
 
Prednisone 30 MG DAILY 02/16 1000 AC 02/16
 
  PO 02/17 0959  0845
 
Ranolazine 500 MG BID 02/13 0007 AC 02/16
 
  PO   0845
 
Tamsulosin HCl 0.4 MG AT BEDTIME 02/13 0015 AC 02/15
 
  PO   2035
 
Tiotropium [place] 1 PUF DAILY 02/13 1000 AC 02/16
 
  INH   0848
 
 
 
 
Results
Last 48 Hrs of Labs/Mics:
 Laboratory Tests
 
02/16/18 0615:
Anion Gap 9, Estimated GFR 40  L, BUN/Creatinine Ratio 25.3  H, Magnesium 1.9
 
02/15/18 0626:
Anion Gap 8, Estimated GFR 40  L, BUN/Creatinine Ratio 21.8
 
Recent Imaging Studies:
 
  Telemetry tracings were personally reviewed and shows sinus rhythm
 
Assessment/Plan
Assessment/Plan
 
1.  Shortness of breath most likely a combination of acute exacerbation of COPD 
along with acute on chronic systolic heart failure with elevated BNP and edema.
2.  Ischemic cardiomyopathy ejection fraction 25% status post AICD
3.  Coronary artery disease status post coronary bypass surgery known occluded 
vein grafts
4.  Hypertension
5.  Chronic COPD with nicotine dependence in remission
6.  Carcinoma of the lung status post radiation therapy
7.  Chronic renal insufficiency
8.  Hx of aortic stenosis
9.  Hx of Carotid artery disease/aortic aneurysm and peripheral vascular disease
 
 
Feels "about the same". Scattered wheezes noted on exam today with a 
nonproductive cough. No recurrent episodes of NSVT. Can continue on the IV Lasix
today and hopefully transition to oral Lasix tomorrow.  He remains on a 
prednisone taper.
 
 
James Mc MD [facility]
Continue telemetry? Yes

## 2018-02-16 NOTE — PATIENT DISCHARGE INSTRUCTIONS
Discharge Instructions
 
General Discharge Information
You were seen/treated for:
COPD Exacerbation
CHF Exacerbation
Special Instructions:
Please follow up with your cardiologist within a week after discharge. 
 
Please follow up with your Pulmonologist within a week after discharge. 
 
 
 
Diet
Continue normal diet: Yes
Recommended Diet: Heart Healthy
 
Activity
Full Activity/No Limits: Yes
 
Acute Coronary Syndrome
 
Inclusion Criteria
At DC or during hospital stay patient has or had the following:
ACS DIAGNOSIS No
 
Discharge Core Measures
Meds if any: Prescribed or Continued at Discharge
Meds if any: NOT Prescribed or Continued at Discharge
 
Congestive Heart Failure
 
Inclusion Criteria
At DC or during hospital stay patient has or had the following:
CHF DIAGNOSIS Yes
 
Discharge Core Measures
Meds if any: Prescribed or Continued at Discharge
Meds if any: NOT Prescribed or Continued at Discharge
 
Cerebrovascular accident
 
Inclusion Criteria
At DC or during hospital stay patient has or had the following:
CVA/TIA Diagnosis No
 
Discharge Core Measures
Meds if any: Prescribed or Continued at Discharge
Meds if any: NOT Prescribed or Continued at Discharge
 
Venous thromboembolism
 
Inclusion Criteria
VTE Diagnosis No
VTE Type NONE
VTE Confirmed by (Test) NONE
 
Discharge Core Measures
- Per Current guidelines, there needs to be overlap
- treatment for the first 5 days of Warfarin therapy.
- If discharged on Warfarin prior to 5 days of
- overlap therapy, the patient will need to be
- assessed for post discharge needs including
- *Post discharge parental anticoagulation
- *Warfarin and/or parental anticoagulation education
- *Follow up date to check INR post discharge
At least 5 days overlap therapy as Inpatient No
Meds if any: Prescribed or Continued at Discharge
Note: Overlap Therapy is Warfarin and Anticoagulant
Meds if any: NOT Prescribed or Continued at Discharge

## 2018-02-16 NOTE — PN- HOUSESTAFF
Trav GLASS,[facility] 18 0725:
Subjective
Follow-up For:
COPD Exacerbation
CHF exacerbation
Tele-Events Since Last Visit:
NSR with PVCs
HR [id number]
Subjective:
Patient sitting comfotably in bed, just finished eating his breakfast. Reports 
continued SOB and cough with sputum production, decreased in amount though. Also
notices improvement in his leg edema. 
 
Review of Systems
Constitutional:
Reports: no symptoms. 
EENTM:
Reports: no symptoms. 
Cardiovascular:
Reports: peripheral edema. 
Respiratory:
Reports: cough, short of breath, sputum production. 
Gastrointestinal:
Reports: no symptoms. 
Genitourinary:
Reports: no symptoms. 
Musculoskeletal:
Reports: no symptoms. 
Skin:
Reports: no symptoms. 
Neurological/Psychological:
Reports: no symptoms. 
Hematologic/Endocrine:
Reports: no symptoms. 
Immunologic/Allergic:
Reports: no symptoms. 
 
Objective
Last 24 Hrs of Vital Signs/I&O
 Vital Signs
 
 
Date Time Temp Pulse Resp B/P B/P Pulse O2 O2 Flow FiO2
 
     Mean Ox Delivery Rate 
 
 0844  90  118/70     
 
 0809      90 Room Air Room Air 
 
02/15 2255 98.1 90 18 118/70  90 Nasal  
 
       Cannula  
 
02/15 2225  80    95   
 
02/15 2053      93 Nasal 1.0L 
 
       Cannula  
 
02/15 2035  84  112/64     
 
02/15 2035  84  112/64     
 
02/15 2034  84  112/64     
 
02/15 1920      92 Nasal 1.0L 
 
       Cannula  
 
02/15 1600      93 Room Air  
 
02/15 1400 98.3 80 20 90/56  95 Nasal  
 
       Cannula  
 
 
 Intake & Output
 
 
  1600  0800  0000
 
Intake Total  300 300
 
Output Total  675 550
 
Balance  -375 -250
 
    
 
Intake, Oral  300 300
 
Number   1
 
Bowel   
 
Movements   
 
Output, Urine  675 550
 
 
 
 
Physical Exam
General Appearance: Alert, Oriented X3, Cooperative
Skin: No Rashes, No Breakdown
Cardiovascular: Regular Rate, Normal S1, Normal S2
Lungs: Wheezing bilateral lung fields. 
Abdomen: Normal Bowel Sounds, Soft, No Tenderness
Extremities: No Clubbing, No Cyanosis, +1 pitting edema
Current Medications:
 Current Medications
 
 
  Sig/Ly Start time  Last
 
Medication Dose Route Stop Time Status Admin
 
Acetaminophen 325 MG Q6P PRN  0015 AC 
 
  PO   
 
Albuterol Sulfate 3 ML TID  2200 AC 
 
  INH   0808
 
Aspirin 81 MG DAILY  1000 AC 
 
  PO   0844
 
Atorvastatin Calcium 40 MG 1700  1700 AC 02/15
 
  PO   1723
 
Azithromycin 500 MG DAILY  1000 AC 
 
  PO   0845
 
Budesonide/ 2 PUF BID  1000 AC 
 
Formoterol Fumarate  INH   0849
 
Calcium Carbonate 500 MG ONCE ONE 02/15 2045 DC 02/15
 
  PO 02/15 2046  2043
 
Carvedilol 12.5 MG BID  2200 AC 
 
  PO   0844
 
Clopidogrel Bisulfate 75 MG DAILY  1000 AC 
 
  PO   0845
 
Furosemide 20 MG BID  2200 AC 
 
  IV   0849
 
Guaifenesin 10 ML Q6-PRN PRN  1045  
 
  PO   
 
Guaifenesin 10 ML Q6P PRN 02/15 2345 MD 
 
  PO   0514
 
Heparin Sodium  5,000 UNIT Q8  2255 AC 
 
(Porcine)  SC   0513
 
Isosorbide  60 MG DAILY  1000 AC 
 
Mononitrate  PO   0844
 
Levothyroxine Sodium 0.1 MG DAILY AC  0700  
 
  PO   0513
 
Losartan Potassium 25 MG DAILY  1457 AC 
 
  PO   0844
 
Nitroglycerin 0.4 MG SEE ADMIN CRITERIA..  0015 [facility]   
 
Patient Medication  1 ED ONE ONE 02/15 1430 MD 
 
Teaching  ED 02/15 1431  
 
Patient Medication  1 ED ONE ONE 02/15 1145 MD 
 
Teaching  ED 02/15 1146  
 
Prednisone 10 MG DAILY  1000 AC 
 
  PO  0959  
 
Prednisone 20 MG DAILY  1000 AC 
 
  PO  0959  
 
Prednisone 30 MG DAILY  1000 CAN 
 
  PO  0959  
 
Prednisone 30 MG DAILY  1000 AC 
 
  PO  0959  0845
 
Prednisone 40 MG DAILY 02/15 1000 DC 02/15
 
  PO   0817
 
Ranolazine 500 MG BID  0007 AC 
 
  PO   0845
 
Tamsulosin HCl 0.4 MG AT BEDTIME  0015 AC 02/15
 
  PO   2035
 
Tiotropium [place] 1 PUF DAILY  1000 AC 
 
  INH   0848
 
 
 
 
Last 24 Hrs of Lab/[place] Results
Last 24 Hrs of Labs/Mics:
 Laboratory Tests
 
18 0615:
Anion Gap 9, Estimated GFR 40  L, BUN/Creatinine Ratio 25.3  H, Magnesium 1.9
 
 
Assessment/Plan
Assessment:
Ms Araya is a 72 year-old-male with a PMH significant for Lung cancer, COPD, CAD
s/p CABG and PCI with stend placement, ischemic cardiomyopathy, CKD, 
hypothyroidism, aortic stenosis, peripheral arterial disease, JEREMY on CPAP who 
presented to the emergency department complaining of dyspnea and insomnia due to
worsening shortness of breath.  Shortness of breath is likely attributed to 
worsening COPD exacerbation.
 
#Acute hypoxic respiratory failure.
#COPD Exacerbation
#CHF exacerbation.
#History of coronary artery disease.
 
CHF exacerbation;
- Continue IV Lasix from 20 mg daily twice a day.  Could change to by mouth 
Lasix tomorrow morning. 
- Continue losartan 25 mg daily.
- Will monitor electrolytes and renal function while on Lasix and losartan.
- Continue increased dose of carvedilol 12.5 mg twice daily for episode NSVT.  
No further episodes of NSVT noted overnight.
-Recent echocardiogram showed an ejection fraction of 20-25%, with mildly 
increased left ventricular and moderate global hypokinesis with akinesis of the 
inferior wall.
 
COPD exacerbation;
- Quick prednisone taper. 
- Continue by mouth azithromycin. 
 
Chronic medical conditions;
Continue home medications.  
 
DVT PPX; Heparin
Patient is a full code.
Problem List:
 1. COPD exacerbation
 
 2. CHF exacerbation
 
Pain Ratin
Pain Location:
None
Pain Goal: Remain pain free
Pain Plan:
None
Tomorrow's Labs & Rationales:
None
 
 
Robinson GLASS,Jessie 18 1235:
Attending MD Review Statement
 
Attending Statement
Attending MD Statement: examined this patient, discuss w/resident/PA/NP, agreed 
w/resident/PA/NP, reviewed EMR data (avail), discussed with nursing, discussed 
with case mgmt, reviewed images
Attending Assessment/Plan:
Patient is still very short of breath.  He sitting and ambulating but feels like
he is still a ways from his baseline.  He is a 72-year-old male with COPD not on
home oxygen or steroids, advanced systolic heart failure who we are actively 
treating both her systolic heart failure exacerbation and a COPD exacerbation.  
We have him on by mouth prednisone with a taper off 10 mg daily with by mouth 
azithromycin for a bacterial bronchitis.  We also have him on IV Lasix with 
Entresto and  beta blocker for the severe systolic heart failure.  Will follow 
closely and if he feels better than plan to discharge in a.m. on oral Lasix and 
on the current regimen with close outpatient follow-up.

## 2018-02-17 VITALS — DIASTOLIC BLOOD PRESSURE: 68 MMHG | SYSTOLIC BLOOD PRESSURE: 112 MMHG

## 2018-02-17 NOTE — PN- ATT ADDEND
Attending Addendum
Attending Brief Note
72-year-old male with past medical history significant for COPD not on home 
oxygen, systolic congestive heart failure, has been admitted to the floor for 
CHF exacerbation and COPD exacerbation.  He was treated with IV Lasix, steroids,
TRC nebs, and supplemental oxygen.
She was seen and examined on the bedside and reported that he is doing pretty 
well and his shortness of breath has been improved.
Cardiology has followed up on the patient and has adjusted his medications and 
patient will be discharged on Lasix 40 mg once daily and prednisone taper and 
will continue the rest of his home medications with a follow-up with his primary
care physician in one week and with the cardiologist in one week.

## 2018-02-17 NOTE — PN- CARDIOLOGY
Subjective
Subjective:
 
Patient is feeling better and his lower extremity edema is improving.  He does 
complain of back pain which he says is chronic.
 
Objective
Vital Signs and I&Os
Vital Signs
 
 
Date Time Temp Pulse Resp B/P B/P Pulse O2 O2 Flow FiO2
 
     Mean Ox Delivery Rate 
 
02/17 1007      96 Room Air Room Air 
 
02/17 1006      93 Room Air Room Air 
 
02/17 0943    112/68 02/17 0943    112/68 02/17 0943    112/68 02/17 0942  71  112/68 02/17 0800      93 Room Air Room Air 
 
02/17 0600 97.5 71 18 112/68  93   
 
02/17 0359  77    93   
 
02/17 0154  72    94   
 
02/16 2102      96 Nasal 1.0L 
 
       Cannula  
 
02/16 2100 98.0 84 18 132/62  96 Nasal 1.0L 
 
       Cannula  
 
02/16 2015  84  132/62     
 
02/16 2015  84  132/62     
 
02/16 2015  84  132/62     
 
02/16 1914      93 Nasal 2.0L 
 
       Cannula  
 
02/16 1600       Nasal 1.0L 
 
       Cannula  
 
02/16 1435 98.1 85 18 116/68  93 Nasal 1.0L 
 
       Cannula  
 
 
 Intake & Output
 
 
 02/17 1600 02/17 0800 02/17 0000 02/16 1600 02/16 0800 02/16 0000
 
Intake Total  360  480 300 300
 
Output Total  1675 1100 1000 675 550
 
Balance  -1315 -1100 -520 -375 -250
 
       
 
Intake, IV    30  
 
Intake, Oral  360  450 300 300
 
Number      1
 
Bowel      
 
Movements      
 
Output, Urine  1675 1100 1000 675 550
 
Patient   248 lb  248 lb 
 
Weight      
 
Weight     Bed scale 
 
Measurement      
 
Method      
 
 
 
Physical Exam:
 
General: no apparent distress. Alert.
Eyes: No obvious scleral icterus.
HEENT: No jugular venous distention or abnormal jugular venous pulsations.
Cardiovascular: Normal intensity S1/S2.  ICD noted, 1/6 SM
Respiratory:  trace scattered wheezes
Abdomen: Soft, nontender with no guarding or rebound tenderness.
Musculoskeletal: No clubbing or cyanosis noted; trace to 1+ lower extremity 
edema
Skin: No obvious rashes or ulcerations.
Neurologic: No gross focal deficits noted.
Lymph: No gross lymphadenopathy. 
Current Medications:
 Current Medications
 
 
  Sig/Ly Start time  Last
 
Medication Dose Route Stop Time Status Admin
 
Acetaminophen 325 MG Q6P PRN 02/13 0015 AC 
 
  PO   
 
Albuterol Sulfate 3 ML TID 02/13 2200 AC 02/17
 
  INH   0819
 
Aspirin 81 MG DAILY 02/13 1000 AC 02/17
 
  PO   0942
 
Atorvastatin Calcium 40 MG 1700 02/13 1700 AC 02/16
 
  PO   1728
 
Azithromycin 500 MG DAILY 02/14 1000 AC 02/17
 
  PO   0942
 
Budesonide/ 2 PUF BID 02/13 1000 AC 02/17
 
Formoterol Fumarate  INH   0944
 
Carvedilol 12.5 MG BID 02/14 2200 AC 02/17
 
  PO   0942
 
Clopidogrel Bisulfate 75 MG DAILY 02/13 1000 AC 02/17
 
  PO   0943
 
Furosemide 20 MG BID 02/14 2200 AC 02/17
 
  IV   0943
 
Guaifenesin 10 ML Q6-PRN PRN 02/16 1045 AC 02/16
 
  PO   2028
 
Heparin Sodium  5,000 UNIT Q8 02/12 2255 AC 02/17
 
(Porcine)  SC   0533
 
Isosorbide  60 MG DAILY 02/13 1000 AC 02/17
 
Mononitrate  PO   0943
 
Levothyroxine Sodium 0.1 MG DAILY AC 02/13 0700 AC 02/17
 
  PO   0533
 
Losartan Potassium 25 MG DAILY 02/13 1457 AC 02/17
 
  PO   0943
 
Nitroglycerin 0.4 MG SEE ADMIN CRITERIA.. 02/13 0015 AC 
 
  SL   
 
Prednisone 10 MG DAILY 02/18 1000 AC 
 
  PO 02/19 0959  
 
Prednisone 20 MG DAILY 02/17 1000 AC 02/17
 
  PO 02/18 0959  0943
 
Prednisone 30 MG DAILY 02/16 1000 DC 02/16
 
  PO 02/17 0959  0845
 
Ranolazine 500 MG BID 02/13 0007 AC 02/17
 
  PO   0943
 
Tamsulosin HCl 0.4 MG AT BEDTIME 02/13 0015 AC 02/16
 
  PO   2015
 
Tiotropium Crump 1 PUF DAILY 02/13 1000 AC 02/17
 
  INH   0943
 
 
 
 
Results
Last 48 Hrs of Labs/Mics:
 Laboratory Tests
 
02/16/18 0615:
Anion Gap 9, Estimated GFR 40  L, BUN/Creatinine Ratio 25.3  H, Magnesium 1.9
 
Recent Imaging Studies:
 
Telemetry tracings showed no evidence of sustained arrhythmia 
 
Assessment/Plan
Assessment/Plan
 
1.  Shortness of breath most likely a combination of acute exacerbation of COPD 
along with acute on chronic systolic heart failure with elevated BNP and edema.
2.  Ischemic cardiomyopathy ejection fraction 25% status post AICD
3.  Coronary artery disease status post coronary bypass surgery known occluded 
vein grafts
4.  Hypertension
5.  Chronic COPD with nicotine dependence in remission
6.  Carcinoma of the lung status post radiation therapy
7.  Chronic renal insufficiency
8.  Hx of aortic stenosis
9.  Hx of Carotid artery disease/aortic aneurysm and peripheral vascular disease
 
 
Patient is feeling better today. I&O's demonstrates excellent diuresis. He tells
me he was only taking the Lasix once daily at home. Recommend transitioning to 
Lasix 40 mg by mouth once daily.  He remains on a prednisone taper. He should 
follow-up in our office within one week of discharge.
 
 
James Mc MD [facility]
Continue telemetry? No

## 2018-02-20 ENCOUNTER — HOSPITAL ENCOUNTER (OUTPATIENT)
Dept: HOSPITAL 68 - ERH | Age: 73
Setting detail: OBSERVATION
LOS: 1 days | End: 2018-02-21
Admitting: INTERNAL MEDICINE
Payer: COMMERCIAL

## 2018-02-20 VITALS — DIASTOLIC BLOOD PRESSURE: 64 MMHG | SYSTOLIC BLOOD PRESSURE: 110 MMHG

## 2018-02-20 VITALS — DIASTOLIC BLOOD PRESSURE: 64 MMHG | SYSTOLIC BLOOD PRESSURE: 1140 MMHG

## 2018-02-20 VITALS — SYSTOLIC BLOOD PRESSURE: 120 MMHG | DIASTOLIC BLOOD PRESSURE: 64 MMHG

## 2018-02-20 VITALS — HEIGHT: 72 IN | BODY MASS INDEX: 33.86 KG/M2 | WEIGHT: 250 LBS

## 2018-02-20 VITALS — DIASTOLIC BLOOD PRESSURE: 60 MMHG | SYSTOLIC BLOOD PRESSURE: 140 MMHG

## 2018-02-20 DIAGNOSIS — Z79.82: ICD-10-CM

## 2018-02-20 DIAGNOSIS — Z85.118: ICD-10-CM

## 2018-02-20 DIAGNOSIS — I35.0: ICD-10-CM

## 2018-02-20 DIAGNOSIS — G47.33: ICD-10-CM

## 2018-02-20 DIAGNOSIS — I73.9: ICD-10-CM

## 2018-02-20 DIAGNOSIS — N18.9: ICD-10-CM

## 2018-02-20 DIAGNOSIS — I25.10: ICD-10-CM

## 2018-02-20 DIAGNOSIS — Z95.5: ICD-10-CM

## 2018-02-20 DIAGNOSIS — Z95.810: ICD-10-CM

## 2018-02-20 DIAGNOSIS — J44.1: Primary | ICD-10-CM

## 2018-02-20 DIAGNOSIS — Z79.01: ICD-10-CM

## 2018-02-20 DIAGNOSIS — N40.0: ICD-10-CM

## 2018-02-20 DIAGNOSIS — R09.02: ICD-10-CM

## 2018-02-20 DIAGNOSIS — Z86.718: ICD-10-CM

## 2018-02-20 DIAGNOSIS — E03.9: ICD-10-CM

## 2018-02-20 DIAGNOSIS — Z95.1: ICD-10-CM

## 2018-02-20 DIAGNOSIS — I13.0: ICD-10-CM

## 2018-02-20 DIAGNOSIS — I25.5: ICD-10-CM

## 2018-02-20 DIAGNOSIS — Z79.52: ICD-10-CM

## 2018-02-20 DIAGNOSIS — I50.22: ICD-10-CM

## 2018-02-20 LAB
ABSOLUTE GRANULOCYTE CT: 8.7 /CUMM (ref 1.4–6.5)
BASOPHILS # BLD: 0 /CUMM (ref 0–0.2)
BASOPHILS NFR BLD: 0.3 % (ref 0–2)
EOSINOPHIL # BLD: 0.3 /CUMM (ref 0–0.7)
EOSINOPHIL NFR BLD: 2.4 % (ref 0–5)
ERYTHROCYTE [DISTWIDTH] IN BLOOD BY AUTOMATED COUNT: 15.4 % (ref 11.5–14.5)
GRANULOCYTES NFR BLD: 83.6 % (ref 42.2–75.2)
HCT VFR BLD CALC: 32.1 % (ref 42–52)
LYMPHOCYTES # BLD: 0.7 /CUMM (ref 1.2–3.4)
MCH RBC QN AUTO: 34 PG (ref 27–31)
MCHC RBC AUTO-ENTMCNC: 33.4 G/DL (ref 33–37)
MCV RBC AUTO: 101.8 FL (ref 80–94)
MONOCYTES # BLD: 0.7 /CUMM (ref 0.1–0.6)
PLATELET # BLD: 142 /CUMM (ref 130–400)
PMV BLD AUTO: 8.1 FL (ref 7.4–10.4)
RED BLOOD CELL CT: 3.15 /CUMM (ref 4.7–6.1)
WBC # BLD AUTO: 10.4 /CUMM (ref 4.8–10.8)

## 2018-02-20 PROCEDURE — A9540 TC99M MAA: HCPCS

## 2018-02-20 PROCEDURE — A9558 XE133 XENON 10MCI: HCPCS

## 2018-02-20 PROCEDURE — G0378 HOSPITAL OBSERVATION PER HR: HCPCS

## 2018-02-20 NOTE — HISTORY & PHYSICAL
Chong Ken 02/20/18 0753:
General Information and HPI
MD Statement:
I have seen and personally examined NANCYJULIO CÉSAR LAI LAUGHLIN and documented this H&P.
 
The patient is a 72 year old M who presented with a patient stated chief 
complaint of shortness of breath, wheezes.
 
Source of Information: patient
Exam Limitations: no limitations
History of Present Illness:
This is a 72-year-old male with past medical history significant for COPD not on
home oxygen, coronary artery disease status post CABG and stents, hypertension, 
ischemic cardiomyopathy status post AICD placement, chronic systolic heart 
failure with ejection fraction 20%, hypothyroidism, BPH, carotid artery disease,
peripheral vascular disease, obstructive sleep apnea on CPAP, moderate aortic 
stenosis presented with worsening shortness of breath for 2 days.
 
Patient was recently admitted to [facility] telemetry for acute hypoxic 
respiratory failure, CHF exacerbation, COPD exacerbation.  Received IV Lasix, 
steroids, antibiotics.  He completed steroid and antibiotic treatment.  
Echocardiogram was done which showed ejection fraction 20-25% with akinesis of 
inferior wall.  He was discharged from the hospital on 02/16/2018.
 
After discharge from hospital he was doing good for 24 hours.  After that he 
started having shortness of breath on minimal exertion associated with wheezing.
 He denied any chest pain, palpitation, fever, chills, productive cough, 
hemoptysis.  However because of worsening shortness of breath and wheezing he 
decided to come to the emergency room.
 
Review of systems negative except for shortness of breath, wheezing.  Denies any
worsening swelling of bilateral lower extremities.  No nausea, vomiting, 
abdominal pain, change in bladder or bowel habits.  Compliant with his 
medications.  Denies smoking, alcohol use, illicit drug abuse.
 
Allergies/Medications
Allergies:
Coded Allergies:
NO KNOWN ALLERGIES (NONE 02/20/18)
 
Home Med list
Acetaminophen 325 MG CAPSULE   1 CAP PO Q6 PRN PAIN  (Reported)
Albuterol Sulfate (Proair Hfa) 90 MCG HFA.AER.AD   2 PUF INH Q4 COPD
Aspirin (Aspirin*) 81 MG TAB.CHEW   1 TAB PO DAILY heart  (Reported)
Atorvastatin Calcium (Lipitor) 40 MG TABLET   1 TAB PO DAILY cholesterol  (
Reported)
Azithromycin 500 MG TABLET   1 MG PO DAILY antiinflammatory
Budesonide/Formoterol Fumarate (Symbicort 160-4.5 Mcg Inhaler) 160 MCG-4.5 MCG/
ACTUATION HFA.AER.AD   2 PUF INH BID COPD  (Reported)
Candesartan Cilexetil 4 MG TABLET   1 TAB PO DAILY CARDIOMYOPATHY
Carvedilol (Coreg) 12.5 MG TABLET   1 TAB PO BID CHF
     .
Clopidogrel Bisulfate (Clopidogrel) 75 MG TABLET   1 TAB PO DAILY BLOOD THINNER 
(Reported)
Furosemide (Lasix) 20 MG TABLET   2 TAB PO DAILY FLUID OVERLOAD
Gabapentin (Neurontin) 800 MG TABLET   1 TAB PO TID BACK PAIN  (Reported)
Isosorbide Mononitrate (Isosorbide Mononitrate ER) 60 MG TAB.ER.24H   1 TAB PO 
DAILY    (Reported)
Levothyroxine Sodium 100 MCG TABLET   1 TAB PO DAILY THYROID  (Reported)
Nitroglycerin (Nitrostat) 0.4 MG TAB.SUBL   1 TAB SL AD PRN CHEST PAIN  (
Reported)
     1st sign of attack; may repeat every 5 minutes until relief; if pain 
persists after 3 tablets in 15 minutes, prompt medical att
Prednisone 10 MG TABLET   0 PO DAILY COPD
     20MG 02/17
     10NG 02/18
     STOP.
Ranolazine (Ranexa) 500 MG TAB.ER.12H   1 TAB PO BID heart  (Reported)
Tamsulosin HCl (Flomax) 0.4 MG CAP.ER.24H   1 CAP PO QHS prostate
     Please take at bed time to avoid low blood pressure during the day.
Tiotropium Bromide (Spiriva) 18 MCG CAP.W.DEV   1 CAP INH DAILY COPD  (Reported)
 
Compliance With Home Meds: GOOD
 
Past History
 
Travel History
Traveled to [place] past 21 day No
 
Medical History
Neurological: NONE
EENT: NONE
Cardiovascular: CARDIAC STENTS CABG CAROTID ARTERY BYPASS DEFIBRILLATOR
Respiratory: COPD
Gastrointestinal: NONE
Hepatic: NONE
Renal: NONE
Musculoskeletal: NONE
Psychiatric: NONE
Endocrine: hypothyroidism
Blood Disorders: DVT
Cancer(s): NONE
GYN/Reproductive: NONE
History of MRSA: No
History of VRE: No
History of CDIFF: No
Influenza Vaccine: 11/15/17
 
Surgical History
Surgical History: CABG, CAROTID ARTECTOMY nerve thermoablation to reduce back 
pain 
 
Past Family/Social History
 
Family History
Relations & Conditions if any
Relation not specified for:
  *No pertinent family history
 
 
Psychosocial History
Who Do You Live With? spouse
Services at Home: None
Primary Language: English
ETOH Use: denies use
Illicit Drug Use: denies illicit drug use
Living Will? no
 
Functional Ability
ADLs
Independent: dressing, eating, toileting, bathing. 
Ambulation: independent
 
Review of Systems
 
Review of Systems
Constitutional:
Reports: weakness.  Denies: chills, diaphoresis, fever, malaise, unexplained 
weight loss. 
EENTM:
Denies: blurred vision, double vision, visual changes. 
Cardiovascular:
Reports: edema, orthopena.  Denies: chest pain, palpitations, peripheral edema, 
syncope. 
Respiratory:
Reports: short of breath, wheezing.  Denies: cough, hemoptysis, orthopnea. 
GI:
Denies: abdominal pain, bloating, diarrhea, nausea, changes in stool. 
Genitourinary:
Denies: discharge, dysuria, frequency. 
Musculoskeletal:
Denies: back pain, gout, joint pain. 
Neurological/Psychological:
Denies: ataxia, confusion, depressed, dementia. 
 
Exam & Diagnostic Data
Last 24 Hrs of Vital Signs/I&O
 Vital Signs
 
 
Date Time Temp Pulse Resp B/P B/P Pulse O2 O2 Flow FiO2
 
     Mean Ox Delivery Rate 
 
02/20 0818 98.6 75 24 120/64  92 Nasal 2.0L 
 
       Cannula  
 
02/20 0816      92 Nasal 2.0L 
 
       Cannula  
 
02/20 0545 96.9 73 22 101/54  97 Nasal 2.0L 
 
       Cannula  
 
02/20 0430      97 Nasal 2.0L 
 
       Cannula  
 
02/20 0425      97 Nasal 2.0L 
 
       Cannula  
 
02/20 0353 96.0 75 24 112/68  88 Room Air Room Air 
 
 
 Intake & Output
 
 
 02/20 1600 02/20 0800 02/20 0000
 
Intake Total   
 
Output Total   
 
Balance   
 
    
 
Patient 113.398 kg 113.398 kg 
 
Weight   
 
Weight  Reported by Patient 
 
Measurement   
 
Method   
 
 
 
 
Physical Exam
General Appearance Alert, Oriented X3, Cooperative, No Acute Distress
Skin No Rashes, No Breakdown
Skin Temp/Moisture Exam: Warm/Dry
Sepsis Skin Exam (color): Normal for Ethnicity
HEENT Atraumatic, PERRLA, EOMI, Mucous Membr. moist/pink
Neck Supple, No LAD
Lymphatic Cervical nl
Cardiovascular Normal S1, Normal S2, grade2 CARLOS ENRIQUE
Lungs Normal Air Movement, b/l wheezes
Abdomen Normal Bowel Sounds, Soft, No Tenderness
Neurological Strength at 5/5 X4 Ext, Cranial Nerves 3-12 NL
Extremities No Clubbing, No Cyanosis, No Edema
Vascular Normal Pulses, Pulses Symmetrical
Sepsis Peripheral Pulse Location: Radial
Sepsis Peripheral Pulse Exam: Normal
Sepsis Cap Refill Exam: <2 Sec
Last 24 Hrs of Labs/Kapil:
 Laboratory Tests
 
02/20/18 0449:
Anion Gap 7, Estimated GFR 46  L, BUN/Creatinine Ratio 30.0  H, Glucose 172  H, 
Calcium 9.0, Total Bilirubin 0.6, Direct Bilirubin 0.2, AST 14  L, ALT 23, 
Alkaline Phosphatase 81, Troponin I 0.08, Pro-B-Natriuretic Pept 3210  H, Total 
Protein 5.5  L, Albumin 3.3  L, Amylase 45, Lipase 50, D-Dimer High Sensitivty 
1569  H, CBC w Diff MAN DIFF ORDERED, RBC 3.15  L, .8  H, MCH 34.0  H, 
MCHC 33.4, RDW 15.4  H, MPV 8.1, Gran % 83.6  H, Lymphocytes % 7.0  L, Monocytes
% 6.7, Eosinophils % 2.4, Basophils % 0.3, Absolute Granulocytes 8.7  H, 
Segmented Neutrophils 89  H, Band Neutrophils 2, Absolute Lymphocytes 0.7  L, 
Lymphocytes 2  L, Monocytes 3, Absolute Monocytes 0.7  H, Eosinophils 2, 
Absolute Eosinophils 0.3, Absolute Basophils 0, Metamyelocytes 2  H, Platelet 
Estimate ADEQUATE, Polychromasia 1+, Hypochromic-Microcytic 1+, Poikilocytosis 1
+
 
02/20/18 0326:
Pro-B-Natriuretic Pept Cancelled
 Microbiology
02/20 0840  NASOPHARYN: Influenza Virus A & B Rapid Smear - COMP
02/20 0749  URINE ROUT: Legionella Antigen - ORD
02/20 0749  URINE ROUT: Streptococcus pneumoniae Antigen (M - ORD
02/20 0749  URINE ROUT: Urine Culture - ORD
02/20 0749  LOWER RESP: Respiratory Culture - ORD
02/20 0749  LOWER RESP: Gram Stain - ORD
02/20 0529  BLOOD: Blood Culture - RECD
02/20 0509  BLOOD: Blood Culture - RECD
 
 
 
Assessment/Plan
Assessment:
This is a 72-year-old male with past medical history significant for COPD not on
home oxygen, coronary artery disease status post CABG and stents, hypertension, 
ischemic cardiomyopathy status post AICD placement, chronic systolic heart 
failure with ejection fraction 20%, hypothyroidism, BPH, carotid artery disease,
peripheral vascular disease, obstructive sleep apnea on CPAP, moderate aortic 
stenosis presented with worsening shortness of breath for 2 days.
 
Patient was recently admitted to [facility] telemetry for acute hypoxic 
respiratory failure, CHF exacerbation, COPD exacerbation.  Received IV Lasix, 
steroids, antibiotics.  He completed steroid and antibiotic treatment.  
Echocardiogram was done which showed ejection fraction 20-25% with akinesis of 
inferior wall.  He was discharged from the hospital on 02/16/2018.
 
 
 
Vitals at the time of admission 
afebrile, heart rate 75, respiratory rate 24, blood pressure 120/60, saturating 
at 88 on 2 L oxygen.
 
Labs
WBC 10.4, hemoglobin 10 and hematocrit 32, platelets 142
BUN 45 and creatinine 1.5
D-dimer 1569
ProBNP 3210
Troponin 0.08
Amylase lipase, LFTs normal
Chest x-ray showed hazy opacity left lung base atelectasis versus pneumonia
EKG sinus rhythm, rate 66, left bundle branch block
 
 
 
1.  Acute hypoxic respiratory failure/COPD exacerbation and pneumonia
Patient was discharged from hospital after being treated for CHF exacerbation 
and COPD with prednisone and azithromycin on 16 2018.  He reports worsening 
shortness of breath.  He was found to be tachypneic, hypoxic requiring 2 L in 
the emergency room.  He is afebrile with a normal WBC count.  However given his 
worsening shortness of breath, chest x-ray findings suggestive of atelectasis 
versus pneumonia will place him under observation status in general medicine 
floor
* Place under observation
* Monitor vitals every shift
* Provide oxygen supplementation
* Maintain oxygen saturation year above 88.
* Will treat him for COPD exacerbation and pneumonia
* IV methylprednisolone 40 mg daily
* Will start antibiotics based on CAT scan chest findings for any pneumonia
* Total respiratory care
* Nebulizer treatments
* Follow blood culture, urine culture, sputum culture
* Follow-up flu slab
* Follow-up urine Legionella and streptococcal antigen
* Follow-up pulmonology recommendations
 
 
 
2.  Elevated D dimers
Patient presented with acute worsening of shortness of breath.  He was found to 
have elevated d-dimer 1569.
* However given his elevated creatinine 1.5 which couldnt get CT angiogram of 
chest
* Please follow-up VQ scan for any pulmonary embolism
* Follow up serial troponin and EKG 11 AM, 5 PM
* Echocardiogram was done on 02/14/2018 showed ejection fraction 20% with 
akinesis of inferior wall
 
 
 
3.  Chronic kidney disease
Creatinine 1.5.  Baseline creatinine 1.7.  Avoid nephrotoxins.
 
 
4.  Stable proBNP 3210
continue home medications.
 
 
5.  Obstructive sleep apnea 
continue CPAP
 
 
6.  Coronary artery disease status post CABG and stents
 continue aspirin, Plavix, atorvastatin
 
 
7.  Ischemic cardiomyopathy, chronic systolic heart failure with ejection 
fraction 20%
* Continue candesartan
* Continue carvedilol 12.5 twice daily
* Continue Lasix 40 mg daily
* Continue isosorbide 60 daily
* Continue ranolazine 500 twice daily
 
 
8.  Hypothyroidism 
continue levothyroxine 100 g daily
 
 
9.  BPH 
continue tamsulosin 0.4 daily
 
 
He is full code
DVT prophylaxis subcutaneous heparin
Heart healthy diet
Pain pathway ordered
 
As Ranked By This Provider
Problem List:
 1. COPD exacerbation
 
 2. Hypoxia
 
 
Core Measures/Misc (9/17)
 
Acute Coronary Syndrome
ACS Diagnosis: No
 
Congestive Heart Failure
Congestive Heart Failure Diagnosis No
 
Cerebrovascular Accident
CVA/TIA Diagnosis: No
 
VTE (View Protocol)
VTE Risk Factors Age>40
No Mechanical VTE Prophylaxis d/t N/A MechProphylax Ordered
No VTE Pharm Prophylaxis d/t NA PharmProphylax ordered
 
Sepsis (View protocol)
Sepsis Present: No
 
 
Arianne,Vinnymarie 02/20/18 1226:
Attending MD Review Statement
 
Attending Statement
Attending MD Statement: examined this patient, discuss w/resident/PA/NP, agreed 
w/resident/PA/NP, discussed with family, reviewed EMR data (avail), discussed 
with nursing, discussed with case mgmt, reviewed images, amended to note
Attending Assessment/Plan:
Patient seen/examined bedside. Patient c/o b/l wheezing and going for V/q scan, 
CT chest. Patient is started on iv steroids, abx, oxygen supplementation, 
bronchodilators. Cloely monitor repsiratroy status.

## 2018-02-20 NOTE — ED DYSPNEA/ASTHMA COMPLAINT
History of Present Illness
 
General
Chief Complaint: Dyspnea (COPD, CHF, Other)
Stated Complaint: DIFF BREATHING, O2 93% AT 
Source: patient
Exam Limitations: no limitations
 
Vital Signs & Intake/Output
Vital Signs & Intake/Output
 Vital Signs
 
 
Date Time Temp Pulse Resp B/P B/P Pulse O2 O2 Flow FiO2
 
     Mean Ox Delivery Rate 
 
 0545 96.9 73 22 101/54  97 Nasal 2.0L 
 
       Cannula  
 
 0430      97 Nasal 2.0L 
 
       Cannula  
 
 042      97 Nasal 2.0L 
 
       Cannula  
 
 0353 96.0 75 24 112/68  88 Room Air Room Air 
 
 
 
Allergies
Coded Allergies:
NO KNOWN ALLERGIES (04/05/15)
 
Reconcile Medications
Acetaminophen 325 MG CAPSULE   1 CAP PO Q6 PRN PAIN  (Reported)
Albuterol Sulfate (Proair Hfa) 90 MCG HFA.AER.AD   2 PUF INH Q4 COPD
Aspirin (Aspirin*) 81 MG TAB.CHEW   1 TAB PO DAILY heart  (Reported)
Atorvastatin Calcium (Lipitor) 40 MG TABLET   1 TAB PO DAILY cholesterol  (
Reported)
Azithromycin 500 MG TABLET   1 MG PO DAILY antiinflammatory
Budesonide/Formoterol Fumarate (Symbicort 160-4.5 Mcg Inhaler) 160 MCG-4.5 MCG/
ACTUATION HFA.AER.AD   2 PUF INH BID COPD  (Reported)
Candesartan Cilexetil 4 MG TABLET   1 TAB PO DAILY CARDIOMYOPATHY
Carvedilol (Coreg) 12.5 MG TABLET   1 TAB PO BID CHF
     .
Clopidogrel Bisulfate (Clopidogrel) 75 MG TABLET   1 TAB PO DAILY BLOOD THINNER 
(Reported)
Furosemide (Lasix) 20 MG TABLET   2 TAB PO DAILY FLUID OVERLOAD
Gabapentin (Neurontin) 800 MG TABLET   1 TAB PO TID BACK PAIN  (Reported)
Isosorbide Mononitrate (Isosorbide Mononitrate ER) 60 MG TAB.ER.24H   1 TAB PO 
DAILY    (Reported)
Levothyroxine Sodium 100 MCG TABLET   1 TAB PO DAILY THYROID  (Reported)
Nitroglycerin (Nitrostat) 0.4 MG TAB.SUBL   1 TAB SL AD PRN CHEST PAIN  (
Reported)
     1st sign of attack; may repeat every 5 minutes until relief; if pain 
persists after 3 tablets in 15 minutes, prompt medical att
Prednisone 10 MG TABLET   0 PO DAILY COPD
     20MG 
     10NG 
     STOP.
Ranolazine (Ranexa) 500 MG TAB.ER.12H   1 TAB PO BID heart  (Reported)
Tamsulosin HCl (Flomax) 0.4 MG CAP.ER.24H   1 CAP PO QHS prostate
     Please take at bed time to avoid low blood pressure during the day.
Tiotropium Bromide (Spiriva) 18 MCG CAP.W.DEV   1 CAP INH DAILY COPD  (Reported)
 
Triage Nurses Notes Reviewed? yes
Onset: Gradual
Duration: day(s):
Timing: recent history
Severity: moderate
Activities at Onset: none
Prior Episodes/Possible Cause: occasional episodes
Modifying Factors:
Improves With: rest. 
Associated Symptoms: cough, wheezing
HPI:
73 YO gentleman
h/o cabg/cad
h/o copd
discharged 3 days ago with a COPD exacerbation.  He presents saying that he 
continues to have shortness of breath and wheezing.  "I can't catch my breath...
I'm really wheezing."
 
He notes no chest pain, diaphoresis, syncopal symptoms.
 
He is otherwise well. 
 
Past History
 
Travel History
Traveled to [place] past 21 day No
 
Medical History
Any Pertinent Medical History? see below for history
Neurological: NONE
EENT: NONE
Cardiovascular: CARDIAC STENTS CABG CAROTID ARTERY BYPASS DEFIBRILLATOR
Respiratory: COPD
Gastrointestinal: NONE
Hepatic: NONE
Renal: NONE
Musculoskeletal: NONE
Psychiatric: NONE
Endocrine: hypothyroidism
Blood Disorders: DVT
Cancer(s): NONE
GYN/Reproductive: NONE
History of MRSA: No
History of VRE: No
History of CDIFF: No
Influenza Vaccine: 11/15/17
 
Surgical History
Surgical History: CABG, CAROTID ARTECTOMY nerve thermoablation to reduce back 
pain 
 
Psychosocial History
Who do you live with Spouse
Services at Home None
What is your primary language English
 
Family History
Hx Contributory? No
 
Review of Systems
 
Review of Systems
Constitutional:
Reports: no symptoms. 
EENTM:
Reports: no symptoms. 
Respiratory:
Reports: no symptoms. 
Cardiovascular:
Reports: no symptoms. 
GI:
Reports: no symptoms. 
Genitourinary:
Reports: no symptoms. 
Musculoskeletal:
Reports: no symptoms. 
Skin:
Reports: no symptoms. 
Neurological/Psychological:
Reports: no symptoms. 
Hematologic/Endocrine:
Reports: no symptoms. 
Immunologic/Allergic:
Reports: no symptoms. 
All Other Systems: Reviewed and Negative
 
Physical Exam
 
Physical Exam
General Appearance: well developed/nourished, mild distress
Head: atraumatic
Eyes:
Bilateral: normal appearance. 
Ears, Nose, Throat: normal pharynx, normal ENT inspection
Neck: normal inspection, supple, full range of motion
Respiratory: wheezing
Cardiovascular: regular rate/rhythm
Gastrointestinal: normal bowel sounds, soft, non-tender
Extremities: normal inspection, normal capillary refill, normal range of motion,
no edema
Neurologic/Psych: no motor/sensory deficits, awake, alert, oriented x 3
Skin: intact, normal color, warm/dry
 
Core Measures
ACS in differential dx? No
CVA/TIA Diagnosis No
Sepsis Present: No
Sepsis Focused Exam Completed? No
 
Progress
Differential Diagnosis: altitude sickness, bronchitis, CHF, COPD, pneumonia
Plan of Care:
 Orders
 
 
Procedure Date/time Status
 
Nothing by Mouth  B Active
 
Saline Lock 555 Active
 
Place in observation 555 Active
 
Misc Message  05 Active
 
ED Holding Orders  05 Active
 
Vital Signs  05 Active
 
LUNG SCAN (V/Q) 555 Active
 
Code Status  05 Active
 
B-TYPE NATRIURETIC PEP (BNP)  044 Complete
 
BLOOD CULTURE  0400 Active
 
RAPID VIRAL INFLUENZA A  0357 Active
 
BLOOD CULTURE  0357 Active
 
TROPONIN LEVEL  0324 Complete
 
LIPASE  0324 Complete
 
HEPATIC FUNCTION PANEL  0324 Complete
 
D-DIMER  0324 Complete
 
CBC WITHOUT DIFFERENTIAL  0324 Complete
 
BASIC METABOLIC PANEL  0324 Complete
 
AMYLASE  0324 Complete
 
EKG  0324 Active
 
 
 Laboratory Tests
 
 
 
18 0449:
Anion Gap 7, Estimated GFR 46  L, BUN/Creatinine Ratio 30.0  H, Glucose 172  H, 
Calcium 9.0, Total Bilirubin 0.6, Direct Bilirubin 0.2, AST 14  L, ALT 23, 
Alkaline Phosphatase 81, Troponin I 0.08, Pro-B-Natriuretic Pept 3210  H, Total 
Protein 5.5  L, Albumin 3.3  L, Amylase 45, Lipase 50, D-Dimer High Sensitivty 
1569  H, CBC w Diff MAN DIFF ORDERED, RBC 3.15  L, .8  H, MCH 34.0  H, 
MCHC 33.4, RDW 15.4  H, MPV 8.1, Gran % 83.6  H, Lymphocytes % 7.0  L, Monocytes
% 6.7, Eosinophils % 2.4, Basophils % 0.3, Absolute Granulocytes 8.7  H, 
Segmented Neutrophils 89  H, Band Neutrophils 2, Absolute Lymphocytes 0.7  L, 
Lymphocytes 2  L, Monocytes 3, Absolute Monocytes 0.7  H, Eosinophils 2, 
Absolute Eosinophils 0.3, Absolute Basophils 0, Metamyelocytes 2  H, Platelet 
Estimate ADEQUATE, Polychromasia 1+, Hypochromic-Microcytic 1+, Poikilocytosis 1
+
 
18 0326:
Pro-B-Natriuretic Pept Cancelled
 Microbiology
 05  BLOOD: Blood Culture - RECD
 0509  BLOOD: Blood Culture - RECD
 0357  NASOPHARYN: Influenza Virus A & B Rapid Smear - ORD
 
 
Diagnostic Imaging:
Viewed by Me: Radiology Read.  Discussed w/RAD: Radiology Read. 
CXR Impression: PATIENT: JULIO CÉSAR CRUZ JR  MEDICAL RECORD NO: [id number] PRESENT 
AGE: 72  PATIENT ACCOUNT NO: [id number] : 43  LOCATION: [facility] ORDERING 
PHYSICIAN: Scotty Null MD     SERVICE DATE:  EXAM TYPE: 
RAD - XRY-PORTABLE CHEST XRAY EXAMINATION: XR PORTABLE CHEST CLINICAL 
INFORMATION: Dyspnea COMPARISON: 2018 TECHNIQUE: Portable frontal view of 
the chest was obtained. FINDINGS: Left chest wall single-lead AICD/pacer is 
unchanged. Median sternotomy wires appear intact. The lungs are well expanded. 
There is hazy left basilar opacity. No pleural effusion or pneumothorax. The 
cardiomediastinal silhouette remains prominent. No acute osseous abnormality. 
IMPRESSION: Hazy opacity at the left base may represent atelectasis or 
pneumonia. DICTATED BY: Luca Brown MD  DATE/TIME DICTATED:18
:RAD[facility]  DATE/TIME TRANSCRIBED:18 CONFIDENTIAL, 
DO NOT COPY WITHOUT APPROPRIATE AUTHORIZATION.  <Electronically signed in Other 
Vendor System>                                                                  
                     SIGNED BY: Kevin GLASS,Luca 18
Initial ED EKG: LBBB
 
Departure
 
Departure
Disposition: STILL A PATIENT
Condition: Stable
Clinical Impression
Primary Impression: COPD exacerbation
Referrals:
Durga Plata MD (PCP/Family)
 
Departure Forms:
Customer Survey
General Discharge Information
Comments
18, 5:52am... radiology called... due to his elevated creatinine, pt to 
have v/q scan. 
 
Observation Note
Spoke With:
Shamir Giles MD
Place Patient In: Non-ED OBS Care Area
Rationale for Observation:
My rational for observation is as follows .
 
pt with copd exacerbation, recently discharged, now with hypoxia, wheezing,,, 
merits steroids, abx, 02 support... +dimer noted.... radiology wishes to pursue 
v/q scan this AM rather than risk angio due to elevated creatinine. 
 
 
Critical Care Note
 
Critical Care Note
Critical Care Time: non-applicable

## 2018-02-20 NOTE — CT SCAN REPORT
EXAMINATION:
CT CHEST WITHOUT CONTRAST
 
CLINICAL INFORMATION:
Rule out pneumonia. Shortness of breath wheezing hypoxia and chest x-ray
showing pneumonia.
 
COMPARISON:
CT scan of the chest dated 07/18/2017.
 
TECHNIQUE:
Multidetector volumetric CT imaging of the chest was done. Axial MIP volume
rendering provided. Sagittal and coronal reformatted images were obtained.
 
DLP:
1610.14 mGy-cm
 
FINDINGS:
 
: Increased densities in the left lower lung comparable to the plain
film study on the same day as the current study.
 
LUNGS: There is interval development of an area of increased density in the
left lower lobe, which extends from the anterior margin of the left lower
lobe to the mid and posterior regions, abutting the fissure lines and
extending minimally into the left upper lobe along the midportion of the
major fissure. The area of increased density is groundglass like quality
without a solid appearing component, however the location correlates very
well with the location of the previously biopsied pulmonary nodule in the
left lung.
 
MEDIASTINUM: No mediastinal or hilar lymphadenopathy is noted. There is
enlargement of the left ventricle with heavy coronary vascular
calcifications. No pericardial effusion is noted. A left-sided MediPort is
present with the catheter extending into the cavoatrial junction Heavy
atherosclerotic plaque is present within the thoracic aorta, however the
thoracic aorta is normal in caliber. There is prominence of the aorta in the
upper abdomen, which measures 4.9 cm in anterior posterior dimension at the
level of the zev of the diaphragm.
 
PLEURA: There is no pleural effusion. There is pleural thickening in the
lateral, posterior and posteromedial aspect of the left lower lobe extending
from the mid lung to the left lung base along the periphery of the left lower
lobe.
 
AXILLA: No lymphadenopathy.
 
UPPER ABDOMEN: Unremarkable.
 
OSSEOUS STRUCTURES: Sclerotic endplate changes are present at multiple levels
in the mid and lower thoracic spine. No focal lytic or sclerotic lesions are
present..
 
IMPRESSION:
 
1. Interval development of groundglass type density involving the
predominantly the left lower lobe extending from the anterior margin of the
major fissure posteriorly within the left lower lobe, as noted above this
correlates with the location of the previously biopsied left pulmonary
nodule. The area of increased density extends minimally along the major
fissure into the posterior aspect of the left upper lobe. Differential
diagnostic considerations would include an infectious process, however
disease progression of the patient's pulmonary malignancy is not excluded on
the basis of the study.
2. Pleural thickening along the lateral, posterior and posteromedial margins
of the left lower lobe.
3. Prominent caliber of the upper abdominal aorta, measuring 4.9 cm in
anterior posterior dimension as described.

## 2018-02-20 NOTE — PN- PULMONARY
Subjective
HPI/Critical Care Issues:
Patient recently discharged for exacerbation of CHF and COPD.  On home he 
developed increasing cough shortness of breath wheezing without fevers chills 
chest pain or sputum production.  On admission resting room oximetry was 89% and
is now 95 on 2 L.  CT scan without contrast shows left lower lobe changes which 
may be related to prior radiation for lung cancer.  His BNP remains markedly 
elevated in the setting of an ejection fraction of 20%
 
Objective
Current Medications:
 Current Medications
 
 
  Sig/Ly Start time  Last
 
Medication Dose Route Stop Time Status Admin
 
Acetaminophen 650 MG Q6P PRN 02/20 0745 AC 
 
  PO   
 
Albuterol Sulfate 3 ML EVERY 4 HRS/AWAKE 02/20 1200 AC 02/20
 
  INH   1031
 
Albuterol Sulfate 2 PUF Q4 02/20 1000 CAN 
 
  INH   
 
Albuterol Sulfate 3 ML ONCE ONE 02/20 0400 DC 02/20
 
  INH 02/20 0401  0434
 
Aspirin 81 MG DAILY 02/20 1000 AC 02/20
 
  PO   1227
 
Atorvastatin Calcium 40 MG 1700 02/20 1700 AC 
 
  PO   
 
Azithromycin 500 MG ONCE ONE 02/20 0400 DC 02/20
 
  PO 02/20 0401  0539
 
Budesonide/ 2 PUF BID 02/20 1000 AC 02/20
 
Formoterol Fumarate  INH   1228
 
Carvedilol 12.5 MG BID 02/20 1000 AC 02/20
 
  PO   1227
 
Ceftriaxone Sodium 0 .STK-MED ONE 02/20 0534 DC 
 
  .ROUTE   
 
Ceftriaxone Sodium 1,000 MG ONCE ONE 02/20 0400 DC 02/20
 
  IV 02/20 0401  0539
 
Clopidogrel Bisulfate 75 MG DAILY 02/20 1000 AC 02/20
 
  PO   1228
 
Furosemide 40 MG DAILY 02/20 1000 AC 02/20
 
  PO   1228
 
Gabapentin 800 MG Q8 02/20 1400 AC 02/20
 
  PO   1331
 
Heparin Sodium  5,000 UNIT Q8 02/20 1400 AC 02/20
 
(Porcine)  SC   1330
 
Ipratropium Bromide 2.5 ML ONCE ONE 02/20 0400 DC 02/20
 
  INH 02/20 0401  0434
 
Isosorbide  60 MG DAILY 02/20 1000 AC 02/20
 
Mononitrate  PO   1227
 
Levothyroxine Sodium 0.1 MG DAILY AC 02/20 1000 AC 02/20
 
  PO   1228
 
Losartan Potassium 25 MG DAILY 02/20 1000 AC 02/20
 
  PO   1228
 
Methylprednisolone 40 MG DAILY 02/20 1400 AC 02/20
 
  IV   1330
 
Methylprednisolone 0 .STK-MED ONE 02/20 0502 DC 
 
  .ROUTE   
 
Methylprednisolone 125 MG ONCE ONE 02/20 0400 DC 02/20
 
  IV 02/20 0401  0503
 
Non-Formulary  0 SEE ADMIN CRITERIA 02/20 0800 CAN 
 
Medication  ANY   
 
Ranolazine 500 MG BID 02/20 1000 AC 02/20
 
  PO   1228
 
Tamsulosin HCl 0.4 MG AT BEDTIME 02/20 2200 AC 
 
  PO   
 
Tiotropium Bromide 1 PUF DAILY 02/20 1000 AC 02/20
 
  INH   1232
 
 
 
 
Vital Signs & I&O
Last 24 Hrs of Vitals and I&O:
 Vital Signs
 
 
Date Time Temp Pulse Resp B/P B/P Pulse O2 O2 Flow FiO2
 
     Mean Ox Delivery Rate 
 
02/20 1228  76  118/68     
 
02/20 1228  76  118/68     
 
02/20 1227  76  118/68     
 
02/20 1227  76  118/68     
 
02/20 1028       Nasal 2.0L 
 
       Cannula  
 
02/20 1027      95 Nasal 2.0L 
 
       Cannula  
 
02/20 0818 98.6 75 24 120/64  92 Nasal 2.0L 
 
       Cannula  
 
02/20 0816      92 Nasal 2.0L 
 
       Cannula  
 
02/20 0545 96.9 73 22 101/54  97 Nasal 2.0L 
 
       Cannula  
 
02/20 0430      97 Nasal 2.0L 
 
       Cannula  
 
02/20 0425      97 Nasal 2.0L 
 
       Cannula  
 
02/20 0353 96.0 75 24 112/68  88 Room Air Room Air 
 
 
 Intake & Output
 
 
 02/20 1600 02/20 0800 02/20 0000
 
Intake Total 800  
 
Output Total 950  
 
Balance -150  
 
    
 
Intake, Oral 800  
 
Output, Urine 950  
 
Patient 250 lb 250 lb 
 
Weight   
 
Weight  Reported by Patient 
 
Measurement   
 
Method   
 
 
Oxygen saturation 2 L 95% exam of his chest shows scattered expiratory wheezes 
cardiac exam shows regular S1 and S2 there is no edema
 
Impression/Plan
 
Impression/Plan
Impression/Plan:
72-year-old with history of lung cancer status post radiation severe COPD 
cardiomyopathy admitted with recurrent shortness of breath and bronchospasm.  
Absence of feverleukocytosis and nonspecific chest x-ray suggest a likely 
noninfectious process.
Recommendations:
Increase Solu-Medrol to every 8 hours.  Aggressive pulmonary toilet.  Provide 
flutter valve for expectoration taper FiO2 his saturations allow and measure 
ambulatory saturations.  Continue baseline bronchodilator medication

## 2018-02-20 NOTE — RADIOLOGY REPORT
EXAMINATION:
XR PORTABLE CHEST
 
CLINICAL INFORMATION:
Dyspnea
 
COMPARISON:
2/12/2018
 
TECHNIQUE:
Portable frontal view of the chest was obtained.
 
FINDINGS:
Left chest wall single-lead AICD/pacer is unchanged. Median sternotomy wires
appear intact. The lungs are well expanded. There is hazy left basilar
opacity. No pleural effusion or pneumothorax. The cardiomediastinal
silhouette remains prominent. No acute osseous abnormality.
 
IMPRESSION:
Hazy opacity at the left base may represent atelectasis or pneumonia.

## 2018-02-20 NOTE — PATIENT DISCHARGE INSTRUCTIONS
Discharge Instructions
 
General Discharge Information
Special Instructions:
- Please follow up with your primary care physician within 1-2 week of 
discharge. Inform your primary care physician of this admission to [facility].
- Continue your current medications per discharge instructions.
- Please watch for these problems: Fever, Chills, Nausea, Vomiting, Shortness of
Breath, Productive Cough, Chest Pain/Discomfort, Abdominal Pain, Active Bleeding
or Bloody urine/stool.
 
Diet
Continue normal diet: Yes
 
Activity
Full Activity/No Limits: Yes
 
Acute Coronary Syndrome
 
Inclusion Criteria
At DC or during hospital stay patient has or had the following:
ACS DIAGNOSIS No
 
Discharge Core Measures
Meds if any: Prescribed or Continued at Discharge
Meds if any: NOT Prescribed or Continued at Discharge
 
Congestive Heart Failure
 
Inclusion Criteria
At DC or during hospital stay patient has or had the following:
CHF DIAGNOSIS No
 
Discharge Core Measures
Meds if any: Prescribed or Continued at Discharge
Meds if any: NOT Prescribed or Continued at Discharge
 
Cerebrovascular accident
 
Inclusion Criteria
At DC or during hospital stay patient has or had the following:
CVA/TIA Diagnosis No
 
Discharge Core Measures
Meds if any: Prescribed or Continued at Discharge
Meds if any: NOT Prescribed or Continued at Discharge
 
Venous thromboembolism
 
Inclusion Criteria
VTE Diagnosis No
VTE Type NONE
VTE Confirmed by (Test) NONE
 
Discharge Core Measures
- Per Current guidelines, there needs to be overlap
- treatment for the first 5 days of Warfarin therapy.
- If discharged on Warfarin prior to 5 days of
- overlap therapy, the patient will need to be
- assessed for post discharge needs including
- *Post discharge parental anticoagulation
- *Warfarin and/or parental anticoagulation education
- *Follow up date to check INR post discharge
At least 5 days overlap therapy as Inpatient No
Meds if any: Prescribed or Continued at Discharge
Note: Overlap Therapy is Warfarin and Anticoagulant
Meds if any: NOT Prescribed or Continued at Discharge

## 2018-02-20 NOTE — NUCLEAR MEDICINE REPORT
EXAMINATION:
PULMONARY VENTILATION PERFUSION STUDY
 
CLINICAL INFORMATION:
Dyspnea. COPD.
 
COMPARISON:
The previous lung scan dated 10/28/2016 is available for comparison.
Radiograph of the chest dated 02/20/2018, the same date as this lung scan is
available for comparison. CT scan of the chest also dated 02/20/2018 is
available for comparison.
 
TECHNIQUE:
Serial gamma scintillation camera images were obtained over the posterior
chest during the single breath, equilibrium rebreathing and washout of 8.7
mCi Xe 133 gas. The patient then received 4.2 mCi Tc-99m MAA intravenously
and a 6-view perfusion study was performed.
 
FINDINGS:
Ventilation images: On the single breath and equilibrium images there is
markedly diminished and almost absent activity at the left lung base. During
the washout phase there is moderate retention in both lungs, most severely at
the right lung base but diffusely in the left lung including in the left lung
base in the region of previously described decreased ventilation on the
single breath and equilibrium images.
 
Perfusion images: No segmental perfusion defects are present. There is a
prominent band of decreased activity that extends from the lateral margin of
the cardiac silhouette 2 the lateral surface of the left lower lobe. This
does not follow a segmental pattern. No other perfusion defects are present.
The cardiac silhouette and mediastinum appear dilated.
 
Compared to the previous scan dated 10/28/2016, the perfusion and ventilation
abnormalities R noted, although mild diffuse gas trapping predominantly in
the left lung was present previously.
 
The contemporaneous chest radiograph there is a hazy opacity at the left lung
base that corresponds to the region of decreased ventilation and perfusion
described above on these images.
 
The contemporaneous CT scan also shows a region of atelectasis and
groundglass opacity involving predominantly the left lower lobe, in a pattern
that is well matched to the band of decreased perfusion described above on
this lung scan.
 
IMPRESSION:
Low probability of pulmonary embolism. A prominent nonsegmental perfusion
abnormality corresponds to ventilation and CT scan abnormalities present in
the left lower lobe, and no additional perfusion abnormalities are present.

## 2018-02-21 VITALS — DIASTOLIC BLOOD PRESSURE: 56 MMHG | SYSTOLIC BLOOD PRESSURE: 96 MMHG

## 2018-02-21 VITALS — SYSTOLIC BLOOD PRESSURE: 118 MMHG | DIASTOLIC BLOOD PRESSURE: 64 MMHG

## 2018-02-21 LAB
ABSOLUTE GRANULOCYTE CT: 13.4 /CUMM (ref 1.4–6.5)
BASOPHILS # BLD: 0 /CUMM (ref 0–0.2)
BASOPHILS NFR BLD: 0 % (ref 0–2)
EOSINOPHIL # BLD: 0 /CUMM (ref 0–0.7)
EOSINOPHIL NFR BLD: 0 % (ref 0–5)
ERYTHROCYTE [DISTWIDTH] IN BLOOD BY AUTOMATED COUNT: 15.9 % (ref 11.5–14.5)
GRANULOCYTES NFR BLD: 94.9 % (ref 42.2–75.2)
HCT VFR BLD CALC: 31.7 % (ref 42–52)
LYMPHOCYTES # BLD: 0.4 /CUMM (ref 1.2–3.4)
MCH RBC QN AUTO: 34 PG (ref 27–31)
MCHC RBC AUTO-ENTMCNC: 33.4 G/DL (ref 33–37)
MCV RBC AUTO: 101.9 FL (ref 80–94)
MONOCYTES # BLD: 0.3 /CUMM (ref 0.1–0.6)
PLATELET # BLD: 134 /CUMM (ref 130–400)
PMV BLD AUTO: 8.4 FL (ref 7.4–10.4)
RED BLOOD CELL CT: 3.11 /CUMM (ref 4.7–6.1)
WBC # BLD AUTO: 14.1 /CUMM (ref 4.8–10.8)

## 2018-02-21 NOTE — PN-OBSERVATION
LeviArlyn 02/21/18 0826:
Observation Note
 
Observation Note
_
I have personally examined JULIO CÉSAR CRUZ JR. him disposition is uncertain at 
this time. Before a determination can be made, he requires continued observation
for the following reasons [SOB].
 
 
Assessment/Plan Medical
Assessment:
Mr. Cruz is a 72-year-old male with past medical history significant for COPD 
not on home oxygen, coronary artery disease status post CABG and stents, 
hypertension, ischemic cardiomyopathy status post AICD placement, chronic 
systolic heart failure with ejection fraction 20%, hypothyroidism, BPH, carotid 
artery disease, peripheral vascular disease, obstructive sleep apnea on CPAP, 
moderate aortic stenosis presented with worsening shortness of breath for 2 
days.
 
Patient was recently admitted to [facility] telemetry for acute hypoxic 
respiratory failure, CHF exacerbation, COPD exacerbation.  Received IV Lasix, 
steroids, antibiotics.  He completed steroid and antibiotic treatment.  
Echocardiogram was done which showed ejection fraction 20-25% with akinesis of 
inferior wall.  He was discharged from the hospital on 02/16/2018.
 
 
 
Vitals at the time of admission 
afebrile, heart rate 75, respiratory rate 24, blood pressure 120/60, saturating 
at 88 on 2 L oxygen.
 
Labs
WBC 10.4, hemoglobin 10 and hematocrit 32, platelets 142
BUN 45 and creatinine 1.5
D-dimer 1569
ProBNP 3210
Troponin 0.08
Amylase lipase, LFTs normal
Chest x-ray showed hazy opacity left lung base atelectasis versus pneumonia
EKG sinus rhythm, rate 66, left bundle branch block
 
 
 
1.  Acute hypoxic respiratory failure/COPD exacerbation and pneumonia
Patient was discharged from hospital after being treated for CHF exacerbation 
and COPD with prednisone and azithromycin on 16 2018.  He reports worsening 
shortness of breath.  He was found to be tachypneic, hypoxic requiring 2 L in 
the emergency room.  He is afebrile with a normal WBC count.  However given his 
worsening shortness of breath, chest x-ray findings suggestive of atelectasis 
versus pneumonia will place him under observation status in general medicine 
floor
* Patient O2 satting well under 2LNC, however his O2 sat dropped below 88% while
resting under room air, which qualifed him for home oxygen therapy. 
* Patient received IV methylprednisolone, and will switch to PO Prednisone 60mg 
per pulm reommendation, and taper on discharge
* Total respiratory care
* Nebulizer treatments
* Follow blood culture, urine culture, sputum culture, no growth so far
* Flu, urine Legionella and streptococcal antigen were negative
* Appreciated pulmonology recommendations
 
 
 
2.  Elevated D dimers
Patient presented with acute worsening of shortness of breath.  He was found to 
have elevated d-dimer 1569.
* V-Q scan showed low possibility for PE. Patient had not been tachycardia over 
hospital stay as well.
* Serial troponin and EKG had been negative of acute process
* Echocardiogram was done on 02/14/2018 showed ejection fraction 20% with 
akinesis of inferior wall
 
 
 
3.  Chronic kidney disease
Creatinine 1.5.  Baseline creatinine 1.7.  Avoid nephrotoxins.
 
 
4.  Stable proBNP 3210
continue home medications as below
 
 
5.  Obstructive sleep apnea 
continue CPAP
 
 
6.  Coronary artery disease status post CABG and stents
 continue aspirin, Plavix, atorvastatin
 
 
7.  Ischemic cardiomyopathy, chronic systolic heart failure with ejection 
fraction 20%
* Continue candesartan
* Continue carvedilol 12.5 twice daily
* Continue Lasix 40 mg daily
* Continue isosorbide 60 daily
* Continue ranolazine 500 twice daily
 
 
8.  Hypothyroidism 
continue levothyroxine 100 g daily
 
 
9.  BPH 
continue tamsulosin 0.4 daily
 
 
Full code
DVT prophylaxis subcutaneous heparin
Heart healthy diet
Pain pathway ordered
Problem List:
 1. COPD exacerbation
 
 2. Hypoxia
 
 
Subjective
Follow-up For:
COPD exacerbation
Elevated D dimers
hx of CKD, JEREMY, CAD s/p CABG/stent, HFrEF, Ischemic cardiomyopathy, 
Hypothyroidism, BPH
Subjective:
No overnight event. Patient was on 2L oxygen breathing comfortably. Felt 
slightly better however would like to go home today.
 
Review of Systems
Constitutional:
Reports: see HPI. 
 
Objective
Last 24 Hrs of Vital Signs/I&O
 Vital Signs
 
 
Date Time Temp Pulse Resp B/P B/P Pulse O2 O2 Flow FiO2
 
     Mean Ox Delivery Rate 
 
02/21 0831      95 Nasal 2.0L 
 
       Cannula  
 
02/21 0623 97.8 71 20 118/64  96 CPAP  
 
02/21 0324  68    96   
 
02/21 0000       CPAP  
 
02/20 2243 98.1 71 18 110/64  91 Nasal 2.0L 
 
       Cannula  
 
02/20 2055      92 Nasal 2.0L 
 
       Cannula  
 
02/20 2047  71  110/64     
 
02/20 2045  71  110/64     
 
02/20 2045  71  110/64     
 
02/20 1646    140/60     
 
02/20 1600       Nasal 2.0L 
 
       Cannula  
 
02/20 1519 97.8 79 20 1140/64  95 Nasal 2.0L 
 
       Cannula  
 
02/20 1228  76  118/68     
 
02/20 1228  76  118/68     
 
02/20 1227  76  118/68     
 
02/20 1227  76  118/68     
 
 
 Intake & Output
 
 
 02/21 1600 02/21 0800 02/21 0000
 
Intake Total  120 1220
 
Output Total  500 1600
 
Balance  -380 -380
 
    
 
Intake, IV   20
 
Intake, Oral  120 1200
 
Output, Urine  500 1600
 
 
 
 
Physical Exam
General Appearance: Alert, Oriented X3, Cooperative, No Acute Distress
Cardiovascular: Regular Rate
Lungs: Normal Air Movement, bilateral wheezing
Abdomen: Normal Bowel Sounds, No Tenderness
Neurological: Normal Speech
Extremities: Normal Pulses, No Tenderness/Swelling, +2 edema
 
 
Jannette Acuña 02/21/18 1121:
Attending Addendum
Attending Brief Note
Patient seen/examined bedside. Patient c/o b/l wheezing and low probability for 
V/q scan, CT chest with no acute pathology. Patient is on taper steroids, abx, 
oxygen supplementation, bronchodilators. Check walling pulse oximetry before 
discharge and arrange home oxygen. Patient respiratroy status improved. Patient 
is medically stable for dsicharge. Case management aware.

## 2018-02-21 NOTE — PN- PULMONARY
Subjective
HPI/Critical Care Issues:
Shortness of breath is markedly improved.  Oxygen saturations are improved.
 
Objective
Current Medications:
 Current Medications
 
 
  Sig/Ly Start time  Last
 
Medication Dose Route Stop Time Status Admin
 
Acetaminophen 650 MG Q6P PRN 02/20 0745 AC 
 
  PO   
 
Albuterol Sulfate 3 ML EVERY 4 HRS/AWAKE 02/20 1200 AC 02/20
 
  INH   2056
 
Albuterol Sulfate 2 PUF Q4 02/20 1000 CAN 
 
  INH   
 
Aspirin 81 MG DAILY 02/20 1000 AC 02/20
 
  PO   1227
 
Atorvastatin Calcium 40 MG 1700 02/20 1700 AC 02/20
 
  PO   1641
 
Budesonide/ 2 PUF BID 02/20 1000 AC 02/20
 
Formoterol Fumarate  INH   2044
 
Carvedilol 12.5 MG BID 02/20 1000 AC 02/20
 
  PO   1227
 
Clopidogrel Bisulfate 75 MG DAILY 02/20 1000 AC 02/20
 
  PO   1228
 
Furosemide 40 MG DAILY 02/20 1000 AC 02/20
 
  PO   1228
 
Gabapentin 800 MG Q8 02/20 1400 AC 02/21
 
  PO   0632
 
Heparin Sodium  5,000 UNIT Q8 02/20 1400 AC 02/21
 
(Porcine)  SC   0635
 
Isosorbide  60 MG DAILY 02/20 1000 AC 02/20
 
Mononitrate  PO   1227
 
Levothyroxine Sodium 0.1 MG DAILY AC 02/20 1000 AC 02/21
 
  PO   0632
 
Losartan Potassium 25 MG DAILY 02/20 1000 AC 02/20
 
  PO   1228
 
Methylprednisolone 40 MG Q8 02/20 2200 AC 02/21
 
  IV   0633
 
Methylprednisolone 40 MG DAILY 02/20 1400 DC 02/20
 
  IV   1330
 
Ranolazine 500 MG BID 02/20 1000 AC 02/20
 
  PO   2047
 
Tamsulosin HCl 0.4 MG AT BEDTIME 02/20 2200 AC 02/20
 
  PO   2045
 
Tiotropium [place] 1 PUF DAILY 02/20 1000 AC 02/20
 
  INH   1232
 
 
 
 
Vital Signs & I&O
Last 24 Hrs of Vitals and I&O:
 Vital Signs
 
 
Date Time Temp Pulse Resp B/P B/P Pulse O2 O2 Flow FiO2
 
     Mean Ox Delivery Rate 
 
02/21 0623 97.8 71 20 118/64  96 CPAP  
 
02/21 0324  68    96   
 
02/20 2243 98.1 71 18 110/64  91 Nasal 2.0L 
 
       Cannula  
 
02/20 2055      92 Nasal 2.0L 
 
       Cannula  
 
02/20 2047  71  110/64     
 
02/20 2045  71  110/64     
 
02/20 2045  71  110/64     
 
02/20 1646    140/60     
 
02/20 1600       Nasal 2.0L 
 
       Cannula  
 
02/20 1519 97.8 79 20 1140/64  95 Nasal 2.0L 
 
       Cannula  
 
02/20 1228  76  118/68     
 
02/20 1228  76  118/68     
 
02/20 1227  76  118/68     
 
02/20 1227  76  118/68     
 
02/20 1028       Nasal 2.0L 
 
       Cannula  
 
02/20 1027      95 Nasal 2.0L 
 
       Cannula  
 
02/20 0818 98.6 75 24 120/64  92 Nasal 2.0L 
 
       Cannula  
 
02/20 0816      92 Nasal 2.0L 
 
       Cannula  
 
 
 Intake & Output
 
 
 02/21 1600 02/21 0800 02/21 0000
 
Intake Total   1220
 
Output Total   1600
 
Balance   -380
 
    
 
Intake, IV   20
 
Intake, Oral   1200
 
Output, Urine   1600
 
 
Since saturation 2 L 96% exam of his chest shows clear there are no wheezes 
cardiac exam shows regular S1 and S2 without murmurs
 
Impression/Plan
 
Impression/Plan
Impression/Plan:
72-year-old readmitted with bronchospasm which appears to have resolved.
Recommendations:
Taper FiO2.  She Solu-Medrol begin oral prednisone.  Consider discharge today

## 2018-04-29 ENCOUNTER — HOSPITAL ENCOUNTER (INPATIENT)
Dept: HOSPITAL 68 - ERH | Age: 73
LOS: 5 days | Discharge: HOME HEALTH | DRG: 638 | End: 2018-05-04
Attending: INTERNAL MEDICINE | Admitting: INTERNAL MEDICINE
Payer: COMMERCIAL

## 2018-04-29 VITALS — BODY MASS INDEX: 29.53 KG/M2 | HEIGHT: 72 IN | WEIGHT: 218 LBS

## 2018-04-29 VITALS — DIASTOLIC BLOOD PRESSURE: 60 MMHG | SYSTOLIC BLOOD PRESSURE: 100 MMHG

## 2018-04-29 DIAGNOSIS — Z79.4: ICD-10-CM

## 2018-04-29 DIAGNOSIS — I25.5: ICD-10-CM

## 2018-04-29 DIAGNOSIS — B37.9: ICD-10-CM

## 2018-04-29 DIAGNOSIS — Z95.1: ICD-10-CM

## 2018-04-29 DIAGNOSIS — I50.9: ICD-10-CM

## 2018-04-29 DIAGNOSIS — E87.1: ICD-10-CM

## 2018-04-29 DIAGNOSIS — C34.90: ICD-10-CM

## 2018-04-29 DIAGNOSIS — T38.0X5A: ICD-10-CM

## 2018-04-29 DIAGNOSIS — I13.0: ICD-10-CM

## 2018-04-29 DIAGNOSIS — Z98.61: ICD-10-CM

## 2018-04-29 DIAGNOSIS — E86.0: ICD-10-CM

## 2018-04-29 DIAGNOSIS — Z79.51: ICD-10-CM

## 2018-04-29 DIAGNOSIS — I95.1: ICD-10-CM

## 2018-04-29 DIAGNOSIS — E09.65: Primary | ICD-10-CM

## 2018-04-29 DIAGNOSIS — Z99.81: ICD-10-CM

## 2018-04-29 DIAGNOSIS — I73.9: ICD-10-CM

## 2018-04-29 DIAGNOSIS — N18.3: ICD-10-CM

## 2018-04-29 DIAGNOSIS — Z79.52: ICD-10-CM

## 2018-04-29 DIAGNOSIS — Z95.810: ICD-10-CM

## 2018-04-29 DIAGNOSIS — I25.10: ICD-10-CM

## 2018-04-29 DIAGNOSIS — E03.9: ICD-10-CM

## 2018-04-29 DIAGNOSIS — Z92.3: ICD-10-CM

## 2018-04-29 DIAGNOSIS — J44.9: ICD-10-CM

## 2018-04-29 DIAGNOSIS — G47.33: ICD-10-CM

## 2018-04-29 DIAGNOSIS — Z79.82: ICD-10-CM

## 2018-04-29 LAB
ABSOLUTE GRANULOCYTE CT: 11.9 /CUMM (ref 1.4–6.5)
BASOPHILS # BLD: 0 /CUMM (ref 0–0.2)
BASOPHILS NFR BLD: 0.2 % (ref 0–2)
EOSINOPHIL # BLD: 0 /CUMM (ref 0–0.7)
EOSINOPHIL NFR BLD: 0 % (ref 0–5)
ERYTHROCYTE [DISTWIDTH] IN BLOOD BY AUTOMATED COUNT: 15.4 % (ref 11.5–14.5)
GRANULOCYTES NFR BLD: 90.6 % (ref 42.2–75.2)
HCT VFR BLD CALC: 44.7 % (ref 42–52)
LYMPHOCYTES # BLD: 0.5 /CUMM (ref 1.2–3.4)
MCH RBC QN AUTO: 33.3 PG (ref 27–31)
MCHC RBC AUTO-ENTMCNC: 33.3 G/DL (ref 33–37)
MCV RBC AUTO: 99.7 FL (ref 80–94)
MONOCYTES # BLD: 0.7 /CUMM (ref 0.1–0.6)
PLATELET # BLD: 141 /CUMM (ref 130–400)
PMV BLD AUTO: 9 FL (ref 7.4–10.4)
RED BLOOD CELL CT: 4.48 /CUMM (ref 4.7–6.1)
WBC # BLD AUTO: 13.1 /CUMM (ref 4.8–10.8)

## 2018-04-29 PROCEDURE — 84133 ASSAY OF URINE POTASSIUM: CPT

## 2018-04-29 PROCEDURE — 84300 ASSAY OF URINE SODIUM: CPT

## 2018-04-29 NOTE — HISTORY & PHYSICAL
DelgadoSandra orellana 04/29/18 1455:
General Information and HPI
MD Statement:
I have seen and personally examined NACNYROBERTJULIO CÉSAR LAI LAUGHLIN and documented this H&P.
 
The patient is a 72 year old M who presented with a patient stated chief 
complaint of [].
 
Source of Information: patient, family, old records
Exam Limitations: no limitations
History of Present Illness:
Mr Araya is a 72 year-old-male with a PMH significant for Lung cancer small cell
carcinoma status post radiation and on prednisone therapy, COPD on 2 L at home, 
CAD s/p CABG and PCI with stend placement, ischemic cardiomyopathy with EF of 20
-25% status post AICD, CKD, hypothyroidism, aortic stenosis, peripheral arterial
disease, JEREMY on CPAP who came to emergency room with chief complaint of sore 
throat eating food and dysgeusia/ageusia.
 
Information was obtained from patient himself and his wife threw the phone.  
Patient reports that he cannot feel any tastes of any food for 5 days and he 
only drinks water sometimes he has pain in his mouth and throat after eating 
solid foods.  However he is pain-free at the moment.  He also reported that he 
has been feeling fatigued with low appetite for 1 or 2 weeks and according to 
the wife he lost 30 pounds.  On April 11 patient was put on daily 50 mg 
prednisone by his oncologist and also is using a steroid inhaler.  Apparently 
patient was not washing his mouth every time that he usedthe steroid inhaler.  
Patient denied any chest pain, nausea, vomiting, abdominal pain, shortness of 
breath, palpitations upon examination.
 
Vital signs in ED was notable for low blood pressure 74/49, heart rate 66, no 
fever 96% on 2 L
 
Labs were notable for WBC 13.1, MCV 99, sodium 129, potassium 4.6, glucose 617, 
creatinine 1.9 baseline is 1.6, CL 88 , UA showed glucose more than 1000
 
Patient received 1-1/2 L of normal saline in the ED, 10 units of insulin Novolin
, 1 dose of clotrimazole 10 mg
 
Blood pressure increased to 111/58 after hydration
 
CXR 1. No active cardiopulmonary disease. There is no significant change.
 
2. There is stable moderate plate-like scar/subsegmental atelectasis at the
left base.
 
 
 
 
 
Allergies/Medications
Allergies:
Coded Allergies:
NO KNOWN ALLERGIES (NONE 02/20/18)
 
 
Past History
 
Travel History
Traveled to [place] past 21 day No
 
Medical History
Neurological: NONE
EENT: NONE
Cardiovascular: CARDIAC STENTS CABG CAROTID ARTERY BYPASS DEFIBRILLATOR
Respiratory: COPD
Gastrointestinal: NONE
Hepatic: NONE
Renal: NONE
Musculoskeletal: NONE
Psychiatric: NONE
Endocrine: hypothyroidism
Blood Disorders: DVT
Cancer(s): NONE
GYN/Reproductive: NONE
History of MRSA: No
History of VRE: No
History of CDIFF: No
Influenza Vaccine: 11/15/17
 
Surgical History
Surgical History: CABG, CAROTID ARTECTOMY nerve thermoablation to reduce back 
pain 
 
Past Family/Social History
 
Family History
Relations & Conditions if any
Relation not specified for:
  *No pertinent family history
 
 
Psychosocial History
Who Do You Live With? spouse
Services at Home: None
Primary Language: English
Living Will? no
 
Functional Ability
ADLs
Independent: dressing, eating, toileting, bathing. 
Ambulation: independent
 
Review of Systems
 
Review of Systems
Constitutional:
Reports: see HPI. 
 
Exam & Diagnostic Data
Last 24 Hrs of Vital Signs/I&O
 Vital Signs
 
 
Date Time Temp Pulse Resp B/P B/P Pulse O2 O2 Flow FiO2
 
     Mean Ox Delivery Rate 
 
04/29 2007  76  100/60     
 
04/29 2006  76  100/60     
 
04/29 2006  76  100/60     
 
04/29 1600       Nasal 3.0L 
 
       Cannula  
 
04/29 1554      97 Nasal 3.0L 
 
       Cannula  
 
04/29 1439 98.0 70 18 111/58  97 Room Air Room Air 
 
04/29 1248 98.0 72 20 115/56  98 Nasal 2.0L 
 
       Cannula  
 
04/29 1155  66  74/49     
 
04/29 1049  69 22 93/58  96 Nasal 2.0L 
 
       Cannula  
 
 
 Intake & Output
 
 
 04/29 1600 04/29 0800 04/29 0000
 
Intake Total   
 
Output Total   
 
Balance   
 
    
 
Patient 99.79 kg  
 
Weight   
 
Weight Reported by Patient  
 
Measurement   
 
Method   
 
 
 
 
Physical Exam
General Appearance Alert, Oriented X3, Cooperative
HEENT oral thrush with erythema
Cardiovascular Regular Rate, Normal S1, Normal S2
Lungs decreased breath sound and wheezin on the right side of lung
Abdomen Normal Bowel Sounds, Soft, No Tenderness
Extremities No Clubbing, No Cyanosis, No Edema
 
Assessment/Plan
Assessment:
Mr Araya is a 72 year-old-male with a PMH significant for Lung cancer small cell
carcinoma status post radiation and on prednisone therapy, COPD on 2 L at home, 
CAD s/p CABG and PCI with stend placement, ischemic cardiomyopathy with EF of 20
-25% status post AICD, CKD, hypothyroidism, aortic stenosis, peripheral arterial
disease, JEREMY on CPAP who came to emergency room with chief complaint of sore 
throat eating food and dysgeusia/ageusia 
 
 
Vital signs in ED was notable for low blood pressure 74/49, heart rate 66, no 
fever 96% on 2 L
 
Labs were notable for WBC 13.1, MCV 99, sodium 129, potassium 4.6, glucose 617, 
creatinine 1.9 baseline is 1.6, CL 88 , UA showed glucose more than 1000
 
Patient received 1-1/2 L of normal saline in the ED, 10 units of insulin Novolin
, 1 dose of clotrimazole 10 mg
 
Blood pressure increased to 111/58 after hydration
 
CXR 1. No active cardiopulmonary disease. There is no significant change.
 
2. There is stable moderate plate-like scar/subsegmental atelectasis at the
left base.
 
 
 
Assessment
hx of CHF with low EF of 20-25% with AICD
History of CAD on dual antiplatelet therapy
History of a small cell cancer status post radiation now on prednisone therapy
History of COPD on 2 L
Hyperglycemia
Dehydration and poor oral intake with hypotension
Oral thrush
COPD
Hypothyroidism
 
 
Plan
Admit to general floor
No more IV hydration for now due to low EF
The patient on daily and bedtime sliding scale insulin, Accu-Cheks, diabetic 
diet, check hemoglobin A1c
Hold Lasix for now
Continue Coreg and Imdur and hold if BP is less than 100
Continue inhalers and TRC nebs
Continue p.o. prednisone 50 mg p.o. daily
Continue levothyroxine
Endo consult for a.m.
Nystatin 4 times a day orally swish and swallow
Continue aspirin, Plavix, Ranexa, tamsulosin, Lipitor
Diabetic diet, full code, DVT prophylaxis is subcu heparin and mechanical, 
Tylenol for pain,
 
 
As Ranked By This Provider
Problem List:
 1. Weakness
 
 
Core Measures/Misc (9/17)
 
Acute Coronary Syndrome
ACS Diagnosis: No
 
Congestive Heart Failure
Congestive Heart Failure Diagnosis No
Last Known EF % 25
 
Cerebrovascular Accident
CVA/TIA Diagnosis: No
 
VTE (View Protocol)
VTE Risk Factors Age>40
No Mechanical VTE Prophylaxis d/t N/A MechProphylax Ordered
No VTE Pharm Prophylaxis d/t NA PharmProphylax ordered
 
Sepsis (View protocol)
Sepsis Present: No
 
Olinda Lopez MD 04/29/18 3983:
General Information and HPI
 
Allergies/Medications
Home Med list
Acetaminophen 325 MG CAPSULE   1 CAP PO PRN PAIN  (Reported)
Albuterol Sulfate (Proair Hfa) 90 MCG HFA.AER.AD   2 PUF INH Q4 COPD
Aspirin (Aspirin*) 81 MG TAB.CHEW   1 TAB PO DAILY heart  (Reported)
Atorvastatin Calcium (Lipitor) 40 MG TABLET   1 TAB PO DAILY cholesterol  (
Reported)
Budesonide/Formoterol Fumarate (Symbicort 160-4.5 Mcg Inhaler) 160 MCG-4.5 MCG/
ACTUATION HFA.AER.AD   2 PUF INH BID COPD  (Reported)
Carvedilol 6.25 MG TABLET   1 TAB PO BID HEART/BP  (Reported)
Clopidogrel Bisulfate (Clopidogrel) 75 MG TABLET   1 TAB PO DAILY BLOOD THINNER 
(Reported)
Furosemide (Lasix) 20 MG TABLET   10 MG PO DAILY HEART HEALTH
     .
Insulin Aspart, Recombinant (Novolog Flexpen) 100 UNIT/ML INSULN.PEN   0 SC SEE 
ADMIN CRITERIA DM
     PLEASE check your blood sugar 3 times daily before meals and at
     bedtime and .
     FOLLOW
     80 - 150 MG/DL   9 UNITS
     151- 200 MG/DL   11 UNITS
     201- 250 MG/DL   13 UNITS
     251- 300 MG/DL   15 UNITS
     301- 350 MG/DL   17 UNITS
     351 - 400MG/DL   19 UNITS
     >400 MG/DL       20 UNITS AND CALL YOUR DR.
Insulin Detemir (Levemir Flextouch) 100 UNIT/ML (3 ML) INSULN.PEN   25 UNITS SC 
DAILY AC DM
Levothyroxine Sodium 100 MCG TABLET   1 TAB PO DAILY THYROID  (Reported)
Nitroglycerin (Nitrostat) 0.4 MG TAB.SUBL   1 TAB SL AD PRN CHEST PAIN  (
Reported)
     1st sign of attack; may repeat every 5 minutes until relief; if pain 
persists after 3 tablets in 15 minutes, prompt medical att
Omeprazole 40 MG CAPSULE.   1 CAP PO DAILY GI  (Reported)
Prednisone 10 MG TABLET   0 PO SEE ADMIN CRITERIA lung health
     please take:.
      
     3 pills for 3 days
     2 pills for 3 days
     1 pill for 3 days 
     and then disscuss with your oncologist and PCP regarding stopping
Ranolazine (Ranexa) 500 MG TAB.ER.12H   1 TAB PO BID heart  (Reported)
Sertraline HCl 50 MG TABLET   1 TAB PO DAILY MENTAL HEALTH  (Reported)
Tamsulosin HCl (Flomax) 0.4 MG CAP.ER.24H   1 CAP PO QHS prostate
     Please take at bed time to avoid low blood pressure during the day.
Tiotropium Bromide (Spiriva) 18 MCG CAP.W.DEV   1 CAP INH DAILY COPD  (Reported)
 
 
 
Attending MD Review Statement
 
Attending Statement
Attending MD Statement: examined this patient, discuss w/resident/PA/NP, agreed 
w/resident/PA/NP, reviewed EMR data (avail), discussed with nursing, amended to 
note
Attending Assessment/Plan:
72-year-old male with history of oxygen dependent COPD, coronary artery disease 
status post CABG and stent placement, hypertension, ischemic adenopathy status 
post AICD placement, EF of 20%, hypothyroidism, carotid artery disease, 
peripheral vascular disease, obstructive sleep apnea on CPAP therapy and 
moderate aortic stenosis.  Medical history is  also significant for lung cancer 
status post radiation therapy.  He presents to the emergency room today with 
complaints of abnormal taste in his mouth.  
 
On evaluation in the emergency room he was found to have Significant oral 
thrush.  Patient is on an inhaled corticosteroid.  He admits to no recent 
appropriately of these and medication.  Also noted in the emergency room was 
blood glucose levels of over 600.  Apparently patient has been taking prednisone
50 mg daily.  He reports that this was prescribed by his oncologist.  He is 
unclear why he is taking his medication.  Reports stopping the medication 
recently although this history is not entirely clear as wife is of the 
impression that patient is still taking this medication.  In the emergency room 
he was noted to have orthostatic blood pressure changes.  He was started on IV 
hydration and referred to the medical service for further evaluation.  IV 
hydration was told by the medical team because patient has a history of systolic
dysfunction.
 
 
General appearance: Well-developed and not in any acute distress.
HEENT: Anicteric, no pallor, pupils equal and reactive.  Significant oral 
thrush.
Neck: Supple with no jugular venous distention.
Heart: S1-S2 regular with no audible murmur.
Lungs: Diminished breath sounds left lung field.  No added sounds.
Abdomen: Nondistended with normal bowel sounds.  Soft, nontender with no 
palpable masses.
Extremities: No pedal edema.  No cyanosis.
Skin: Intact
 
 
Problems:
1.  Hyperglycemia; likely steroid-induced.
2.  Orthostatic hypotension
3.  Oxygen dependent COPD
4.  Oral thrush
5.  Coronary artery disease
 6.  Leukocytosis
7.  Hyponatremia likely secondary to hyperglycemia
8.  Hyperkalemia.
9.  Chronic kidney disease stage III.
 
 
Plan:
-Admit to the inpatient medical service.
-Glucose level has improved from 600 down to 300 with IV hydration.  Place 
patient on low-dose sliding scale coverage.
-Orthostatic hypotension is likely due to volume depletion.  Also within the 
differential is adrenal insufficiency given his high-dose steroid use.
-Hold off further hydration due to his cardiomyopathy.  Repeat orthostatic 
vitals.
-Continue history of therapy.  Follow-up with the prescribing doctor regarding 
indication and duration of therapy.
-Nystatin for his oral thrush.
-Continue bronchodilators.  Continue oxygen supplementation.
-Continue cardiac regimen including Coreg and isosorbide with holding 
parameters.
Sodium level-should improve following correction of glucose levels.  Potassium 
level should improve following the administration of insulin.  Repeat serum 
chemistry level.
-Creatinine level is elevated above baseline.  Likely due to volume depletion 
from his hyperglycemia and poor oral intake.  Repeat serum chemistry to monitor 
for improvement.

## 2018-04-29 NOTE — ED GENERAL ADULT
History of Present Illness
 
General
Chief Complaint: General Adult
Stated Complaint: GUMS SORE
Source: patient, old records
Exam Limitations: no limitations
 
Vital Signs & Intake/Output
Vital Signs & Intake/Output
 Vital Signs
 
 
Date Time Temp Pulse Resp B/P B/P Pulse O2 O2 Flow FiO2
 
     Mean Ox Delivery Rate 
 
04/29 1439 98.0 70 18 111/58  97 Room Air Room Air 
 
04/29 1248 98.0 72 20 115/56  98 Nasal 2.0L 
 
       Cannula  
 
04/29 1155  66  74/49     
 
04/29 1049  69 22 93/58  96 Nasal 2.0L 
 
       Cannula  
 
 
 
Allergies
Coded Allergies:
NO KNOWN ALLERGIES (NONE 02/20/18)
 
Reconcile Medications
Acetaminophen 325 MG CAPSULE   1 CAP PO PRN PAIN  (Reported)
Albuterol Sulfate (Proair Hfa) 90 MCG HFA.AER.AD   2 PUF INH Q4 COPD
Aspirin (Aspirin*) 81 MG TAB.CHEW   1 TAB PO DAILY heart  (Reported)
Atorvastatin Calcium (Lipitor) 40 MG TABLET   1 TAB PO DAILY cholesterol  (
Reported)
Budesonide/Formoterol Fumarate (Symbicort 160-4.5 Mcg Inhaler) 160 MCG-4.5 MCG/
ACTUATION HFA.AER.AD   2 PUF INH BID COPD  (Reported)
Carvedilol 6.25 MG TABLET   1 TAB PO BID HEART/BP  (Reported)
Clopidogrel Bisulfate (Clopidogrel) 75 MG TABLET   1 TAB PO DAILY BLOOD THINNER 
(Reported)
Furosemide (Lasix) 20 MG TABLET   1 TAB PO DAILY DIURETIC  (Reported)
Isosorbide Mononitrate (Isosorbide Mononitrate ER) 60 MG TAB.ER.24H   1 TAB PO 
DAILY    (Reported)
Levothyroxine Sodium 100 MCG TABLET   1 TAB PO DAILY THYROID  (Reported)
Nitroglycerin (Nitrostat) 0.4 MG TAB.SUBL   1 TAB SL AD PRN CHEST PAIN  (
Reported)
     1st sign of attack; may repeat every 5 minutes until relief; if pain 
persists after 3 tablets in 15 minutes, prompt medical att
Omeprazole 40 MG CAPSULE.DR   1 CAP PO DAILY GI  (Reported)
Ranolazine (Ranexa) 500 MG TAB.ER.12H   1 TAB PO BID heart  (Reported)
Sertraline HCl 50 MG TABLET   1 TAB PO DAILY MENTAL HEALTH  (Reported)
Tamsulosin HCl (Flomax) 0.4 MG CAP.ER.24H   1 CAP PO QHS prostate
     Please take at bed time to avoid low blood pressure during the day.
Tiotropium Bromide (Spiriva) 18 MCG CAP.W.DEV   1 CAP INH DAILY COPD  (Reported)
 
Core Measure Meds Pre-Hospital aspirin
Triage Note:
REQUESTING EVALUATION FOR PAINFULL GUMS. SECONDARY
TO THIS HE ALSO REPORTS DECREASE IN APPETTITE.
THIS PROBLEM BEGAN 5 DAYS AGO.
Triage Nurses Notes Reviewed? yes
Onset: Last week
Duration: day(s):, constant, continues in ED
Timing: recent history
Injury Environment: home
Severity: severe
Modifying Factors:
Worsens With: eating. 
HPI:
5 days prior to admission patient complains of bumps on his gums with whitish 
yellow discoloration to his tongue or cheeks associated with anorexia change in 
taste of food and fluid with increasing weakness unable to stand and walk.
He denies fever chills nausea vomiting diarrhea abdominal pain chest pain 
shortness breath headache dysuria bleeding.
 
Past History
 
Travel History
Traveled to [place] past 21 day No
 
Medical History
Any Pertinent Medical History? see below for history
Neurological: NONE
EENT: NONE
Cardiovascular: CARDIAC STENTS CABG CAROTID ARTERY BYPASS DEFIBRILLATOR
Respiratory: COPD
Gastrointestinal: NONE
Hepatic: NONE
Renal: NONE
Musculoskeletal: NONE
Psychiatric: NONE
Endocrine: hypothyroidism
Blood Disorders: DVT
Cancer(s): NONE
GYN/Reproductive: NONE
History of MRSA: No
History of VRE: No
History of CDIFF: No
Influenza Vaccine: 11/15/17
 
Surgical History
Surgical History: CABG, CAROTID ARTECTOMY nerve thermoablation to reduce back 
pain 
 
Psychosocial History
Who do you live with Spouse
Services at Home None
What is your primary language English
Tobacco Use: Quit >30 days ago
 
Family History
Family History, If Any:
Relation not specified for:
  *No pertinent family history
 
Hx Contributory? No
 
Review of Systems
 
Review of Systems
Constitutional:
Reports: see HPI, weakness. 
EENTM:
Reports: see HPI, mouth pain. 
Respiratory:
Reports: no symptoms. 
Cardiovascular:
Reports: no symptoms. 
GI:
Reports: no symptoms. 
Genitourinary:
Reports: no symptoms. 
Musculoskeletal:
Reports: no symptoms. 
Skin:
Reports: no symptoms. 
Neurological/Psychological:
Reports: see HPI, weakness. 
Hematologic/Endocrine:
Reports: no symptoms. 
Immunologic/Allergic:
Reports: no symptoms. 
All Other Systems: Reviewed and Negative
 
Physical Exam
 
Physical Exam
General Appearance: well developed/nourished, alert, awake, anxious, moderate 
distress, obese
Head: atraumatic, normal appearance
Eyes:
Bilateral: normal appearance, PERRL, EOMI. 
Ears, Nose, Throat: hearing grossly normal, pharyngeal erythema, dry mucous 
membranes with curd-like yellow discharge on tongue gums cheeks, poor dentition
Neck: normal inspection, supple, full range of motion, no midline tenderness
Respiratory: chest non-tender, no respiratory distress, quiet respiration, 
decreased breath sounds
Cardiovascular: regular rate/rhythm, normal peripheral pulses, norml femoral 
pulses equa
Peripheral Pulses:
4+ carotid (R), 4+ carotid (L)
Gastrointestinal: normal bowel sounds, soft, non-tender, no organomegaly
Back: normal inspection, normal range of motion, no vertebral tenderness
Extremities: normal inspection, normal capillary refill, normal range of motion,
no edema
Neurologic/Psych: no motor/sensory deficits, awake, alert, oriented x 3, normal 
mood/affect, CNs II-XII nml as tested
Reflexes:
2+: bicep (R), bicep (L). 
Skin: intact, normal color, warm/dry
Lymphatic: no anterior cervical kanika
 
Core Measures
ACS in differential dx? No
CVA/TIA Diagnosis: No
Sepsis Present: No
Sepsis Focused Exam Completed? No
 
Progress
Differential Diagnoses
I considered the following diagnoses in my evaluation of the patient: Oral 
thrush herpes labialis dehydration
 
Plan of Care:
 Orders
 
 
Procedure Date/time Status
 
CBC WITHOUT DIFFERENTIAL 04/30 0600 Active
 
BASIC ELECTROLYTES PLUS BUN&CR 04/30 0600 Active
 
Consistent Carbohydrate 2 04/29 D Active
 
FingerStick- Glucose 04/29 1426 Active
 
URINALYSIS 04/29 1315 Complete
 
Patient Data 04/29 1310 Active
 
OXYGEN SETUP (GEN) 04/29 1227 Active
 
Saline Lock 04/29 1227 Active
 
Admit to inpatient 04/29 1227 Active
 
Vital Signs 04/29 1227 Active
 
Activity/Ambulation 04/29 1227 Active
 
Code Status 04/29 1227 Active
 
Intake & Output 04/29 1121 Active
 
MISTAKE 04/29 1111 Active
 
TROPONIN LEVEL 04/29 1111 Complete
 
COMPREHENSIVE METABOLIC PANEL 04/29 1111 Complete
 
CBC WITHOUT DIFFERENTIAL 04/29 1111 Complete
 
EKG 04/29 1111 Active
 
 
 Laboratory Tests
 
 
 
04/29/18 1427:
Urine Color YEL, Urine Clarity CLEAR, Urine pH 6.0, Ur Specific Gravity 1.015, 
Urine Protein NEG, Urine Ketones NEG, Urine Nitrite NEG, Urine Bilirubin NEG, 
Urine Urobilinogen 0.2, Ur Leukocyte Esterase NEG, Ur Microscopic EXAM NOT 
REQUIRED, Urine Hemoglobin NEG, Urine Glucose >=1000  H
 
04/29/18 1135:
Anion Gap 12, Estimated GFR 35  L, BUN/Creatinine Ratio 22.1, Glucose 617 *H, 
Calcium 9.2, Total Bilirubin 1.3, AST 14  L, ALT 25, Alkaline Phosphatase 127  H
, Troponin I 0.06, Total Protein 6.2  L, Albumin 3.8, Globulin 2.4, Albumin/
Globulin Ratio 1.6, CBC w Diff NO MAN DIFF REQ, RBC 4.48  L, MCV 99.7  H, MCH 
33.3  H, MCHC 33.3, RDW 15.4  H, MPV 9.0, Gran % 90.6  H, Lymphocytes % 3.6  L, 
Monocytes % 5.6, Eosinophils % 0, Basophils % 0.2, Absolute Granulocytes 11.9  H
, Absolute Lymphocytes 0.5  L, Absolute Monocytes 0.7  H, Absolute Eosinophils 0
, Absolute Basophils 0
 
Initial ED EKG: normal axis, normal intervals, normal p-waves, normal QRS 
complex, normal sinus rhythm, nonspecific ST T wave chg
Prior EKG: unchanged
Rhythm Strip: normal sinus rhythm
 
Departure
 
Departure
Disposition: STILL A PATIENT
Condition: Stable
Clinical Impression
Primary Impression: Hyperglycemia without ketosis
Secondary Impressions: Acute renal failure, Dehydration syndrome, Hyperkalemia, 
Leukocytosis, Oral thrush
Referrals:
Durga Plata MD (PCP/Family)
 
Departure Forms:
Customer Survey
General Discharge Information
 
Admission Note
Spoke With:
Olinda Lopez MD
Documentation of Exam:
Documentation of any treatments & extenuating circumstances including Concerns 
Regarding Discharge (functional status, medication knowledge or non-compliance, 
living conditions, etc.) that warrant an admission rather than observation: 
Serial lab exam IV hydration treatment of oral thrush diabetic education 
endocrinology evaluation medication adjustment continuing care discharge 
planning
 
 
Critical Care Note
 
Critical Care Note
Critical Care Time: 30-74 min (45)

## 2018-04-29 NOTE — RADIOLOGY REPORT
EXAMINATION:
XR CHEST
 
CLINICAL INFORMATION:
Cough and weakness.
 
COMPARISON:
CT chest and chest radiograph dated 02/20/2018.
 
TECHNIQUE:
Frontal and lateral views of the chest were obtained.
 
FINDINGS:
The heart, great vessels, pulmonary vasculature and mediastinum are stable.
There has been a prior CABG procedure. There is stable moderate plate-like
scar/subsegmental atelectasis at the left base. The right lung field appears
relatively clear. No infiltrate, effusion or pneumothorax is seen. There is
no acute osseous abnormality. A pacemaker/defibrillator device shows no lead
fracture or change in lead tip positions. Abdominal aortic stent material is
noted.
 
IMPRESSION:
 
1. No active cardiopulmonary disease. There is no significant change.
 
2. There is stable moderate plate-like scar/subsegmental atelectasis at the
left base.

## 2018-04-29 NOTE — ADMISSION CERTIFICATION
Admission Certification
 
Certification Statement
- As attending physician, I certify that at the time of
- admission, based on clinical presentation, severity of
- symptoms, need for further diagnostic testing and
- therapeutic interventions, and risk of adverse outcomes
- without in-hospital treatment, in my clinical assessment,
- this patient requires an acute hospital stay for a minimum
- of two nights or longer. I have also considered psychsocial
- factors such as support system, advanced age, financial
- issues, cognitive issues, and failed out-patient treatments,
- past re-admission history, safety of patient, and lack of
- compliance as applicable.
Specific rationale supporting this admission is:
Patient is being hospitalized for management of his markedly elevated glucose 
level.

## 2018-04-30 VITALS — DIASTOLIC BLOOD PRESSURE: 64 MMHG | SYSTOLIC BLOOD PRESSURE: 120 MMHG

## 2018-04-30 VITALS — SYSTOLIC BLOOD PRESSURE: 102 MMHG | DIASTOLIC BLOOD PRESSURE: 54 MMHG

## 2018-04-30 VITALS — SYSTOLIC BLOOD PRESSURE: 112 MMHG | DIASTOLIC BLOOD PRESSURE: 60 MMHG

## 2018-04-30 VITALS — DIASTOLIC BLOOD PRESSURE: 70 MMHG | SYSTOLIC BLOOD PRESSURE: 122 MMHG

## 2018-04-30 LAB
ABSOLUTE GRANULOCYTE CT: 8.6 /CUMM (ref 1.4–6.5)
BASOPHILS # BLD: 0 /CUMM (ref 0–0.2)
BASOPHILS NFR BLD: 0 % (ref 0–2)
EOSINOPHIL # BLD: 0 /CUMM (ref 0–0.7)
EOSINOPHIL NFR BLD: 0 % (ref 0–5)
ERYTHROCYTE [DISTWIDTH] IN BLOOD BY AUTOMATED COUNT: 16.1 % (ref 11.5–14.5)
GRANULOCYTES NFR BLD: 93.3 % (ref 42.2–75.2)
HCT VFR BLD CALC: 38.9 % (ref 42–52)
LYMPHOCYTES # BLD: 0.3 /CUMM (ref 1.2–3.4)
MCH RBC QN AUTO: 34.3 PG (ref 27–31)
MCHC RBC AUTO-ENTMCNC: 34.7 G/DL (ref 33–37)
MCV RBC AUTO: 98.8 FL (ref 80–94)
MONOCYTES # BLD: 0.3 /CUMM (ref 0.1–0.6)
PLATELET # BLD: 111 /CUMM (ref 130–400)
PMV BLD AUTO: 9.4 FL (ref 7.4–10.4)
RED BLOOD CELL CT: 3.93 /CUMM (ref 4.7–6.1)
WBC # BLD AUTO: 9.2 /CUMM (ref 4.8–10.8)

## 2018-04-30 NOTE — CONS- ENDOCRINOLOGY
General Information and HPI
 
Consulting Request
Date of Consult: 04/30/18
Requested By: medical team
Reason for Consult:
management of steroid induced hyperglycemia/diabetes type 2
Source of Information: patient, old records
Exam Limitations: no limitations
History of Present Illness:
 72 year-old-male with a PMH significant for small cell lung carcinoma status 
post radiation and on prednisone therapy ( prednisone 50 mg daily since 4/11/
2018), COPD on 2 L at home, CAD s/p CABG and PCI with stend placement, ischemic 
cardiomyopathy with EF of 20-25% status post AICD, CKD, hypothyroidism, aortic 
stenosis, peripheral arterial disease, JEREMY on CPAP presented to emergency room 
with chief complaint of sore throat eating food and dysgeusia.
 
Blood work showed glucose 617, Cr 1.9, K 5.6, bicarb 29 and sodium 129. HbA1c is
pending.
 
His mother had diabetes.
 
 
Allergies/Medications
Allergies:
Coded Allergies:
NO KNOWN ALLERGIES (NONE 02/20/18)
 
Home Med List:
Acetaminophen 325 MG CAPSULE   1 CAP PO PRN PAIN  (Reported)
Albuterol Sulfate (Proair Hfa) 90 MCG HFA.AER.AD   2 PUF INH Q4 COPD
Aspirin (Aspirin*) 81 MG TAB.CHEW   1 TAB PO DAILY heart  (Reported)
Atorvastatin Calcium (Lipitor) 40 MG TABLET   1 TAB PO DAILY cholesterol  (
Reported)
Budesonide/Formoterol Fumarate (Symbicort 160-4.5 Mcg Inhaler) 160 MCG-4.5 MCG/
ACTUATION HFA.AER.AD   2 PUF INH BID COPD  (Reported)
Carvedilol 6.25 MG TABLET   1 TAB PO BID HEART/BP  (Reported)
Clopidogrel Bisulfate (Clopidogrel) 75 MG TABLET   1 TAB PO DAILY BLOOD THINNER 
(Reported)
Furosemide (Lasix) 20 MG TABLET   1 TAB PO DAILY DIURETIC  (Reported)
Isosorbide Mononitrate (Isosorbide Mononitrate ER) 60 MG TAB.ER.24H   1 TAB PO 
DAILY    (Reported)
Levothyroxine Sodium 100 MCG TABLET   1 TAB PO DAILY THYROID  (Reported)
Nitroglycerin (Nitrostat) 0.4 MG TAB.SUBL   1 TAB SL AD PRN CHEST PAIN  (
Reported)
     1st sign of attack; may repeat every 5 minutes until relief; if pain 
persists after 3 tablets in 15 minutes, prompt medical att
Omeprazole 40 MG CAPSULE.DR   1 CAP PO DAILY GI  (Reported)
Prednisone 10 MG TABLET   50 MG PO DAILY LUNG  (Reported)
Ranolazine (Ranexa) 500 MG TAB.ER.12H   1 TAB PO BID heart  (Reported)
Sertraline HCl 50 MG TABLET   1 TAB PO DAILY MENTAL HEALTH  (Reported)
Tamsulosin HCl (Flomax) 0.4 MG CAP.ER.24H   1 CAP PO QHS prostate
     Please take at bed time to avoid low blood pressure during the day.
Tiotropium Bromide (Spiriva) 18 MCG CAP.W.DEV   1 CAP INH DAILY COPD  (Reported)
 
 
Review of Systems
 
Review of Systems
Constitutional:
Reports: malaise, unexplained weight loss. 
Cardiovascular:
Denies: chest pain. 
Respiratory:
Reports: short of breath (on oxygen). 
GI:
Denies: abdominal pain. 
Genitourinary:
Reports: frequency. 
Hematologic/Endocrine:
Reports: polyuria, polydipsia. 
 
Past History
 
Travel History
Traveled to [place] past 21 day No
 
Medical History
Neurological: NONE
EENT: NONE
Cardiovascular: CARDIAC STENTS CABG CAROTID ARTERY BYPASS DEFIBRILLATOR
Respiratory: COPD
Gastrointestinal: NONE
Hepatic: NONE
Renal: NONE
Musculoskeletal: NONE
Psychiatric: NONE
Endocrine: hypothyroidism
Blood Disorders: DVT
Cancer(s): lung cancer
GYN/Reproductive: NONE
 
Surgical History
Surgical History: CABG, CAROTID ARTECTOMY nerve thermoablation to reduce back 
pain 
 
Family History
Relations & Conditions If Any:
Relation not specified for:
  *No pertinent family history
 
 
Psychosocial History
Who Do You Live With? spouse
Services at Home: None
Primary Language: English
Smoking Status: Current Everyday Smoker
Living Will? no
 
Functional Ability
ADLs
Independent: dressing, eating, toileting, bathing. 
Ambulation: independent
 
Exam & Diagnostic Data
Last 24 Hrs of Vital Signs/I&O
 Vital Signs
 
 
Date Time Temp Pulse Resp B/P B/P Pulse O2 O2 Flow FiO2
 
     Mean Ox Delivery Rate 
 
04/30 0843 97.8 73  120/64     
 
04/30 0842 97.8 73 20 120/64     
 
04/30 0842 97.8 73 20 120/64     
 
04/30 0610  73    94   
 
04/30 0559 97.8 79 20 120/64  95 Room Air  
 
04/30 0302      96   
 
04/30 0030  79    97   
 
04/30 0000      97 CPAP  
 
04/29 2330  85    97   
 
04/29 2201       Nasal 3.0L 
 
       Cannula  
 
04/29 2149 97.7 74 20 100/60  97   
 
04/29 2007  76  100/60     
 
04/29 2006  76  100/60     
 
04/29 2006  76  100/60     
 
04/29 1600       Nasal 3.0L 
 
       Cannula  
 
04/29 1554      97 Nasal 3.0L 
 
       Cannula  
 
04/29 1439 98.0 70 18 111/58  97 Room Air Room Air 
 
04/29 1248 98.0 72 20 115/56  98 Nasal 2.0L 
 
       Cannula  
 
04/29 1155  66  74/49     
 
04/29 1049  69 22 93/58  96 Nasal 2.0L 
 
       Cannula  
 
 
 Intake & Output
 
 
 04/30 1600 04/30 0800 04/30 0000
 
Intake Total   700
 
Output Total  600 800
 
Balance  -600 -100
 
    
 
Intake, Oral   700
 
Number  1 
 
Bowel   
 
Movements   
 
Output, Urine  600 800
 
 
 
 
Physical Exam
General Appearance: no apparent distress
Neck: normal inspection
Respiratory: decreased breath sounds
Cardiovascular: regular rate/rhythm
Gastrointestinal: soft, non-tender, distention
Extremities: no edema
Labs/Kapil Results:
 Laboratory Tests
 
 
 04/30 04/29 04/29
 
 0748 1630 1630
 
Chemistry   
 
  Sodium (137 - 145 mmol/L) Pending  131  L
 
  Potassium (3.5 - 5.1 mmol/L) Pending  4.8
 
  Chloride (98 - 107 mmol/L) Pending  96  L
 
  Carbon Dioxide (22 - 30 mmol/L) Pending  27
 
  Anion Gap (5 - 16) Pending  8
 
  BUN (9 - 20 mg/dL) Pending  38  H
 
  Creatinine (0.7 - 1.2 mg/dL) Pending  1.6  H
 
  Estimated GFR (>60 ml/min)   42  L
 
  BUN/Creatinine Ratio (7 - 25 %) Pending  23.8
 
  Hemoglobin A1c  Pending 
 
Hematology   
 
  CBC w Diff Pending  
 
  WBC Pending  
 
  RBC Pending  
 
  Hgb Pending  
 
  Hct Pending  
 
  MCV Pending  
 
  MCH Pending  
 
  MCHC Pending  
 
  RDW Pending  
 
  Plt Count Pending  
 
  MPV Pending  
 
 
 
 
 04/29 04/29
 
 1427 1135
 
Chemistry  
 
  Sodium (137 - 145 mmol/L)  129  L
 
  Potassium (3.5 - 5.1 mmol/L)  5.6  H
 
  Chloride (98 - 107 mmol/L)  88  L
 
  Carbon Dioxide (22 - 30 mmol/L)  29
 
  Anion Gap (5 - 16)  12
 
  BUN (9 - 20 mg/dL)  42  H
 
  Creatinine (0.7 - 1.2 mg/dL)  1.9  H
 
  Estimated GFR (>60 ml/min)  35  L
 
  BUN/Creatinine Ratio (7 - 25 %)  22.1
 
  Glucose (65 - 99 mg/dL)  617 *H
 
  Calcium (8.4 - 10.2 mg/dL)  9.2
 
  Total Bilirubin (0.2 - 1.3 mg/dL)  1.3
 
  AST (17 - 59 U/L)  14  L
 
  ALT (21 - 72 U/L)  25
 
  Alkaline Phosphatase (< 127 U/L)  127  H
 
  Troponin I (<0.11 ng/ml)  0.06
 
  Total Protein (6.3 - 8.2 g/dL)  6.2  L
 
  Albumin (3.5 - 5.0 g/dL)  3.8
 
  Globulin (1.9 - 4.2 gm/dL)  2.4
 
  Albumin/Globulin Ratio (1.1 - 2.2 %)  1.6
 
  TSH (0.270 - 4.200 uIU/mL)  2.330
 
  Free T4 (0.78 - 2.44 ng/dL)  1.54
 
Hematology  
 
  CBC w Diff  NO MAN DIFF REQ
 
  WBC (4.8 - 10.8 /CUMM)  13.1  H
 
  RBC (4.70 - 6.10 /CUMM)  4.48  L
 
  Hgb (14.0 - 18.0 G/DL)  14.9
 
  Hct (42 - 52 %)  44.7
 
  MCV (80.0 - 94.0 FL)  99.7  H
 
  MCH (27.0 - 31.0 PG)  33.3  H
 
  MCHC (33.0 - 37.0 G/DL)  33.3
 
  RDW (11.5 - 14.5 %)  15.4  H
 
  Plt Count (130 - 400 /CUMM)  141
 
  MPV (7.4 - 10.4 FL)  9.0
 
  Gran % (42.2 - 75.2 %)  90.6  H
 
  Lymphocytes % (20.5 - 51.1 %)  3.6  L
 
  Monocytes % (1.7 - 9.3 %)  5.6
 
  Eosinophils % (0 - 5 %)  0
 
  Basophils % (0.0 - 2.0 %)  0.2
 
  Absolute Granulocytes (1.4 - 6.5 /CUMM)  11.9  H
 
  Absolute Lymphocytes (1.2 - 3.4 /CUMM)  0.5  L
 
  Absolute Monocytes (0.10 - 0.60 /CUMM)  0.7  H
 
  Absolute Eosinophils (0.0 - 0.7 /CUMM)  0
 
  Absolute Basophils (0.0 - 0.2 /CUMM)  0
 
Urines  
 
  Urine Color (YEL,AMB,STR) YEL 
 
  Urine Clarity (CLEAR) CLEAR 
 
  Urine pH (5.0 - 8.0) 6.0 
 
  Ur Specific Gravity (1.001 - 1.035) 1.015 
 
  Urine Protein (NEG,<30 MG/DL) NEG 
 
  Urine Ketones (NEG) NEG 
 
  Urine Nitrite (NEG) NEG 
 
  Urine Bilirubin (NEG) NEG 
 
  Urine Urobilinogen (0.1  -  1.0 EU/dl) 0.2 
 
  Ur Leukocyte Esterase (NEG) NEG 
 
  Ur Microscopic EXAM NOT REQUIRED 
 
  Urine Hemoglobin (NEG) NEG 
 
  Urine Glucose (N MG/DL) >=1000  H 
 
 
 
 
Assessment/Plan
Assessment/Plan
 72 year-old-male with a PMH significant for small cell lung carcinoma status 
post radiation and on prednisone therapy ( prednisone 50 mg daily since 4/11/
2018), COPD on 2 L at home, CAD s/p CABG and PCI with stend placement, ischemic 
cardiomyopathy with EF of 20-25% status post AICD, CKD, hypothyroidism, aortic 
stenosis, peripheral arterial disease, JEREMY on CPAP presented to emergency room 
with chief complaint of sore throat eating food and dysgeusia. He was newly 
diagnosed with diabetes with glucose of of 617 after he was placed on prednisone
50 mg daily. 
 
Plan:
1. start Levemir 15 units daily;
2. adjust Novolog coverage before meals and Novolog coverage at bedtime; detail 
see the inpatient DM orders;
3. monitor FSGs;
4. diabetes education/ glucometer teaching/nutrition consult;
will follow.
 
 
 
 
Inpatient Diabetes Orders
Before Each Meal:
   Bolus Insulin: Novolog
   < 80 mg/dl: no coverage
    mg/dl: 5 units
   101-120 mg/dl: 5 units
   121-150 mg/dl: 5 units
   151-200 mg/dl: 7 units
   201-250 mg/dl: 9 units
   251-300 mg/dl: 11 units
   301-350 mg/dl: 13 units
   351-400 mg/dl: 15 units
   > 400 mg/dl: 16 units
Bedtime:
   Bolus Insulin: Novolog
   < 80 mg/dl: no coverage
    mg/dl: no coverage
   101-120 mg/dl: no coverage
   121-150 mg/dl: no coverage
   151-200 mg/dl: no coverage
   201-250 mg/dl: no coverage
   251-300 mg/dl: 2 units
   301-350 mg/dl: 3 units
   351-400 mg/dl: 4 units
   > 400 mg/dl: 5 units
 
Consult Acknowledgment
- Thank you for your consult request.

## 2018-04-30 NOTE — EVENT NOTE
Event Note
Event Note:
Patient's Oncologist Dr. Davalos ([phone]) was reached over the phone 
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  
Patient has been on prednisone 50 mg, per Dr. Davalos, for a while, however had
been resolving hyperglycemia events.  Patient's wife was not satisfied regarding
the prednisone-induced hyperglycemia, and has contacted us and Dr. Davalos 
today and requested prednisone taper.
Dr. Davalos suggested to start prednisone tapering tomorrow at 40 mg daily p.o.
, and may continue a 10 mg taper every 3-4 days.  Prednisone 40 mg daily p.o. 
was ordered for 5/1/2018, and will sign out to the morning team to contact Dr. Villatoro for the phone again during morning rounds to discuss further taper plan
, and prescribing prednisone after discharge from this hospital stay.

## 2018-04-30 NOTE — PN- HOUSESTAFF
Zahira Blake MD,[facility] 18 0709:
Subjective
Follow-up For:
Oral thrush
Diabetes mellitus
Dehydration
Subjective:
Patient visited today, was lying in bed  in no acute distress, was alert and 
oriented.
 
Reported improved oral lesions. 
 
No fever or chills, no shortness of breathing, no chest pain, no other events.
 
-DM considering Hb a1c
- started on levemir and adjusted sliding scale
- orthostatic positive
 
Review of Systems
Constitutional:
Reports: see HPI. 
 
Objective
Last 24 Hrs of Vital Signs/I&O
 Vital Signs
 
 
Date Time Temp Pulse Resp B/P B/P Pulse O2 O2 Flow FiO2
 
     Mean Ox Delivery Rate 
 
 1350 98.7 60 20 112/60  95 Room Air  
 
 1115  81  122/70     
 
 1056      94 Nasal 2.0L 
 
       Cannula  
 
 0843 97.8 73  120/64     
 
 0842 97.8 73 20 120/64     
 
 0842 97.8 73 20 120/64     
 
 0610  73    94   
 
 0559 97.8 79 20 120/64  95 Room Air  
 
 0302      96   
 
 0030  79    97   
 
 0000      97 CPAP  
 
 2330  85    97   
 
 2201       Nasal 3.0L 
 
       Cannula  
 
 2149 97.7 74 20 100/60  97   
 
2007  76  100/60     
 
 2006  76  100/60     
 
 2006  76  100/60     
 
 
 Intake & Output
 
 
  1600  0800  0000
 
Intake Total   700
 
Output Total 600 600 800
 
Balance -600 -600 -100
 
    
 
Intake, Oral   700
 
Number  1 
 
Bowel   
 
Movements   
 
Output, Urine 600 600 800
 
 
 
 
Physical Exam
General Appearance: Alert, Oriented X3, Cooperative, No Acute Distress
Skin Temp/Moisture Exam: Warm/Dry
Sepsis Skin Exam (color): Normal for Ethnicity
HEENT: Atraumatic, EOMI, improved oral lesions
Cardiovascular: Normal S1, Normal S2
Lungs: Normal Air Movement
Abdomen: Soft, No Tenderness
Neurological: Normal Speech, Strength at 5/5 X4 Ext
Current Medications:
 Current Medications
 
 
  Sig/Ly Start time  Last
 
Medication Dose Route Stop Time Status Admin
 
Acetaminophen 650 MG Q6P PRN  1530 AC 
 
  PO   
 
Albuterol Sulfate 3 ML BID  1056 AC 
 
  INH   1057
 
Aspirin 81 MG DAILY  0900 AC 
 
  PO   0843
 
Atorvastatin Calcium 40 MG DAILY@1700  1700 AC 
 
  PO   1744
 
Budesonide/ 2 PUF BID  2100 AC 
 
Formoterol Fumarate  INH   0840
 
Carvedilol 6.25 MG BID  2100 AC 
 
  PO   0842
 
Clopidogrel Bisulfate 75 MG DAILY  0900 AC 
 
  PO   0842
 
Heparin Sodium  5,000 UNIT Q8  0600 AC 
 
(Porcine)  SC   1406
 
Insulin Aspart 0 TIDAC  0800 AC 
 
  SC   1219
 
Insulin Aspart 0 AT BEDTIME  2100 AC 
 
  SC   
 
Insulin Detemir 15 UNITS DAILY  0900 AC 
 
  SC   0904
 
Isosorbide  60 MG DAILY  0900 AC 
 
Mononitrate  PO   0843
 
Levothyroxine Sodium 0.1 MG DAILY AC  0700 AC 
 
  PO   0652
 
Lidocaine 15 ML BID  0900 AC 
 
  PO   0913
 
Nitroglycerin 0.4 MG EVERY 5 MIN PRN  1615 AC 
 
     
 
Nystatin 5 ML 4 TIMES/DAY  1458 AC 
 
  PO   1218
 
Omeprazole 40 MG DAILY AC  0700 AC 
 
  PO   0652
 
Patient Medication  1 ED ONE ONE  1430 OH 
 
Teaching  ED  1431  
 
Prednisone 50 MG DAILY  1700 AC 
 
  PO   0843
 
Ranolazine 500 MG BID  2100 AC 
 
  PO   0842
 
Sertraline HCl 50 MG DAILY  0900 AC 
 
  PO   0843
 
Tamsulosin HCl 0.4 MG AT BEDTIME  2100 AC 
 
  PO   
 
Tiotropium Bromide 1 PUF DAILY  1607 AC 
 
  INH   1744
 
 
 
 
Last 24 Hrs of Lab/Kapil Results
Last 24 Hrs of Labs/Mics:
 Laboratory Tests
 
18 0748:
Anion Gap 9, Estimated GFR 50  L, BUN/Creatinine Ratio 25.0, CBC w Diff NO MAN 
DIFF REQ, RBC 3.93  L, MCV 98.8  H, MCH 34.3  H, MCHC 34.7, RDW 16.1  H, MPV 9.4
, Gran % 93.3  H, Lymphocytes % 3.1  L, Monocytes % 3.6, Eosinophils % 0, 
Basophils % 0, Absolute Granulocytes 8.6  H, Absolute Lymphocytes 0.3  L, 
Absolute Monocytes 0.3, Absolute Eosinophils 0, Absolute Basophils 0
 
 
Assessment/Plan
Assessment:
Mr Araya is a 72 year-old-male with a PMH significant for Lung cancer small cell
carcinoma status post radiation and on prednisone therapy, COPD on 2 L at home, 
CAD s/p CABG and PCI with stend placement, ischemic cardiomyopathy with EF of 20
-25% status post AICD, CKD, hypothyroidism, aortic stenosis, peripheral arterial
disease, JEREMY on CPAP who came to emergency room with chief complaint of sore 
throat eating food and dysgeusia/ageusia 
 
 
Vital signs in ED was notable for low blood pressure 74/49, heart rate 66, no 
fever 96% on 2 L
 
Labs were notable for WBC 13.1, MCV 99, sodium 129, potassium 4.6, glucose 617, 
creatinine 1.9 baseline is 1.6, CL 88 , UA showed glucose more than 1000
 
Patient received 1-1/2 L of normal saline in the ED, 10 units of insulin Novolin
, 1 dose of clotrimazole 10 mg
 
Blood pressure increased to 111/58 after hydration
 
CXR 1. No active cardiopulmonary disease. There is no significant change.
 
2. There is stable moderate plate-like scar/subsegmental atelectasis at the
left base.
 
Assessment
hx of CHF with low EF of 20-25% with AICD
History of CAD on dual antiplatelet therapy
History of a small cell cancer status post radiation now on prednisone therapy
History of COPD on 2 L
Hyperglycemia, DM
Dehydration and poor oral intake with hypotension
Oral thrush - improved
COPD
Hypothyroidism
 
 
Plan
Contiue admission to general floor
- No more IV hydration for now due to low EF
- Hb a1c 12
- levemir and sliding scale
- follow endo consult
- Nystatin 4 times a day orally swish and swallow - currently significant 
improvement
- home medications:
Hold Lasix for now
Continue Coreg and Imdur and hold if BP is less than 100
Continue inhalers and TRC nebs
Continue p.o. prednisone 50 mg p.o. daily
Continue levothyroxine
Continue aspirin, Plavix, Ranexa, tamsulosin, Lipitor
 
 
Diabetic diet, full code, DVT prophylaxis is subcu heparin and mechanical, 
Tylenol for pain,
 
Problem List:
 1. Oral thrush
 
 2. Dehydration syndrome
 
 3. Hyperglycemia without ketosis
 
Pain Ratin
Pain Location:
Continue current medication
Pain Goal: Pain 4 or less
Pain Plan:
continue current plan
Tomorrow's Labs & Rationales:
CBC BEP
 
 
Jessica GLASS,[place] 18 1154:
Attending MD Review Statement
 
Attending Statement
Attending MD Statement: examined this patient, discuss w/resident/PA/NP, agreed 
w/resident/PA/NP, reviewed EMR data (avail), discussed with nursing, discussed 
with case mgmt, amended to note
Attending Assessment/Plan:
Patient seen and examined.  Resting comfortably and not in any acute distress.  
No issues overnight reported by nursing staff.  He continues to report poor 
taste sensation but denies any nausea or vomiting.  He is tolerating his meals. 
On examination oral thrush appears to be improving.  Blood glucose levels have 
improved although still elevated none in the 300s.
His hemoglobin A1c is markedly elevated at 12.5 does confirm the diagnosis of 
diabetes.  Endocrinology consultation appreciated.  Patient is willing to be 
discharged home on insulin therapy.  Patient continues to have orthostatic blood
pressure changes today.  IV hydration was limited due to his chronic systolic 
heart failure.
 
Recommendations:
-Begin insulin therapy as recommended by the endocrinology service.
-Monitor patient's response overnight.
-Nutritionist consultation for diabetes education.  Nursing staff to instruct 
patient on how to check fingersticks and administer insulin.
-Check random cortisol level to rule out adrenal insufficiency as etiology of 
his orthostatic blood pressure changes.  We do anticipate levels to be decreased
due to the exogenous steroid use.  Patient may also have autonomic dysfunction 
from diabetes.

## 2018-05-01 VITALS — DIASTOLIC BLOOD PRESSURE: 60 MMHG | SYSTOLIC BLOOD PRESSURE: 118 MMHG

## 2018-05-01 VITALS — DIASTOLIC BLOOD PRESSURE: 74 MMHG | SYSTOLIC BLOOD PRESSURE: 100 MMHG

## 2018-05-01 VITALS — DIASTOLIC BLOOD PRESSURE: 60 MMHG | SYSTOLIC BLOOD PRESSURE: 110 MMHG

## 2018-05-01 VITALS — DIASTOLIC BLOOD PRESSURE: 64 MMHG | SYSTOLIC BLOOD PRESSURE: 106 MMHG

## 2018-05-01 LAB
ABSOLUTE GRANULOCYTE CT: 8.1 /CUMM (ref 1.4–6.5)
BASOPHILS # BLD: 0 /CUMM (ref 0–0.2)
BASOPHILS NFR BLD: 0 % (ref 0–2)
EOSINOPHIL # BLD: 0 /CUMM (ref 0–0.7)
EOSINOPHIL NFR BLD: 0 % (ref 0–5)
ERYTHROCYTE [DISTWIDTH] IN BLOOD BY AUTOMATED COUNT: 15.6 % (ref 11.5–14.5)
GRANULOCYTES NFR BLD: 86.9 % (ref 42.2–75.2)
HCT VFR BLD CALC: 37 % (ref 42–52)
LYMPHOCYTES # BLD: 0.6 /CUMM (ref 1.2–3.4)
MCH RBC QN AUTO: 33.3 PG (ref 27–31)
MCHC RBC AUTO-ENTMCNC: 33.6 G/DL (ref 33–37)
MCV RBC AUTO: 99.1 FL (ref 80–94)
MONOCYTES # BLD: 0.6 /CUMM (ref 0.1–0.6)
PLATELET # BLD: 109 /CUMM (ref 130–400)
PMV BLD AUTO: 9.3 FL (ref 7.4–10.4)
RED BLOOD CELL CT: 3.73 /CUMM (ref 4.7–6.1)
WBC # BLD AUTO: 9.3 /CUMM (ref 4.8–10.8)

## 2018-05-01 NOTE — CONS- CARDIOLOGY
General Information and HPI
 
Consulting Request
Date of Consult: 05/01/18
Requested By:
Olinda Lopez MD
 
Reason for Consult:
Orthostatic hypotension
Source of Information: patient, old records
Exam Limitations: no limitations
History of Present Illness:
The patient is a 72-year-old gentleman with a past medical history of coronary 
artery disease (status post prior bypass surgery and coronary intervention), and
ischemic cardiomyopathy with an LVEF of 35% in October 2017, abdominal aortic 
aneurysm, status post EVAR, mild to moderate aortic stenosis, moderate mitral 
regurgitation, post biventricular AICD ([employer]), small cell lung CA status 
post radiation and on prednisone therapy, hypothyroidism and peripheral vascular
disease.  He presented to our hospital with symptoms of odynophagia, and was 
noted to have profound orthostatic blood pressures.
 
From a cardiac standpoint, the patient is asymptomatic for chest pains, 
palpitations nor dyspnea at rest.  He does state having symptoms of increasing 
fatigue over the past several weeks.  There has been no syncope noted.
 
On arrival to the emergency room, the patient was hypotensive with a blood 
pressure of 74/49 and a heart rate of 66.  This responded to IV hydration with 
improvement of his underlying blood pressure.  He was as well hyperglycemic with
a glucose level of greater than 600.
 
Of note, the patient does admit to having poor p.o. intake recently, triggered 
by his underlying odynophagia and mouth pain.  Oral thrush was noted on his 
admission.
 
Allergies/Medications
Allergies:
Coded Allergies:
NO KNOWN ALLERGIES (NONE 02/20/18)
 
Home Med List:
Acetaminophen 325 MG CAPSULE   1 CAP PO PRN PAIN  (Reported)
Albuterol Sulfate (Proair Hfa) 90 MCG HFA.AER.AD   2 PUF INH Q4 COPD
Aspirin (Aspirin*) 81 MG TAB.CHEW   1 TAB PO DAILY heart  (Reported)
Atorvastatin Calcium (Lipitor) 40 MG TABLET   1 TAB PO DAILY cholesterol  (
Reported)
Budesonide/Formoterol Fumarate (Symbicort 160-4.5 Mcg Inhaler) 160 MCG-4.5 MCG/
ACTUATION HFA.AER.AD   2 PUF INH BID COPD  (Reported)
Carvedilol 6.25 MG TABLET   1 TAB PO BID HEART/BP  (Reported)
Clopidogrel Bisulfate (Clopidogrel) 75 MG TABLET   1 TAB PO DAILY BLOOD THINNER 
(Reported)
Furosemide (Lasix) 20 MG TABLET   1 TAB PO DAILY DIURETIC  (Reported)
Isosorbide Mononitrate (Isosorbide Mononitrate ER) 60 MG TAB.ER.24H   1 TAB PO 
DAILY    (Reported)
Levothyroxine Sodium 100 MCG TABLET   1 TAB PO DAILY THYROID  (Reported)
Nitroglycerin (Nitrostat) 0.4 MG TAB.SUBL   1 TAB SL AD PRN CHEST PAIN  (
Reported)
     1st sign of attack; may repeat every 5 minutes until relief; if pain 
persists after 3 tablets in 15 minutes, prompt medical att
Omeprazole 40 MG CAPSULE.DR   1 CAP PO DAILY GI  (Reported)
Prednisone 10 MG TABLET   50 MG PO DAILY LUNG  (Reported)
Prednisone 10 MG TABLET   0 PO SEE ADMIN CRITERIA lung health
     please take:
     4 pills for 3 days
     3 pills for 4 days
     2 pills for 4 days
     1 pill for 4 days 
     and then disscuss with your oncologist regarding stopping
Ranolazine (Ranexa) 500 MG TAB.ER.12H   1 TAB PO BID heart  (Reported)
Sertraline HCl 50 MG TABLET   1 TAB PO DAILY MENTAL HEALTH  (Reported)
Tamsulosin HCl (Flomax) 0.4 MG CAP.ER.24H   1 CAP PO QHS prostate
     Please take at bed time to avoid low blood pressure during the day.
Tiotropium Bromide (Spiriva) 18 MCG CAP.W.DEV   1 CAP INH DAILY COPD  (Reported)
 
Current Medications:
 Current Medications
 
 
  Sig/Ly Start time  Last
 
Medication Dose Route Stop Time Status Admin
 
Acetaminophen 650 MG Q6P PRN 04/29 1530 AC 
 
  PO   
 
Albuterol Sulfate 3 ML BID 04/30 1056 AC 04/30
 
  INH   2025
 
Aspirin 81 MG DAILY 04/30 0900 AC 05/01
 
  PO   0824
 
Atorvastatin Calcium 40 MG DAILY@1700 04/29 1700 AC 04/30
 
  PO   1718
 
Budesonide/ 2 PUF BID 04/29 2100 AC 05/01
 
Formoterol Fumarate  INH   0822
 
Carvedilol 6.25 MG BID 04/29 2100 AC 05/01
 
  PO   0824
 
Clopidogrel Bisulfate 75 MG DAILY 04/30 0900 AC 05/01
 
  PO   0824
 
Heparin Sodium  5,000 UNIT Q8 04/30 0600 AC 05/01
 
(Porcine)  SC   0610
 
Insulin Aspart 0 TIDAC 04/30 0800 AC 05/01
 
  SC   0825
 
Insulin Aspart 0 AT BEDTIME 04/29 2100 AC 04/30
 
  SC   2103
 
Insulin Detemir 15 UNITS DAILY 04/30 0900 AC 05/01
 
  SC   0825
 
Isosorbide  60 MG DAILY 04/30 0900 AC 05/01
 
Mononitrate  PO   0824
 
Levothyroxine Sodium 0.1 MG DAILY AC 04/30 0700 AC 05/01
 
  PO   0610
 
Lidocaine 15 ML BID 04/30 0900 AC 05/01
 
  PO   0823
 
Nitroglycerin 0.4 MG EVERY 5 MIN PRN 04/29 1615 AC 
 
  SL   
 
Nystatin 5 ML 4 TIMES/DAY 04/29 1458 AC 05/01
 
  PO   0822
 
Omeprazole 40 MG DAILY AC 04/30 0700 AC 05/01
 
  PO   0610
 
Patient Medication  1 ED ONE ONE 04/30 1430 DC 04/30
 
Teaching  ED 04/30 1431  2108
 
Prednisone 40 MG DAILY 05/01 0900 AC 05/01
 
  PO   0824
 
Prednisone 50 MG DAILY 04/29 1700 DC 04/30
 
  PO   0843
 
Ranolazine 500 MG BID 04/29 2100 AC 05/01
 
  PO   0824
 
Sertraline HCl 50 MG DAILY 04/30 0900 AC 05/01
 
  PO   0824
 
Tamsulosin HCl 0.4 MG AT BEDTIME 04/29 2100 AC 04/30
 
  PO   2103
 
Tiotropium Mylo 1 PUF DAILY 04/29 1607 AC 05/01
 
  INH   0823
 
 
 
 
Review of Systems
Review of Systems:
The review of systems is negative for chest pains, palpitations nor 
lightheadedness.
The remainder of the 14 point review of systems is noncontributory with the 
exception of above.
 
Past History
 
Travel History
Traveled to [place] past 21 day No
 
Medical History
Neurological: NONE
EENT: NONE
Cardiovascular: CARDIAC STENTS CABG CAROTID ARTERY BYPASS DEFIBRILLATOR
Respiratory: COPD
Gastrointestinal: NONE
Hepatic: NONE
Renal: NONE
Musculoskeletal: NONE
Psychiatric: NONE
Endocrine: hypothyroidism
Blood Disorders: DVT
Cancer(s): lung cancer
GYN/Reproductive: NONE
 
Surgical History
Surgical History: CABG, CAROTID ARTECTOMY nerve thermoablation to reduce back 
pain 
 
Family History
Relations & Conditions If Any:
Relation not specified for:
  *No pertinent family history
 
 
Psychosocial History
Who Do You Live With? spouse
Services at Home: None
Primary Language: English
Smoking Status: Current Everyday Smoker
Living Will? no
 
Functional Ability
ADLs
Independent: dressing, eating, toileting, bathing. 
Ambulation: independent
 
ECHO Results (as available)
Report:
Left ventricular cavity size normal. Left ventricular wall thickness
 mildly increased. Moderate to severe hypokinesis of the mid to basal
 inferior wall. Mild distal anteroseptal/anteroapical hypokinesis.
 Left ventricular ejection fraction is estimated at 45 %.
 Catheter/pacemaker wire in the right ventricular cavity. Normal
 right ventricular size and function.
 Mild left atrial dilatation.
 Moderate mitral regurgitation.
 Mild-to-moderate aortic stenosis (MICHELLE 1.4 cm2 by continuity with a
 mean gradient of 13 mmHg).
 Moderate tricuspid regurgitation. RVSP > 45 mmHg.
 
 
Exam & Diagnostic Data
Vital Signs and I&O
Vital Signs
 
 
Date Time Temp Pulse Resp B/P B/P Pulse O2 O2 Flow FiO2
 
     Mean Ox Delivery Rate 
 
05/01 1003  74  100/74     
 
05/01 0824  78  106/64     
 
05/01 0824  78  106/64     
 
05/01 0824  78  106/64     
 
05/01 0800       Nasal 2.0L 
 
       Cannula  
 
05/01 0620 98.1 78 20 106/64  96 Nasal  
 
       Cannula  
 
05/01 0000       Nasal 2.0L 
 
       Cannula  
 
04/30 2246  73    98   
 
04/30 2207 97.8 63 20 102/54  97 Nasal 2.0L 
 
       Cannula  
 
04/30 2105  63  102/54     
 
04/30 2105  63  102/54     
 
04/30 2103 97.8 63 20 102/54     
 
04/30 2025      98 Nasal 2.0L 
 
       Cannula  
 
04/30 1600      95 Nasal 2.0L 
 
       Cannula  
 
04/30 1350 98.7 60 20 112/60  95 Room Air  
 
04/30 1115  81  122/70     
 
 
 Intake & Output
 
 
 05/01 1600 05/01 0800 05/01 0000 04/30 1600 04/30 0800 04/30 0000
 
Intake Total  60 480   700
 
Output Total  1125 400 600 600 800
 
Balance  -1065 80 -600 -600 -100
 
       
 
Intake, Oral  60 480   700
 
Number     1 
 
Bowel      
 
Movements      
 
Output, Urine  1125 400 600 600 800
 
Patient 218 lb     
 
Weight      
 
 
 
Physical Exam:
General: Nontoxic, no apparent distress.
HEENT: Sclera and conjunctiva within normal limits, without xanthelasmas.
Neck: Carotids 2+ without bruits.
Respiratory: Scattered rhonchi, air movement is good, without accessory 
respiratory muscle use.
Heart: Regular rate and rhythm, 2 out of 6 systolic ejection murmur at left 
sternal border, without JVD.
Abdomen: Soft, nontender, no masses, normoactive bowel sounds.
Extremities: Without clubbing, cyanosis, without edema.
Neuro: Nonfocal exam, strength, 5 out of 5
Skin: Within normal limits without lesions.
Psych: Mood and affect: Normal
Labs/Kapil Results:
 Laboratory Tests
 
 
 05/01 04/30
 
 0615 5902
 
Chemistry  
 
  Sodium (137 - 145 mmol/L) 129  L 133  L
 
  Potassium (3.5 - 5.1 mmol/L) 4.6 5.6  H
 
  Chloride (98 - 107 mmol/L) 93  L 96  L
 
  Carbon Dioxide (22 - 30 mmol/L) 26 28
 
  Anion Gap (5 - 16) 9 9
 
  BUN (9 - 20 mg/dL) 42  H 35  H
 
  Creatinine (0.7 - 1.2 mg/dL) 1.4  H 1.4  H
 
  Estimated GFR (>60 ml/min) 50  L 50  L
 
  BUN/Creatinine Ratio (7 - 25 %) 30.0  H 25.0
 
  Cortisol AM Sample (4.46 - 22.7 ug/dL) 5.4 
 
Hematology  
 
  CBC w Diff MAN DIFF ORDERED NO MAN DIFF REQ
 
  WBC (4.8 - 10.8 /CUMM) 9.3 9.2
 
  RBC (4.70 - 6.10 /CUMM) 3.73  L 3.93  L
 
  Hgb (14.0 - 18.0 G/DL) 12.4  L 13.5  L
 
  Hct (42 - 52 %) 37.0  L 38.9  L
 
  MCV (80.0 - 94.0 FL) 99.1  H 98.8  H
 
  MCH (27.0 - 31.0 PG) 33.3  H 34.3  H
 
  MCHC (33.0 - 37.0 G/DL) 33.6 34.7
 
  RDW (11.5 - 14.5 %) 15.6  H 16.1  H
 
  Plt Count (130 - 400 /CUMM) 109  L 111  L
 
  MPV (7.4 - 10.4 FL) 9.3 9.4
 
  Gran % (42.2 - 75.2 %) 86.9  H 93.3  H
 
  Lymphocytes % (20.5 - 51.1 %) 6.7  L 3.1  L
 
  Monocytes % (1.7 - 9.3 %) 6.4 3.6
 
  Eosinophils % (0 - 5 %) 0 0
 
  Basophils % (0.0 - 2.0 %) 0 0
 
  Absolute Granulocytes (1.4 - 6.5 /CUMM) 8.1  H 8.6  H
 
  Absolute Lymphocytes (1.2 - 3.4 /CUMM) 0.6  L 0.3  L
 
  Absolute Monocytes (0.10 - 0.60 /CUMM) 0.6 0.3
 
  Absolute Eosinophils (0.0 - 0.7 /CUMM) 0 0
 
  Absolute Basophils (0.0 - 0.2 /CUMM) 0 0
 
  Platelet Estimate (ADEQUATE) DECREASED 
 
  Poikilocytosis 1+ 
 
  Anisocytosis 1+ 
 
  Ovalocytes 1+ 
 
  Rangel Cells 1+ 
 
 
 
 
 04/29 04/29 04/29
 
 1630 1630 1427
 
Chemistry   
 
  Sodium (137 - 145 mmol/L)  131  L 
 
  Potassium (3.5 - 5.1 mmol/L)  4.8 
 
  Chloride (98 - 107 mmol/L)  96  L 
 
  Carbon Dioxide (22 - 30 mmol/L)  27 
 
  Anion Gap (5 - 16)  8 
 
  BUN (9 - 20 mg/dL)  38  H 
 
  Creatinine (0.7 - 1.2 mg/dL)  1.6  H 
 
  Estimated GFR (>60 ml/min)  42  L 
 
  BUN/Creatinine Ratio (7 - 25 %)  23.8 
 
  Hemoglobin A1c (4.2 - 5.8 %) 12.5  H  
 
Urines   
 
  Urine Color (YEL,AMB,STR)   YEL
 
  Urine Clarity (CLEAR)   CLEAR
 
  Urine pH (5.0 - 8.0)   6.0
 
  Ur Specific Gravity (1.001 - 1.035)   1.015
 
  Urine Protein (NEG,<30 MG/DL)   NEG
 
  Urine Ketones (NEG)   NEG
 
  Urine Nitrite (NEG)   NEG
 
  Urine Bilirubin (NEG)   NEG
 
  Urine Urobilinogen (0.1  -  1.0 EU/dl)   0.2
 
  Ur Leukocyte Esterase (NEG)   NEG
 
  Ur Microscopic   EXAM NOT REQUIRED
 
  Urine Hemoglobin (NEG)   NEG
 
  Urine Glucose (N MG/DL)   >=1000  H
 
 
 
 
 04/29
 
 1135
 
Chemistry 
 
  Sodium (137 - 145 mmol/L) 129  L
 
  Potassium (3.5 - 5.1 mmol/L) 5.6  H
 
  Chloride (98 - 107 mmol/L) 88  L
 
  Carbon Dioxide (22 - 30 mmol/L) 29
 
  Anion Gap (5 - 16) 12
 
  BUN (9 - 20 mg/dL) 42  H
 
  Creatinine (0.7 - 1.2 mg/dL) 1.9  H
 
  Estimated GFR (>60 ml/min) 35  L
 
  BUN/Creatinine Ratio (7 - 25 %) 22.1
 
  Glucose (65 - 99 mg/dL) 617 *H
 
  Calcium (8.4 - 10.2 mg/dL) 9.2
 
  Total Bilirubin (0.2 - 1.3 mg/dL) 1.3
 
  AST (17 - 59 U/L) 14  L
 
  ALT (21 - 72 U/L) 25
 
  Alkaline Phosphatase (< 127 U/L) 127  H
 
  Troponin I (<0.11 ng/ml) 0.06
 
  Total Protein (6.3 - 8.2 g/dL) 6.2  L
 
  Albumin (3.5 - 5.0 g/dL) 3.8
 
  Globulin (1.9 - 4.2 gm/dL) 2.4
 
  Albumin/Globulin Ratio (1.1 - 2.2 %) 1.6
 
  TSH (0.270 - 4.200 uIU/mL) 2.330
 
  Free T4 (0.78 - 2.44 ng/dL) 1.54
 
Hematology 
 
  CBC w Diff NO MAN DIFF REQ
 
  WBC (4.8 - 10.8 /CUMM) 13.1  H
 
  RBC (4.70 - 6.10 /CUMM) 4.48  L
 
  Hgb (14.0 - 18.0 G/DL) 14.9
 
  Hct (42 - 52 %) 44.7
 
  MCV (80.0 - 94.0 FL) 99.7  H
 
  MCH (27.0 - 31.0 PG) 33.3  H
 
  MCHC (33.0 - 37.0 G/DL) 33.3
 
  RDW (11.5 - 14.5 %) 15.4  H
 
  Plt Count (130 - 400 /CUMM) 141
 
  MPV (7.4 - 10.4 FL) 9.0
 
  Gran % (42.2 - 75.2 %) 90.6  H
 
  Lymphocytes % (20.5 - 51.1 %) 3.6  L
 
  Monocytes % (1.7 - 9.3 %) 5.6
 
  Eosinophils % (0 - 5 %) 0
 
  Basophils % (0.0 - 2.0 %) 0.2
 
  Absolute Granulocytes (1.4 - 6.5 /CUMM) 11.9  H
 
  Absolute Lymphocytes (1.2 - 3.4 /CUMM) 0.5  L
 
  Absolute Monocytes (0.10 - 0.60 /CUMM) 0.7  H
 
  Absolute Eosinophils (0.0 - 0.7 /CUMM) 0
 
  Absolute Basophils (0.0 - 0.2 /CUMM) 0
 
 
 
 
Assessment/Plan
Assessment/Plan
72-year-old gentleman with a past medical history of coronary artery disease (
status post prior bypass surgery and coronary intervention), and ischemic 
cardiomyopathy with an LVEF of 35% in October 2017, abdominal aortic aneurysm, 
status post EVAR, mild to moderate aortic stenosis, moderate mitral 
regurgitation, post biventricular AICD ([employer]), small cell lung CA status 
post radiation and on prednisone therapy, hypothyroidism and peripheral vascular
disease.  He presented to our hospital with symptoms of odynophagia, significant
hyperglycemia in the setting of prednisone use, and was noted to have profound 
orthostatic blood pressures.
 
Orthostatic hypotension:
The patient presented with orthostatic hypotension likely from a multifactorial 
etiology including possible adrenocortical suppression from steroid use, as well
as dehydration from both poor p.o. intake as well as from severe hypoglycemia, 
induced by his steroid use.  He has responded to IV fluids, and I would continue
the same.  He has significant metabolic derangements which are likely 
exacerbating his symptoms, and we will attempt to correct these as well.
 
Small cell lung CA:
Continue treatment as per oncology.  He will likely require a very slow 
prednisone taper, and as endocrinology is following him for diabetes, and as 
such, further workup and tapering of his steroids may be performed with their 
assistance.
 
Coronary artery disease:
Stable, the patient had a nuclear stress test performed as an outpatient several
months ago which demonstrated no evidence of ischemia and underlying infarct.  
We will continue his current medication regimen; however, I would discontinue 
his regimen of isosorbide mononitrate as he has not had symptoms of chest 
discomfort.
 
Thank you for allowing us to participate in the care of your patient.
Please do not hesitate to contact us further with any questions.
 
Sincerely,
Israel Pedro MD [facility]
 
 
Consult Acknowledgment
- Thank you for your consult request.

## 2018-05-01 NOTE — PN- DIABETES
Assessment/Plan Diabetes
Assessment:
72 year-old-male with a PMH significant for small cell lung carcinoma status 
post radiation and on prednisone therapy ( prednisone 50 mg daily since 4/11/
2018), COPD on 2 L at home, CAD s/p CABG and PCI with stend placement, ischemic 
cardiomyopathy with EF of 20-25% status post AICD, CKD, hypothyroidism, aortic 
stenosis, peripheral arterial disease, JEREMY on CPAP presented to emergency room 
with chief complaint of sore throat eating food and dysgeusia. He was newly 
diagnosed with diabetes with glucose of of 617. His HbA1c was 12.5%.
 
He was put on Levemir 15 units once a day, Novolog coverage before meals and 
Novolog coverage at bedtime. His FSGs were 317, 371, 328, 354 and 272.
 
The team has touched base with his oncologist and patient will be on prednisone 
taper. Starting 5/1/2018, he will be on prednisone 40 mg daily.
 
Plan:
1. continue Levemir 15 units daily for now;
2. adjust Novolog coverage before meals; detail see the inpatient DM order;
3. continue the current Novolog coverage at bedtime;
4. monitor FSGs.
will follow.
 
Inpatient Diabetes Orders
Before Each Meal:
   Bolus Insulin: Novolog
   < 80 mg/dl: no coverage
    mg/dl: 7 units
   101-120 mg/dl: 7 units
   121-150 mg/dl: 7 units
   151-200 mg/dl: 9 units
   201-250 mg/dl: 11 units
   251-300 mg/dl: 13 units
   301-350 mg/dl: 15 units
   351-400 mg/dl: 17 units
   > 400 mg/dl: 18 units
 
Subjective
Subjective:
He has no special complaints this morning.
 
Objective
Last 24 Hrs of Vital Signs/I&O
 Vital Signs
 
 
Date Time Temp Pulse Resp B/P B/P Pulse O2 O2 Flow FiO2
 
     Mean Ox Delivery Rate 
 
05/01 0824  78  106/64 05/01 0824  78  106/64 05/01 0824  78  106/64 05/01 0800       Nasal 2.0L 
 
       Cannula  
 
05/01 0620 98.1 78 20 106/64  96 Nasal  
 
       Cannula  
 
05/01 0000       Nasal 2.0L 
 
       Cannula  
 
04/30 2246  73    98   
 
04/30 2207 97.8 63 20 102/54  97 Nasal 2.0L 
 
       Cannula  
 
04/30 2105  63  102/54     
 
04/30 2105  63  102/54     
 
04/30 2103 97.8 63 20 102/54     
 
04/30 2025      98 Nasal 2.0L 
 
       Cannula  
 
04/30 1600      95 Nasal 2.0L 
 
       Cannula  
 
04/30 1350 98.7 60 20 112/60  95 Room Air  
 
04/30 1115  81  122/70     
 
04/30 1056      94 Nasal 2.0L 
 
       Cannula  
 
 
 Intake & Output
 
 
 05/01 1600 05/01 0800 05/01 0000
 
Intake Total  60 480
 
Output Total  1125 400
 
Balance  -1065 80
 
    
 
Intake, Oral  60 480
 
Output, Urine  1125 400
 
 
 
 
Findings
Pertinent Lab/Kapil Results:
 Laboratory Tests
 
 
 05/01
 
 0631
 
Chemistry 
 
  Sodium Pending
 
  Potassium Pending
 
  Chloride Pending
 
  Carbon Dioxide Pending
 
  Anion Gap Pending
 
  BUN Pending
 
  Creatinine Pending
 
  BUN/Creatinine Ratio Pending
 
  Cortisol AM Sample Pending
 
Hematology 
 
  CBC w Diff Pending
 
  WBC Pending
 
  RBC Pending
 
  Hgb Pending
 
  Hct Pending
 
  MCV Pending
 
  MCH Pending
 
  MCHC Pending
 
  RDW Pending
 
  Plt Count Pending
 
  MPV Pending

## 2018-05-01 NOTE — PN- HOUSESTAFF
Zahira Blake MD,[facility] 18 0712:
Subjective
Follow-up For:
Oral thrush
Diabetes mellitus
Dehydration
 
Subjective:
Patient visited today, was lying in bed in no acute distress, was alert and 
oriented.
 
No fever or chills, no shortness of breathing, no chest pain, no other events.
 
Improved oral trush. 
 
BS still in 300s, adjusted insulin per endo
 
Cardiology consulted, stopped nitrate
 
Prenisolon started to taper
 
Review of Systems
Constitutional:
Reports: see HPI. 
 
Objective
Last 24 Hrs of Vital Signs/I&O
 Vital Signs
 
 
Date Time Temp Pulse Resp B/P B/P Pulse O2 O2 Flow FiO2
 
     Mean Ox Delivery Rate 
 
 1333 98.0 72 20 110/60  98 Nasal 2.0L 
 
       Cannula  
 
 1100      97 Nasal 2.0L 
 
       Cannula  
 
 1003  74  100/74     
 
 0824  78  106/64     
 
 0824  78  106/64     
 
 0824  78  106/64     
 
 0800       Nasal 2.0L 
 
       Cannula  
 
 0620 98.1 78 20 106/64  96 Nasal  
 
       Cannula  
 
 0000       Nasal 2.0L 
 
       Cannula  
 
 2246  73    98   
 
 2207 97.8 63 20 102/54  97 Nasal 2.0L 
 
       Cannula  
 
 2105  63  102/54     
 
 2105  63  102/54     
 
 2103 97.8 63 20 102/54     
 
 2025      98 Nasal 2.0L 
 
       Cannula  
 
 1600      95 Nasal 2.0L 
 
       Cannula  
 
 
 Intake & Output
 
 
  1600  0800  0000
 
Intake Total 480 60 480
 
Output Total 200 1125 400
 
Balance 280 -1065 80
 
    
 
Intake, Oral 480 60 480
 
Number 0  
 
Bowel   
 
Movements   
 
Output, Urine 200 1125 400
 
Patient 218 lb  
 
Weight   
 
 
 
 
Physical Exam
General Appearance: Alert, Oriented X3, No Acute Distress
Skin Temp/Moisture Exam: Warm/Dry
HEENT: Atraumatic, no trush
Cardiovascular: Normal S1, Normal S2
Lungs: Normal Air Movement
Abdomen: Soft, No Tenderness
Neurological: Normal Speech, Strength at 5/5 X4 Ext
Current Medications:
 Current Medications
 
 
  Sig/Ly Start time  Last
 
Medication Dose Route Stop Time Status Admin
 
Acetaminophen 650 MG Q6P PRN  1530 AC 
 
  PO   
 
Albuterol Sulfate 3 ML BID  1056 AC 
 
  INH   1107
 
Aspirin 81 MG DAILY  0900 AC 
 
  PO   0824
 
Atorvastatin Calcium 40 MG DAILY@1700  1700 AC 
 
  PO   1718
 
Budesonide/ 2 PUF BID  2100 AC 
 
Formoterol Fumarate  INH   0822
 
Carvedilol 6.25 MG BID  2100 AC 
 
  PO   0824
 
Clopidogrel Bisulfate 75 MG DAILY  0900 AC 
 
  PO   0824
 
Heparin Sodium  5,000 UNIT Q8  0600 AC 
 
(Porcine)  SC   1350
 
Insulin Aspart 0 TIDAC  08 AC 
 
  SC   1242
 
Insulin Aspart 0 AT BEDTIME  2100 AC 
 
  SC   2103
 
Insulin Detemir 15 UNITS DAILY  09 AC 
 
  SC   0825
 
Isosorbide  60 MG DAILY  0900 DC 
 
Mononitrate  PO   0824
 
Levothyroxine Sodium 0.1 MG DAILY AC  0700 AC 
 
  PO   0610
 
Lidocaine 15 ML BID  0900 AC 
 
  PO   0823
 
Nitroglycerin 0.4 MG EVERY 5 MIN PRN  1615 AC 
 
     
 
Nystatin 5 ML 4 TIMES/DAY  1458 AC 
 
  PO   1241
 
Omeprazole 40 MG DAILY AC  0700 AC 
 
  PO   0610
 
Prednisone 40 MG DAILY  0900 AC 
 
  PO   0824
 
Prednisone 50 MG DAILY  1700 DC 
 
  PO   0843
 
Ranolazine 500 MG BID  2100 AC 
 
  PO   0824
 
Sertraline HCl 50 MG DAILY  0900 AC 
 
  PO   0824
 
Tamsulosin HCl 0.4 MG AT BEDTIME  2100 AC 
 
  PO   2103
 
Tiotropium Wall 1 PUF DAILY  1607 AC 
 
  INH   0823
 
 
 
 
Last 24 Hrs of Lab/Kapil Results
Last 24 Hrs of Labs/Mics:
 Laboratory Tests
 
18 0631:
Anion Gap 9, Estimated GFR 50  L, BUN/Creatinine Ratio 30.0  H, Serum Osmolality
295, Cortisol AM Sample 5.4, CBC w Diff MAN DIFF ORDERED, RBC 3.73  L, MCV 99.1 
H, MCH 33.3  H, MCHC 33.6, RDW 15.6  H, MPV 9.3, Gran % 86.9  H, Lymphocytes % 
6.7  L, Monocytes % 6.4, Eosinophils % 0, Basophils % 0, Absolute Granulocytes 
8.1  H, Absolute Lymphocytes 0.6  L, Absolute Monocytes 0.6, Absolute 
Eosinophils 0, Absolute Basophils 0, Platelet Estimate DECREASED, Poikilocytosis
1+, Anisocytosis 1+, Ovalocytes 1+, Rangel Cells 1+
 
 
Assessment/Plan
Assessment:
Mr Araya is a 72 year-old-male with a PMH significant for Lung cancer small cell
carcinoma status post radiation and on prednisone therapy, COPD on 2 L at home, 
CAD s/p CABG and PCI with stend placement, ischemic cardiomyopathy with EF of 20
-25% status post AICD, CKD, hypothyroidism, aortic stenosis, peripheral arterial
disease, JEREMY on CPAP who came to emergency room with chief complaint of sore 
throat eating food and dysgeusia/ageusia 
 
 
Vital signs in ED was notable for low blood pressure 74/49, heart rate 66, no 
fever 96% on 2 L
 
Labs were notable for WBC 13.1, MCV 99, sodium 129, potassium 4.6, glucose 617, 
creatinine 1.9 baseline is 1.6, CL 88 , UA showed glucose more than 1000
 
Patient received 1-1/2 L of normal saline in the ED, 10 units of insulin Novolin
, 1 dose of clotrimazole 10 mg
 
Blood pressure increased to 111/58 after hydration
 
CXR 1. No active cardiopulmonary disease. There is no significant change.
 
2. There is stable moderate plate-like scar/subsegmental atelectasis at the
left base.
 
 
Patient was admitted to Gm floor for management of following conditions:
 
- Hyperglycemia, DM
- Oral trush
- Chronic medical conditions: CHF with low EF of 20-25% with AICD, CAD on dual 
antiplatelet therapy, small cell cancer status post radiation now on prednisone 
therapy for pneumonitis, COPD on 2 L, hypothyroid
 
Plan:
- Contiue admission to general floor
- No more IV hydration for now due to low EF
- Hb a1c: 12
- levemir and sliding scale
- follow endo consult
- Nystatin 4 times a day orally swish and swallow - currently significant 
improvement
- Continue home medications: levothyroxine, aspirin, Plavix, Ranexa, tamsulosin,
Lipitor
- Hold Lasix and imdur for now
- Continue Coreg, inhalers and TRC nebs
- Started to taper p.o. prednisone to 40 mg p.o. daily, reduce 10mg every 3-4 
day
 
 
Diabetic diet, full code, DVT prophylaxis is subcu heparin and mechanical, 
Tylenol for pain,
 
 
Problem List:
 1. Hyperglycemia without ketosis
 
 2. Diabetes mellitus
 
Pain Ratin
Pain Location:
none
Pain Goal: Pain 4 or less
Pain Plan:
Continue current plan
Tomorrow's Labs & Rationales:
None
 
 
Jessica GLASSAshusanjana 18 1050:
Attending MD Review Statement
 
Attending Statement
Attending MD Statement: examined this patient, discuss w/resident/PA/NP, agreed 
w/resident/PA/NP, reviewed EMR data (avail), discussed with nursing, discussed 
with case mgmt, amended to note
Attending Assessment/Plan:
Resting comfortably and not in any acute distress.  No issues overnight reported
by nursing staff.  He was started on insulin therapy by the endocrinology 
service yesterday.  Glucose levels do remain elevated this morning.  Adjustments
have been made to his sliding scale by the endocrinology service today.  
Orthostatic vitals were rechecked this morning.  Although his supine blood 
pressure is on the lower side he did not have orthostatic blood pressure or 
heart rate changes this morning.  His initial changes may have been related to 
volume depletion.  His diuretic therapy have been on hold.  The concern at 
present is his current borderline blood pressure.  His cardiology service has 
been notified.
 
Problems:
1.  Newly diagnosed diabetes mellitus
2.  Hypotension with orthostatic blood pressure changes.
3.  Oral thrush; resolving
4.  Thrombocytopenia
5.  Hyponatremia
 
Recommendations:
-Endocrinology follow-up appreciated.  Continue recommendations for management 
of his newly diagnosed diabetes mellitus.  Awaiting medication by the 
nutritionist.  Nursing staff will instruct patient on how to check his blood 
glucose levels and administer his insulin.
-Blood pressure is currently borderline.  Patient is on a number of cardiac 
medications including nitrates and beta-blocker.  He was uninterested in the 
past however he appears to have stopped this a while ago.  He also was on an ARB
in the past with also appears to have been stopped.  He probably has been having
blood pressure issues in the outpatient setting.  We will follow-up 
recommendations from his cardiology service regarding management of his cardiac 
regimen particularly his antihypertensive medications.
-His diuretic is on hold however given his chronic systolic dysfunction we may 
not be able stop this completely.  He currently appears euvolemic.  We will 
continue to hold particularly in view of his low blood pressure unless otherwise
recommended by his cardiologist.
-He is noted to be more hyponatremic today.  Check serum and urine osmolarity 
and urine electrolytes.  Repeat serum chemistry in a.m.
-Patient was to be thrombocytopenic today.  Recommend peripheral smear to rule 
out platelet clumping.  Repeat CBC in a.m. to monitor trend.

## 2018-05-02 VITALS — DIASTOLIC BLOOD PRESSURE: 58 MMHG | SYSTOLIC BLOOD PRESSURE: 100 MMHG

## 2018-05-02 VITALS — SYSTOLIC BLOOD PRESSURE: 112 MMHG | DIASTOLIC BLOOD PRESSURE: 68 MMHG

## 2018-05-02 VITALS — SYSTOLIC BLOOD PRESSURE: 106 MMHG | DIASTOLIC BLOOD PRESSURE: 66 MMHG

## 2018-05-02 LAB
ABSOLUTE GRANULOCYTE CT: 7.3 /CUMM (ref 1.4–6.5)
BASOPHILS # BLD: 0 /CUMM (ref 0–0.2)
BASOPHILS NFR BLD: 0.1 % (ref 0–2)
EOSINOPHIL # BLD: 0 /CUMM (ref 0–0.7)
EOSINOPHIL NFR BLD: 0 % (ref 0–5)
ERYTHROCYTE [DISTWIDTH] IN BLOOD BY AUTOMATED COUNT: 15.6 % (ref 11.5–14.5)
GRANULOCYTES NFR BLD: 85 % (ref 42.2–75.2)
HCT VFR BLD CALC: 38 % (ref 42–52)
LYMPHOCYTES # BLD: 0.7 /CUMM (ref 1.2–3.4)
MCH RBC QN AUTO: 33.3 PG (ref 27–31)
MCHC RBC AUTO-ENTMCNC: 33.2 G/DL (ref 33–37)
MCV RBC AUTO: 100.1 FL (ref 80–94)
MONOCYTES # BLD: 0.6 /CUMM (ref 0.1–0.6)
PLATELET # BLD: 118 /CUMM (ref 130–400)
PMV BLD AUTO: 9.3 FL (ref 7.4–10.4)
RED BLOOD CELL CT: 3.79 /CUMM (ref 4.7–6.1)
WBC # BLD AUTO: 8.6 /CUMM (ref 4.8–10.8)

## 2018-05-02 NOTE — PN- DIABETES
Assessment/Plan Diabetes
Assessment:
72 year-old-male with a PMH significant for small cell lung carcinoma status 
post radiation and on prednisone therapy ( prednisone 50 mg daily since 4/11/
2018), COPD on 2 L at home, CAD s/p CABG and PCI with stend placement, ischemic 
cardiomyopathy with EF of 20-25% status post AICD, CKD, hypothyroidism, aortic 
stenosis, peripheral arterial disease, JEREMY on CPAP presented to emergency room 
with chief complaint of sore throat eating food and dysgeusia. He was newly 
diagnosed with diabetes with glucose of of 617. His HbA1c was 12.5%.
 
He was put on Levemir 15 units once a day; Novolog coverage before meals was 
adjusted. In addition, he is on another Novolog coverage at bedtime. His FSGs 
were 272, 303, 310 358, 393 and 350. He is on consistent carbohydrates 3 diet.
 
The team touched base with his oncologist and patient will be on prednisone 
taper. Starting 5/1/2018, prednisone was decreased to 40 mg daily.
 
 
Plan:
1. diet control; change diet to consistent carbohydrates 1 diet;
2. increase Levemir to 25 units daily;
3. adjust Novolog coverage before meals; detail see the inpatient DM order;
4. continue the current Novolog coverage at bedtime;
5. monitor FSGs
will follow.
 
Inpatient Diabetes Orders
Before Each Meal:
   Bolus Insulin: Novolog
   < 80 mg/dl: no coverage
    mg/dl: 9 units
   101-120 mg/dl: 9 units
   121-150 mg/dl: 9 units
   151-200 mg/dl: 11 units
   201-250 mg/dl: 13 units
   251-300 mg/dl: 15 units
   301-350 mg/dl: 17 units
   351-400 mg/dl: 19 units
   > 400 mg/dl: 20 units
 
Subjective
Subjective:
His glucose level has been over 300.
 
Objective
Last 24 Hrs of Vital Signs/I&O
 Vital Signs
 
 
Date Time Temp Pulse Resp B/P B/P Pulse O2 O2 Flow FiO2
 
     Mean Ox Delivery Rate 
 
05/02 0856      95 Nasal 2.0L 
 
       Cannula  
 
05/02 0802  73  112/68 05/02 0802  73  112/68 05/02 0800       Nasal 2.0L 
 
       Cannula  
 
05/02 0620 98.2 73 18 112/68  96 Nasal  
 
       Cannula  
 
05/02 0033  78    96   
 
05/02 0000       Nasal 2.0L 
 
       Cannula  
 
05/01 2221 98.3 66 20 118/60  97   
 
05/01 2134  73    96   
 
05/01 2107  66  118/60     
 
05/01 2107  66  118/60     
 
05/01 2106 98.3 66 20 118/60     
 
05/01 1910      96 Nasal 2.0L 
 
       Cannula  
 
05/01 1600       Nasal 2.0L 
 
       Cannula  
 
05/01 1333 98.0 72 20 110/60  98 Nasal 2.0L 
 
       Cannula  
 
 
 Intake & Output
 
 
 05/02 1600 05/02 0800 05/02 0000
 
Intake Total 500 480 480
 
Output Total 225 900 900
 
Balance 275 -420 -420
 
    
 
Intake, Oral 500 480 480
 
Output, Urine 225 900 900
 
 
 
 
Findings
Pertinent Lab/Kapil Results:
 Laboratory Tests
 
 
 05/02 05/01
 
 0615 2350
 
Chemistry  
 
  Sodium (137 - 145 mmol/L) 131  L 
 
  Potassium (3.5 - 5.1 mmol/L) 4.4 
 
  Chloride (98 - 107 mmol/L) 96  L 
 
  Carbon Dioxide (22 - 30 mmol/L) 27 
 
  Anion Gap (5 - 16) 8 
 
  BUN (9 - 20 mg/dL) 39  H 
 
  Creatinine (0.7 - 1.2 mg/dL) 1.4  H 
 
  Estimated GFR (>60 ml/min) 50  L 
 
  BUN/Creatinine Ratio (7 - 25 %) 27.9  H 
 
Hematology  
 
  CBC w Diff NO MAN DIFF REQ 
 
  WBC (4.8 - 10.8 /CUMM) 8.6 
 
  RBC (4.70 - 6.10 /CUMM) 3.79  L 
 
  Hgb (14.0 - 18.0 G/DL) 12.6  L 
 
  Hct (42 - 52 %) 38.0  L 
 
  MCV (80.0 - 94.0 FL) 100.1  H 
 
  MCH (27.0 - 31.0 PG) 33.3  H 
 
  MCHC (33.0 - 37.0 G/DL) 33.2 
 
  RDW (11.5 - 14.5 %) 15.6  H 
 
  Plt Count (130 - 400 /CUMM) 118  L 
 
  MPV (7.4 - 10.4 FL) 9.3 
 
  Gran % (42.2 - 75.2 %) 85.0  H 
 
  Lymphocytes % (20.5 - 51.1 %) 7.7  L 
 
  Monocytes % (1.7 - 9.3 %) 7.2 
 
  Eosinophils % (0 - 5 %) 0 
 
  Basophils % (0.0 - 2.0 %) 0.1 
 
  Absolute Granulocytes (1.4 - 6.5 /CUMM) 7.3  H 
 
  Absolute Lymphocytes (1.2 - 3.4 /CUMM) 0.7  L 
 
  Absolute Monocytes (0.10 - 0.60 /CUMM) 0.6 
 
  Absolute Eosinophils (0.0 - 0.7 /CUMM) 0 
 
  Absolute Basophils (0.0 - 0.2 /CUMM) 0 
 
Urines  
 
  Urine Osmolality (300 - 1000 MOSM/KG)  699
 
  Ur Random Creatinine (mg/dL)  64.3
 
  Ur Random Sodium (30 - 90 mmol/L)  25  L
 
  Ur Random Potassium (mmol/L)  28.4
 
  Fraction Sodium Excret (<1% %)  0.4

## 2018-05-02 NOTE — PN- CARDIOLOGY
Subjective
Subjective:
 
Resting comfortably but does feel some weakness.  Reports ambulating without any
dizziness or chest pain. 
 
Objective
Vital Signs and I&Os
Vital Signs
 
 
Date Time Temp Pulse Resp B/P B/P Pulse O2 O2 Flow FiO2
 
     Mean Ox Delivery Rate 
 
05/02 0856      95 Nasal 2.0L 
 
       Cannula  
 
05/02 0802  73  112/68     
 
05/02 0802  73  112/68     
 
05/02 0800       Nasal 2.0L 
 
       Cannula  
 
05/02 0620 98.2 73 18 112/68  96 Nasal  
 
       Cannula  
 
05/02 0033  78    96   
 
05/02 0000       Nasal 2.0L 
 
       Cannula  
 
05/01 2221 98.3 66 20 118/60  97   
 
05/01 2134  73    96   
 
05/01 2107  66  118/60     
 
05/01 2107  66  118/60     
 
05/01 2106 98.3 66 20 118/60     
 
05/01 1910      96 Nasal 2.0L 
 
       Cannula  
 
05/01 1600       Nasal 2.0L 
 
       Cannula  
 
05/01 1333 98.0 72 20 110/60  98 Nasal 2.0L 
 
       Cannula  
 
 
 Intake & Output
 
 
 05/02 1600 05/02 0800 05/02 0000 05/01 1600 05/01 0800 05/01 0000
 
Intake Total  480 480 480 60 480
 
Output Total  900  400
 
Balance  -420 -420 280 -1065 80
 
       
 
Intake, Oral  480 480 480 60 480
 
Number    0  
 
Bowel      
 
Movements      
 
Output, Urine  900  400
 
Patient    218 lb  
 
Weight      
 
 
 
Physical Exam:
 
General: no apparent distress. Alert.
Eyes: No obvious scleral icterus.
HEENT: No jugular venous distention or abnormal jugular venous pulsations.
Cardiovascular: Normal intensity S1/S2. Regular. ICD noted
Respiratory: Lungs clear to auscultation bilaterally.
Abdomen: Soft, nontender with no guarding or rebound tenderness.
Musculoskeletal: No clubbing or cyanosis noted
Skin: warm
Neurologic: No gross focal deficits noted.
 
Current Medications:
 Current Medications
 
 
  Sig/Ly Start time  Last
 
Medication Dose Route Stop Time Status Admin
 
Acetaminophen 650 MG Q6P PRN 04/29 1530 AC 
 
  PO   
 
Albuterol Sulfate 3 ML BID 04/30 1056 AC 05/02
 
  INH   0856
 
Aspirin 81 MG DAILY 04/30 0900 AC 05/02
 
  PO   0801
 
Atorvastatin Calcium 40 MG DAILY@1700 04/29 1700 AC 05/01
 
  PO   1701
 
Budesonide/ 2 PUF BID 04/29 2100 AC 05/02
 
Formoterol Fumarate  INH   0802
 
Carvedilol 6.25 MG BID 04/29 2100 AC 05/02
 
  PO   0802
 
Clopidogrel Bisulfate 75 MG DAILY 04/30 0900 AC 05/02
 
  PO   0802
 
Heparin Sodium  5,000 UNIT Q8 04/30 0600 AC 05/02
 
(Porcine)  SC   0518
 
Insulin Aspart 0 TIDAC 04/30 0800 AC 05/02
 
  SC   0801
 
Insulin Aspart 0 AT BEDTIME 04/29 2100 AC 05/01
 
  SC   2112
 
Insulin Detemir 25 UNITS DAILY 05/02 0900 AC 05/02
 
  SC   0817
 
Insulin Detemir 15 UNITS DAILY 04/30 0900 DC 05/02
 
  SC   0800
 
Isosorbide  60 MG DAILY 04/30 0900 DC 05/01
 
Mononitrate  PO   0824
 
Levothyroxine Sodium 0.1 MG DAILY AC 04/30 0700 AC 05/02
 
  PO   0517
 
Lidocaine 15 ML BID 04/30 0900 AC 05/02
 
  PO   0801
 
Nitroglycerin 0.4 MG EVERY 5 MIN PRN 04/29 1615 AC 
 
     
 
Nystatin 5 ML 4 TIMES/DAY 04/29 1458 AC 05/02
 
  PO   0801
 
Omeprazole 40 MG DAILY AC 04/30 0700 AC 05/02
 
  PO   0517
 
Patient Medication  1 ED ONE ONE 05/02 1115 DC 
 
Teaching  ED 05/02 1116  
 
Prednisone 40 MG DAILY 05/01 0900 AC 05/02
 
  PO   0802
 
Ranolazine 500 MG BID 04/29 2100 AC 05/02
 
  PO   0802
 
Sertraline HCl 50 MG DAILY 04/30 0900 AC 05/02
 
  PO   0801
 
Tamsulosin HCl 0.4 MG AT BEDTIME 04/29 2100 AC 05/01
 
  PO   2107
 
Tiotropium Dorena 1 PUF DAILY 04/29 1607 AC 05/02
 
  INH   0802
 
 
 
 
Results
Last 48 Hrs of Labs/Mics:
 Laboratory Tests
 
05/02/18 0615:
Anion Gap 8, Estimated GFR 50  L, BUN/Creatinine Ratio 27.9  H, CBC w Diff NO 
MAN DIFF REQ, RBC 3.79  L, .1  H, MCH 33.3  H, MCHC 33.2, RDW 15.6  H, 
MPV 9.3, Gran % 85.0  H, Lymphocytes % 7.7  L, Monocytes % 7.2, Eosinophils % 0,
Basophils % 0.1, Absolute Granulocytes 7.3  H, Absolute Lymphocytes 0.7  L, 
Absolute Monocytes 0.6, Absolute Eosinophils 0, Absolute Basophils 0
 
05/01/18 2350:
Urine Osmolality 699, Ur Random Creatinine 64.3, Ur Random Sodium 25  L, Ur 
Random Potassium 28.4, Fraction Sodium Excret 0.4
 
05/01/18 0631:
Anion Gap 9, Estimated GFR 50  L, BUN/Creatinine Ratio 30.0  H, Serum Osmolality
295, Cortisol AM Sample 5.4, CBC w Diff MAN DIFF ORDERED, RBC 3.73  L, MCV 99.1 
H, MCH 33.3  H, MCHC 33.6, RDW 15.6  H, MPV 9.3, Gran % 86.9  H, Lymphocytes % 
6.7  L, Monocytes % 6.4, Eosinophils % 0, Basophils % 0, Absolute Granulocytes 
8.1  H, Absolute Lymphocytes 0.6  L, Absolute Monocytes 0.6, Absolute 
Eosinophils 0, Absolute Basophils 0, Platelet Estimate DECREASED, Poikilocytosis
1+, Anisocytosis 1+, Ovalocytes 1+, Rangel Cells 1+
 
Recent Imaging Studies:
 
Not on telemetry 
 
Assessment/Plan
Assessment/Plan
 
1.  Hyperglycemia with prednisone use
2.  Ischemic cardiomyopathy with prior CABG/PCI
3.  AICD in situ
4.  Abdominal aortic aneurysm status post prior EVAR
5.  Aortic stenosis
6.  Small cell lung CA
7.  Hypothyroidism
8.  Not on ACE inhibitor per reoprt due to CKD with hyperkalemia
 
Patient is doing well and blood pressure appears stable. He appears euvolemic on
exam. Continue dual antiplatelet and statin therapy. Continue to monitor 
orthostatics but he was able to ambulate well today without any dizziness. 
Continue on the carvedilol as blood pressure tolerates. 
 
 
James Mc MD [facility]
Continue telemetry? Not applicable

## 2018-05-02 NOTE — PN- HOUSESTAFF
Zahira Blake MD,[facility] 18 0706:
Subjective
Follow-up For:
Oral thrush
Diabetes mellitus
Dehydration
Subjective:
Patient visited today, was lying in bed comfortably in no acute distress, was 
alert and oriented.
 
Patient had no complaint except weakness, that can be related to chronic steroid
use.
 
Blood sugar still high, could be related to prenisolon use, insulin dose 
adjusted, this is not stable to be discharged. 
 
No fever or chills, no shortness of breathing, no chest pain, no other events.
 
We will consider to discharge patient tomorrow
 
Review of Systems
Constitutional:
Reports: see HPI. 
 
Objective
Last 24 Hrs of Vital Signs/I&O
 Vital Signs
 
 
Date Time Temp Pulse Resp B/P B/P Pulse O2 O2 Flow FiO2
 
     Mean Ox Delivery Rate 
 
 1407 97.9 72 20 100/58  97   
 
 0856      95 Nasal 2.0L 
 
       Cannula  
 
 0802  73  112/68     
 
 0802  73  112/68     
 
 0800       Nasal 2.0L 
 
       Cannula  
 
 0620 98.2 73 18 112/68  96 Nasal  
 
       Cannula  
 
 0033  78    96   
 
05/ 0000       Nasal 2.0L 
 
       Cannula  
 
 2221 98.3 66 20 118/60  97   
 
01 2134  73    96   
 
 2107  66  118/60     
 
05 2107  66  118/60     
 
 2106 98.3 66 20 118/60     
 
 1910      96 Nasal 2.0L 
 
       Cannula  
 
 1600       Nasal 2.0L 
 
       Cannula  
 
 
 Intake & Output
 
 
  1600  0800  0000
 
Intake Total 1060 480 480
 
Output Total 400 900 900
 
Balance 660 -420 -420
 
    
 
Intake, Oral 1060 480 480
 
Number 0  
 
Bowel   
 
Movements   
 
Output, Urine 400 900 900
 
 
 
 
Physical Exam
General Appearance: Alert, Oriented X3, Cooperative, No Acute Distress
Skin Temp/Moisture Exam: Warm/Dry
Sepsis Skin Exam (color): Normal for Ethnicity
HEENT: Atraumatic, EOMI
Cardiovascular: Normal S1, Normal S2
Lungs: Normal Air Movement
Abdomen: Soft, No Tenderness
Neurological: Normal Speech
Current Medications:
 Current Medications
 
 
  Sig/Ly Start time  Last
 
Medication Dose Route Stop Time Status Admin
 
Acetaminophen 650 MG Q6P PRN  1530 AC 
 
  PO   
 
Albuterol Sulfate 3 ML BID  1056 AC 
 
  INH   0856
 
Aspirin 81 MG DAILY  0900 AC 
 
  PO   0801
 
Atorvastatin Calcium 40 MG DAILY@1700  1700 AC 
 
  PO   1701
 
Budesonide/ 2 PUF BID  2100 AC 
 
Formoterol Fumarate  INH   08
 
Carvedilol 6.25 MG BID  2100 AC 
 
  PO   0802
 
Clopidogrel Bisulfate 75 MG DAILY  09 AC 
 
  PO   0802
 
Heparin Sodium  5,000 UNIT Q8  06 AC 
 
(Porcine)  SC   1328
 
Insulin Aspart 0 TIDAC  08 AC 
 
  SC   1146
 
Insulin Aspart 0 AT BEDTIME 2100 AC 
 
  SC   2112
 
Insulin Detemir 25 UNITS DAILY  09 AC 
 
  SC   0817
 
Insulin Detemir 15 UNITS DAILY  0900 DC 
 
  SC   0800
 
Levothyroxine Sodium 0.1 MG DAILY AC  0700 AC 
 
  PO   0517
 
Lidocaine 15 ML BID  09 AC 
 
  PO   0801
 
Nitroglycerin 0.4 MG EVERY 5 MIN PRN  1615 AC 
 
     
 
Nystatin 5 ML 4 TIMES/DAY  1458 AC 
 
  PO   1328
 
Omeprazole 40 MG DAILY AC  07 AC 
 
  PO   0517
 
Patient Medication  1 ED ONE ONE  1115 ME 
 
Teaching  ED  1116  1146
 
Prednisone 40 MG DAILY  0900 AC 
 
  PO   0802
 
Ranolazine 500 MG BID 2100 AC 
 
  PO   0802
 
Sertraline HCl 50 MG DAILY  09 AC 
 
  PO   0801
 
Tamsulosin HCl 0.4 MG AT BEDTIME 2100 AC 
 
  PO   2107
 
Tiotropium [place] 1 PUF DAILY  1607 AC 
 
  INH   0802
 
 
 
 
Last 24 Hrs of Lab/Kapil Results
Last 24 Hrs of Labs/Mics:
 Laboratory Tests
 
18 0615:
Anion Gap 8, Estimated GFR 50  L, BUN/Creatinine Ratio 27.9  H, CBC w Diff NO 
MAN DIFF REQ, RBC 3.79  L, .1  H, MCH 33.3  H, MCHC 33.2, RDW 15.6  H, 
MPV 9.3, Gran % 85.0  H, Lymphocytes % 7.7  L, Monocytes % 7.2, Eosinophils % 0,
Basophils % 0.1, Absolute Granulocytes 7.3  H, Absolute Lymphocytes 0.7  L, 
Absolute Monocytes 0.6, Absolute Eosinophils 0, Absolute Basophils 0
 
180:
Urine Osmolality 699, Ur Random Creatinine 64.3, Ur Random Sodium 25  L, Ur 
Random Potassium 28.4, Fraction Sodium Excret 0.4
 
 
Assessment/Plan
Assessment:
Mr Araya is a 72 year-old-male with a PMH significant for Lung cancer small cell
carcinoma status post radiation and on prednisone therapy, COPD on 2 L at home, 
CAD s/p CABG and PCI with stend placement, ischemic cardiomyopathy with EF of 20
-25% status post AICD, CKD, hypothyroidism, aortic stenosis, peripheral arterial
disease, JEREMY on CPAP who came to emergency room with chief complaint of sore 
throat eating food and dysgeusia/ageusia 
 
 
Vital signs in ED was notable for low blood pressure 74/49, heart rate 66, no 
fever 96% on 2 L
 
Labs were notable for WBC 13.1, MCV 99, sodium 129, potassium 4.6, glucose 617, 
creatinine 1.9 baseline is 1.6, CL 88 , UA showed glucose more than 1000
 
Patient received 1-1/2 L of normal saline in the ED, 10 units of insulin Novolin
, 1 dose of clotrimazole 10 mg
 
Blood pressure increased to 111/58 after hydration
 
CXR 1. No active cardiopulmonary disease. There is no significant change.
 
2. There is stable moderate plate-like scar/subsegmental atelectasis at the
left base.
 
 
Patient was admitted to Gm floor for management of following conditions:
 
- Hyperglycemia, DM
- Oral trush
- Chronic medical conditions: CHF with low EF of 20-25% with AICD, CAD on dual 
antiplatelet therapy, small cell cancer status post radiation now on prednisone 
therapy for pneumonitis, COPD on 2 L, hypothyroid
 
Plan:
- Contiue admission to general floor
- No more IV hydration for now due to low EF
- Hb a1c: 12
- levemir and sliding scale, adjusted
- high blood sugar can be related to steroid use and diet
- downgrade carb in consistant carb diet to "3"
- follow endo consult
- blood pressure are more stable
- follow cardiology
- Nystatin 4 times a day orally swish and swallow - currently significant 
improvement, consider to stop 
- Continue home medications: levothyroxine, aspirin, Plavix, Ranexa, tamsulosin,
Lipitor
- continue carvedilol as blood pressure allows
- Hold Lasix and imdur for now
- Continue Coreg, inhalers and TRC nebs
- Started to taper p.o. prednisone to 40 mg p.o. daily, reduce 10mg every 3-4 
day
 
 
Diabetic diet, full code, DVT prophylaxis is subcu heparin and mechanical, 
Tylenol for pain,
 
 
Problem List:
 1. Diabetes mellitus
 
Pain Ratin
Pain Location:
None
Pain Goal: Pain 4 or less
Pain Plan:
continue current plan
Tomorrow's Labs & Rationales:
None
 
 
Jessica GLASS,[facility] 18 1012:
Attending MD Review Statement
 
Attending Statement
Attending MD Statement: examined this patient, discuss w/resident/PA/NP, agreed 
w/resident/PA/NP, reviewed EMR data (avail), discussed with nursing, discussed 
with case mgmt, amended to note
Attending Assessment/Plan:
Patient seen and examined.  Resting comfortably not in any acute distress.  No 
issues overnight reported by nursing staff.  This morning he is alert oriented 
3.  He denies any pain.
 
Blood glucose remains significantly elevated in the high 300s.  He was followed 
by the endocrinology service this morning.  Recommendations are to increase the 
dose of his Levemir insulin to 25 units daily.  We will maintain the patient in 
the hospital tonight to determine his response to this change in medication 
regimen particularly to ensure that he does not develop any significant 
hypoglycemia.  We will reevaluate for discharge home tomorrow after observing 
his response to therapy.
 
Patient had significant orthostatic blood pressure changes on admission.  This 
has resolved.  He was hypotensive yesterday.  We do appreciate consultation by 
his cardiology service.  Recommendations are to discontinue his nitrate therapy.
 Blood pressure this morning is acceptable.  Patient denies dizziness or 
lightheadedness.
 
Patient is eager to ambulate today.  This was discussed with nursing staff will 
ensure that patient ambulates frequently today.
 
His platelet count was on the lower side yesterday.  Etiology for 
thrombocytopenia is uncertain.  However levels are trending upwards.  Recommend 
outpatient monitoring of his CBCs.  Sodium level has also improved.  Likely 
related to his hyperglycemia.
 
I did speak with the patient's oncologist yesterday.  We will continue to taper 
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

## 2018-05-03 VITALS — DIASTOLIC BLOOD PRESSURE: 70 MMHG | SYSTOLIC BLOOD PRESSURE: 124 MMHG

## 2018-05-03 VITALS — DIASTOLIC BLOOD PRESSURE: 60 MMHG | SYSTOLIC BLOOD PRESSURE: 102 MMHG

## 2018-05-03 VITALS — SYSTOLIC BLOOD PRESSURE: 118 MMHG | DIASTOLIC BLOOD PRESSURE: 60 MMHG

## 2018-05-03 NOTE — EVENT NOTE
Event Note
Event Note:
Situation
Nursing called that patient doesn't want to leave
 
Background
Mr Araya is a 72 year-old-male with a PMH significant for Lung cancer small cell
carcinoma status post radiation and on prednisone therapy, COPD on 2 L at home, 
CAD s/p CABG and PCI with stend placement, ischemic cardiomyopathy with EF of 20
-25% status post AICD, CKD, hypothyroidism, aortic stenosis, peripheral arterial
disease, JEREMY on CPAP who came to emergency room with chief complaint of sore 
throat eating food and dysgeusia/ageusia 
Patient was found to have diabetes mellitus which was considered to be 
exacerbated by prednisone intake.
Patient was being instructed for the last 2 days for glucometer for today for 
insulin injection
 
Assessment and plan
Patient had some concerns regarding using glucometer and injecting insulin.  I 
observed patient to check his blood sugar, patient is still not skillful to 
check blood sugar.  It was considered not to be safe for discharge.  We will 
keep patient in hospital for another day and continue educating him.  Issue was 
discussed with attending.

## 2018-05-03 NOTE — PATIENT DISCHARGE INSTRUCTIONS
Discharge Instructions
 
General Discharge Information
You were seen/treated for:
Which is mellitus
Dehydration
Watch for these problems:
Severe dizziness, weakness, nausea vomiting, increased urination, chest pain, 
shortness of breathing or worsening of any symptoms
Special Instructions:
Please follow with your PCP within one week of discharge.
 
Please note, several of your cardiac medications are changed due to low blood 
pressure. Please follow with your cardiologist next week as you need to be 
evaluated again to resume these medicatoins. 
 
Please follow with your endocrinologist, Dr. montelongo within 1 week of discharge 
regarding management of your diabetes mellitus.
 
Please check your blood sugars regularly, please use your medications as ordered
, please come back to hospital if symptoms worsen.
 
Diet
Continue normal diet: No
Recommended Diet: Diabetic
 
Activity
Full Activity/No Limits: No
Activity Self Limited: Yes
 
Acute Coronary Syndrome
 
Inclusion Criteria
At DC or during hospital stay patient has or had the following:
ACS DIAGNOSIS No
 
Discharge Core Measures
Meds if any: Prescribed or Continued at Discharge
Meds if any: NOT Prescribed or Continued at Discharge
 
Congestive Heart Failure
 
Inclusion Criteria
At DC or during hospital stay patient has or had the following:
CHF DIAGNOSIS No (Chronic )
 
Discharge Core Measures
Meds if any: Prescribed or Continued at Discharge
Meds if any: NOT Prescribed or Continued at Discharge
 
Cerebrovascular accident
 
Inclusion Criteria
At DC or during hospital stay patient has or had the following:
CVA/TIA Diagnosis No
 
Discharge Core Measures
Meds if any: Prescribed or Continued at Discharge
Meds if any: NOT Prescribed or Continued at Discharge
 
Venous thromboembolism
 
Inclusion Criteria
VTE Diagnosis No
VTE Type NONE
VTE Confirmed by (Test) NONE
 
Discharge Core Measures
- Per Current guidelines, there needs to be overlap
- treatment for the first 5 days of Warfarin therapy.
- If discharged on Warfarin prior to 5 days of
- overlap therapy, the patient will need to be
- assessed for post discharge needs including
- *Post discharge parental anticoagulation
- *Warfarin and/or parental anticoagulation education
- *Follow up date to check INR post discharge
At least 5 days overlap therapy as Inpatient No
Meds if any: Prescribed or Continued at Discharge
Note: Overlap Therapy is Warfarin and Anticoagulant
Meds if any: NOT Prescribed or Continued at Discharge

## 2018-05-03 NOTE — PN- HOUSESTAFF
Zahira Blake MD,[facility] 18 0800:
Subjective
Follow-up For:
Oral thrush
Diabetes mellitus
Dehydration
Subjective:
Patient visited today, was sitting at the bedside comfortably in no acute 
distress, was alert and oriented.
 
No fever or chills, no shortness of breathing, no chest pain, no other events.
 
Patient was instructed to check her sugars himself.  This morning decreased 
sugars and are in 100s. 
 
Patient will be instructed to follow in outpatient with endocrinologist.
 
Review of Systems
Constitutional:
Reports: see HPI. 
 
Objective
Last 24 Hrs of Vital Signs/I&O
 Vital Signs
 
 
Date Time Temp Pulse Resp B/P B/P Pulse O2 O2 Flow FiO2
 
     Mean Ox Delivery Rate 
 
 08      98 Nasal 2.0L 
 
       Cannula  
 
 0600 98.0 58 20 102/60  96 Nasal  
 
       Cannula  
 
 0012      94   
 
 0000       Nasal 2.0L 
 
       Cannula  
 
 2253  78    92   
 
 2218 98.1 73 20 106/66  96 Nasal 2.0L 
 
       Cannula  
 
 213  73  106/66     
 
2129  73  106/66     
 
 2129  73  106/66     
 
2005      97 Nasal 2.0L 
 
       Cannula  
 
 1600       Nasal 2.0L 
 
       Cannula  
 
 1407 97.9 72 20 100/58  97   
 
 0856      95 Nasal 2.0L 
 
       Cannula  
 
 
 Intake & Output
 
 
  1600  0800  0000
 
Intake Total  120 120
 
Output Total   
 
Balance  120 120
 
    
 
Intake, Oral  120 120
 
 
 
 
Physical Exam
General Appearance: Alert, Oriented X3, Cooperative, No Acute Distress
Skin Temp/Moisture Exam: Warm/Dry
Sepsis Skin Exam (color): Normal for Ethnicity
HEENT: Atraumatic, EOMI
Cardiovascular: Normal S1, Normal S2
Lungs: Normal Air Movement
Current Medications:
 Current Medications
 
 
  Sig/Ly Start time  Last
 
Medication Dose Route Stop Time Status Admin
 
Acetaminophen 650 MG Q6P PRN  1530 AC 
 
  PO   
 
Albuterol Sulfate 3 ML BID  1056 AC 
 
  INH   0821
 
Aspirin 81 MG DAILY  0900 AC 
 
  PO   0801
 
Atorvastatin Calcium 40 MG DAILY@1700  1700 AC 
 
  PO   1757
 
Budesonide/ 2 PUF BID 2100 AC 
 
Formoterol Fumarate  INH   213
 
Carvedilol 6.25 MG BID  2100 AC 
 
  PO   2130
 
Clopidogrel Bisulfate 75 MG DAILY  0900 AC 
 
  PO   0802
 
Heparin Sodium  5,000 UNIT Q8  0600 AC 
 
(Porcine)  SC   0642
 
Insulin Aspart 0 TIDAC  0800 AC 
 
  SC   1710
 
Insulin Aspart 0 AT BEDTIME  2100 AC 
 
  SC   2128
 
Insulin Detemir 25 UNITS DAILY  0900 AC 
 
  SC   0817
 
Levothyroxine Sodium 0.1 MG DAILY AC  0700 AC 
 
  PO   0517
 
Lidocaine 15 ML BID  0900 AC 
 
  PO   2132
 
Nitroglycerin 0.4 MG EVERY 5 MIN PRN  1615 AC 
 
  SL   
 
Nystatin 5 ML 4 TIMES/DAY  1458 AC 
 
  PO   2135
 
Omeprazole 40 MG DAILY AC  0700 AC 
 
  PO   0642
 
Patient Medication  1 ED ONE ONE  1115 DC 
 
Teaching  ED  1116  1146
 
Prednisone 40 MG DAILY  0900 AC 
 
  PO   0802
 
Ranolazine 500 MG BID  2100 AC 
 
  PO   2129
 
Sertraline HCl 50 MG DAILY  09 AC 
 
  PO   0801
 
Tamsulosin HCl 0.4 MG AT BEDTIME  2100 AC 
 
  PO   2129
 
Tiotropium [place] 1 PUF DAILY  1607 AC 
 
  INH   0802
 
 
 
 
Assessment/Plan
Assessment:
Mr Araya is a 72 year-old-male with a PMH significant for Lung cancer small cell
carcinoma status post radiation and on prednisone therapy, COPD on 2 L at home, 
CAD s/p CABG and PCI with stend placement, ischemic cardiomyopathy with EF of 20
-25% status post AICD, CKD, hypothyroidism, aortic stenosis, peripheral arterial
disease, JEREMY on CPAP who came to emergency room with chief complaint of sore 
throat eating food and dysgeusia/ageusia 
 
 
Vital signs in ED was notable for low blood pressure 74/49, heart rate 66, no 
fever 96% on 2 L
 
Labs were notable for WBC 13.1, MCV 99, sodium 129, potassium 4.6, glucose 617, 
creatinine 1.9 baseline is 1.6, CL 88 , UA showed glucose more than 1000
 
Patient received 1-1/2 L of normal saline in the ED, 10 units of insulin Novolin
, 1 dose of clotrimazole 10 mg
 
Blood pressure increased to 111/58 after hydration
 
CXR 1. No active cardiopulmonary disease. There is no significant change.
 
2. There is stable moderate plate-like scar/subsegmental atelectasis at the
left base.
 
 
Patient was admitted to  floor for management of following conditions:
 
- Hyperglycemia, DM
dehydration.CHF
IV hydration were administered cautiously considering low ejection fraction, 
patient had low blood pressures as well.  patient was placed on insulin 
injections and accucheks. in the following days the dose of insulin was adjusted
till blood sugar was properly controlled. 
Patient was stable to be discharged with recommendations to follow Dr May in 
outpatient.  Patient was also instructed regarding diabetes nutrition.
 
- Oral trush
most likely related to chronic steroid use. responded quickly to oral nystatin.
 
 
- Chronic medical conditions: CHF with low EF of 20-25% with AICD, CAD on dual 
antiplatelet therapy, small cell cancer status post radiation now on prednisone 
therapy for pneumonitis, COPD on 2 L, hypothyroid
 
Cardiology service was consulted, it was recommended to hold imdure, lasix was 
held due to low blood pressure, patient was instructed to follow with 
cardiologist within 1 week.  Patient will also continue to monitor blood 
pressure by visiting nurse at home.
Patient's oncologist was contacted.  It was decided to start to taper prednisone
by 10 mg every 3 days.
 
We continued rest of home medications: levothyroxine, aspirin, Plavix, Ranexa, 
tamsulosin, Lipitor, Continue Coreg, inhalers and TRC nebs. 
we continued carvedilol as blood pressure allowed. 
 
Patient was stable to be discharged.
Problem List:
 1. CHF (congestive heart failure)
 
 2. Diabetes mellitus
 
Pain Ratin
Pain Location:
none
Pain Goal: Pain 4 or less
Pain Plan:
Continue current plan
Tomorrow's Labs & Rationales:
None
 
 
Olinda Lopez MD 18 1025:
Attending MD Review Statement
 
Attending Statement
Attending MD Statement: examined this patient, discuss w/resident/PA/NP, agreed 
w/resident/PA/NP, reviewed EMR data (avail), discussed with nursing, discussed 
with case mgmt, amended to note
Attending Assessment/Plan:
Patient seen and examined.  Resting comfortably not in any acute distress.  No 
issues with her sister.  He is alert and oriented 3 and conversant 
appropriately.  Denies any dizziness or lightheadedness.  Ambulating freely 
without need for assistance per nursing staff.  Blood pressure has been stable 
overnight.  He is tolerating his beta-blocker therapy with Coreg.  We continue 
to hold his diuretic therapy due to his borderline blood pressure.  He remains 
clinically euvolemic with no evidence of volume overload.  Patient is to follow-
up with his cardiology service as an outpatient.  Determination for need of 
resuming diuretic therapy will be made by his cardiologist in the outpatient 
setting.
 
 his blood glucose level is better controlled this morning on insulin regimen 
recommended by the reinforced the need toEndo service.  He will be discharged 
home on this regimen.  Insulin teaching has been done by nursing staff.  We 
reinforced the need to be aware of symptoms of low blood glucose and what to do 
when his glucose levels are low.  He verbalized understanding.
 
His platelet counts are stable.  Etiology of his thrombocytopenia is unknown.  
This will be monitored and followed up by his primary care provider.
 
He is medically stable to be discharged home today.

## 2018-05-03 NOTE — PN- DIABETES
Assessment/Plan Diabetes
Assessment:
72 year-old-male with a PMH significant for small cell lung carcinoma status 
post radiation and on prednisone therapy ( prednisone 50 mg daily since 4/11/
2018), COPD on 2 L at home, CAD s/p CABG and PCI with stend placement, ischemic 
cardiomyopathy with EF of 20-25% status post AICD, CKD, hypothyroidism, aortic 
stenosis, peripheral arterial disease, JEREMY on CPAP presented to emergency room 
with chief complaint of sore throat eating food and dysgeusia. He was newly 
diagnosed with diabetes with glucose of of 617. His HbA1c was 12.5%.
 
The team touched base with his oncologist and patient will be on prednisone 
taper. Starting 5/1/2018, prednisone was decreased to 40 mg daily.
 
Levemir was increased to 25 units once a day; Novolog coverage before meals was 
adjusted again ( FSG , 9 units; 151-200, 11 units, etc). In addition, he 
is on another Novolog coverage at bedtime. His diet was changed to consistent 
carbohydrates 1 diet. His FSGs were 350, 204, 389, 317 and 131.
 
Plan:
continue the current insulin regimen for now;
monitor FSGs
if he is medically stable for discharge, the discharge plan for DM will be
---- Levemir 25 units daily in the morning;
----Novolog coverage before meals --- same scale as inpatient;
----no Novolog coverage at bedtime;
----monitor FSGs x 4 times a day;
----f/u in office after discharge;
----call office if there are any questions.
 
Subjective
Subjective:
He didn't have special complaints this morning. His FSG was 131 this morning.
 
Objective
Last 24 Hrs of Vital Signs/I&O
 Vital Signs
 
 
Date Time Temp Pulse Resp B/P B/P Pulse O2 O2 Flow FiO2
 
     Mean Ox Delivery Rate 
 
05/03 0821      98 Nasal 2.0L 
 
       Cannula  
 
05/03 0600 98.0 58 20 102/60  96 Nasal  
 
       Cannula  
 
05/03 0012      94   
 
05/03 0000       Nasal 2.0L 
 
       Cannula  
 
05/02 2253  78    92   
 
05/02 2218 98.1 73 20 106/66  96 Nasal 2.0L 
 
       Cannula  
 
05/02 2130  73  106/66     
 
05/02 2129  73  106/66 05/02 2129  73  106/66 05/02 2005      97 Nasal 2.0L 
 
       Cannula  
 
05/02 1600       Nasal 2.0L 
 
       Cannula  
 
05/02 1407 97.9 72 20 100/58  97   
 
05/02 0856      95 Nasal 2.0L 
 
       Cannula  
 
 
 Intake & Output
 
 
 05/03 1600 05/03 0800 05/03 0000
 
Intake Total  120 120
 
Output Total   
 
Balance  120 120
 
    
 
Intake, Oral  120 120

## 2018-05-03 NOTE — DISCHARGE SUMMARY
Visit Information
 
Visit Dates
Admission Date:
04/29/18
 
Discharge Date:
05/04/18
 
 
Hospital Course
 
Course
Attending Physician:
Jessica GLASS,[facility]
 
Primary Care Physician:
Sherlyn GLASS,[facility] Course:
Mr Araya is a 72 year-old-male with a PMH significant for Lung cancer small cell
carcinoma status post radiation and on prednisone therapy, COPD on 2 L at home, 
CAD s/p CABG and PCI with stend placement, ischemic cardiomyopathy with EF of 20
-25% status post AICD, CKD, hypothyroidism, aortic stenosis, peripheral arterial
disease, JEREMY on CPAP who came to emergency room with chief complaint of sore 
throat eating food and dysgeusia/ageusia 
 
 
Vital signs in ED was notable for low blood pressure 74/49, heart rate 66, no 
fever 96% on 2 L
 
Labs were notable for WBC 13.1, MCV 99, sodium 129, potassium 4.6, glucose 617, 
creatinine 1.9 baseline is 1.6, CL 88 , UA showed glucose more than 1000
 
Patient received 1-1/2 L of normal saline in the ED, 10 units of insulin Novolin
, 1 dose of clotrimazole 10 mg
 
Blood pressure increased to 111/58 after hydration
 
CXR 1. No active cardiopulmonary disease. There is no significant change.
 
2. There is stable moderate plate-like scar/subsegmental atelectasis at the
left base.
 
 
Patient was admitted to Gm floor for management of following conditions:
 
- Hyperglycemia, DM
dehydration.CHF
IV hydration were administered cautiously considering low ejection fraction, 
patient had low blood pressures as well.  patient was placed on insulin 
injections and accucheks. in the following days the dose of insulin was adjusted
till blood sugar was properly controlled. 
Patient was stable to be discharged with recommendations to follow Dr Montelongo in 
outpatient.  Patient was also instructed regarding diabetes nutrition.
 
- Oral trush
most likely related to chronic steroid use. responded quickly to oral nystatin.
 
 
- Chronic medical conditions: CHF with low EF of 20-25% with AICD, CAD on dual 
antiplatelet therapy, small cell cancer status post radiation now on prednisone 
therapy for pneumonitis, COPD on 2 L, hypothyroid
 
Cardiology service was consulted, it was recommended to hold imdure, lasix was 
held due to low blood pressure, patient was instructed to follow with 
cardiologist within 1 week.  Patient will also continue to monitor blood 
pressure by visiting nurse at home.
Patient's oncologist was contacted.  It was decided to start to taper prednisone
by 10 mg every 3 days.
 
We continued rest of home medications: levothyroxine, aspirin, Plavix, Ranexa, 
tamsulosin, Lipitor, Continue Coreg, inhalers and TRC nebs. 
we continued carvedilol as blood pressure allowed. Low dose lasix was also 
started on the day before discharge, blood pressure was monitored, patient 
tolerated. 
 
Patient was stable to be discharged.
 
Allergies:
Coded Allergies:
NO KNOWN ALLERGIES (NONE 02/20/18)
 
 
Disposition Summary
 
Disposition
Principal Diagnosis:
DM
 
Additional Diagnosis:
Oral trush
Discharge Disposition: home health services
 
Discharge Instructions
 
General Discharge Information
Code Status: Full Code
Patient's Diet:
Diabetic
Patient's Activity:
As tolerated
Follow-Up Instructions/Appts:
Please follow with your PCP within one week of discharge.
 
Please note, several of your cardiac medications are changed due to low blood 
pressure. Please follow with your cardiologist next week as you need to be 
evaluated again to resume these medicatoins. 
 
Please follow with your endocrinologist, Dr. montelongo within 1 week of discharge 
regarding management of your diabetes mellitus.
 
Please check your blood sugars regularly, please use your medications as ordered
, please come back to hospital if symptoms worsen.
 
Medications at Discharge
Discharge Medications:
Stop taking the following medications:
Isosorbide Mononitrate (Isosorbide Mononitrate ER) 60 MG TAB.ER.24H ORAL DAILY 
Qty = 90
 
Furosemide (Lasix) 20 MG TABLET ORAL DAILY 
 
Prednisone (Prednisone) 10 MG TABLET ORAL DAILY 
 
Continue taking these medications:
Aspirin (Aspirin*) 81 MG TAB.CHEW
    1 Tablet ORAL DAILY
    Qty = 30
    Comments:
       Last Taken:2/21/18
             Time: 9:42A.M
 
Atorvastatin Calcium (Lipitor) 40 MG TABLET
    1 Tablet ORAL DAILY
    Qty = 30
    Comments:
       Last Taken 5/3/18
             Time:4:37 PM
 
Ranolazine (Ranexa) 500 MG TAB.ER.12H
    1 Tablet ORAL TWICE DAILY
    Qty = 30
    Comments:
        
        
        
        
        
        
        
        
        
        
        
        
        
        
        
        
        
        
        
        
        
        
        
        
        
        
        
        
        
        
        
        
        
        
        
        
        
        
        
        
        
        
        
        
        
        
        
        
        
        
        
        
        
        
        
        
        
        
        
        
        
        
        
        
        
        
        
        
        
        
        
        
        
        
        
        
        
        
        
        
        
        
        
        
        
        
        
        
        
        
        
        
        
        
        
        
        
        
        
        
        
        
        
        
        
        
        
        
        
        
        
        
        
        
        
        
        
       Last Taken:5/4/18
             Time: 8:16 AM
 
Clopidogrel Bisulfate (Clopidogrel) 75 MG TABLET
    1 Tablet ORAL DAILY
    Qty = 90
    Comments:
       Last Taken: 5/4/18
             Time: 8:16 AM
 
Acetaminophen (Acetaminophen) 325 MG CAPSULE
    1 Capsule ORAL  as needed for PAIN
    Comments:
       Last Taken:NOT GIVEN THIS ADMISSION
             
 
Albuterol Sulfate (Proair Hfa) 90 MCG HFA.AER.AD
    2 Puff Inhale through mouth Every 4 hours
    Qty = 1
    Comments:
       NOT GIVEN THIS ADMISSION
 
Tamsulosin HCl (Flomax) 0.4 MG CAP.ER.24H
    1 Capsule ORAL TAKE AT BEDTIME
    Qty = 30
    Instructions:
       Please take at bed time to avoid low blood pressure during the day.
    Comments:
       Last Taken: 5/3/18
             Time: 8:20 PM
 
Tiotropium Bromide (Spiriva) 18 MCG CAP.W.DEV
    1 Capsule Inhale through mouth DAILY
    Qty = 30
    Comments:
       Last Taken: 5/4/18
             Time:8:17 AM
 
Budesonide/Formoterol Fumarate (Symbicort 160-4.5 Mcg Inhaler) 160 MCG-4.5 MCG/
ACTUATION HFA.AER.AD
    2 Puff Inhale through mouth TWICE DAILY
    Qty = 10
    Comments:
       Last Taken: 5/4/18
             Time: 8:17 AM
 
Levothyroxine Sodium (Levothyroxine Sodium) 100 MCG TABLET
    1 Tablet ORAL DAILY
    Qty = 90
    Comments:
       Last Taken:5/4/18
             Time:5:30 AM
 
Nitroglycerin (Nitrostat) 0.4 MG TAB.SUBL
    1 Tablet SUBLINGUAL As Directed as needed for CHEST PAIN
    Instructions:
       1st sign of attack; may repeat every 5 minutes until relief; if pain 
persists after 3 tablets in 15 minutes, prompt medical att
    Comments:
       NOT GIVEN THIS ADMISSION
 
Carvedilol (Carvedilol) 6.25 MG TABLET
    1 Tablet ORAL TWICE DAILY
    Comments:
       Last Taken:5/4/18
             Time:8:14 AM
 
Omeprazole (Omeprazole) 40 MG CAPSULE.DR
    1 Capsule ORAL DAILY
    Qty = 30
    Comments:
       Last Taken:5/4/18
             Time:5:30 AM
 
Sertraline HCl (Sertraline HCl) 50 MG TABLET
    1 Tablet ORAL DAILY
    Qty = 30
    Comments:
       Last Taken:5/4/18
             Time:8:14 AM
 
Start taking the following new medications:
Prednisone (Prednisone) 10 MG TABLET
    0 ORAL SEE INSTRUCTIONS
    Qty = 10
    No Refills
    Instructions:
       please take:.
        
       3 pills for 3 days
       2 pills for 3 days
       1 pill for 3 days 
       and then disscuss with your oncologist and PCP regarding stopping
    Comments:
        
       please take:
        
        3 pills for 3 days
        2 pills for 3 days
        1 pill for 3 days
        and then disscuss with your oncologist and PCP regarding stopping
 
Insulin Aspart, Recombinant (Novolog Flexpen) 100 UNIT/ML INSULN.PEN
    0 Inject into fatty tissue SEE INSTRUCTIONS
    Qty = 30
    No Refills
    Instructions:
       PLEASE check your blood sugar 3 times daily before meals and at
       bedtime and .
       FOLLOW
       80 - 150 MG/DL   9 UNITS
       151- 200 MG/DL   11 UNITS
       201- 250 MG/DL   13 UNITS
       251- 300 MG/DL   15 UNITS
       301- 350 MG/DL   17 UNITS
       351 - 400MG/DL   19 UNITS
       >400 MG/DL       20 UNITS AND CALL YOUR DR.
 
Furosemide (Lasix) 20 MG TABLET
    10 Milligram ORAL DAILY
    Qty = 30
    No Refills
    Instructions:
       .
 
Insulin Detemir (Levemir Flextouch) 100 UNIT/ML (3 ML) INSULN.PEN
    25 Units Inject into fatty tissue DAILY BEFORE BREAKFAST
    Qty = 30
    No Refills
 
 
Copies To:
Sherlyn GLASS,Lizeth Montelongo MD,Jacob
 
Attending MD Review Statement
Documenting Attending:
Olinda Lopez MD
Other Findings:
Discharge in stable condition

## 2018-05-03 NOTE — PN- CARDIOLOGY
Subjective
Subjective:
 
No new complaints.  Still able to ambulate with no dizziness.
 
Objective
Vital Signs and I&Os
Vital Signs
 
 
Date Time Temp Pulse Resp B/P B/P Pulse O2 O2 Flow FiO2
 
     Mean Ox Delivery Rate 
 
05/03 1025  58  102/66     
 
05/03 1025  58  102/66     
 
05/03 0821      98 Nasal 2.0L 
 
       Cannula  
 
05/03 0600 98.0 58 20 102/60  96 Nasal  
 
       Cannula  
 
05/03 0012      94   
 
05/03 0000       Nasal 2.0L 
 
       Cannula  
 
05/02 2253  78    92   
 
05/02 2218 98.1 73 20 106/66  96 Nasal 2.0L 
 
       Cannula  
 
05/02 2130  73  106/66     
 
05/02 2129  73  106/66     
 
05/02 2129  73  106/66     
 
05/02 2005      97 Nasal 2.0L 
 
       Cannula  
 
05/02 1600       Nasal 2.0L 
 
       Cannula  
 
05/02 1407 97.9 72 20 100/58  97   
 
 
 Intake & Output
 
 
 05/03 1600 05/03 0800 05/03 0000 05/02 1600 05/02 0800 05/02 0000
 
Intake Total   480 480
 
Output Total    400 900 900
 
Balance  120 120 660 -420 -420
 
       
 
Intake, Oral   480 480
 
Number    0  
 
Bowel      
 
Movements      
 
Output, Urine    400 900 900
 
 
 
Physical Exam:
 
General: no apparent distress. Alert.
Eyes: No obvious scleral icterus.
HEENT: No jugular venous distention or abnormal jugular venous pulsations.
Cardiovascular: Normal intensity S1/S2. Regular. ICD noted
Respiratory: Lungs clear to auscultation bilaterally.
Abdomen: Soft, nontender with no guarding or rebound tenderness.
Musculoskeletal: No clubbing or cyanosis noted
Skin: warm
Neurologic: No gross focal deficits noted.
Current Medications:
 Current Medications
 
 
  Sig/Ly Start time  Last
 
Medication Dose Route Stop Time Status Admin
 
Acetaminophen 650 MG Q6P PRN 04/29 1530 AC 
 
  PO   
 
Albuterol Sulfate 3 ML BID 04/30 1056 AC 05/03
 
  INH   0821
 
Aspirin 81 MG DAILY 04/30 0900 AC 05/02
 
  PO   0801
 
Atorvastatin Calcium 40 MG DAILY@1700 04/29 1700 AC 05/02
 
  PO   1757
 
Budesonide/ 2 PUF BID 04/29 2100 AC 05/03
 
Formoterol Fumarate  INH   1023
 
Carvedilol 6.25 MG BID 04/29 2100 AC 05/03
 
  PO   1025
 
Clopidogrel Bisulfate 75 MG DAILY 04/30 0900 AC 05/02
 
  PO   0802
 
Heparin Sodium  5,000 UNIT Q8 04/30 0600 AC 05/03
 
(Porcine)  SC   0642
 
Insulin Aspart 0 TIDAC 04/30 0800 AC 05/03
 
  SC   1022
 
Insulin Aspart 0 AT BEDTIME 04/29 2100 AC 05/02
 
  SC   2128
 
Insulin Detemir 25 UNITS DAILY 05/02 0900 AC 05/03
 
  SC   1022
 
Levothyroxine Sodium 0.1 MG DAILY AC 04/30 0700 AC 05/03
 
  PO   1026
 
Lidocaine 15 ML BID 04/30 0900 AC 05/03
 
  PO   1023
 
Nitroglycerin 0.4 MG EVERY 5 MIN PRN 04/29 1615 AC 
 
     
 
Nystatin 5 ML 4 TIMES/DAY 04/29 1458 AC 05/03
 
  PO   1023
 
Omeprazole 40 MG DAILY AC 04/30 0700 AC 05/03
 
  PO   0642
 
Patient Medication  1 ED ONE ONE 05/02 1115 DC 05/02
 
Teaching  ED 05/02 1116  1146
 
Prednisone 40 MG DAILY 05/01 0900 AC 05/03
 
  PO   1025
 
Ranolazine 500 MG BID 04/29 2100 AC 05/03
 
  PO   1025
 
Sertraline HCl 50 MG DAILY 04/30 0900 AC 05/03
 
  PO   1025
 
Tamsulosin HCl 0.4 MG AT BEDTIME 04/29 2100 AC 05/02
 
  PO   2129
 
Tiotropium [place] 1 PUF DAILY 04/29 1607 AC 05/03
 
  INH   1023
 
 
 
 
Results
Last 48 Hrs of Labs/Mics:
 Laboratory Tests
 
05/02/18 0615:
Anion Gap 8, Estimated GFR 50  L, BUN/Creatinine Ratio 27.9  H, CBC w Diff NO 
MAN DIFF REQ, RBC 3.79  L, .1  H, MCH 33.3  H, MCHC 33.2, RDW 15.6  H, 
MPV 9.3, Gran % 85.0  H, Lymphocytes % 7.7  L, Monocytes % 7.2, Eosinophils % 0,
Basophils % 0.1, Absolute Granulocytes 7.3  H, Absolute Lymphocytes 0.7  L, 
Absolute Monocytes 0.6, Absolute Eosinophils 0, Absolute Basophils 0
 
05/01/18 2350:
Urine Osmolality 699, Ur Random Creatinine 64.3, Ur Random Sodium 25  L, Ur 
Random Potassium 28.4, Fraction Sodium Excret 0.4
 
Recent Imaging Studies:
 
Not on telemetry
 
Assessment/Plan
Assessment/Plan
 
1.  Hyperglycemia with prednisone use
2.  Ischemic cardiomyopathy with prior CABG/PCI
3.  AICD in situ
4.  Abdominal aortic aneurysm status post prior EVAR
5.  Aortic stenosis
6.  Small cell lung CA
7.  Hypothyroidism
8.  Not on ACE inhibitor per reoprt due to CKD with hyperkalemia
 
 
Doing well and hyperglycemia has improved. Recommend resuming outpatient low-
dose Lasix on discharge. No recurrent hyperkalemia on yesterday's metabolic 
panel. Continue on the carvedilol as blood pressure tolerates. Continue dual 
antiplatelet and statin therapy.
 
James Mc MD [facility]
Continue telemetry? Not applicable

## 2018-05-04 VITALS — SYSTOLIC BLOOD PRESSURE: 102 MMHG | DIASTOLIC BLOOD PRESSURE: 60 MMHG

## 2018-05-04 VITALS — DIASTOLIC BLOOD PRESSURE: 70 MMHG | SYSTOLIC BLOOD PRESSURE: 100 MMHG

## 2018-05-04 NOTE — PN- HOUSESTAFF
Zahira Blake MD,[facility] 18 0710:
Subjective
Follow-up For:
DM
Subjective:
Patient visited today, was sitting at bedside comfortably in no acute distress, 
was alert and oriented.
We questioned regarding checking BS and administration of Ins, patient had 
understanding but it was decided patient would benefit from visiting nurse 
intially. 
 
No fever or chills, no shortness of breathing, no chest pain, no other events.
 
Review of Systems
Constitutional:
Reports: see HPI. 
 
Objective
Last 24 Hrs of Vital Signs/I&O
 Vital Signs
 
 
Date Time Temp Pulse Resp B/P B/P Pulse O2 O2 Flow FiO2
 
     Mean Ox Delivery Rate 
 
 0816  66  102/60     
 
 0814  66  102/60     
 
 0806      98 Nasal 2.0L 
 
       Cannula  
 
 0800       Nasal 2.0L 
 
       Cannula  
 
 0608 98.0 66 20 102/60  98   
 
 0000       Nasal 2.0L 
 
       Cannula  
 
 2221  69    97   
 
 2153 97.9 81 20 124/70  98   
 
 2045      98 Nasal 2.0L 
 
       Cannula  
 
2020 97.9 81 20 124/70     
 
 2020 97.9 81 20 124/70     
 
 2019 97.9 81 20 124/70     
 
 1600       Nasal 2.0L 
 
       Cannula  
 
 1327 97.9 73 18 118/60  96 Nasal 2.0L 
 
       Cannula  
 
 
 Intake & Output
 
 
  1600  0800  0000
 
Intake Total  350 200
 
Output Total   300
 
Balance  350 -100
 
    
 
Intake, Oral  350 200
 
Output, Urine   300
 
 
 
 
Physical Exam
General Appearance: Alert, Oriented X3, Cooperative, No Acute Distress
Skin Temp/Moisture Exam: Warm/Dry
Sepsis Skin Exam (color): Normal for Ethnicity
HEENT: Atraumatic, EOMI
Cardiovascular: Normal S1, Normal S2
Lungs: Normal Air Movement
Abdomen: Soft, No Tenderness
Current Medications:
 Current Medications
 
 
  Sig/Ly Start time  Last
 
Medication Dose Route Stop Time Status Admin
 
Acetaminophen 650 MG Q6P PRN  1530 AC 
 
  PO   
 
Albuterol Sulfate 3 ML BID  1056 AC 
 
  INH   2044
 
Aspirin 81 MG DAILY  0900 AC 
 
  PO   0814
 
Atorvastatin Calcium 40 MG DAILY@1700  1700 AC 
 
  PO   1637
 
Budesonide/ 2 PUF BID  2100 AC 
 
Formoterol Fumarate  INH   0817
 
Carvedilol 6.25 MG BID  2100 AC 
 
  PO   0814
 
Clopidogrel Bisulfate 75 MG DAILY  0900 AC 
 
  PO   0816
 
Furosemide 10 MG DAILY  1733 AC 
 
  PO   1016
 
Heparin Sodium  5,000 UNIT Q8  0600 AC 
 
(Porcine)  SC   0528
 
Insulin Aspart 0 TIDAC  0800 AC 
 
  SC   1135
 
Insulin Aspart 0 AT BEDTIME  2100 AC 
 
  SC   2045
 
Insulin Detemir 25 UNITS DAILY  0900 AC 
 
  SC   0815
 
Levothyroxine Sodium 0.1 MG DAILY AC  0700 AC 
 
  PO   0530
 
Lidocaine 15 ML BID  0900 AC 
 
  PO   0817
 
Nitroglycerin 0.4 MG EVERY 5 MIN PRN  1615 AC 
 
  SL   
 
Nystatin 5 ML 4 TIMES/DAY  1458 DC 
 
  PO   1219
 
Omeprazole 40 MG DAILY AC  0700 AC 
 
  PO   0530
 
Patient Medication  1 ED ONE ONE  1215 DC 
 
Teaching  ED  1216  1438
 
Prednisone 30 MG DAILY  0900 AC 
 
  PO   1016
 
Prednisone 40 MG DAILY  0900 DC 
 
  PO   1025
 
Ranolazine 500 MG BID  2100 AC 
 
  PO   0816
 
Sertraline HCl 50 MG DAILY  0900 AC 
 
  PO   0814
 
Tamsulosin HCl 0.4 MG AT BEDTIME 2100 AC 
 
  PO   2020
 
Tiotropium Bromide 1 PUF DAILY  1607 AC 
 
  INH   0817
 
 
 
 
Assessment/Plan
Assessment:
Mr Araya is a 72 year-old-male with a PMH significant for Lung cancer small cell
carcinoma status post radiation and on prednisone therapy, COPD on 2 L at home, 
CAD s/p CABG and PCI with stend placement, ischemic cardiomyopathy with EF of 20
-25% status post AICD, CKD, hypothyroidism, aortic stenosis, peripheral arterial
disease, JEREMY on CPAP who came to emergency room with chief complaint of sore 
throat eating food and dysgeusia/ageusia 
 
 
Vital signs in ED was notable for low blood pressure 74/49, heart rate 66, no 
fever 96% on 2 L
 
Labs were notable for WBC 13.1, MCV 99, sodium 129, potassium 4.6, glucose 617, 
creatinine 1.9 baseline is 1.6, CL 88 , UA showed glucose more than 1000
 
Patient received 1-1/2 L of normal saline in the ED, 10 units of insulin Novolin
, 1 dose of clotrimazole 10 mg
 
Blood pressure increased to 111/58 after hydration
 
CXR 1. No active cardiopulmonary disease. There is no significant change.
 
2. There is stable moderate plate-like scar/subsegmental atelectasis at the
left base.
 
 
Patient was admitted to Gm floor for management of following conditions:
 
- Hyperglycemia, DM
dehydration.CHF
IV hydration were administered cautiously considering low ejection fraction, 
patient had low blood pressures as well.  patient was placed on insulin 
injections and accucheks. in the following days the dose of insulin was adjusted
till blood sugar was properly controlled. 
Patient was stable to be discharged with recommendations to follow Dr May in 
outpatient.  Patient was also instructed regarding diabetes nutrition.
 
- Oral trush
most likely related to chronic steroid use. responded quickly to oral nystatin.
 
 
- Chronic medical conditions: CHF with low EF of 20-25% with AICD, CAD on dual 
antiplatelet therapy, small cell cancer status post radiation now on prednisone 
therapy for pneumonitis, COPD on 2 L, hypothyroid
 
Cardiology service was consulted, it was recommended to hold imdure, lasix was 
held due to low blood pressure, patient was instructed to follow with 
cardiologist within 1 week.  Patient will also continue to monitor blood 
pressure by visiting nurse at home.
Patient's oncologist was contacted.  It was decided to start to taper prednisone
by 10 mg every 3 days.
 
We continued rest of home medications: levothyroxine, aspirin, Plavix, Ranexa, 
tamsulosin, Lipitor, Continue Coreg, inhalers and TRC nebs. 
we continued carvedilol as blood pressure allowed. Low dose lasix was also 
started on the day before discharge, blood pressure was monitored, patient 
tolerated. 
 
Patient was stable to be discharged.
Problem List:
 1. Diabetes mellitus
 
Pain Ratin
Pain Location:
None
Pain Goal: Pain 4 or less
Pain Plan:
Continue current plan
Tomorrow's Labs & Rationales:
None
 
 
Olinda Lopez MD 18 1216:
Attending MD Review Statement
 
Attending Statement
Attending MD Statement: examined this patient, discuss w/resident/PA/NP, agreed 
w/resident/PA/NP, reviewed EMR data (avail), discussed with nursing, discussed 
with case mgmt, amended to note
Attending Assessment/Plan:
Patient seen and examined.  Resting comfortably and not in any acute distress.  
He was scheduled for discharge yesterday however he had hesitation about 
checking his blood glucose levels and administering insulin.  He has particular 
concerns about timing of his sliding scale coverage yesterday.  He states that 
yesterday was the first time he was shown how to administer his sliding scale 
coverage.  This morning he reports feeling more confident.  He states that his 
wife will be unable to administer his insulin for him.  Arrangements have been 
made with visiting nurse services to follow-up with the patient at home.  She 
feels more comfortable being discharged today.  He was observed checking his 
blood glucose levels and administering insulin by the nursing team.  Visiting 
nurse services will continue reinforcement at home.
Glucose levels were elevated improved from admission.  He will follow-up with 
endocrinology service for further titration of his regimen as needed.  
Cardiology follow-up appreciated.  Recommendations noted.

## 2018-05-04 NOTE — PN- CARDIOLOGY
Subjective
Subjective:
 
Doing well.  No dizziness.
 
Objective
Vital Signs and I&Os
Vital Signs
 
 
Date Time Temp Pulse Resp B/P B/P Pulse O2 O2 Flow FiO2
 
     Mean Ox Delivery Rate 
 
05/04 0816  66  102/60     
 
05/04 0814  66  102/60     
 
05/04 0806      98 Nasal 2.0L 
 
       Cannula  
 
05/04 0800       Nasal 2.0L 
 
       Cannula  
 
05/04 0608 98.0 66 20 102/60  98   
 
05/04 0000       Nasal 2.0L 
 
       Cannula  
 
05/03 2221  69    97   
 
05/03 2153 97.9 81 20 124/70  98   
 
05/03 2045      98 Nasal 2.0L 
 
       Cannula  
 
05/03 2020 97.9 81 20 124/70     
 
05/03 2020 97.9 81 20 124/70     
 
05/03 2019 97.9 81 20 124/70     
 
05/03 1600       Nasal 2.0L 
 
       Cannula  
 
05/03 1327 97.9 73 18 118/60  96 Nasal 2.0L 
 
       Cannula  
 
 
 Intake & Output
 
 
 05/04 1600 05/04 0800 05/04 0000 05/03 1600 05/03 0800 05/03 0000
 
Intake Total  350 200 720 120 120
 
Output Total   300   
 
Balance  350 -100 720 120 120
 
       
 
Intake, Oral  350 200 720 120 120
 
Output, Urine   300   
 
 
 
Physical Exam:
General: no apparent distress. Alert.
Eyes: No obvious scleral icterus.
HEENT: No jugular venous distention or abnormal jugular venous pulsations.
Cardiovascular: Normal intensity S1/S2. Regular. ICD noted
Respiratory: Lungs clear to auscultation bilaterally.
Abdomen: Soft, nontender with no guarding or rebound tenderness.
Musculoskeletal: No clubbing or cyanosis noted
Skin: warm
Neurologic: No gross focal deficits noted.
Current Medications:
 Current Medications
 
 
  Sig/Ly Start time  Last
 
Medication Dose Route Stop Time Status Admin
 
Acetaminophen 650 MG Q6P PRN 04/29 1530 AC 
 
  PO   
 
Albuterol Sulfate 3 ML BID 04/30 1056 AC 05/03
 
  INH   2044
 
Aspirin 81 MG DAILY 04/30 0900 AC 05/04
 
  PO   0814
 
Atorvastatin Calcium 40 MG DAILY@1700 04/29 1700 AC 05/03
 
  PO   1637
 
Budesonide/ 2 PUF BID 04/29 2100 AC 05/04
 
Formoterol Fumarate  INH   0817
 
Carvedilol 6.25 MG BID 04/29 2100 AC 05/04
 
  PO   0814
 
Clopidogrel Bisulfate 75 MG DAILY 04/30 0900 AC 05/04
 
  PO   0816
 
Furosemide 10 MG DAILY 05/03 1733 AC 05/04
 
  PO   1016
 
Heparin Sodium  5,000 UNIT Q8 04/30 0600 AC 05/04
 
(Porcine)  SC   0528
 
Insulin Aspart 0 TIDAC 04/30 0800 AC 05/04
 
  SC   0815
 
Insulin Aspart 0 AT BEDTIME 04/29 2100 AC 05/03
 
  SC   2045
 
Insulin Detemir 25 UNITS DAILY 05/02 0900 AC 05/04
 
  SC   0815
 
Levothyroxine Sodium 0.1 MG DAILY AC 04/30 0700 AC 05/04
 
  PO   0530
 
Lidocaine 15 ML BID 04/30 0900 AC 05/04
 
  PO   0817
 
Nitroglycerin 0.4 MG EVERY 5 MIN PRN 04/29 1615 AC 
 
     
 
Nystatin 5 ML 4 TIMES/DAY 04/29 1458 DC 05/03
 
  PO   1219
 
Omeprazole 40 MG DAILY AC 04/30 0700 AC 05/04
 
  PO   0530
 
Patient Medication  1 ED ONE ONE 05/03 1215 DC 05/03
 
Teaching  ED 05/03 1216  1438
 
Prednisone 30 MG DAILY 05/04 0900 AC 05/04
 
  PO   1016
 
Prednisone 40 MG DAILY 05/01 0900 DC 05/03
 
  PO   1025
 
Ranolazine 500 MG BID 04/29 2100 AC 05/04
 
  PO   0816
 
Sertraline HCl 50 MG DAILY 04/30 0900 AC 05/04
 
  PO   0814
 
Tamsulosin HCl 0.4 MG AT BEDTIME 04/29 2100 AC 05/03
 
  PO   2020
 
Tiotropium Bromide 1 PUF DAILY 04/29 1607 AC 05/04
 
  INH   0817
 
 
 
 
Results
Last 48 Hrs of Labs/Mics:
Laboratory Tests
 
05/02/18 0615:
Anion Gap 8, Estimated GFR 50  L, BUN/Creatinine Ratio 27.9  H, CBC w Diff NO 
MAN DIFF REQ, RBC 3.79  L, .1  H, MCH 33.3  H, MCHC 33.2, RDW 15.6  H, 
MPV 9.3, Gran % 85.0  H, Lymphocytes % 7.7  L, Monocytes % 7.2, Eosinophils % 0,
Basophils % 0.1, Absolute Granulocytes 7.3  H, Absolute Lymphocytes 0.7  L, 
Absolute Monocytes 0.6, Absolute Eosinophils 0, Absolute Basophils 0
 
05/01/18 2350:
Urine Osmolality 699, Ur Random Creatinine 64.3, Ur Random Sodium 25  L, Ur 
Random Potassium 28.4, Fraction Sodium Excret 0.4
 
 
Assessment/Plan
Assessment/Plan
 
1.  Hyperglycemia with prednisone use
2.  Ischemic cardiomyopathy with prior CABG/PCI
3.  AICD in situ
4.  Abdominal aortic aneurysm status post prior EVAR
5.  Aortic stenosis
6.  Small cell lung CA
7.  Hypothyroidism
8.  Not on ACE inhibitor per reoprt due to CKD with hyperkalemia
 
 
Doing well. No dizziness. Continue current cardiac regimen. Follow-up in our 
office after discharge.  Please call with any additional questions or concerns.
 
 
James Mc MD [facility]
Continue telemetry? Not applicable

## 2018-05-04 NOTE — PN- DIABETES
Assessment/Plan Diabetes
Assessment:
72 year-old-male with a PMH significant for small cell lung carcinoma status 
post radiation and on prednisone therapy ( prednisone 50 mg daily since 4/11/
2018), COPD on 2 L at home, CAD s/p CABG and PCI with stend placement, ischemic 
cardiomyopathy with EF of 20-25% status post AICD, CKD, hypothyroidism, aortic 
stenosis, peripheral arterial disease, JEREMY on CPAP presented to emergency room 
with chief complaint of sore throat eating food and dysgeusia. He was newly 
diagnosed with diabetes with glucose of of 617. His HbA1c was 12.5%.
 
The team touched base with his oncologist and patient will be on prednisone 
taper. Starting 5/1/2018, prednisone was decreased to 40 mg daily. Prednisone 
will be further decreased to 30 mg daily today.
 
Levemir was increased to 25 units once a day; Novolog coverage before meals was 
adjusted again ( FSG , 9 units; 151-200, 11 units, etc). In addition, he 
is on another Novolog coverage at bedtime. His diet was changed to consistent 
carbohydrates 1 diet. His FSGs were 302, 305, 230, 306 and 226.
 
 
 
Plan:
continue the current insulin regimen for now;
monitor FSGs
if he is medically stable for discharge, the discharge plan for DM will be
---- Levemir 25 units daily in the morning;
----Novolog coverage before meals --- same scale as inpatient;
----no Novolog coverage at bedtime;
----monitor FSGs x 4 times a day;
----f/u in office after discharge;
----call office if there are any questions.
 
 
Subjective
Subjective:
He feels okay. His glucose levels are still in the 200s and 300s. But prednisone
will be further decreased to 30 mg daily today.
 
Objective
Last 24 Hrs of Vital Signs/I&O
 Vital Signs
 
 
Date Time Temp Pulse Resp B/P B/P Pulse O2 O2 Flow FiO2
 
     Mean Ox Delivery Rate 
 
05/04 0806      98 Nasal 2.0L 
 
       Cannula  
 
05/04 0608 98.0 66 20 102/60  98   
 
05/04 0000       Nasal 2.0L 
 
       Cannula  
 
05/03 2221  69    97   
 
05/03 2153 97.9 81 20 124/70  98   
 
05/03 2045      98 Nasal 2.0L 
 
       Cannula  
 
05/03 2020 97.9 81 20 124/70     
 
05/03 2020 97.9 81 20 124/70     
 
05/03 2019 97.9 81 20 124/70     
 
05/03 1600       Nasal 2.0L 
 
       Cannula  
 
05/03 1327 97.9 73 18 118/60  96 Nasal 2.0L 
 
       Cannula  
 
05/03 1025  58  102/66 05/03 1025  58  102/66 05/03 0821      98 Nasal 2.0L 
 
       Cannula  
 
 
 Intake & Output
 
 
 05/04 1600 05/04 0800 05/04 0000
 
Intake Total  350 200
 
Output Total   300
 
Balance  350 -100
 
    
 
Intake, Oral  350 200
 
Output, Urine   300

## 2018-07-08 ENCOUNTER — HOSPITAL ENCOUNTER (EMERGENCY)
Dept: HOSPITAL 68 - ERH | Age: 73
End: 2018-07-08
Payer: COMMERCIAL

## 2018-07-08 VITALS — WEIGHT: 240 LBS | BODY MASS INDEX: 32.51 KG/M2 | HEIGHT: 72 IN

## 2018-07-08 VITALS — SYSTOLIC BLOOD PRESSURE: 127 MMHG | DIASTOLIC BLOOD PRESSURE: 59 MMHG

## 2018-07-08 DIAGNOSIS — R60.9: ICD-10-CM

## 2018-07-08 DIAGNOSIS — R25.2: Primary | ICD-10-CM

## 2018-07-08 LAB
ABSOLUTE GRANULOCYTE CT: 6.2 /CUMM (ref 1.4–6.5)
BASOPHILS # BLD: 0 /CUMM (ref 0–0.2)
BASOPHILS NFR BLD: 0.3 % (ref 0–2)
EOSINOPHIL # BLD: 0 /CUMM (ref 0–0.7)
EOSINOPHIL NFR BLD: 0.4 % (ref 0–5)
ERYTHROCYTE [DISTWIDTH] IN BLOOD BY AUTOMATED COUNT: 18.7 % (ref 11.5–14.5)
GRANULOCYTES NFR BLD: 80.5 % (ref 42.2–75.2)
HCT VFR BLD CALC: 36 % (ref 42–52)
LYMPHOCYTES # BLD: 0.8 /CUMM (ref 1.2–3.4)
MCH RBC QN AUTO: 33.9 PG (ref 27–31)
MCHC RBC AUTO-ENTMCNC: 33.2 G/DL (ref 33–37)
MCV RBC AUTO: 102 FL (ref 80–94)
MONOCYTES # BLD: 0.7 /CUMM (ref 0.1–0.6)
PLATELET # BLD: 118 /CUMM (ref 130–400)
PMV BLD AUTO: 8.2 FL (ref 7.4–10.4)
RED BLOOD CELL CT: 3.53 /CUMM (ref 4.7–6.1)
WBC # BLD AUTO: 7.7 /CUMM (ref 4.8–10.8)

## 2018-07-08 NOTE — ED UPPER/LOWER EXTREMITY COMPL
**See Addendum**
History of Present Illness
 
General
Chief Complaint: Lower Extremity Problems
Stated Complaint: BILTERAL LEG PAIN
Source: patient, old records
Exam Limitations: no limitations
 
Vital Signs & Intake/Output
Vital Signs & Intake/Output
 Vital Signs
 
 
Date Time Temp Pulse Resp B/P B/P Pulse O2 O2 Flow FiO2
 
     Mean Ox Delivery Rate 
 
 1316 97.5 37 18 127/59  98 Room Air  
 
 1057 97.8 71 18 105/66  97 Room Air  
 
 
 
Allergies
Coded Allergies:
NO KNOWN ALLERGIES (NONE 18)
 
Reconcile Medications
Acetaminophen 325 MG CAPSULE   1 CAP PO PRN PAIN  (Reported)
Albuterol Sulfate (Proair Hfa) 90 MCG HFA.AER.AD   2 PUF INH Q4 COPD
Aspirin (Aspirin*) 81 MG TAB.CHEW   1 TAB PO DAILY heart  (Reported)
Atorvastatin Calcium (Lipitor) 40 MG TABLET   1 TAB PO DAILY cholesterol  (
Reported)
Budesonide/Formoterol Fumarate (Symbicort 160-4.5 Mcg Inhaler) 160 MCG-4.5 MCG/
ACTUATION HFA.AER.AD   2 PUF INH BID COPD  (Reported)
Carvedilol 6.25 MG TABLET   1 TAB PO BID HEART/BP  (Reported)
Clopidogrel Bisulfate (Clopidogrel) 75 MG TABLET   1 TAB PO DAILY BLOOD THINNER 
(Reported)
Furosemide (Lasix) 20 MG TABLET   10 MG PO DAILY HEART HEALTH
     .
Insulin Aspart, Recombinant (Novolog Flexpen) 100 UNIT/ML INSULN.PEN   0 SC SEE 
ADMIN CRITERIA DM
     PLEASE check your blood sugar 3 times daily before meals and at
     bedtime and .
     FOLLOW
     80 - 150 MG/DL   9 UNITS
     151- 200 MG/DL   11 UNITS
     201- 250 MG/DL   13 UNITS
     251- 300 MG/DL   15 UNITS
     301- 350 MG/DL   17 UNITS
     351 - 400MG/DL   19 UNITS
     >400 MG/DL       20 UNITS AND CALL YOUR DR.
Insulin Detemir (Levemir Flextouch) 100 UNIT/ML (3 ML) INSULN.PEN   25 UNITS SC 
DAILY AC DM
Levothyroxine Sodium 100 MCG TABLET   1 TAB PO DAILY THYROID  (Reported)
Nitroglycerin (Nitrostat) 0.4 MG TAB.SUBL   1 TAB SL AD PRN CHEST PAIN  (
Reported)
     1st sign of attack; may repeat every 5 minutes until relief; if pain 
persists after 3 tablets in 15 minutes, prompt medical att
Omeprazole 40 MG CAPSULE.DR   1 CAP PO DAILY GI  (Reported)
Prednisone 10 MG TABLET   0 PO SEE ADMIN CRITERIA lung health
     please take:.
      
     3 pills for 3 days
     2 pills for 3 days
     1 pill for 3 days 
     and then disscuss with your oncologist and PCP regarding stopping
Ranolazine (Ranexa) 500 MG TAB.ER.12H   1 TAB PO BID heart  (Reported)
Sertraline HCl 50 MG TABLET   1 TAB PO DAILY MENTAL HEALTH  (Reported)
Tamsulosin HCl (Flomax) 0.4 MG CAP.ER.24H   1 CAP PO QHS prostate
     Please take at bed time to avoid low blood pressure during the day.
Tiotropium Bromide (Spiriva) 18 MCG CAP.W.DEV   1 CAP INH DAILY COPD  (Reported)
 
Triage Note:
72M REPORTS BILATERAL LEG CRAMPS SINCE YESTERDAY.
 ADMITS HE IS NOT DRINKING ENOUGH WATER. CRAMPING
 IS ALSO AT REST. RECENT DX OF DIABETES. DENIES
 SENSITIVITY TO HOT/COLD AND DENIES FEVERS/CHILLS
 R/O DVT. RECENT DX OF DIABETES. DENIES
 SENSITIVITY TO HOT/COLD AND DENIES FEVERS/CHILLS.
Triage Nurses Notes Reviewed? yes
Onset: Gradual
Duration: day(s):
Timing: recent history
Severity: moderate
Pain/Injury Location:
Bilateral: Leg. 
HPI:
72-year-old male with history of hypertension, diabetes, lung cancer, coronary 
artery disease presents to emergency department complaining of bilateral leg 
cramps beginning yesterday.  Patient reports cramping sensation in calf's, worse
on the left side.  Patient also notes increasing swelling in lower legs.  
Patient states he was taken off Lasix and isosorbide about one month ago by his 
endocrinologist.  Patient was started on insulin injections due to elevated 
blood sugars at that time.  Patient reports dyspnea which is chronic, no 
worsening. Patient denies recent travel, hemoptysis, chest pain, abdominal pain.
(Keara GREEN,Umm Botello)
 
Past History
 
Travel History
Traveled to [place] past 21 day No
 
Medical History
Any Pertinent Medical History? see below for history
Neurological: NONE
EENT: NONE
Cardiovascular: CARDIAC STENTS CABG CAROTID ARTERY BYPASS DEFIBRILLATOR
Respiratory: COPD
Gastrointestinal: NONE
Hepatic: NONE
Renal: NONE
Musculoskeletal: NONE
Psychiatric: NONE
Endocrine: hypothyroidism
Blood Disorders: DVT
Cancer(s): lung cancer
GYN/Reproductive: NONE
History of MRSA: No
History of VRE: No
History of CDIFF: No
Influenza Vaccine: 11/15/17
 
Surgical History
Surgical History: CABG, CAROTID ARTECTOMY nerve thermoablation to reduce back 
pain 
 
Psychosocial History
Who do you live with Spouse
Services at Home None
What is your primary language English
Tobacco Use: Quit >30 days ago
 
Family History
Family History, If Any:
Relation not specified for:
  *No pertinent family history
 
Hx Contributory? No
(Umm Schafer)
 
Review of Systems
 
Review of Systems
Constitutional:
Reports: no symptoms. 
EENTM:
Reports: no symptoms. 
Respiratory:
Reports: see HPI. 
Cardiovascular:
Reports: see HPI. 
Gastrointestinal/Abdominal:
Reports: no symptoms. 
Genitourinary:
Reports: no symptoms. 
Musculoskeletal:
Reports: see HPI. 
Skin:
Reports: no symptoms. 
Neurological/Psychological:
Reports: no symptoms. 
Hematologic/Endocrine:
Reports: no symptoms. 
Immunological:
Reports: no symptoms. 
All Other Systems: Reviewed and Negative
(Umm Schafer)
 
Physical Exam
 
Physical Exam
General Appearance: well developed/nourished, no apparent distress, alert, awake
Head: atraumatic, normal appearance
Eyes:
Bilateral: normal appearance. 
Ears, Nose, Throat: hearing grossly normal
Neck: normal inspection, supple, full range of motion
Cardiovascular/Respiratory: regular rate/rhythm, no respiratory distress, mild 
bilateral expiratory wheezes
Back: normal inspection, normal range of motion
Leg Left: mild calf tenderness, mild pitting edema
Leg Right: mild calf tenderness, mild pitting edema
Neurologic/Tendon: normal sensation, normal motor functions, normal tendon 
functions, capillary refill intact
Skin: intact, normal color, warm/dry
(Umm Schafer)
 
Progress
Differential Diagnosis: arterial insufficiency, cellulitis, CHF, DVT, sprain, 
electrolyte abnormality, lactic acidosis
Plan of Care:
 Orders
 
 
Procedure Date/time Status
 
EKG  1327 Active
 
Add-on Test (ER Only)  1200 Active
 
LACTIC ACID  1200 Complete
 
CREATINE PHOSPHOKINASE  1200 Complete
 
Add-on Test (ER Only)  1159 Active
 
TROPONIN LEVEL  1116 Complete
 
MAGNESIUM  1116 Complete
 
COMPREHENSIVE METABOLIC PANEL  1116 Complete
 
CBC WITHOUT DIFFERENTIAL  1116 Complete
 
 
 Laboratory Tests
 
 
 
18 1200:
Anion Gap 10, Estimated GFR 40  L, BUN/Creatinine Ratio 20.0, Glucose 116  H, 
Lactic Acid 0.9, Calcium 8.9, Magnesium 1.7, Total Bilirubin 0.5, AST 28, ALT 66
, Alkaline Phosphatase 83, Creatine Kinase 65, Troponin I 0.06, Total Protein 
5.6  L, Albumin 3.3  L, Globulin 2.3, Albumin/Globulin Ratio 1.4, CBC w Diff NO 
MAN DIFF REQ, RBC 3.53  L, .0  H, MCH 33.9  H, MCHC 33.2, RDW 18.7  H, 
MPV 8.2, Gran % 80.5  H, Lymphocytes % 10.0  L, Monocytes % 8.8, Eosinophils % 
0.4, Basophils % 0.3, Absolute Granulocytes 6.2, Absolute Lymphocytes 0.8  L, 
Absolute Monocytes 0.7  H, Absolute Eosinophils 0, Absolute Basophils 0
 
Ultrasound shows no evidence of DVTs.  Patient's labs are stable compared to his
baseline.  No evidence of lactic acidosis or electrolyte abnormality based on 
the patient's labs.  Patient's bilateral calf pain and mild swelling is likely 
related to discontinuation of his Lasix.  Patient does have chronic kidney 
disease.  Patient was formerly on low-dose Lasix, will restart his Lasix on a 
PRN schedule.  Follow-up with his endocrinologist this week.  Patient given 
strict return precautions.  The patient agrees with plan of care.  The patient 
was discussed with Dr. Dumont who agrees with this plan.
Diagnostic Imaging:
Viewed by Me: Ultrasound.  Discussed w/RAD: Ultrasound. 
Radiology Impression: PATIENT: JULIO CÉSAR CRUZ JR  MEDICAL RECORD NO: [id number] 
PRESENT AGE: 72  PATIENT ACCOUNT NO: [id number] : 43  LOCATION: [facility] 
ORDERING PHYSICIAN: Jose A Dumont MD     SERVICE DATE:  EXAM TYPE: US
- US-EXT BILAT VENOUS DOPPLER EXAMINATION: US TRIPLEX OF LOWER EXTREMITIES, 
BILATERAL CLINICAL INFORMATION: Bilateral lower extremity cramping COMPARISON: 
None TECHNIQUE: Color-flow triplex imaging with spectral analysis and 
compression Doppler were performed on the lower extremities. FINDINGS: 
Respiratory variation, normal compression and augmented flow are noted 
throughout the lower extremities. The visualized common femoral vein, 
superficial femoral vein, profunda femoral vein, popliteal vein and midcalf 
peroneal and posterior tibial venous segments show no evidence of deep venous 
thrombosis. There is no Baker's cyst. IMPRESSION: No evidence of deep venous 
thrombosis involving the bilateral lower extremities. DICTATED BY: Van Ireland MD  DATE/TIME DICTATED:18 :RAD.SYKES  DATE/TIME 
TRANSCRIBED:18 CONFIDENTIAL, DO NOT COPY WITHOUT APPROPRIATE 
AUTHORIZATION.  <Electronically signed in Other Vendor System>                  
                                                                     SIGNED BY: 
Van Ireland MD 18 [id number]
(Umm Schafer)
 
Departure
 
Departure
Disposition: HOME OR SELF CARE
Condition: Stable
Clinical Impression
Primary Impression: Leg cramps
Secondary Impressions: Pedal edema
Referrals:
Durga Plata MD (PCP/Family)
 
Additional Instructions:
As discussed, YOU may take Tylenol 650-1000mg four times a day to help with 
pain. Begin furosemide again AS NEEDED for swelling in your legs. Do not take 
lasix every day, only when you notice swelling is increasing. Follow up with Dr. May's office, call this week to make an appointment and inform them of the 
changes we made today.  Return with worsening symptoms or concerns.
 
Please note that there might be incidental findings in your evaluation that are 
unrelated to the current emergency department visit.  Please notify your primary
care doctor about this emergency department visit in order to obtain and review 
all of the testing performed so that these incidental findings can be monitored 
as needed.
 
If you had an x-ray performed, please understand that some fractures may not be 
seen on the initial set of x-rays.  If your symptoms persist you might need a 
repeat set of x-rays to check for such a fracture.
 
If you had a laceration evaluated, please understand that foreign bodies such as
glass or wood may not be visible to the naked eye or on plain x-rays.  If the 
wound becomes red, swollen, increasingly more painful or if there is any 
drainage from the wound, please have it reevaluated by a physician for the 
possibility of a retained foreign body.
 
If you're unable to follow up as outlined in the discharge instructions please 
return to the emergency department.
 
Thank you for choosing the [facility] Emergency Department for your care.
It was a pleasure to serve you today.
Departure Forms:
Customer Survey
General Discharge Information
(Umm Schafer)
 
PA/NP Co-Sign Statement
Statement:
ED Attending supervision documentation-
 
x I saw and evaluated the patient. I have also reviewed all the pertinent lab 
results and diagnostic results. I agree with the findings and the plan of care 
as documented in the PA's/NP's documentation. 
 
[] I have reviewed the ED Record and agree with the PA's/NP's documentation.
 
[] Additions or exceptions (if any) to the PAs/NP's note and plan are 
summarized below:
[]
 
(Julia GLASS,Jose A)

## 2018-07-08 NOTE — ULTRASOUND REPORT
EXAMINATION:
US TRIPLEX OF LOWER EXTREMITIES, BILATERAL
 
CLINICAL INFORMATION:
Bilateral lower extremity cramping
 
COMPARISON:
None
 
TECHNIQUE:
Color-flow triplex imaging with spectral analysis and compression Doppler
were performed on the lower extremities.
 
FINDINGS:
Respiratory variation, normal compression and augmented flow are noted
throughout the lower extremities. The visualized common femoral vein,
superficial femoral vein, profunda femoral vein, popliteal vein and midcalf
peroneal and posterior tibial venous segments show no evidence of deep venous
thrombosis.
 
There is no Baker's cyst.
 
IMPRESSION:
No evidence of deep venous thrombosis involving the bilateral lower
extremities.

## 2018-08-19 ENCOUNTER — HOSPITAL ENCOUNTER (INPATIENT)
Dept: HOSPITAL 68 - ERH | Age: 73
LOS: 17 days | Discharge: HOME HEALTH | DRG: 190 | End: 2018-09-05
Attending: INTERNAL MEDICINE | Admitting: INTERNAL MEDICINE
Payer: COMMERCIAL

## 2018-08-19 VITALS — BODY MASS INDEX: 29.83 KG/M2 | WEIGHT: 220.25 LBS | HEIGHT: 72 IN

## 2018-08-19 VITALS — DIASTOLIC BLOOD PRESSURE: 78 MMHG | SYSTOLIC BLOOD PRESSURE: 108 MMHG

## 2018-08-19 DIAGNOSIS — Z79.4: ICD-10-CM

## 2018-08-19 DIAGNOSIS — Z66: ICD-10-CM

## 2018-08-19 DIAGNOSIS — I13.0: ICD-10-CM

## 2018-08-19 DIAGNOSIS — I35.0: ICD-10-CM

## 2018-08-19 DIAGNOSIS — Z98.61: ICD-10-CM

## 2018-08-19 DIAGNOSIS — Z79.52: ICD-10-CM

## 2018-08-19 DIAGNOSIS — D69.6: ICD-10-CM

## 2018-08-19 DIAGNOSIS — Z92.3: ICD-10-CM

## 2018-08-19 DIAGNOSIS — Z86.718: ICD-10-CM

## 2018-08-19 DIAGNOSIS — I50.23: ICD-10-CM

## 2018-08-19 DIAGNOSIS — R79.89: ICD-10-CM

## 2018-08-19 DIAGNOSIS — Z87.891: ICD-10-CM

## 2018-08-19 DIAGNOSIS — I25.5: ICD-10-CM

## 2018-08-19 DIAGNOSIS — Z95.810: ICD-10-CM

## 2018-08-19 DIAGNOSIS — N18.3: ICD-10-CM

## 2018-08-19 DIAGNOSIS — J44.1: Primary | ICD-10-CM

## 2018-08-19 DIAGNOSIS — G47.33: ICD-10-CM

## 2018-08-19 DIAGNOSIS — E03.9: ICD-10-CM

## 2018-08-19 DIAGNOSIS — N40.0: ICD-10-CM

## 2018-08-19 DIAGNOSIS — Z79.01: ICD-10-CM

## 2018-08-19 DIAGNOSIS — Z79.82: ICD-10-CM

## 2018-08-19 DIAGNOSIS — J90: ICD-10-CM

## 2018-08-19 DIAGNOSIS — C34.90: ICD-10-CM

## 2018-08-19 DIAGNOSIS — Z79.51: ICD-10-CM

## 2018-08-19 DIAGNOSIS — I25.10: ICD-10-CM

## 2018-08-19 DIAGNOSIS — E11.22: ICD-10-CM

## 2018-08-19 LAB
ABSOLUTE GRANULOCYTE CT: 7.3 /CUMM (ref 1.4–6.5)
BASOPHILS # BLD: 0 /CUMM (ref 0–0.2)
BASOPHILS NFR BLD: 0.1 % (ref 0–2)
EOSINOPHIL # BLD: 0 /CUMM (ref 0–0.7)
EOSINOPHIL NFR BLD: 0.5 % (ref 0–5)
ERYTHROCYTE [DISTWIDTH] IN BLOOD BY AUTOMATED COUNT: 17.4 % (ref 11.5–14.5)
GRANULOCYTES NFR BLD: 83.6 % (ref 42.2–75.2)
HCT VFR BLD CALC: 34.2 % (ref 42–52)
LYMPHOCYTES # BLD: 0.6 /CUMM (ref 1.2–3.4)
MCH RBC QN AUTO: 34.5 PG (ref 27–31)
MCHC RBC AUTO-ENTMCNC: 33.2 G/DL (ref 33–37)
MCV RBC AUTO: 103.8 FL (ref 80–94)
MONOCYTES # BLD: 0.7 /CUMM (ref 0.1–0.6)
PLATELET # BLD: 125 /CUMM (ref 130–400)
PMV BLD AUTO: 8.4 FL (ref 7.4–10.4)
RED BLOOD CELL CT: 3.29 /CUMM (ref 4.7–6.1)
WBC # BLD AUTO: 8.7 /CUMM (ref 4.8–10.8)

## 2018-08-19 PROCEDURE — 2NSBP: CPT

## 2018-08-19 NOTE — RADIOLOGY REPORT
EXAMINATION: XRY-PORTABLE CHEST XRAY
 
CLINICAL INFORMATION:
Presumptive Dx: PNA  CHF<br>Signs   Symptoms: SOB<br>
 
COMPARISON:
None
 
TECHNIQUE: XRY-PORTABLE CHEST XRAY
 
Tubes and lines: Cardiac device projecting over the LEFT hemithorax remain in
place unchanged. There are sternotomy wires
 
Lungs and [place]: Opacification over the LEFT lung base probably infiltrate
and/or effusion. Right lung relatively spared. There is mild vascular
congestion.
 
Pleura: There is probably LEFT pleural effusion.
 
Heart and mediastinum: Heart and mediastinum are widened exaggerated by the
AP technique..
 
Bones: Skeletal structures included are normal for patient's age.
 
IMPRESSION:
Opacity projecting over the LEFT lower lobe probably an infiltrate
atelectasis and/or pleural effusion. Underlying vascular vascular congestion.
Suggest follow-up chest PA and lateral when patient's conditions permits.

## 2018-08-19 NOTE — HISTORY & PHYSICAL
Sirisha Florian 08/19/18 2017:
General Information and HPI
MD Statement:
I have seen and personally examined JULIO CÉSAR CRUZ  and documented this H&P.
 
The patient is a 73 year old M who presented with a patient stated chief 
complaint of [].
 
Source of Information: patient
Exam Limitations: no limitations
History of Present Illness:
73 year old male with extensive PMH s/p CABG (1989), CAD with stents (1990s), h/
o DVT (5 yrs ago), IDDM (dx 3months ago), COPD, Small Cell lung CA (last dose of
radiation 3 months ago), ischemic cardiomyopathy with EF 20% s/p defibrillator 
placement (Jan 2018), s/p carotid endarterectomy right side (1998), hypothryroid
, HTN, BPH, JEREMY on CPAP at noc presenting with two day history of progressive 
SOB. He states his symptoms developed suddenly. Patient reports associated dry 
cough but denies fever, chills, n/v/d, chest pain, dizziness.  He states he 
becomes severely SOB ambulating from kitchen to living room (approx 25ft).  He 
is unable to climb stairs without getting on his hands and knees and taking 
breaks.  He denies recent illness, travel, or sick contacts.  Patient also 
reports 'itching' to his extremities and scabs from scratching. Patient was last
admitted in May 2018 for COPD exacerbation.     
 
Past History
 
Travel History
Traveled to [place] past 21 day No
 
Medical History
Neurological: NONE
EENT: NONE
Cardiovascular: CARDIAC STENTS CABG CAROTID ARTERY BYPASS DEFIBRILLATOR
Respiratory: COPD
Gastrointestinal: NONE
Hepatic: NONE
Renal: NONE
Musculoskeletal: NONE
Psychiatric: NONE
Endocrine: hypothyroidism
Blood Disorders: DVT
Cancer(s): lung cancer
GYN/Reproductive: NONE
History of MRSA: No
History of VRE: No
History of CDIFF: No
 
Surgical History
Surgical History: CABG, CAROTID ARTECTOMY nerve thermoablation to reduce back 
pain 
 
Past Family/Social History
 
Family History
Relations & Conditions if any
Relation not specified for:
  *No pertinent family history
 
 
Psychosocial History
Who Do You Live With? spouse
Services at Home: None
Primary Language: English
Smoking Status: Former Smoker (60 pack year history;quit 8mos)
ETOH Use: denies use
Illicit Drug Use: denies illicit drug use
Living Will? no
 
Functional Ability
ADLs
Independent: dressing, eating, toileting, bathing. 
Ambulation: independent
 
Employment History
Employment Retired
 
Review of Systems
 
Review of Systems
Constitutional:
Reports: no symptoms. 
EENTM:
Reports: no symptoms. 
Cardiovascular:
Reports: peripheral edema. 
Respiratory:
Reports: cough, short of breath.  Denies: sputum production, wheezing. 
GI:
Reports: no symptoms. 
Genitourinary:
Reports: no symptoms. 
Musculoskeletal:
Reports: no symptoms. 
Skin:
Reports: dryness (excoriations BUE/BLE). 
 
Exam & Diagnostic Data
Last 24 Hrs of Vital Signs/I&O
 Vital Signs
 
 
Date Time Temp Pulse Resp B/P B/P Pulse O2 O2 Flow FiO2
 
     Mean Ox Delivery Rate 
 
08/19 2146       Room Air  
 
08/19 2117 97.8 82 20 122/74  93 Room Air  
 
08/19 1918 97.8 86 18 117/76  96 Room Air  
 
08/19 1638      95   
 
08/19 1557      95   
 
08/19 1512      93   
 
08/19 1448 98.0 88 22 106/69  96 Room Air  
 
 
 Intake & Output
 
 
 08/19 1600 08/19 0800 08/19 0000
 
Intake Total   
 
Output Total   
 
Balance   
 
    
 
Patient 250 lb  
 
Weight   
 
 
 
 
Physical Exam
General Appearance Alert, Oriented X3, Cooperative, No Acute Distress, obese 
male
Skin excoriations to BUE/BLE. suspicious moles on back with irregular shape; 
dark brown to black in color; no surrounding erythema or active bleeding
Skin Temp/Moisture Exam: Warm/Dry
HEENT Atraumatic, PERRLA
Neck Supple
Cardiovascular Regular Rate, systolic murmur
Lungs expiratory wheeze heard over RUL otherwise CTA
Abdomen Normal Bowel Sounds, Soft, No Tenderness, obese
Extremities 2+pitting edema BLE, unable to palpate pedal pulses 2/2 edema; cap 
refill <2sec
 
Assessment/Plan
Assessment:
73 year old male with extensive PMH s/p CABG (1989), CAD with stents (1990s), h/
o DVT (5 yrs ago), IDDM (dx 3months ago), COPD, Small Cell lung CA (last dose of
radiation 3 months ago), ischemic cardiomyopathy with EF 20% s/p defibrillator 
placement (Jan 2018), s/p carotid endarterectomy right side (1998), hypothryroid
, HTN, BPH, JEREMY on CPAP at noc presenting with two day history of progressive 
SOB. He states his symptoms developed suddenly.  Patient will be brought in for 
observation to the general medicine service for further care of the following:
 
Problem List:
1. Acute COPD exacerbation
2. CKD stage 3
3. Elevated ALT
4. Thrombocytopenia
5. h/o IDDM
 
Admission Data: VS T98.0 P88 RR22 /69 Sat96%RA
Labs: WBC 8.7 H/H 11.3/34.2 Plt 125 BUN/Cr 34/1.7 ALT 86 Alb 3.3
Portable CXR:  IMPRESSION:
Opacity projecting over the LEFT lower lobe probably an infiltrate
atelectasis and/or pleural effusion. Underlying vascular vascular congestion.
Suggest follow-up chest PA and lateral when patient's conditions permits.
Patient given SoluMedrol 125mg IV x1dose and duonebs in ED
 
#Acute COPD exacerbation; unlikely to have an infectious component despite CXR 
findings as patient is s/p radiation in that lung field and patient has no 
leukocytosis and afebrile.
-SoluMedrol 40mg IV Q8hr then taper
-Azithromycin 5 day course
-Duonebs
-Oxygen supplementation as needed to maintain sats >90% but less than 93%; not 
requiring oxygen currently
 
#CKD stage 3
-continue to monitor
 
#Elevated ALT-may be medication related
-monitor
 
#Thrombocytopenia-may be 2/2 viral infection vs bacterial vs medication induced 
vs nutritional deficiency (albumin 3.3) vs malignancy
-continue to monitor
-hold chemical prophylaxis; ALPS and ambulation only
 
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
with Small cell lung cancer
-Sliding scale insulin
-levemir home dose
-finger sticks
 
DVT prophylaxis: ALPS/ambulation
Code Status: DNR/DNI
 
 
As Ranked By This Provider
Problem List:
 1. Chronic kidney disease
 
 2. COPD exacerbation
 
 
Core Measures/Misc (9/17)
 
Acute Coronary Syndrome
ACS Diagnosis: No
 
Congestive Heart Failure
Congestive Heart Failure Diagnosis No
 
Cerebrovascular Accident
CVA/TIA Diagnosis: No
 
VTE (View Protocol)
VTE Risk Factors Acute Medical Illness
No Mechanical VTE Prophylaxis d/t N/A MechProphylax Ordered
No VTE Pharm Prophylaxis d/t Platelets below ref range
 
Sepsis (View protocol)
Sepsis Present: No
If YES complete Sepsis Event Note If YES complete Sepsis Event Note
 
 
Chong Ken 08/19/18 2231:
Core Measures/Misc (9/17)
 
Sepsis (View protocol)
If YES complete Sepsis Event Note If YES complete Sepsis Event Note
 
Resident Review Statement
Resident Statement: examined this patient, discussed with intern, agreed with 
intern, discussed with family, reviewed EMR data (avail), discussed with nursing
, discussed with case mgmt, reviewed images, amended to note
Other Findings:
This is a 73-year-old male with past medical history significant for COPD not on
home oxygen, coronary artery disease status post CABG, PCI, ischemic 
cardiomyopathy ejection fraction 20% status post AICD, diabetes mellitus, 
systolic heart failure, hypothyroidism, GERD, BPH, lung cancer small cell 
carcinoma status post chemo and radio, last radiation 3 months ago, chronic 
kidney disease stage III, peripheral arterial disease, JEREMY not on CPAP presented
to the hospital for evaluation of worsening shortness of breath for 2 days.
 
Patient reports worsening shortness of breath with minimal exertion for last 
couple of days, not relieved with usual inhalers.  Also reports dry cough.  
Denies any sputum production, fever, chills, chest pain.  Denies any sick 
contact exposure or travel history.  Patient has shortness of breath with 
exertion which is his baseline.  However for last 2 days he could not even walk 
20 feet without getting short of breath.
 
Denies any chest pain, nausea, vomiting, abdominal pain, change in bladder or 
bowel habits.  He quit smoking 8 months ago.  Denies alcohol abuse, illicit drug
abuse.  He was admitted in February for hypoxic respiratory failure and CHF 
exacerbation.
 
--------------------------------------------------------------------------------
-----------
 
Vitals afebrile, heart rate 98, respiratory 22, blood pressure 106 assistant, 
saturating at 96 on room air 
 
labs
WBC 8.7, hemoglobin 11 and hematocrit 34, platelets 125
Sodium 141, potassium 4, BUN 34 and creatinine 1.7
Chest x-ray showed left lower lobe opacity
EKG sinus rhythm, 85, PVCs
 
 
1.  Acute COPD exacerbation
Patient presented with worsening and progressive shortness of breath for 2 days 
associated with dry cough.  Denied any fever, chills, sputum production.  He 
quit smoking 8 months ago.  Chest x-ray showed left lower lobe opacity.  However
given his symptoms he does not look like having pneumonia picture.  We will 
observe him in [facility] for acute COPD exacerbation.
 
-Observation in general med
-Vitals every shift
-Monitor for fever, leukocytosis
-TRC nebs
-IV methylprednisone 40 mg every 8 hours
-We will continue Symbicort, Spiriva
-Azithromycin 250 mg for 5 days
 
 
 
chronic medical conditions
Coronary artery disease-continue aspirin 81, Lipitor 40, Plavix 75, Ranexa 500 
twice daily, carvedilol 6.25 twice daily
Hyperlipidemia-continue Lipitor 40 daily
Hypertension-continue carvedilol 6.25 twice daily
Diabetes mellitus-continue Accu-Cheks, NovoLog sliding scale and Levemir 20 
units a.m.
Systolic heart failure-continue Lasix 20 mg daily
Hypothyroidism-levothyroxine 150 mcg daily
GERD-omeprazole 40 daily
BPH continue Flomax 0.4 daily
Mental health-continue sertraline 50 daily
 
 
DNR/DNI
Consistent carbohydrate 3 diet
Subcu heparin for DVT prophylaxis
Pain pathway Tylenol
 
 
 
 
Sung GLASS,[place] 08/20/18 0413:
General Information and HPI
MD Statement:
I have seen and personally examined JULIO CÉSAR CRUZ JR and documented this H&P.
 
The patient is a 73 year old M who presented with a patient stated chief 
complaint of [].
 
Source of Information: patient
Exam Limitations: no limitations
 
Allergies/Medications
Allergies:
Coded Allergies:
NO KNOWN ALLERGIES (NONE 02/20/18)
 
Home Med list
Acetaminophen 325 MG CAPSULE   1 CAP PO PRN PAIN  (Reported)
Albuterol Sulfate (Proair Hfa) 90 MCG HFA.AER.AD   2 PUF INH Q4 COPD
Aspirin (Aspirin*) 81 MG TAB.CHEW   1 TAB PO DAILY heart  (Reported)
Atorvastatin Calcium (Lipitor) 40 MG TABLET   1 TAB PO DAILY cholesterol  (
Reported)
Budesonide/Formoterol Fumarate (Symbicort 160-4.5 Mcg Inhaler) 160 MCG-4.5 MCG/
ACTUATION HFA.AER.AD   2 PUF INH BID COPD  (Reported)
Carvedilol 6.25 MG TABLET   1 TAB PO BID HEART/BP  (Reported)
Clopidogrel Bisulfate (Clopidogrel) 75 MG TABLET   1 TAB PO DAILY BLOOD THINNER 
(Reported)
Empagliflozin (Jardiance) 10 MG TABLET   1 TAB PO DAILY DM  (Reported)
Furosemide (Lasix) 20 MG TABLET   1 TAB PO DAILY CHF  (Reported)
Insulin Aspart, Recombinant (Novolog Flexpen) 100 UNIT/ML INSULN.PEN   0 SC SEE 
ADMIN CRITERIA DM
     PLEASE check your blood sugar 3 times daily before meals and at
     bedtime and .
     FOLLOW
     80 - 150 MG/DL   9 UNITS
     151- 200 MG/DL   11 UNITS
     201- 250 MG/DL   13 UNITS
     251- 300 MG/DL   15 UNITS
     301- 350 MG/DL   17 UNITS
     351 - 400MG/DL   19 UNITS
     >400 MG/DL       20 UNITS AND CALL YOUR DR.
Insulin Detemir (Levemir) 100 UNIT/ML VIAL   20 UNITS SC QAM DM  (Reported)
Levothyroxine Sodium 100 MCG TABLET   1.5 TAB PO DAILY HYPOTHYROID  (Reported)
Nitroglycerin (Nitrostat) 0.4 MG TAB.SUBL   1 TAB SL AD PRN CHEST PAIN  (
Reported)
     1st sign of attack; may repeat every 5 minutes until relief; if pain 
persists after 3 tablets in 15 minutes, prompt medical att
Omeprazole 40 MG CAPSULE.DR   1 CAP PO DAILY GI  (Reported)
Prednisone 10 MG TABLET   1.5 TAB PO DAILY STEROID  (Reported)
Ranolazine (Ranexa) 500 MG TAB.ER.12H   1 TAB PO BID heart  (Reported)
Sertraline HCl 50 MG TABLET   1 TAB PO DAILY MENTAL HEALTH  (Reported)
Tamsulosin HCl (Flomax) 0.4 MG CAP.ER.24H   1 CAP PO QHS prostate
     Please take at bed time to avoid low blood pressure during the day.
Tiotropium Bromide (Spiriva) 18 MCG CAP.W.DEV   1 CAP INH DAILY COPD  (Reported)
 
 
Past History
 
Medical History
Respiratory: COPD
Endocrine: hypothyroidism
Blood Disorders: DVT
Cancer(s): lung cancer
 
Surgical History
Surgical History: CABG, CAROTID ARTECTOMY nerve thermoablation to reduce back 
pain 
 
Past Family/Social History
 
Psychosocial History
Smoking Status: Former Smoker
ETOH Use: denies use
Illicit Drug Use: denies illicit drug use
 
Employment History
Employment Retired
 
Review of Systems
 
Review of Systems
Constitutional:
Reports: see HPI. 
 
Exam & Diagnostic Data
Last 24 Hrs of Vital Signs/I&O
 Vital Signs
 
 
Date Time Temp Pulse Resp B/P B/P Pulse O2 O2 Flow FiO2
 
     Mean Ox Delivery Rate 
 
08/19 2337  86  108/78 08/19 2337    108/78 08/19 2336  86  108/78 08/19 2200 97.8 86 20 108/78  93   
 
08/19 2146       Room Air  
 
08/19 2117 97.8 82 20 122/74  93 Room Air  
 
08/19 1918 97.8 86 18 117/76  96 Room Air  
 
08/19 1638      95   
 
08/19 1557      95   
 
08/19 1512      93   
 
08/19 1448 98.0 88 22 106/69  96 Room Air  
 
 
 Intake & Output
 
 
 08/20 0800 08/20 0000 08/19 1600
 
Intake Total  200 
 
Output Total   
 
Balance  200 
 
    
 
Intake, Oral  200 
 
Patient  250 lb 250 lb
 
Weight   
 
 
 
 
Physical Exam
General Appearance Alert, Oriented X3, Cooperative, No Acute Distress
Skin excoriations to BUE/BLE. suspicious moles on back with irregular shape; 
dark brown to black in color; no surrounding erythema or active bleeding
HEENT Atraumatic, PERRLA, EOMI
Neck Supple, No JVD
Cardiovascular Regular Rate, systolic murmur
Lungs expiratory wheeze heard over RUL otherwise CTA
Abdomen Normal Bowel Sounds, Soft, No Tenderness
Extremities 2+pitting edema BLE, unable to palpate pedal pulses 2/2 edema; cap 
refill <2sec
Last 24 Hrs of Labs/[place]:
 Laboratory Tests
 
08/19/18 1508:
Anion Gap 7, Estimated GFR 39  L, BUN/Creatinine Ratio 20.0, Glucose 164  H, 
Calcium 8.6, Total Bilirubin 0.8, AST 27, ALT 86  H, Alkaline Phosphatase 100, 
Troponin I 0.06, Total Protein 5.6  L, Albumin 3.3  L, Globulin 2.3, Albumin/
Globulin Ratio 1.4, CBC w Diff NO MAN DIFF REQ, RBC 3.29  L, .8  H, MCH 
34.5  H, MCHC 33.2, RDW 17.4  H, MPV 8.4, Gran % 83.6  H, Lymphocytes % 7.3  L, 
Monocytes % 8.5, Eosinophils % 0.5, Basophils % 0.1, Absolute Granulocytes 7.3  
H, Absolute Lymphocytes 0.6  L, Absolute Monocytes 0.7  H, Absolute Eosinophils 
0, Absolute Basophils 0
 
 
 
Core Measures/Misc (9/17)
 
Sepsis (View protocol)
If YES complete Sepsis Event Note If YES complete Sepsis Event Note
 
Attending MD Review Statement
 
Attending Statement
Attending MD Statement: examined this patient, discuss w/resident/PA/NP, agreed 
w/resident/PA/NP, reviewed EMR data (avail), amended to note
Attending Assessment/Plan:
This patient is a 73 year old male with a significant past medical history for 
CABG (1989), CAD with stents (1990s), h/o DVT (5 yrs ago), IDDM (dx 3months ago)
, COPD, Small Cell lung CA (last dose of radiation 3 months ago), ischemic 
cardiomyopathy with EF 20% s/p defibrillator placement (Jan 2018), s/p carotid 
endarterectomy right side (1998), hypothyroid, HTN, BPH, JEREMY on CPAP presenting 
with a two day history of progressive SOB. The symptoms developed suddenly and 
he becomes severely SOB ambulating from kitchen to living room (approx. 25ft).  
He is unable to climb stairs without getting on his hands and knees and taking 
breaks.  While in the ED the patient remained afebrile with his blood pressure 
running 108/80 and his pulse ox slightly low at 93% room air. B UN 34/creatinine
1.7, ALT 86, CXR - Opacity projecting over the LEFT lower lobe probably an 
infiltrate atelectasis and/or pleural effusion, EKG - NSR, rate (85), LVH, IVCD,
Prior EKG: unchanged. The patient will be admitted to general medicine for acute
insect limitation of COPD. IV steroids, antibiotics, neb treatments and 
maintenance of his high blood sugar worsened by the steroids.  DNR/DNI

## 2018-08-19 NOTE — ED DYSPNEA/ASTHMA COMPLAINT
History of Present Illness
 
General
Chief Complaint: Dyspnea (COPD, CHF, Other)
Stated Complaint: SOB
Source: patient
Exam Limitations: no limitations
 
Vital Signs & Intake/Output
Vital Signs & Intake/Output
 Vital Signs
 
 
Date Time Temp Pulse Resp B/P B/P Pulse O2 O2 Flow FiO2
 
     Mean Ox Delivery Rate 
 
 1638      95   
 
 1557      95   
 
 1512      93   
 
 1448 98.0 88 22 106/69  96 Room Air  
 
 
 
Allergies
Coded Allergies:
NO KNOWN ALLERGIES (NONE 18)
 
Reconcile Medications
Acetaminophen 325 MG CAPSULE   1 CAP PO PRN PAIN  (Reported)
Albuterol Sulfate (Proair Hfa) 90 MCG HFA.AER.AD   2 PUF INH Q4 COPD
Aspirin (Aspirin*) 81 MG TAB.CHEW   1 TAB PO DAILY heart  (Reported)
Atorvastatin Calcium (Lipitor) 40 MG TABLET   1 TAB PO DAILY cholesterol  (
Reported)
Budesonide/Formoterol Fumarate (Symbicort 160-4.5 Mcg Inhaler) 160 MCG-4.5 MCG/
ACTUATION HFA.AER.AD   2 PUF INH BID COPD  (Reported)
Carvedilol 6.25 MG TABLET   1 TAB PO BID HEART/BP  (Reported)
Clopidogrel Bisulfate (Clopidogrel) 75 MG TABLET   1 TAB PO DAILY BLOOD THINNER 
(Reported)
Empagliflozin (Jardiance) 10 MG TABLET   1 TAB PO DAILY DM  (Reported)
Furosemide (Lasix) 20 MG TABLET   10 MG PO DAILY HEART HEALTH
     .
Insulin Aspart, Recombinant (Novolog Flexpen) 100 UNIT/ML INSULN.PEN   0 SC SEE 
ADMIN CRITERIA DM
     PLEASE check your blood sugar 3 times daily before meals and at
     bedtime and .
     FOLLOW
     80 - 150 MG/DL   9 UNITS
     151- 200 MG/DL   11 UNITS
     201- 250 MG/DL   13 UNITS
     251- 300 MG/DL   15 UNITS
     301- 350 MG/DL   17 UNITS
     351 - 400MG/DL   19 UNITS
     >400 MG/DL       20 UNITS AND CALL YOUR DR.
Insulin Detemir (Levemir) 100 UNIT/ML VIAL   20 UNITS SC QAM DM  (Reported)
Levothyroxine Sodium 100 MCG TABLET   1 TAB PO DAILY THYROID  (Reported)
Levothyroxine Sodium 25 MCG TABLET   0.5 TAB PO DAILY THYROID  (Reported)
Nitroglycerin (Nitrostat) 0.4 MG TAB.SUBL   1 TAB SL AD PRN CHEST PAIN  (
Reported)
     1st sign of attack; may repeat every 5 minutes until relief; if pain 
persists after 3 tablets in 15 minutes, prompt medical att
Omeprazole 40 MG CAPSULE.DR   1 CAP PO DAILY GI  (Reported)
Prednisone 10 MG TABLET   1.5 TAB PO DAILY STEROID  (Reported)
Ranolazine (Ranexa) 500 MG TAB.ER.12H   1 TAB PO BID heart  (Reported)
Sertraline HCl 50 MG TABLET   1 TAB PO DAILY MENTAL HEALTH  (Reported)
Tamsulosin HCl (Flomax) 0.4 MG CAP.ER.24H   1 CAP PO QHS prostate
     Please take at bed time to avoid low blood pressure during the day.
Tiotropium Bromide (Spiriva) 18 MCG CAP.W.DEV   1 CAP INH DAILY COPD  (Reported)
 
Triage Note:
PT COMPLAINS OF INCREASED SOB ON EXERTION OVER THE
 PAST 2 DAYS. O2 SAT 95 % ON RA WHILE AT REST.
 SLIGHT NON PRODUCTIVE COUGH. DENIES CP
Triage Nurses Notes Reviewed? yes
Onset: Gradual
Duration: day(s):, constant, changing over time, continues in ED, getting worse,
waxing and waning
Severity: severe
Activities at Onset: rest
Prior Episodes/Possible Cause: occasional episodes
HPI:
Patient presents for evaluation of worsening shortness of breath over the past 2
days.  Patient states he onset was relatively abrupt and has been constant since
onset although it waxes and wanes in intensity.  His dyspnea worsens with any 
exertion.  He states he has had shortness of breath previously thought due to 
his cigarette smoking (patient quit smoking about 8 months ago).  He denies 
associated chest pain wheezing fever or cold symptoms.  He has had a 
nonproductive cough.  He denies orthopnea.  He denies history of congestive 
heart failure or other lung disease.  He does admit to diabetes and coronary 
artery disease with prior "open heart".
 
Past History
 
Travel History
Traveled to [place] past 21 day No
 
Medical History
Any Pertinent Medical History? see below for history
Neurological: NONE
EENT: NONE
Cardiovascular: CARDIAC STENTS CABG CAROTID ARTERY BYPASS DEFIBRILLATOR
Respiratory: COPD
Gastrointestinal: NONE
Hepatic: NONE
Renal: NONE
Musculoskeletal: NONE
Psychiatric: NONE
Endocrine: hypothyroidism
Blood Disorders: DVT
Cancer(s): lung cancer
GYN/Reproductive: NONE
History of MRSA: No
History of VRE: No
History of CDIFF: No
 
Surgical History
Surgical History: CABG, CAROTID ARTECTOMY nerve thermoablation to reduce back 
pain 
 
Psychosocial History
Who do you live with Spouse
Services at Home None
What is your primary language English
Tobacco Use: Never used
ETOH Use: denies use
Illicit Drug Use: denies illicit drug use
 
Family History
Family History, If Any:
Relation not specified for:
  *No pertinent family history
 
Hx Contributory? No
 
Review of Systems
 
Review of Systems
Constitutional:
Reports: no symptoms. 
EENTM:
Reports: no symptoms. 
Respiratory:
Reports: see HPI. 
Cardiovascular:
Reports: no symptoms. 
GI:
Reports: no symptoms. 
Genitourinary:
Reports: no symptoms. 
Musculoskeletal:
Reports: no symptoms. 
Skin:
Reports: no symptoms. 
Neurological/Psychological:
Reports: no symptoms. 
Hematologic/Endocrine:
Reports: no symptoms. 
Immunologic/Allergic:
Reports: no symptoms. 
All Other Systems: Reviewed and Negative
 
Physical Exam
 
Physical Exam
Respiratory: SEE BELOW
Comments:
Gen.: Well-nourished, well-developed, no acute respiratory distress.
Head: Normocephalic, atraumatic.
Eyes: Normal inspection bilaterally
Ears: Normal inspection bilaterally
Nose: Normal inspection
Throat/mouth : Moist mucosa 
Neck: Supple, full range of motion, no goiter, no JVD
Heart: Regular rate and rhythm, no murmurs rubs or gallops
Lungs: Scattered expiratory wheezing bilaterally with mildly decreased air entry
Chest: Nontender
Back: Normal range of motion
Abdomen: Soft, protuberant, nontender, nondistended, normal bowel sounds
Extremities: Normal range of motion grossly, equal radial pulses, no cyanosis, 1
+ bilateral pitting edema of the lower extremities
Neurologic: Cranial nerves grossly intact, speech is clear
Skin: warm and dry
Psychiatric: Calm, cooperative, no apparent delusions or hallucinations
 
 
Core Measures
ACS in differential dx? No
CVA/TIA Diagnosis No
Sepsis Present: No
Sepsis Focused Exam Completed? No
 
Progress
Differential Diagnosis: AMI, bronchitis, CHF, COPD, pericarditis, pulmonary 
embolism, pneumonia, pneumothorax, unstable angina
Plan of Care:
 Orders
 
 
Procedure Date/time Status
 
Heart Healthy Diet  B Active
 
Consistent Carbohydrate 1  B Active
 
Place in observation  183 Active
 
Misc Message  183 Active
 
ED Holding Orders  1836 Active
 
Vital Signs  183 Active
 
Code Status  1836 Active
 
TROPONIN LEVEL  1451 Complete
 
COMPREHENSIVE METABOLIC PANEL  1451 Complete
 
CBC WITHOUT DIFFERENTIAL  1451 Complete
 
EKG  1450 Active
 
 
 Laboratory Tests
 
 
 
18 1508:
Anion Gap 7, Estimated GFR 39  L, BUN/Creatinine Ratio 20.0, Glucose 164  H, 
Calcium 8.6, Total Bilirubin 0.8, AST 27, ALT 86  H, Alkaline Phosphatase 100, 
Troponin I 0.06, Total Protein 5.6  L, Albumin 3.3  L, Globulin 2.3, Albumin/
Globulin Ratio 1.4, CBC w Diff NO MAN DIFF REQ, RBC 3.29  L, .8  H, MCH 
34.5  H, MCHC 33.2, RDW 17.4  H, MPV 8.4, Gran % 83.6  H, Lymphocytes % 7.3  L, 
Monocytes % 8.5, Eosinophils % 0.5, Basophils % 0.1, Absolute Granulocytes 7.3  
H, Absolute Lymphocytes 0.6  L, Absolute Monocytes 0.7  H, Absolute Eosinophils 
0, Absolute Basophils 0
 
Diagnostic Imaging:
Discussed w/RAD: Radiology Read. 
CXR Impression: PATIENT: JULIO CÉSAR CRUZ JR  MEDICAL RECORD NO: [id number] PRESENT 
AGE: 73  PATIENT ACCOUNT NO: [id number] : 43  LOCATION: [facility], [place] ORDERING 
PHYSICIAN: Richard GREEN     SERVICE DATE:  EXAM TYPE: RAD - XRY-
PORTABLE CHEST XRAY EXAMINATION: XRY-PORTABLE CHEST XRAY CLINICAL INFORMATION: 
Presumptive Dx: PNA  CHF<br>Signs   Symptoms: SOB<br> COMPARISON: None TECHNIQUE
: XRY-PORTABLE CHEST XRAY Tubes and lines: Cardiac device projecting over the 
LEFT hemithorax remain in place unchanged. There are sternotomy wires Lungs and 
[place]: Opacification over the LEFT lung base probably infiltrate and/or effusion.
Right lung relatively spared. There is mild vascular congestion. Pleura: There 
is probably LEFT pleural effusion. Heart and mediastinum: Heart and mediastinum 
are widened exaggerated by the AP technique.. Bones: Skeletal structures 
included are normal for patient's age. IMPRESSION: Opacity projecting over the 
LEFT lower lobe probably an infiltrate atelectasis and/or pleural effusion. 
Underlying vascular vascular congestion. Suggest follow-up chest PA and lateral 
when patient's conditions permits. DICTATED BY: Gene Vela MD  DATE/TIME 
DICTATED:18 :RAD.SYKES  DATE/TIME TRANSCRIBED:1518 CONFIDENTIAL, DO NOT COPY WITHOUT APPROPRIATE AUTHORIZATION.  <
Electronically signed in Other Vendor System>                                   
                                                    SIGNED BY: Gene Vela MD 18 [id number]
Initial ED EKG: NSR, rate (85), LVH, IVCD
Prior EKG: unchanged
Comments:
2018 4:30:23 PM although Julio César feels no improvement after the DuoNeb, his 
wheezing has lessened considerably.  I will give him another albuterol treatment
and reassess.
 
2018 5:44:41 PM patient's case discussed with case management and with the 
hospitalist.
 
Departure
 
Departure
Disposition: STILL A PATIENT
Condition: Stable
Clinical Impression
Primary Impression: COPD (chronic obstructive pulmonary disease)
Qualifiers:  COPD type: unspecified COPD Qualified Code: J44.9 - Chronic 
obstructive pulmonary disease, unspecified
Secondary Impressions: Exertional dyspnea, Renal insufficiency
Referrals:
Durga Plata MD (PCP/Family)
 
Departure Forms:
Customer Survey
General Discharge Information
 
Observation Note
Spoke With:
Willard GLASS,Marlee HERNANDEZ
Place Patient In: Non-ED OBS Care Area
Rationale for Observation:
My rational for observation is as follows patient's clinical presentation is 
consistent with COPD, given his dyspnea on exertion and and expiratory wheezing.
 With ambulation in the emergency department (after 2 nebulizer treatments) he 
became very dyspneic and tachypneic and needed to sit down again.  Given the 
patient's dyspnea I feel he would be a poor candidate for outpatient management 
and likely would return, potentially worse clinical condition.  His dyspnea 
would place him at high risk of falling and also make it very challenging for 
him to comply with outpatient treatment.  I now feel that he requires 
hospitalization for repeated nebulized bronchodilators, IV steroids and 
monitoring of pulse oximetry.  Pulmonary consultation should be considered the 
patient's medications optimized.
 
 
Critical Care Note
 
Critical Care Note
Critical Care Time: 30-74 min

## 2018-08-20 VITALS — DIASTOLIC BLOOD PRESSURE: 74 MMHG | SYSTOLIC BLOOD PRESSURE: 116 MMHG

## 2018-08-20 VITALS — DIASTOLIC BLOOD PRESSURE: 78 MMHG | SYSTOLIC BLOOD PRESSURE: 127 MMHG

## 2018-08-20 VITALS — DIASTOLIC BLOOD PRESSURE: 86 MMHG | SYSTOLIC BLOOD PRESSURE: 130 MMHG

## 2018-08-20 LAB
ABSOLUTE GRANULOCYTE CT: 6.9 /CUMM (ref 1.4–6.5)
BASOPHILS # BLD: 0 /CUMM (ref 0–0.2)
BASOPHILS NFR BLD: 0 % (ref 0–2)
EOSINOPHIL # BLD: 0 /CUMM (ref 0–0.7)
EOSINOPHIL NFR BLD: 0 % (ref 0–5)
ERYTHROCYTE [DISTWIDTH] IN BLOOD BY AUTOMATED COUNT: 17.3 % (ref 11.5–14.5)
GRANULOCYTES NFR BLD: 95.5 % (ref 42.2–75.2)
HCT VFR BLD CALC: 33.5 % (ref 42–52)
LYMPHOCYTES # BLD: 0.3 /CUMM (ref 1.2–3.4)
MCH RBC QN AUTO: 34.7 PG (ref 27–31)
MCHC RBC AUTO-ENTMCNC: 34 G/DL (ref 33–37)
MCV RBC AUTO: 102.1 FL (ref 80–94)
MONOCYTES # BLD: 0.1 /CUMM (ref 0.1–0.6)
PLATELET # BLD: 107 /CUMM (ref 130–400)
PMV BLD AUTO: 8.6 FL (ref 7.4–10.4)
RED BLOOD CELL CT: 3.29 /CUMM (ref 4.7–6.1)
WBC # BLD AUTO: 7.2 /CUMM (ref 4.8–10.8)

## 2018-08-20 PROCEDURE — 5A09457 ASSISTANCE WITH RESPIRATORY VENTILATION, 24-96 CONSECUTIVE HOURS, CONTINUOUS POSITIVE AIRWAY PRESSURE: ICD-10-PCS | Performed by: INTERNAL MEDICINE

## 2018-08-20 NOTE — PN- HOUSESTAFF
Mariia Spear 18 0754:
Subjective
Follow-up For:
COPD exacerbation
Subjective:
Patient was seen and examined at bedside. He says he is not doing any better. He
is resting comforatbly and saturating well on room air. However, he reports he 
gets short of breath when he walks as little as to the door of the room. Denies 
fever, chills, nausea, vomiting, chest pain.
 
Review of Systems
Constitutional:
Reports: see HPI. 
 
Objective
Last 24 Hrs of Vital Signs/I&O
 Vital Signs
 
 
Date Time Temp Pulse Resp B/P B/P Pulse O2 O2 Flow FiO2
 
     Mean Ox Delivery Rate 
 
 0615 97.5 91 22 130/86  93 Room Air  
 
 2337  86  108/78     
 
 2337    108/78     
 
 2336  86  108/78     
 
 2200 97.8 86 20 108/78  93   
 
 2146       Room Air  
 
 2117 97.8 82 20 122/74  93 Room Air  
 
 1918 97.8 86 18 117/76  96 Room Air  
 
 1638      95   
 
 1557      95   
 
 1512      93   
 
 1448 98.0 88 22 106/69  96 Room Air  
 
 
 Intake & Output
 
 
  1600  0800  0000
 
Intake Total   200
 
Output Total   
 
Balance   200
 
    
 
Intake, Oral   200
 
Number  0 
 
Bowel   
 
Movements   
 
Patient   250 lb
 
Weight   
 
 
 
 
Physical Exam
General Appearance: Alert, Oriented X3, Cooperative, No Acute Distress
Skin: No Rashes
Neck: Supple
Cardiovascular: Regular Rate, Normal S1, Normal S2
Lungs: b/l scattered crackles
Abdomen: Normal Bowel Sounds, Soft, No Tenderness
Extremities: No Edema, Normal Pulses
 
Assessment/Plan
Assessment:
73 year old male with extensive PMH s/p CABG (), CAD with stents (), h/
o DVT (5 yrs ago), IDDM (dx 3months ago), COPD, Small Cell lung CA (last dose of
radiation 3 months ago), ischemic cardiomyopathy with EF 20% s/p defibrillator 
placement (2018), s/p carotid endarterectomy right side (), hypothryroid
, HTN, BPH, JEREMY on CPAP at noc presenting with two day history of progressive 
SOB. He states his symptoms developed suddenly.  
 
 
Problem List:
1. Acute COPD exacerbation vs CHF exacerbation
2. CKD stage 3
3. Elevated ALT
4. Thrombocytopenia
5. h/o IDDM
 
Vitals were stable 
Labs: WBC 7.2 H/H 11.4/33.5 Plt 107 BUN/Cr 34/1.5 ALT 86 Alb 3.3 ProBNP:5960
 
 
Portable CXR:  IMPRESSION:
Opacity projecting over the LEFT lower lobe probably an infiltrate
atelectasis and/or pleural effusion. Underlying vascular vascular congestion.
 
 
1.Acute COPD exacerbation with CHF exacerbation ( Chronic Systolic Heart Failure
with EF 20%)
-SoluMedrol 40mg IV Q8hr then taper
-Azithromycin 5 day course
-Duonebs
-Oxygen supplementation as needed to maintain sats >90% but less than 93%; not 
requiring oxygen currently
-Lasix 40mg PO, with stat dose of Lasix 20mg iv
 
2.CKD stage 3
-continue to monitor
 
3.Elevated ALT-may be medication related
-monitor
 
4.Thrombocytopenia-may be 2/2 viral infection vs bacterial vs medication induced
vs nutritional deficiency (albumin 3.3) vs malignancy
-continue to monitor
-hold chemical prophylaxis; ALPS and ambulation only
 
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 
patient with Small cell lung cancer
-Sliding scale insulin
-levemir home dose
-finger sticks
 
DVT prophylaxis: ALPS/ambulation
Code Status: DNR/DNI
Problem List:
 1. CKD stage 3 secondary to diabetes
 
 2. Small cell lung cancer in adult
 
 3. IDDM (insulin dependent diabetes mellitus)
 
 4. Thrombocytopenia
 
 5. CHF exacerbation
 
 6. COPD exacerbation
 
 7. Exertional dyspnea
 
Pain Ratin
Pain Location:
na
Pain Goal: Remain pain free
Pain Plan:
na
Tomorrow's Labs & Rationales:
cbc and bep with hepatic function
 
 
Jannette Acuña 18 1211:
Attending MD Review Statement
 
Attending Statement
Attending MD Statement: examined this patient, discuss w/resident/PA/NP, agreed 
w/resident/PA/NP, discussed with family, reviewed EMR data (avail), discussed 
with nursing, discussed with case mgmt, reviewed images, amended to note
Attending Assessment/Plan:
Patient seen/examined bedside. Patient denies any new complaints. He has mild 
use of accessory msucles. He has shortness of breath on exertion. Vitals stable.
 
 
Patient admitted here for COPD exaceraton in smoking history with lung cancer 
now found to have left sided pleural effsuion which was not present on 2018 
CT chest. 
 
Continue with iv steroids, azithromycin, o2 supplementation as needed, TRCS, 
check walking pusle oximetry. Obtain CT chest to evaluate new onset pleural 
effusion. 
 
Systolic Heart failure EF 20% rule out exacerbation. Obtain pro bnp. Consider iv
lasix.  
 
gi.dvt prophyalxis

## 2018-08-20 NOTE — CT SCAN REPORT
EXAMINATION:
CT CHEST WITHOUT CONTRAST
 
CLINICAL INFORMATION:
Shortness of breath. Pleural effusion.
 
COMPARISON:
Chest CT 02/20/2018.
 
TECHNIQUE:
Multidetector volumetric CT imaging of the chest was done. Axial MIP volume
rendering provided. Sagittal and coronal reformatted images were obtained.
 
DLP:
531 mGy-cm
 
FINDINGS:
 
LUNGS: The central airways are patent. There are background changes of
emphysema. There is subsegmental atelectasis in the left more than right
lower lobes. No dense consolidation is seen. No suspicious pulmonary nodules
or masses are seen. There is a nonspecific groundglass opacity in the right
lower lobe (series 3 image 30).
 
MEDIASTINUM: Mild cardiomegaly. Left chest wall single lead AICD in place
with lead tip terminating in the right ventricle.  No pericardial effusion.
Postoperative changes of CABG. Atherosclerotic calcifications in the aorta,
coronary arteries, and mitral annulus.
 
PLEURA: Small left-sided layering pleural effusion. No pneumothorax.
 
AXILLA: No lymphadenopathy.
 
UPPER ABDOMEN: The unenhanced upper abdominal viscera are within normal
limits. Small splenule is noted. There is aneurysmal dilatation of the distal
thoracic aorta status which measures up to 5.3 cm and is status graft
placement.
 
OSSEOUS STRUCTURES: Multilevel degenerative changes are seen throughout the
thoracic spine. No acute fracture is seen.
 
IMPRESSION:
- Small left pleural effusion and atelectasis of the left more than right
lung bases.
- No dense consolidation or mass. Background changes of emphysema.
- Cardiomegaly.
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
placement.

## 2018-08-21 VITALS — DIASTOLIC BLOOD PRESSURE: 70 MMHG | SYSTOLIC BLOOD PRESSURE: 110 MMHG

## 2018-08-21 VITALS — SYSTOLIC BLOOD PRESSURE: 116 MMHG | DIASTOLIC BLOOD PRESSURE: 57 MMHG

## 2018-08-21 VITALS — DIASTOLIC BLOOD PRESSURE: 79 MMHG | SYSTOLIC BLOOD PRESSURE: 147 MMHG

## 2018-08-21 LAB
ABSOLUTE GRANULOCYTE CT: 11.4 /CUMM (ref 1.4–6.5)
BASOPHILS # BLD: 0 /CUMM (ref 0–0.2)
BASOPHILS NFR BLD: 0 % (ref 0–2)
EOSINOPHIL # BLD: 0 /CUMM (ref 0–0.7)
EOSINOPHIL NFR BLD: 0 % (ref 0–5)
ERYTHROCYTE [DISTWIDTH] IN BLOOD BY AUTOMATED COUNT: 17.2 % (ref 11.5–14.5)
GRANULOCYTES NFR BLD: 94.6 % (ref 42.2–75.2)
HCT VFR BLD CALC: 34.1 % (ref 42–52)
LYMPHOCYTES # BLD: 0.2 /CUMM (ref 1.2–3.4)
MCH RBC QN AUTO: 34.7 PG (ref 27–31)
MCHC RBC AUTO-ENTMCNC: 33.8 G/DL (ref 33–37)
MCV RBC AUTO: 102.9 FL (ref 80–94)
MONOCYTES # BLD: 0.4 /CUMM (ref 0.1–0.6)
PLATELET # BLD: 117 /CUMM (ref 130–400)
PMV BLD AUTO: 8.8 FL (ref 7.4–10.4)
RED BLOOD CELL CT: 3.32 /CUMM (ref 4.7–6.1)
WBC # BLD AUTO: 12 /CUMM (ref 4.8–10.8)

## 2018-08-21 NOTE — PN- HOUSESTAFF
Vita Speari 18 0735:
Subjective
Follow-up For:
CHF exacerbation
Subjective:
Patient was seen and examined at bedside. He says he does not feel any better. 
He complains he is not able to sleep lying flat and that he has to sit up to be 
able to breathe. Denies cough, chest pain, nausea, vomiting, palpitations.
 
Review of Systems
Constitutional:
Reports: see HPI. 
 
Objective
Last 24 Hrs of Vital Signs/I&O
 Vital Signs
 
 
Date Time Temp Pulse Resp B/P B/P Pulse O2 O2 Flow FiO2
 
     Mean Ox Delivery Rate 
 
 0916      95 Room Air  
 
 0817  80  128/82     
 
 0817  80  128/82     
 
 0800       Room Air  
 
 0733 97.6 81 20 147/79  91   
 
 0000       Room Air  
 
 2138 97.9 90 20 116/74  94 CPAP  
 
 2041  100    93   
 
2021    118/70     
 
2020    118/70     
 
 2020    118/70     
 
 1854       Room Air Room Air 
 
 1542 97.9 89 18 127/78  95 Room Air  
 
 
 
 
Physical Exam
General Appearance: Alert, Oriented X3, Cooperative, No Acute Distress
Neck: Supple
Cardiovascular: Regular Rate, Normal S1, Normal S2
Lungs: b/l crackles
Abdomen: Normal Bowel Sounds, Soft, No Tenderness
 
Assessment/Plan
Assessment:
73 year old male with extensive PMH s/p CABG (), CAD with stents (), h/
o DVT (5 yrs ago), IDDM (dx 3months ago), COPD, Small Cell lung CA (last dose of
radiation 3 months ago), ischemic cardiomyopathy with EF 20% s/p defibrillator 
placement (2018), s/p carotid endarterectomy right side (), hypothryroid
, HTN, BPH, JEREMY on CPAP at noc presenting with two day history of progressive 
SOB. He states his symptoms developed suddenly. He does endorse orthopnea and 
PND. In the setting of acutely elevated Pro BNP, CHF exacerbation would be more 
likely. 
 
 
Problem List:
1. Acute COPD exacerbation vs CHF exacerbation
2. CKD stage 3
3. Elevated ALT
4. Thrombocytopenia
5. h/o IDDM
 
Vitals were stable 
Labs: WBC 12.0 H/H 11.5 Plt 117 BUN/Cr 42/1.4  ProBNP:5960
 
 
Portable CXR:  IMPRESSION:
Opacity projecting over the LEFT lower lobe probably an infiltrate
atelectasis and/or pleural effusion. Underlying vascular vascular congestion.
 
 
1.Acute COPD exacerbation with CHF exacerbation ( Chronic Systolic Heart Failure
with EF 20%)
-Prednisone 40mg. Will taper from tomorrow.
-Azithromycin 5 day course. Day 3.
-Duonebs as needed
-Oxygen supplementation as needed to maintain sats >90% but less than 93%; not 
requiring oxygen currently. Saturating well at room air
-Patient has a history of chronic systolic heart failure with an EF of 20% s/p 
defibrillator placement. 
-Reports orthopnea and PND in the setting of bilateral crackles.
-CHF exacerbation > COPD exacerbation 
-Lasix 40mg PO, with stat dose of Lasix 20mg iv
 
2.CKD stage 3
-continue to monitor
 
3.Elevated ALT-may be medication related
-monitor
 
4.Thrombocytopenia-may be 2/2 viral infection vs bacterial vs medication induced
vs nutritional deficiency (albumin 3.3) vs malignancy
-continue to monitor
-hold chemical prophylaxis; ALPS and ambulation only
 
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 
patient with Small cell lung cancer
-Sliding scale insulin
-levemir home dose
-finger sticks
 
DVT prophylaxis: ALPS/ambulation
Code Status: DNR/DNI 
Problem List:
 1. Small cell lung cancer in adult
 
 2. CKD stage 3 secondary to diabetes
 
 3. IDDM (insulin dependent diabetes mellitus)
 
 4. Thrombocytopenia
 
 5. CHF exacerbation
 
 6. COPD exacerbation
 
 7. Exertional dyspnea
 
 8. Renal insufficiency
 
Pain Ratin
Pain Location:
na
Pain Goal: Remain pain free
Pain Plan:
na
Tomorrow's Labs & Rationales:
cbc and bep
 
 
Jannette Acuña 18 1107:
Attending MD Review Statement
 
Attending Statement
Attending MD Statement: examined this patient, discuss w/resident/PA/NP, agreed 
w/resident/PA/NP, discussed with family, reviewed EMR data (avail), discussed 
with nursing, discussed with case mgmt, reviewed images, amended to note
Attending Assessment/Plan:
Patient seen/examined bedside. He has mild use of accessory msucles. He has 
shortness of breath on exertion. orthopnea +, PND+. Vitals stable. 
 
Patient admitted here for COPD exaceraton and acute on chronic CHF exacerbation 
in smoking history with lung cancer now found to have left sided small layered 
pleural effsuion. 
 
Continue with iv lasix, taper steroids, azithromycin, o2 supplementation as 
needed, TRCS, check walking pusle oximetry.  
 
Acute on chronic Systolic Heart failure EF 20% possible exacerbation. Elevtaed 
pro bnp than baseline. 
 
gi.dvt prophyalxis

## 2018-08-22 VITALS — SYSTOLIC BLOOD PRESSURE: 112 MMHG | DIASTOLIC BLOOD PRESSURE: 68 MMHG

## 2018-08-22 VITALS — SYSTOLIC BLOOD PRESSURE: 100 MMHG | DIASTOLIC BLOOD PRESSURE: 70 MMHG

## 2018-08-22 VITALS — SYSTOLIC BLOOD PRESSURE: 115 MMHG | DIASTOLIC BLOOD PRESSURE: 63 MMHG

## 2018-08-22 LAB
ABSOLUTE GRANULOCYTE CT: 11 /CUMM (ref 1.4–6.5)
BASOPHILS # BLD: 0 /CUMM (ref 0–0.2)
BASOPHILS NFR BLD: 0 % (ref 0–2)
EOSINOPHIL # BLD: 0 /CUMM (ref 0–0.7)
EOSINOPHIL NFR BLD: 0 % (ref 0–5)
ERYTHROCYTE [DISTWIDTH] IN BLOOD BY AUTOMATED COUNT: 17 % (ref 11.5–14.5)
GRANULOCYTES NFR BLD: 90 % (ref 42.2–75.2)
HCT VFR BLD CALC: 33.6 % (ref 42–52)
LYMPHOCYTES # BLD: 0.5 /CUMM (ref 1.2–3.4)
MCH RBC QN AUTO: 34.7 PG (ref 27–31)
MCHC RBC AUTO-ENTMCNC: 33.9 G/DL (ref 33–37)
MCV RBC AUTO: 102.5 FL (ref 80–94)
MONOCYTES # BLD: 0.7 /CUMM (ref 0.1–0.6)
PLATELET # BLD: 120 /CUMM (ref 130–400)
PMV BLD AUTO: 8.8 FL (ref 7.4–10.4)
RED BLOOD CELL CT: 3.28 /CUMM (ref 4.7–6.1)
WBC # BLD AUTO: 12.2 /CUMM (ref 4.8–10.8)

## 2018-08-22 NOTE — CONS- CARDIOLOGY
General Information and HPI
 
Consulting Request
Date of Consult: 08/22/18
Requested By:
Arianne GLASS,Jannette
 
Reason for Consult:
Shortness of breath
Source of Information: patient, old records
History of Present Illness:
 
This is a 73-year-old male with a past medical history of COPD, ischemic 
cardiomyopathy with prior CABG/PCI, AICD in situ, obstructive sleep apnea, CKD, 
aortic stenosis, small cell lung cancer, vascular disease with prior EVAR, 
hypothyroidism, and diabetes who presents to [facility] with progressive 
shortness of breath over the last few days; he denies associated chest pain but 
does report the shortness of breath is worse with exertion.  He reports a 
feeling of congestion in his chest with cough that is not productive of much 
sputum.  He denies subjective fevers.  He does report orthopnea and increased 
lower extremity edema.  He denies slurring of speech, focal weakness, syncope, 
or bleeding.
 
 
 
Allergies/Medications
Allergies:
Coded Allergies:
NO KNOWN ALLERGIES (NONE 02/20/18)
 
Home Med List:
Acetaminophen 325 MG CAPSULE   1 CAP PO PRN PAIN  (Reported)
Albuterol Sulfate (Proair Hfa) 90 MCG HFA.AER.AD   2 PUF INH Q4 COPD
Aspirin (Aspirin*) 81 MG TAB.CHEW   1 TAB PO DAILY heart  (Reported)
Atorvastatin Calcium (Lipitor) 40 MG TABLET   1 TAB PO DAILY cholesterol  (
Reported)
Budesonide/Formoterol Fumarate (Symbicort 160-4.5 Mcg Inhaler) 160 MCG-4.5 MCG/
ACTUATION HFA.AER.AD   2 PUF INH BID COPD  (Reported)
Carvedilol 6.25 MG TABLET   1 TAB PO BID HEART/BP  (Reported)
Clopidogrel Bisulfate (Clopidogrel) 75 MG TABLET   1 TAB PO DAILY BLOOD THINNER 
(Reported)
Empagliflozin (Jardiance) 10 MG TABLET   1 TAB PO DAILY DM  (Reported)
Furosemide (Lasix) 20 MG TABLET   1 TAB PO DAILY CHF  (Reported)
Insulin Aspart, Recombinant (Novolog Flexpen) 100 UNIT/ML INSULN.PEN   0 SC SEE 
ADMIN CRITERIA DM
     PLEASE check your blood sugar 3 times daily before meals and at
     bedtime and .
     FOLLOW
     80 - 150 MG/DL   9 UNITS
     151- 200 MG/DL   11 UNITS
     201- 250 MG/DL   13 UNITS
     251- 300 MG/DL   15 UNITS
     301- 350 MG/DL   17 UNITS
     351 - 400MG/DL   19 UNITS
     >400 MG/DL       20 UNITS AND CALL YOUR DR.
Insulin Detemir (Levemir) 100 UNIT/ML VIAL   20 UNITS SC QAM DM  (Reported)
Levothyroxine Sodium 100 MCG TABLET   1.5 TAB PO DAILY HYPOTHYROID  (Reported)
Nitroglycerin (Nitrostat) 0.4 MG TAB.SUBL   1 TAB SL AD PRN CHEST PAIN  (
Reported)
     1st sign of attack; may repeat every 5 minutes until relief; if pain 
persists after 3 tablets in 15 minutes, prompt medical att
Omeprazole 40 MG CAPSULE.DR   1 CAP PO DAILY GI  (Reported)
Prednisone 10 MG TABLET   1.5 TAB PO DAILY STEROID  (Reported)
Ranolazine (Ranexa) 500 MG TAB.ER.12H   1 TAB PO BID heart  (Reported)
Sertraline HCl 50 MG TABLET   1 TAB PO DAILY MENTAL HEALTH  (Reported)
Tamsulosin HCl (Flomax) 0.4 MG CAP.ER.24H   1 CAP PO QHS prostate
     Please take at bed time to avoid low blood pressure during the day.
Tiotropium Bromide (Spiriva) 18 MCG CAP.W.DEV   1 CAP INH DAILY COPD  (Reported)
 
Current Medications:
 Current Medications
 
 
  Sig/Ly Start time  Last
 
Medication Dose Route Stop Time Status Admin
 
Acetaminophen 650 MG Q6P PRN 08/19 2045 AC 
 
  PO   
 
Albuterol Sulfate 2 PUF Q4P PRN 08/21 1700 AC 
 
  INH   
 
Albuterol Sulfate 3 ML EVERY 4 HRS/AWAKE 08/20 2000 AC 08/22
 
  INH   0859
 
Albuterol Sulfate 2 PUF Q4 08/19 2200 DC 08/21
 
  INH   1445
 
Aspirin 81 MG DAILY 08/20 0900 AC 08/22
 
  PO   0833
 
Atorvastatin Calcium 40 MG DAILY 08/20 0900 AC 08/22
 
  PO   0835
 
Azithromycin 250 MG DAILY@2200 08/19 2200 AC 08/21
 
  PO   2021
 
Benzonatate 100 MG TID PRN 08/19 2315 AC 08/21
 
  PO   2047
 
Budesonide/ 2 PUF BID 08/19 2100 AC 08/22
 
Formoterol Fumarate  INH   0832
 
Carvedilol 6.25 MG BID 08/19 2100 AC 08/22
 
  PO   0837
 
Clopidogrel Bisulfate 75 MG DAILY 08/20 0900 AC 08/22
 
  PO   0835
 
Furosemide 40 MG 7:30 AM, & 4:30 PM 08/22 1630 AC 
 
  IV   
 
Furosemide 0 .STK-MED ONE 08/22 1029 DC 
 
  IV   
 
Furosemide 40 MG BID 08/22 0907 DC 08/22
 
  IV   1033
 
Furosemide 40 MG DAILY 08/22 0905 DC 
 
  IV   
 
Furosemide 20 MG DAILY 08/20 1329 DC 08/21
 
  IV 08/22 0631  0817
 
Furosemide 20 MG DAILY 08/20 0900 DC 08/22
 
  PO   0833
 
Heparin Sodium  5,000 UNIT Q8 08/19 2200 AC 08/22
 
(Porcine)  SC   0547
 
Insulin Aspart 0 TIDAC/HS 08/21 2100 AC 08/22
 
  SC   0832
 
Insulin Aspart 0 .STK-MED ONE 08/21 2033 DC 
 
  SC   
 
Insulin Aspart 0 TIDAC 08/19 2100 DC 08/21
 
  SC   1725
 
Insulin Detemir 25 UNITS QAM 08/22 0900 AC 08/22
 
  SC   0850
 
Insulin Detemir 20 UNITS QAM 08/20 0900 DC 08/21
 
  SC   0825
 
Levothyroxine Sodium 0.15 MG DAILY AC 08/20 0700 AC 08/22
 
  PO   0544
 
Melatonin 0 .STK-MED ONE 08/22 0103 DC 
 
  PO   
 
Melatonin 10 MG ONE TIME ONE 08/22 0100 DC 08/22
 
  PO 08/22 0101  0109
 
Omeprazole 40 MG DAILY AC 08/20 0700 AC 08/22
 
  PO   0544
 
Oxycodone/ 1 TAB Q6P PRN 08/19 2045 AC 
 
Acetaminophen  PO   
 
Patient Medication  1 ED ONE ONE 08/21 1415 DC 08/21
 
Teaching  ED 08/21 1416  1756
 
Prednisone 40 MG DAILY 08/21 0946 AC 08/22
 
  PO   0835
 
Ranolazine 500 MG BID 08/19 2100 AC 08/22
 
  PO   0837
 
Sertraline HCl 50 MG DAILY 08/20 0900 AC 08/22
 
  PO   0837
 
Tamsulosin HCl 0.4 MG AT BEDTIME 08/19 2100 AC 08/21
 
  PO   2028
 
Tiotropium Foster 1 PUF DAILY 08/20 0900 AC 08/22
 
  INH   0832
 
 
 
 
Review of Systems
Review of Systems:
Review of systems as per HPI. The remainder of a 10 point review of systems was 
reviewed and was otherwise negative.
 
Past History
 
Travel History
Traveled to [place] past 21 day No
 
Medical History
Blood Transfusion Hx: No
Neurological: NONE
EENT: NONE
Cardiovascular: CARDIAC STENTS CABG CAROTID ARTERY BYPASS DEFIBRILLATOR
Respiratory: COPD
Gastrointestinal: NONE
Hepatic: NONE
Renal: NONE
Musculoskeletal: NONE
Psychiatric: NONE
Endocrine: hypothyroidism
Blood Disorders: DVT
Cancer(s): lung cancer
GYN/Reproductive: NONE
 
Surgical History
Surgical History: CABG, CAROTID ARTECTOMY nerve thermoablation to reduce back 
pain 
 
Family History
Relations & Conditions If Any:
Relation not specified for:
  *No pertinent family history
 
 
Psychosocial History
Who Do You Live With? spouse
Services at Home: None
Primary Language: English
Smoking Status: Former Smoker
ETOH Use: denies use
Illicit Drug Use: denies illicit drug use
Living Will? no
 
Functional Ability
ADLs
Independent: dressing, eating, toileting, bathing. 
Ambulation: independent
 
Employment History
Employment: Retired
 
ECHO Results (as available)
Report:
Left ventricular cavity size normal. Left ventricular wall thickness
 mildly increased. Moderate to severe hypokinesis of the mid to basal
 inferior wall. Mild distal anteroseptal/anteroapical hypokinesis.
 Left ventricular ejection fraction is estimated at 45 %.
 Catheter/pacemaker wire in the right ventricular cavity. Normal
 right ventricular size and function.
 Mild left atrial dilatation.
 Moderate mitral regurgitation.
 Mild-to-moderate aortic stenosis (MICHELLE 1.4 cm2 by continuity with a
 mean gradient of 13 mmHg).
 Moderate tricuspid regurgitation. RVSP > 45 mmHg.
 
 
Exam & Diagnostic Data
Vital Signs and I&O
Vital Signs
 
 
Date Time Temp Pulse Resp B/P B/P Pulse O2 O2 Flow FiO2
 
     Mean Ox Delivery Rate 
 
08/22 0901      92 Room Air  
 
08/22 0837  88  108/74     
 
08/22 0837  88  108/74     
 
08/22 0635 97.4 82 20 100/70  94 Room Air  
 
08/22 0000       Room Air  
 
08/22 0000  91    97   
 
08/21 2137 97.6 99 19 116/57  95 Room Air  
 
08/21 2029    116/57     
 
08/21 2021    116/87     
 
08/21 1655      95 Room Air  
 
08/21 1530 98.0 84 18 110/70  94 Room Air  
 
 
 Intake & Output
 
 
 08/22 1600 08/22 0800 08/22 0000 08/21 1600 08/21 0800 08/21 0000
 
Intake Total  240 120 600  
 
Output Total 220 400 500 800  
 
Balance -220 -160 -380 -200  
 
       
 
Intake, Oral  240 120 600  
 
Number    1  
 
Bowel      
 
Movements      
 
Output, Urine 220 400 500 800  
 
 
 
Physical Exam:
 
General: no apparent distress. Alert.
Eyes: No obvious scleral icterus.
HEENT: No jugular venous distention or abnormal jugular venous pulsations.
Cardiovascular: Normal intensity S1/S2. Regular. ICD noted, 2 out of 6 systolic 
murmur
Respiratory: Increased air entry at the bases
Abdomen: Soft, nontender with no guarding or rebound tenderness.
Musculoskeletal: No clubbing or cyanosis noted; 2+ lower extremity edema
Skin: warm
Neurologic: No gross focal deficits noted.
Labs/Kapil Results:
 Laboratory Tests
 
 
 08/22 08/21
 
 0627 0920
 
Chemistry  
 
  Sodium (137 - 145 mmol/L) 134  L 135  L
 
  Potassium (3.5 - 5.1 mmol/L) 4.3 4.2
 
  Chloride (98 - 107 mmol/L) 101 100
 
  Carbon Dioxide (22 - 30 mmol/L) 28 25
 
  Anion Gap (5 - 16) 5 10
 
  BUN (9 - 20 mg/dL) 47  H 42  H
 
  Creatinine (0.7 - 1.2 mg/dL) 1.4  H 1.4  H
 
  Estimated GFR (>60 ml/min) 49  L 49  L
 
  BUN/Creatinine Ratio (7 - 25 %) 33.6  H 30.0  H
 
Hematology  
 
  CBC w Diff NO MAN DIFF REQ 
 
  WBC (4.8 - 10.8 /CUMM) 12.2  H 
 
  RBC (4.70 - 6.10 /CUMM) 3.28  L 
 
  Hgb (14.0 - 18.0 G/DL) 11.4  L 
 
  Hct (42 - 52 %) 33.6  L 
 
  MCV (80.0 - 94.0 FL) 102.5  H 
 
  MCH (27.0 - 31.0 PG) 34.7  H 
 
  MCHC (33.0 - 37.0 G/DL) 33.9 
 
  RDW (11.5 - 14.5 %) 17.0  H 
 
  Plt Count (130 - 400 /CUMM) 120  L 
 
  MPV (7.4 - 10.4 FL) 8.8 
 
  Gran % (42.2 - 75.2 %) 90.0  H 
 
  Lymphocytes % (20.5 - 51.1 %) 4.2  L 
 
  Monocytes % (1.7 - 9.3 %) 5.8 
 
  Eosinophils % (0 - 5 %) 0 
 
  Basophils % (0.0 - 2.0 %) 0 
 
  Absolute Granulocytes (1.4 - 6.5 /CUMM) 11.0  H 
 
  Absolute Lymphocytes (1.2 - 3.4 /CUMM) 0.5  L 
 
  Absolute Monocytes (0.10 - 0.60 /CUMM) 0.7  H 
 
  Absolute Eosinophils (0.0 - 0.7 /CUMM) 0 
 
  Absolute Basophils (0.0 - 0.2 /CUMM) 0 
 
 
 
 
 08/21
 
 0705
 
Hematology 
 
  CBC w Diff NO MAN DIFF REQ
 
  WBC (4.8 - 10.8 /CUMM) 12.0  H
 
  RBC (4.70 - 6.10 /CUMM) 3.32  L
 
  Hgb (14.0 - 18.0 G/DL) 11.5  L
 
  Hct (42 - 52 %) 34.1  L
 
  MCV (80.0 - 94.0 FL) 102.9  H
 
  MCH (27.0 - 31.0 PG) 34.7  H
 
  MCHC (33.0 - 37.0 G/DL) 33.8
 
  RDW (11.5 - 14.5 %) 17.2  H
 
  Plt Count (130 - 400 /CUMM) 117  L
 
  MPV (7.4 - 10.4 FL) 8.8
 
  Gran % (42.2 - 75.2 %) 94.6  H
 
  Lymphocytes % (20.5 - 51.1 %) 2.0  L
 
  Monocytes % (1.7 - 9.3 %) 3.4
 
  Eosinophils % (0 - 5 %) 0
 
  Basophils % (0.0 - 2.0 %) 0
 
  Absolute Granulocytes (1.4 - 6.5 /CUMM) 11.4  H
 
  Absolute Lymphocytes (1.2 - 3.4 /CUMM) 0.2  L
 
  Absolute Monocytes (0.10 - 0.60 /CUMM) 0.4
 
  Absolute Eosinophils (0.0 - 0.7 /CUMM) 0
 
  Absolute Basophils (0.0 - 0.2 /CUMM) 0
 
 
 
 
Diagnostic Data
EKG Results
Tracing was personally reviewed and shows a sinus rhythm at 85 bpm with 
intraventricular conduction delay and PVC
CXR Results
Opacity projecting over the LEFT lower lobe probably an infiltrate
atelectasis and/or pleural effusion. Underlying vascular vascular congestion.
Suggest follow-up chest PA and lateral when patient's conditions permits.
 
Other Results
CT
- Small left pleural effusion and atelectasis of the left more than right
lung bases.
- No dense consolidation or mass. Background changes of emphysema.
- Cardiomegaly.
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
placement.
 
Assessment/Plan
Assessment/Plan
 
1.  Shortness of breath likely multifactorial
2.  Ischemic cardiomyopathy with prior CABG/PCI
3.  AICD in situ
4.  Abdominal aortic aneurysm status post prior EVAR
5.  Aortic stenosis
6.  Small cell lung CA
7.  Hypothyroidism
8.  Not on ACE inhibitor per reoprt due to CKD with hyperkalemia
9.  Acute on chronic systolic congestive heart failure
10. COPD/JEREMY
11.  Diabetes
 
The patient's dyspnea is likely multifactorial but I agree there is a component 
of volume overload; can continue diuresis with twice daily IV Lasix while 
monitoring strict I's and O's and daily weights along with daily metabolic 
panels.  Recommend obtaining a repeat echocardiogram.  Continue his carvedilol. 
He may be a candidate for repeat ischemic testing in the future.
 
 
James Mc MD [facility]
 
 
Consult Acknowledgment
- Thank you for your consult request.

## 2018-08-22 NOTE — PN- HOUSESTAFF
Vita Speari 18 0736:
Subjective
Follow-up For:
CHf and COPD exacerbation
Subjective:
Patient was seen and examiend at bedside. He says he does not feel any better. 
He reports he could not sleep at all last night because of not becoming short of
breath as soon as he reclined. He says he was up sitting in the chair. According
to him, this is a sudden change from his baseline. He apparently sleeps with one
pillow under his head and one pillow under his legs at home. He does endorse an 
improvement in his pedal edema though. Denies fever, chills, nausea, vomiting, 
chest pain.
 
Review of Systems
Constitutional:
Reports: see HPI. 
 
Objective
Last 24 Hrs of Vital Signs/I&O
 Vital Signs
 
 
Date Time Temp Pulse Resp B/P B/P Pulse O2 O2 Flow FiO2
 
     Mean Ox Delivery Rate 
 
 0635 97.4 82 20 100/70  94 Room Air  
 
 0000       Room Air  
 
 0000  91    97   
 
 2137 97.6 99 19 116/57  95 Room Air  
 
 2029    116/57     
 
 2021    116/87     
 
 1655      95 Room Air  
 
 1530 98.0 84 18 110/70  94 Room Air  
 
 0916      95 Room Air  
 
 
 Intake & Output
 
 
  1600  0800  0000
 
Intake Total  240 120
 
Output Total  400 500
 
Balance  -160 -380
 
    
 
Intake, Oral  240 120
 
Output, Urine  400 500
 
 
 
 
Physical Exam
General Appearance: Alert, Oriented X3, Cooperative, No Acute Distress
Skin: No Rashes
Neck: Supple, No JVD
Cardiovascular: Regular Rate, Normal S1, Normal S2
Lungs: b/l crackles
Abdomen: Normal Bowel Sounds, Soft, No Tenderness
Extremities: pedal edema +
 
Last 24 Hrs of Lab/Kapil Results
Last 24 Hrs of Labs/Mics:
 Laboratory Tests
 
18 0627:
Anion Gap 5, Estimated GFR 49  L, BUN/Creatinine Ratio 33.6  H, CBC w Diff NO 
MAN DIFF REQ, RBC 3.28  L, .5  H, MCH 34.7  H, MCHC 33.9, RDW 17.0  H, 
MPV 8.8, Gran % 90.0  H, Lymphocytes % 4.2  L, Monocytes % 5.8, Eosinophils % 0,
Basophils % 0, Absolute Granulocytes 11.0  H, Absolute Lymphocytes 0.5  L, 
Absolute Monocytes 0.7  H, Absolute Eosinophils 0, Absolute Basophils 0
 
 
Assessment/Plan
Assessment:
73 year old male with extensive PMH s/p CABG (), CAD with stents (), h/
o DVT (5 yrs ago), IDDM (dx 3months ago), COPD, Small Cell lung CA (last dose of
radiation 3 months ago), ischemic cardiomyopathy with EF 20% s/p defibrillator 
placement (2018), s/p carotid endarterectomy right side (), hypothryroid
, HTN, BPH, JEREMY on CPAP at [facility] was admitted to the [facility] service for the 
evaluation and treatment of shortness of breath. He states his symptoms 
developed suddenly. He does endorse orthopnea and PND. In the setting of acutely
elevated Pro BNP, volume overload is a concern. 
 
 
Problem List:
1. Acute COPD exacerbation vs CHF exacerbation
2. CKD stage 3
3. Elevated ALT
4. Thrombocytopenia
5. h/o IDDM
 
Vitals were stable 
Labs: WBC 12.2, Hgb: 11.4, Plt 120, BUN/Cr 47/1.4  ProBNP:5960
 
 
Portable CXR:  IMPRESSION:
Opacity projecting over the LEFT lower lobe probably an infiltrate
atelectasis and/or pleural effusion. Underlying vascular vascular congestion.
 
 
1.Acute COPD exacerbation with CHF exacerbation ( Chronic Systolic Heart Failure
with EF 20-25%)
-Prednisone 30mg. Will taper further.
-Azithromycin 5 day course. Day 4.
-Duonebs as needed
-Oxygen supplementation as needed to maintain sats >90% but less than 93%; not 
requiring oxygen currently. Saturating well at room air
-Patient has a history of chronic systolic heart failure with an EF of 20% s/p 
defibrillator placement. 
-Reports orthopnea and PND in the setting of bilateral crackles.
-CHF exacerbation > COPD exacerbation
-Cardio consult appreciated. Will follow recommendations
* ECHO in the PM later today
* Stop PO Lasix
* Lasix 40mg iv BID
* Strict I/O, daily weights, daily metabolic panel 
 
2.CKD stage 3
-continue to monitor
-Creatinine stable at 1.4 today
 
3.Elevated ALT-may be medication related
-monitor
 
4.Thrombocytopenia-may be 2/2 viral infection vs bacterial vs medication induced
vs nutritional deficiency (albumin 3.3) vs malignancy
-continue to monitor, 120 today
-hold chemical prophylaxis; ALPS and ambulation only
 
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 
patient with Small cell lung cancer
-Sliding scale insulin
-levemir home dose
-finger sticks
 
DVT prophylaxis: ALPS/ambulation
Code Status: DNR/DNI 
Problem List:
 1. CKD stage 3 secondary to diabetes
 
 2. Small cell lung cancer in adult
 
 3. IDDM (insulin dependent diabetes mellitus)
 
 4. Thrombocytopenia
 
 5. CHF exacerbation
 
 6. COPD exacerbation
 
 7. Exertional dyspnea
 
 8. Pedal edema
 
Pain Ratin
Pain Location:
na
Pain Goal: Remain pain free
Pain Plan:
na
Tomorrow's Labs & Rationales:
cbc and bep
 
 
Jannette Acuña 18 1122:
Attending MD Review Statement
 
Attending Statement
Attending MD Statement: examined this patient, discuss w/resident/PA/NP, agreed 
w/resident/PA/NP, discussed with family, reviewed EMR data (avail), discussed 
with nursing, discussed with case mgmt, reviewed images, amended to note
Attending Assessment/Plan:
Patient seen/examined bedside. He has mild use of accessory msucles. He has 
shortness of breath on exertion. orthopnea +, PND+. Vitals stable. 
 
Patient admitted here for COPD exaceraton and acute on chronic CHF exacerbation 
in smoking history with lung cancer now found to have left sided small layered 
pleural effsuion. 
 
Continue with iv lasix, taper steroids, azithromycin, o2 supplementation as 
needed, TRCS, walking pusle oximetry.  
 
Acute on chronic Systolic Heart failure EF 20% possible exacerbation. Elevtaed 
pro bnp than baseline. Cardiology consult.
 
gi.dvt prophyalxis

## 2018-08-23 VITALS — SYSTOLIC BLOOD PRESSURE: 112 MMHG | DIASTOLIC BLOOD PRESSURE: 64 MMHG

## 2018-08-23 VITALS — DIASTOLIC BLOOD PRESSURE: 92 MMHG | SYSTOLIC BLOOD PRESSURE: 124 MMHG

## 2018-08-23 LAB
ABSOLUTE GRANULOCYTE CT: 8.1 /CUMM (ref 1.4–6.5)
BASOPHILS # BLD: 0 /CUMM (ref 0–0.2)
BASOPHILS NFR BLD: 0 % (ref 0–2)
EOSINOPHIL # BLD: 0 /CUMM (ref 0–0.7)
EOSINOPHIL NFR BLD: 0 % (ref 0–5)
ERYTHROCYTE [DISTWIDTH] IN BLOOD BY AUTOMATED COUNT: 17 % (ref 11.5–14.5)
GRANULOCYTES NFR BLD: 86.5 % (ref 42.2–75.2)
HCT VFR BLD CALC: 35 % (ref 42–52)
LYMPHOCYTES # BLD: 0.5 /CUMM (ref 1.2–3.4)
MCH RBC QN AUTO: 34.7 PG (ref 27–31)
MCHC RBC AUTO-ENTMCNC: 33.8 G/DL (ref 33–37)
MCV RBC AUTO: 102.7 FL (ref 80–94)
MONOCYTES # BLD: 0.8 /CUMM (ref 0.1–0.6)
PLATELET # BLD: 118 /CUMM (ref 130–400)
PMV BLD AUTO: 8.8 FL (ref 7.4–10.4)
RED BLOOD CELL CT: 3.41 /CUMM (ref 4.7–6.1)
WBC # BLD AUTO: 9.4 /CUMM (ref 4.8–10.8)

## 2018-08-23 NOTE — RADIOLOGY REPORT
EXAMINATION:
XR PORTABLE CHEST
 
CLINICAL INFORMATION:
Shortness of breath. Assess for pulmonary edema.
 
COMPARISON:
Chest x-ray 08/19/2018 and CT scan of the chest 08/20/2018.
 
TECHNIQUE:
Portable frontal view of the chest was obtained.
 
FINDINGS:
The study redemonstrates a left pectoral pacemaker/AICD. There are sequelae
of prior median sternotomy. There has been interval decrease in the size of
the left pleural effusion compared to prior imaging. Underlying consolidation
cannot excluded. The lung fields are moderately well-expanded. The cardiac
silhouette is prominent but stable. There is prominence of the central
pulmonary vessels.
 
IMPRESSION:
1. There has been interval decrease in the left-sided pleural effusion
compared to the prior chest x-ray. There may be underlying consolidation.

## 2018-08-23 NOTE — PN- CARDIOLOGY
Subjective
Subjective:
Patient resting and sleeping soundly quite comfortably.
 
Objective
Vital Signs and I&Os
Vital Signs
 
 
Date Time Temp Pulse Resp B/P B/P Pulse O2 O2 Flow FiO2
 
     Mean Ox Delivery Rate 
 
08/23 0835      94 Room Air Room Air 
 
08/23 0725 95.7 78 18 112/64  92   
 
08/23 0000      92 Room Air  
 
08/22 2221 97.8 87 18 115/63  91 Room Air  
 
08/22 2139 97.8 87 18 115/63     
 
08/22 2139 97.8 87 18 115/63     
 
08/22 2138 97.8 87 18 115/63     
 
08/22 1627      93 Room Air Room Air 
 
08/22 1600       Room Air Room Air 
 
08/22 1446 97.6 84 18 112/68  93 Room Air  
 
 
 Intake & Output
 
 
 08/23 1600 08/23 0800 08/23 0000 08/22 1600 08/22 0800 08/22 0000
 
Intake Total  480 480 720 240 120
 
Output Total  1000 1500 700 400 500
 
Balance  -520 -1020 20 -160 -380
 
       
 
Intake, Oral  480 480 720 240 120
 
Output, Urine  1000 1500 700 400 500
 
Patient  252 lb    
 
Weight      
 
 
 
Physical Exam:
On physical exam patient appears moderately obese.
Head normocephalic atraumatic
Eyes sclera anicteric conjunctiva showed no pallor extraocular muscles were 
normal.
Neck no jugular venous tension no thyroid masses no palpable nodes.  Scar 
previous endarterectomy
Chest lungs bilateral scattered expiratory wheezes.
Heart regular rhythm with a grade 1/6 to 2/6 systolic murmur
Abdomen protuberant bowel sounds normal
Extremities edema differential right greater than left.  Severe bilateral 
vascular disease.
Neurological no gross motor or sensory deficits
Current Medications:
 Current Medications
 
 
  Sig/Ly Start time  Last
 
Medication Dose Route Stop Time Status Admin
 
Acetaminophen 650 MG Q6P PRN 08/19 2045 AC 
 
  PO   
 
Albuterol Sulfate 2 PUF Q4P PRN 08/21 1700 AC 
 
  INH   
 
Albuterol Sulfate 3 ML EVERY 4 HRS/AWAKE 08/20 2000 AC 08/23
 
  INH   0833
 
Aspirin 81 MG DAILY 08/20 0900 AC 08/22
 
  PO   0833
 
Atorvastatin Calcium 40 MG DAILY 08/20 0900 AC 08/22
 
  PO   0835
 
Azithromycin 250 MG DAILY@2200 08/19 2200 AC 08/22
 
  PO   2138
 
Benzonatate 100 MG TID PRN 08/19 2315 AC 08/23
 
  PO   0612
 
Budesonide/ 2 PUF BID 08/19 2100 AC 08/22
 
Formoterol Fumarate  INH   2140
 
Carvedilol 6.25 MG BID 08/19 2100 AC 08/22
 
  PO   2139
 
Clopidogrel Bisulfate 75 MG DAILY 08/20 0900 AC 08/22
 
  PO   0835
 
Furosemide 40 MG 7:30 AM, & 4:30 PM 08/22 1630 AC 08/23
 
  IV   0730
 
Furosemide 0 .STK-MED ONE 08/22 1029 DC 
 
  IV   
 
Furosemide 40 MG BID 08/22 0907 DC 08/22
 
  IV   1033
 
Furosemide 40 MG DAILY 08/22 0905 DC 
 
  IV   
 
Heparin Sodium  5,000 UNIT Q8 08/19 2200 AC 08/23
 
(Porcine)  SC   0604
 
Insulin Aspart 0 TIDAC/HS 08/21 2100 AC 08/22
 
  SC   2139
 
Insulin Detemir 25 UNITS QAM 08/22 0900 AC 08/22
 
  SC   0850
 
Levothyroxine Sodium 0.15 MG DAILY AC 08/20 0700 AC 08/23
 
  PO   0605
 
Omeprazole 40 MG DAILY AC 08/20 0700 AC 08/23
 
  PO   0605
 
Oxycodone/ 1 TAB Q6P PRN 08/19 2045 AC 
 
Acetaminophen  PO   
 
Prednisone 30 MG DAILY 08/23 0900  
 
  PO   
 
Prednisone 40 MG DAILY 08/21 0946 DC 08/22
 
  PO   0835
 
Ramelteon 8 MG AT BEDTIME AS NEED.. 08/22 2215 AC 08/22
 
  PO   2216
 
Ramelteon 0 .STK-MED ONE 08/22 2215 DC 
 
  PO   
 
Ranolazine 500 MG BID 08/19 2100 AC 08/22
 
  PO   2138
 
Sertraline HCl 50 MG DAILY 08/20 0900 AC 08/22
 
  PO   0837
 
Tamsulosin HCl 0.4 MG AT BEDTIME 08/19 2100 AC 08/22
 
  PO   2139
 
Tiotropium [place] 1 PUF DAILY 08/20 0900 AC 08/22
 
  INH   0832
 
 
 
 
Results
Last 48 Hrs of Labs/Mics:
 Laboratory Tests
 
08/23/18 0700:
Anion Gap 6, Estimated GFR 54  L, BUN/Creatinine Ratio 36.9  H, CBC w Diff NO 
MAN DIFF REQ, RBC 3.41  L, .7  H, MCH 34.7  H, MCHC 33.8, RDW 17.0  H, 
MPV 8.8, Gran % 86.5  H, Lymphocytes % 5.3  L, Monocytes % 8.2, Eosinophils % 0,
Basophils % 0, Absolute Granulocytes 8.1  H, Absolute Lymphocytes 0.5  L, 
Absolute Monocytes 0.8  H, Absolute Eosinophils 0, Absolute Basophils 0
 
08/22/18 0627:
Anion Gap 5, Estimated GFR 49  L, BUN/Creatinine Ratio 33.6  H, CBC w Diff NO 
MAN DIFF REQ, RBC 3.28  L, .5  H, MCH 34.7  H, MCHC 33.9, RDW 17.0  H, 
MPV 8.8, Gran % 90.0  H, Lymphocytes % 4.2  L, Monocytes % 5.8, Eosinophils % 0,
Basophils % 0, Absolute Granulocytes 11.0  H, Absolute Lymphocytes 0.5  L, 
Absolute Monocytes 0.7  H, Absolute Eosinophils 0, Absolute Basophils 0
 
Recent Imaging Studies:
CT scan of the chest reveals
- Small left pleural effusion and atelectasis of the left more than right
lung bases.
- No dense consolidation or mass. Background changes of emphysema.
- Cardiomegaly.
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
placement.
 
Assessment/Plan
Assessment/Plan
In summary this 73-year-old gentleman has following problems
1.  Shortness of breath likely multifactorial
2.  Ischemic cardiomyopathy with prior CABG/PCI
3.  AICD in situ
4.  Abdominal aortic aneurysm status post prior EVAR
5.  Aortic stenosis
6.  Small cell lung CA
7.  Hypothyroidism
8.  Not on ACE inhibitor per reoprt due to CKD with hyperkalemia
9.  Acute on chronic systolic congestive heart failure
10. COPD/JEREMY
11.  Diabetes
 
CT scan of the chest does not show any convincing evidence of significant 
congestive heart failure.  Small left pleural effusion is nonspecific.  
Echocardiogram has not been done.  He currently is receiving parenteral IV 
Lasix.  Await echocardiogram to see if there is any major change from before and
if not I would discharge him on double the dose of preadmission diuretics with a
slip for electrolytes BUN and creatinine 1 week after discharge.  I suspect most
of his issues are related to pulmonary.
Continue telemetry? Not applicable

## 2018-08-23 NOTE — PN- HOUSESTAFF
**See Addendum**
Mariia Spear 18 0720:
Subjective
Follow-up For:
CHF excaerbation
COPD exacerbation
Subjective:
Patient was seen and exmained at bedside. He was resting comfortably. He says he
feels no better but he was able to get some sleep last night. He is still 
sleeping propped up. He denies fever, chills, nausea, vomiting, chest pain, 
palpitations.
 
Review of Systems
Constitutional:
Reports: see HPI. 
 
Objective
Last 24 Hrs of Vital Signs/I&O
 Vital Signs
 
 
Date Time Temp Pulse Resp B/P B/P Pulse O2 O2 Flow FiO2
 
     Mean Ox Delivery Rate 
 
 1126 98.0        
 
 1032  78  112/64     
 
 1000    112/64     
 
 0835      94 Room Air Room Air 
 
 0725 95.7 78 18 112/64  92   
 
 0000      92 Room Air  
 
 2221 97.8 87 18 115/63  91 Room Air  
 
 2139 97.8 87 18 115/63     
 
 2139 97.8 87 18 115/63     
 
 2138 97.8 87 18 115/63     
 
 1627      93 Room Air Room Air 
 
 1600       Room Air Room Air 
 
 1446 97.6 84 18 112/68  93 Room Air  
 
 
 Intake & Output
 
 
  1600  0800  0000
 
Intake Total  480 480
 
Output Total  1000 1500
 
Balance  -520 -1020
 
    
 
Intake, Oral  480 480
 
Output, Urine  1000 1500
 
Patient  252 lb 
 
Weight   
 
 
 
 
Physical Exam
General Appearance: Alert, Oriented X3, Cooperative, No Acute Distress
Neck: Supple
Cardiovascular: Regular Rate, Normal S1, Normal S2
Lungs: b/l crackles
Abdomen: Normal Bowel Sounds, Soft, No Tenderness
Extremities: No Edema, Normal Pulses
 
Assessment/Plan
Assessment:
73 year old male with extensive PMH s/p CABG (), CAD with stents (), h/
o DVT (5 yrs ago), IDDM (dx 3months ago), COPD, Small Cell lung CA (last dose of
radiation 3 months ago), ischemic cardiomyopathy with EF 20% s/p defibrillator 
placement (2018), s/p carotid endarterectomy right side (), hypothryroid
, HTN, BPH, JEREMY on CPAP at [facility] was admitted to the [facility] service for the 
evaluation and treatment of shortness of breath. He states his symptoms 
developed suddenly. He does endorse orthopnea and PND. In the setting of acutely
elevated Pro BNP, volume overload is a concern. 
 
 
Problem List:
1. Acute COPD exacerbation vs CHF exacerbation
2. CKD stage 3
3. Elevated ALT
4. Thrombocytopenia
5. h/o IDDM
 
Vitals were stable 
Labs: WBC 9.4, Hgb: 11.8, Plt 118, BUN/Cr 48/1.4  ProBNP:5960
 
 
Portable CXR:  IMPRESSION:
Opacity projecting over the LEFT lower lobe probably an infiltrate
atelectasis and/or pleural effusion. Underlying vascular vascular congestion.
 
 
1.Acute COPD exacerbation with CHF exacerbation ( Chronic Systolic Heart Failure
with EF 20-25%)
-Prednisone 30mg. Will taper further.
-Azithromycin 5 day course. Day 4.
-Duonebs as needed
-Oxygen supplementation as needed to maintain sats >90% but less than 93%; not 
requiring oxygen currently. Saturating well at room air
-Patient has a history of chronic systolic heart failure with an EF of 20% s/p 
defibrillator placement. 
-Reports orthopnea and PND in the setting of bilateral crackles.
-CHF exacerbation > COPD exacerbation
-Cardio consult appreciated. Will follow recommendations
* ECHO pending
* Stop PO Lasix
* Lasix 40mg iv BID
* Strict I/O, daily weights, daily metabolic panel 
* Would consider discharging patient on double than addmission dose of Lasix
* Would consider pulmonary input
 
2.CKD stage 3
-continue to monitor
-Creatinine stable at 1.3 today
 
3.Elevated ALT-may be medication related
-monitor
 
4.Thrombocytopenia-may be 2/2 viral infection vs bacterial vs medication induced
vs nutritional deficiency (albumin 3.3) vs malignancy
-continue to monitor, 118 today
-hold chemical prophylaxis; ALPS and ambulation only
 
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 
patient with Small cell lung cancer
-Sliding scale insulin
-levemir home dose
-finger sticks
 
DVT prophylaxis: ALPS/ambulation
Code Status: DNR/DNI 
Problem List:
 1. CKD stage 3 secondary to diabetes
 
 2. Small cell lung cancer in adult
 
 3. IDDM (insulin dependent diabetes mellitus)
 
 4. Thrombocytopenia
 
 5. COPD exacerbation
 
 6. Exertional dyspnea
 
 7. CHF exacerbation
 
Pain Ratin
Pain Location:
none
Pain Goal: Remain pain free
Pain Plan:
none
Tomorrow's Labs & Rationales:
cbc and bep
 
 
Jannette Acuña 18 1123:
Attending MD Review Statement
 
Attending Statement
Attending MD Statement: examined this patient, discuss w/resident/PA/NP, agreed 
w/resident/PA/NP, discussed with family, reviewed EMR data (avail), discussed 
with nursing, discussed with case mgmt, reviewed images, amended to note
Attending Assessment/Plan:
Patient seen/examined bedside.  He has shortness of breath improved. Lying on 
his bed on left side. orthopnea +, PND+. Vitals stable. Labs noted with 
Creatinine stable. 
 
Patient admitted here for COPD exaceraton and acute on chronic CHF exacerbation 
in smoking history with lung cancer now found to have left sided small layered 
pleural effsuion. 
 
Continue with iv lasix, taper steroids, azithromycin, o2 supplementation as 
needed, TRCS, walking pusle oximetry.  
 
Acute on chronic Systolic Heart failure EF 20% possible exacerbation. Elevtaed 
pro bnp than baseline. Cardiology follow up with ECHO pending.
 
gi.dvt prophyalxis

## 2018-08-24 VITALS — SYSTOLIC BLOOD PRESSURE: 110 MMHG | DIASTOLIC BLOOD PRESSURE: 60 MMHG

## 2018-08-24 VITALS — SYSTOLIC BLOOD PRESSURE: 172 MMHG | DIASTOLIC BLOOD PRESSURE: 70 MMHG

## 2018-08-24 VITALS — SYSTOLIC BLOOD PRESSURE: 110 MMHG | DIASTOLIC BLOOD PRESSURE: 70 MMHG

## 2018-08-24 VITALS — SYSTOLIC BLOOD PRESSURE: 140 MMHG | DIASTOLIC BLOOD PRESSURE: 80 MMHG

## 2018-08-24 LAB
ABSOLUTE GRANULOCYTE CT: 7.8 /CUMM (ref 1.4–6.5)
BASOPHILS # BLD: 0 /CUMM (ref 0–0.2)
BASOPHILS NFR BLD: 0.1 % (ref 0–2)
EOSINOPHIL # BLD: 0 /CUMM (ref 0–0.7)
EOSINOPHIL NFR BLD: 0.1 % (ref 0–5)
ERYTHROCYTE [DISTWIDTH] IN BLOOD BY AUTOMATED COUNT: 17.1 % (ref 11.5–14.5)
GRANULOCYTES NFR BLD: 86.8 % (ref 42.2–75.2)
HCT VFR BLD CALC: 35 % (ref 42–52)
LYMPHOCYTES # BLD: 0.6 /CUMM (ref 1.2–3.4)
MCH RBC QN AUTO: 34.5 PG (ref 27–31)
MCHC RBC AUTO-ENTMCNC: 33.5 G/DL (ref 33–37)
MCV RBC AUTO: 103.2 FL (ref 80–94)
MONOCYTES # BLD: 0.6 /CUMM (ref 0.1–0.6)
PLATELET # BLD: 126 /CUMM (ref 130–400)
PMV BLD AUTO: 8.6 FL (ref 7.4–10.4)
RED BLOOD CELL CT: 3.4 /CUMM (ref 4.7–6.1)
WBC # BLD AUTO: 9 /CUMM (ref 4.8–10.8)

## 2018-08-24 NOTE — PN- HOUSESTAFF
Vita Speari 18 0732:
Subjective
Follow-up For:
COPD exacerbation
CHF exacerbation
Subjective:
Patient was seen and examined at bedside. He says he does not feel any better. 
He is still short of breath on minimal exertion but has been able to sleep 
better. Denies any new episodes of chest pain
 
Review of Systems
Constitutional:
Reports: see HPI. 
 
Objective
Last 24 Hrs of Vital Signs/I&O
 Vital Signs
 
 
Date Time Temp Pulse Resp B/P B/P Pulse O2 O2 Flow FiO2
 
     Mean Ox Delivery Rate 
 
 0921      92 Room Air  
 
 0617 97.4 75 20 110/68  92   
 
 0000       Room Air  
 
 2254      94 Room Air Room Air 
 
 2213 98.0 76 18 110/70  95 Room Air  
 
 2136  76  110/70     
 
 2136  76  110/70     
 
 1903 98.3 82 20 172/70  96 Room Air  
 
 1655      92 Nasal 1.0L 
 
       Cannula  
 
 1600      95 Nasal 1.0L 
 
       Cannula  
 
 1531 98.1 74 18 110/60  96   
 
 
 Intake & Output
 
 
  1600  0800  0000
 
Intake Total   750
 
Output Total  700 600
 
Balance  -700 150
 
    
 
Intake, Oral   750
 
Number   0
 
Bowel   
 
Movements   
 
Output, Urine  700 600
 
Patient  244 lb 
 
Weight   
 
Weight  Bed scale 
 
Measurement   
 
Method   
 
 
 
 
Physical Exam
General Appearance: Alert, Oriented X3, Cooperative, No Acute Distress
Cardiovascular: Regular Rate, Normal S1, Normal S2
Lungs: b/l crackles
Abdomen: Normal Bowel Sounds, Soft, No Tenderness
Extremities: No Edema, Normal Pulses
 
Assessment/Plan
Assessment:
73 year old male with extensive PMH s/p CABG (), CAD with stents (), h/
o DVT (5 yrs ago), IDDM (dx 3months ago), COPD, Small Cell lung CA (last dose of
radiation 3 months ago), ischemic cardiomyopathy with EF 20% s/p defibrillator 
placement (2018), s/p carotid endarterectomy right side (), hypothryroid
, HTN, BPH, JEREMY on CPAP at [facility] was admitted to the [facility] service for the 
evaluation and treatment of shortness of breath. He states his symptoms 
developed suddenly. He does endorse orthopnea and PND. In the setting of acutely
elevated Pro BNP, volume overload is a concern. 
 
 
Problem List:
1. Acute COPD exacerbation vs CHF exacerbation
2. CKD stage 3
3. Elevated ALT
4. Thrombocytopenia
5. h/o IDDM
 
Vitals were stable 
Labs: WBC 12.2, Hgb: 11.4, Plt 120, Cr:1.8  ProBNP:5960
 
 
Portable CXR:  IMPRESSION:
Opacity projecting over the LEFT lower lobe probably an infiltrate
atelectasis and/or pleural effusion. Underlying vascular vascular congestion.
 
 
1.Acute COPD exacerbation with CHF exacerbation ( Chronic Systolic Heart Failure
with EF 20-25%)
-Prednisone 40mg
-Azithromycin 5 day course. Day 5
-Duonebs as needed
-Oxygen supplementation as needed to maintain sats >90% but less than 93%; not 
requiring oxygen currently. Saturating well at room air
-Patient has a history of chronic systolic heart failure with an EF of 20% s/p 
defibrillator placement. 
-Reports orthopnea and PND in the setting of bilateral crackles.
-CHF exacerbation > COPD exacerbation
-Cardio consult appreciated. Will follow recommendations
* ECHO in the PM later today
* Stop PO Lasix
* Lasix 40mg daily. Reduced in the context of worsened kidney function
* Strict I/O, daily weights, daily metabolic panel 
 
2.CKD stage 3
-continue to monitor
-Creatinine stable at 1.4 today
 
3.Elevated ALT-may be medication related
-monitor
 
4.Thrombocytopenia-may be 2/2 viral infection vs bacterial vs medication induced
vs nutritional deficiency (albumin 3.3) vs malignancy
-continue to monitor, 120 today
-hold chemical prophylaxis; ALPS and ambulation only
 
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 
patient with Small cell lung cancer
-Sliding scale insulin
-levemir home dose
-finger sticks
 
DVT prophylaxis: ALPS/ambulation
Code Status: DNR/DNI 
Problem List:
 1. CKD stage 3 secondary to diabetes
 
 2. Small cell lung cancer in adult
 
 3. IDDM (insulin dependent diabetes mellitus)
 
 4. Thrombocytopenia
 
 5. CHF exacerbation
 
 6. COPD exacerbation
 
 7. Exertional dyspnea
 
 8. Pedal edema
 
Pain Ratin
Pain Location:
na
Pain Goal: Remain pain free
Pain Plan:
na
Tomorrow's Labs & Rationales:
cbc and bep
 
 
ArianneJannette hutson 18 1220:
Attending MD Review Statement
 
Attending Statement
Attending MD Statement: examined this patient, discuss w/resident/PA/NP, agreed 
w/resident/PA/NP, discussed with family, reviewed EMR data (avail), discussed 
with nursing, discussed with case mgmt, reviewed images, amended to note
Attending Assessment/Plan:
Patient seen/examined bedside.  He has shortness of breath improved. Lying on 
his bed on left side. orthopnea +, PND+. Vitals stable. Labs noted with 
Creatinine stable. 
 
Patient admitted here for COPD exaceraton and acute on chronic CHF exacerbation 
in smoking history with stage 1 lung cancer s/p radiation now found to have left
sided small layered pleural effsuion. Inform Pulmonary Dr Harris.
 
Continue with iv lasix, c/w steroids, azithromycin, o2 supplementation as needed
, TRCS, walking pusle oximetry before discharge
 
Acute on chronic Systolic Heart failure EF 20% possible exacerbation. Elevtaed 
pro bnp than baseline. Cardiology follow up with ECHO pending. 
 
Mild renal insuffuiciecny Cr 1.8, decrease lasix 40 mg iv daily. Consult 
nephrology.
 
gi.dvt prophyalxis
 
Spoke to wife at length about ongoing patient care.

## 2018-08-24 NOTE — PN- CARDIOLOGY
Subjective
Subjective:
 
Patient still complains of some chest congestion.  Also still with lower 
extremity edema.
 
Objective
Vital Signs and I&Os
Vital Signs
 
 
Date Time Temp Pulse Resp B/P B/P Pulse O2 O2 Flow FiO2
 
     Mean Ox Delivery Rate 
 
08/24 1107      93 Room Air Room Air 
 
08/24 0820 98.0 65 20 140/80     
 
08/24 0820 98.0 65 20 140/80     
 
08/24 0647      94 Room Air  
 
08/24 0530 98.0 65 20 140/80  96 Nasal 2.0L 
 
       Cannula  
 
08/24 0000       Room Air  
 
08/23 2215 98.0 91 20 124/92  96 Nasal 2.0L 
 
       Cannula  
 
08/23 2050  90  124/92     
 
08/23 2050  90  124/92     
 
08/23 2049  90  124/92     
 
08/23 2010 92 Room Air  
 
08/23 1538 98.0 93 18 112/64  93 Room Air  
 
 
 Intake & Output
 
 
 08/24 1600 08/24 0800 08/24 0000 08/23 1600 08/23 0800 08/23 0000
 
Intake Total   480 620 480 480
 
Output Total  1650 4889 609 2298 1500
 
Balance  -1650 -720 -180 -520 -1020
 
       
 
Intake, Oral   480 620 480 480
 
Number   1 0  
 
Bowel      
 
Movements      
 
Output, Urine  1650 4167 550 2218 1500
 
Patient  248 lb   252 lb 
 
Weight      
 
 
 
Physical Exam:
 
General: no apparent distress. Alert.
Eyes: No obvious scleral icterus.
HEENT: No jugular venous distention or abnormal jugular venous pulsations.
Cardiovascular: Normal intensity S1/S2. Regular. ICD noted, 2 out of 6 systolic 
murmur
Respiratory: Increased air entry at the bases
Abdomen: Soft, nontender with no guarding or rebound tenderness.
Musculoskeletal: No clubbing or cyanosis noted; 2+ lower extremity edema
Skin: warm
Neurologic: No gross focal deficits noted.
Current Medications:
 Current Medications
 
 
  Sig/Ly Start time  Last
 
Medication Dose Route Stop Time Status Admin
 
Acetaminophen 650 MG Q6P PRN 08/19 2045 AC 
 
  PO   
 
Albuterol Sulfate 2 PUF Q4P PRN 08/21 1700 AC 
 
  INH   
 
Albuterol Sulfate 3 ML EVERY 4 HRS/AWAKE 08/20 2000 AC 08/24
 
  INH   1105
 
Aspirin 81 MG DAILY 08/20 0900 AC 08/24
 
  PO   0820
 
Atorvastatin Calcium 40 MG DAILY 08/20 0900 AC 08/24
 
  PO   0820
 
Azithromycin 250 MG DAILY@2200 08/19 2200 AC 08/23
 
  PO   2114
 
Benzonatate 100 MG TID PRN 08/19 2315 AC 08/24
 
  PO   0048
 
Budesonide/ 2 PUF BID 08/19 2100 AC 08/24
 
Formoterol Fumarate  INH   0819
 
Carvedilol 6.25 MG BID 08/19 2100 AC 08/24
 
  PO   0820
 
Clopidogrel Bisulfate 75 MG DAILY 08/20 0900 AC 08/24
 
  PO   0820
 
Furosemide 40 MG DAILY 08/24 0902 AC 08/24
 
  IV   0948
 
Furosemide 40 MG 7:30 AM, & 4:30 PM 08/22 1630 DC 08/24
 
  IV   0819
 
Guaifenesin 600 MG Q12 08/24 1037 AC 
 
  PO   
 
Heparin Sodium  5,000 UNIT Q8 08/19 2200 AC 08/24
 
(Porcine)  SC   0542
 
Insulin Aspart 0 TIDAC/HS 08/21 2100 AC 08/23
 
  SC   2048
 
Insulin Detemir 25 UNITS QAM 08/22 0900 AC 08/24
 
  SC   0819
 
Levothyroxine Sodium 0.15 MG DAILY AC 08/20 0700 AC 08/24
 
  PO   0542
 
Nitroglycerin 0.5 GM Q6P PRN 08/23 1300 AC 
 
  TOP   
 
Nitroglycerin 0 .STK-MED ONE 08/23 1217 DC 
 
     
 
Nitroglycerin 0.4 MG ONCE ONE 08/23 1215 DC 08/23
 
  SL 08/23 1216  1218
 
Omeprazole 40 MG DAILY AC 08/20 0700 AC 08/24
 
  PO   0542
 
Oxycodone/ 1 TAB Q6P PRN 08/19 2045 AC 
 
Acetaminophen  PO   
 
Patient Medication  1 ED ONE ONE 08/23 1815 DC 08/23
 
Teaching  ED 08/23 1816  1834
 
Prednisone 40 MG DAILY 08/25 0900 AC 
 
  PO   
 
Prednisone 20 MG DAILY 08/24 0900 DC 
 
  PO   
 
Prednisone 30 MG DAILY 08/23 0900 DC 08/23
 
  PO   1000
 
Ramelteon 8 MG AT BEDTIME AS NEED.. 08/22 2215 AC 08/24
 
  PO   0048
 
Ranolazine 500 MG BID 08/19 2100 AC 08/24
 
  PO   0820
 
Sertraline HCl 50 MG DAILY 08/20 0900 AC 08/24
 
  PO   0820
 
Tamsulosin HCl 0.4 MG AT BEDTIME 08/19 2100 AC 08/23
 
  PO   2050
 
Tiotropium Show Low 1 PUF DAILY 08/20 0900 AC 08/24
 
  INH   0820
 
 
 
 
Results
Last 48 Hrs of Labs/Mics:
 Laboratory Tests
 
08/24/18 0714:
Anion Gap 3  L, Estimated GFR 37  L, BUN/Creatinine Ratio 27.2  H, CBC w Diff 
MAN DIFF ORDERED, RBC 3.40  L, .2  H, MCH 34.5  H, MCHC 33.5, RDW 17.1  H
, MPV 8.6, Gran % 86.8  H, Lymphocytes % 6.8  L, Monocytes % 6.2, Eosinophils % 
0.1, Basophils % 0.1, Absolute Granulocytes 7.8  H, Segmented Neutrophils 91  H,
Band Neutrophils 1, Absolute Lymphocytes 0.6  L, Lymphocytes 3  L, Monocytes 5, 
Absolute Monocytes 0.6, Absolute Eosinophils 0, Absolute Basophils 0, Nucleated 
RBCs 1  H, Platelet Estimate VERIFIED BY SMEAR, Anisocytosis 1+, Macrocytic 
Cells FEW, Ovalocytes 1+
 
08/23/18 1225:
Anion Gap 5, Estimated GFR 46  L, BUN/Creatinine Ratio 32.7  H, Troponin I 0.07
 
08/23/18 1214:
Troponin I Cancelled
 
08/23/18 0700:
Anion Gap 6, Estimated GFR 54  L, BUN/Creatinine Ratio 36.9  H, TSH 0.308, CBC w
Diff NO MAN DIFF REQ, RBC 3.41  L, .7  H, MCH 34.7  H, MCHC 33.8, RDW 
17.0  H, MPV 8.8, Gran % 86.5  H, Lymphocytes % 5.3  L, Monocytes % 8.2, 
Eosinophils % 0, Basophils % 0, Absolute Granulocytes 8.1  H, Absolute 
Lymphocytes 0.5  L, Absolute Monocytes 0.8  H, Absolute Eosinophils 0, Absolute 
Basophils 0
 
Recent Imaging Studies:
 
cxr:
1. There has been interval decrease in the left-sided pleural effusion
compared to the prior chest x-ray. There may be underlying consolidation.
 
Assessment/Plan
Assessment/Plan
 
1.  Shortness of breath likely multifactorial
2.  Ischemic cardiomyopathy with prior CABG/PCI
3.  AICD in situ
4.  Abdominal aortic aneurysm status post prior EVAR
5.  Aortic stenosis
6.  Small cell lung CA
7.  Hypothyroidism
8.  Not on ACE inhibitor per reoprt due to CKD with hyperkalemia
9.  Acute on chronic systolic congestive heart failure
10. COPD/JEREMY
11.  Diabetes
 
The patient still feels he is congested.  He is lying flat without dyspnea but 
still has lower extremity edema.  He has been diuresing but creatinine is 
elevated today; agree with decreasing the frequency of the IV Lasix while 
continuing to monitor daily metabolic panels along with strict I's and O's and 
daily weights.  Echocardiogram is pending. He may be a candidate for repeat 
ischemic testing in the future.  His chest x-ray from yesterday showed 
improvement.
 
 
James Mc MD [facility]
Continue telemetry? Not applicable

## 2018-08-25 VITALS — DIASTOLIC BLOOD PRESSURE: 73 MMHG | SYSTOLIC BLOOD PRESSURE: 126 MMHG

## 2018-08-25 VITALS — SYSTOLIC BLOOD PRESSURE: 118 MMHG | DIASTOLIC BLOOD PRESSURE: 69 MMHG

## 2018-08-25 VITALS — SYSTOLIC BLOOD PRESSURE: 110 MMHG | DIASTOLIC BLOOD PRESSURE: 68 MMHG

## 2018-08-25 LAB
ABSOLUTE GRANULOCYTE CT: 7.7 /CUMM (ref 1.4–6.5)
BASOPHILS # BLD: 0 /CUMM (ref 0–0.2)
BASOPHILS NFR BLD: 0 % (ref 0–2)
EOSINOPHIL # BLD: 0.1 /CUMM (ref 0–0.7)
EOSINOPHIL NFR BLD: 0.6 % (ref 0–5)
ERYTHROCYTE [DISTWIDTH] IN BLOOD BY AUTOMATED COUNT: 16.8 % (ref 11.5–14.5)
GRANULOCYTES NFR BLD: 84.3 % (ref 42.2–75.2)
HCT VFR BLD CALC: 36.3 % (ref 42–52)
LYMPHOCYTES # BLD: 0.6 /CUMM (ref 1.2–3.4)
MCH RBC QN AUTO: 34 PG (ref 27–31)
MCHC RBC AUTO-ENTMCNC: 33.3 G/DL (ref 33–37)
MCV RBC AUTO: 102.3 FL (ref 80–94)
MONOCYTES # BLD: 0.8 /CUMM (ref 0.1–0.6)
PLATELET # BLD: 123 /CUMM (ref 130–400)
PMV BLD AUTO: 8.8 FL (ref 7.4–10.4)
RED BLOOD CELL CT: 3.55 /CUMM (ref 4.7–6.1)
WBC # BLD AUTO: 9.1 /CUMM (ref 4.8–10.8)

## 2018-08-25 NOTE — PN- HOUSESTAFF
Zach Perez 18 1145:
Subjective
Follow-up For:
COPD exacerbation, CHF exacerbation
Subjective:
Patient seen and examined at bedside.  He was complaining of decreased sleep 
last night.  He feels comfortable when he is  sitting chair.  He is asking for 
echo report.  He complaining of shortness of breath.  He denies fever, chills, 
chest pain, palpitation, abdominal pain, diarrhea, constipation, no micturition.
 
Review of Systems
Constitutional:
Reports: see HPI. 
 
Objective
Last 24 Hrs of Vital Signs/I&O
 Vital Signs
 
 
Date Time Temp Pulse Resp B/P B/P Pulse O2 O2 Flow FiO2
 
     Mean Ox Delivery Rate 
 
 1509 97.5 83 18 126/73  93 Room Air  
 
 0921      92 Room Air  
 
 0800      96 Room Air  
 
 0617 97.4 75 20 110/68  92   
 
 0000       Room Air  
 
 2254      94 Room Air Room Air 
 
 2213 98.0 76 18 110/70  95 Room Air  
 
 2136  76  110/70     
 
 2136  76  110/70     
 
 1903 98.3 82 20 172/70  96 Room Air  
 
 
 Intake & Output
 
 
  1600  0800  0000
 
Intake Total 800  750
 
Output Total 1200 700 600
 
Balance -400 -700 150
 
    
 
Intake, Oral 800  750
 
Number 1  0
 
Bowel   
 
Movements   
 
Output, Urine 1200 700 600
 
Patient  244 lb 
 
Weight   
 
Weight  Bed scale 
 
Measurement   
 
Method   
 
 
 
 
Physical Exam
General Appearance: Alert, Oriented X3, Cooperative, No Acute Distress
 
Assessment/Plan
Assessment:
73 year old male with extensive PMH s/p CABG (), CAD with stents (), h/
o DVT (5 yrs ago), IDDM (dx 3months ago), COPD, Small Cell lung CA (last dose of
radiation 3 months ago), ischemic cardiomyopathy with EF 20% s/p defibrillator 
placement (2018), s/p carotid endarterectomy right side (), hypothryroid
, HTN, BPH, JEREMY on CPAP at [facility] was admitted to the [facility] service for the 
evaluation and treatment of shortness of breath. He states his symptoms 
developed suddenly. He does endorse orthopnea and PND. In the setting of acutely
elevated Pro BNP, volume overload is a concern. 
 
 
Problem List:
* Acute COPD exacerbation
*CHF exacerbation
* CKD stage 3
* Thrombocytopenia
* IDDM
 
Vitals were stable 
Labs: WBC 12.2, Hgb: 11.4, Plt 120, Cr:1.8  ProBNP:5960
 
 
Portable CXR:  IMPRESSION:
Opacity projecting over the LEFT lower lobe probably an infiltrate
atelectasis and/or pleural effusion. Underlying vascular vascular congestion.
 
 
1.Acute COPD exacerbation 
-Prednisone 40mg
-Azithromycin 5 day course. Day 5
-Oxygen supplementation as needed to maintain sats >90% but less than 93%; not 
requiring oxygen currently.
 
CHF exacerbation ( Chronic Systolic Heart Failure with EF 20-25%):
-Cardiology consult appreciated
-Cardiologist and agree that CHF exacerbation could be 1 of the factor for 
shortness of breath.
-Patient has a history of chronic systolic heart failure with an EF of 20% s/p 
defibrillator placement. 
-At the time of presentation there was bilateral inspiratory fine crackles
-Cardiologist recommended echo and start Lasix 40 mg
-Daily weight, input and output monitoring
 
2.CKD stage 3
-continue to monitor
-Creatinine stable at 1.4 today
 
3.Elevated ALT-may be medication related
-monitor
 
4.Thrombocytopenia-may be 2/2 viral infection vs bacterial vs medication induced
vs nutritional deficiency (albumin 3.3) vs malignancy
-continue to monitor, 123 today
-hold chemical prophylaxis
-ALPS and ambulation only
 
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 
-Endocrinology consult appreciated
-Sliding scale insulin
-levemir home dose
-finger sticks
 
DVT prophylaxis: ALPS/ambulation
Code Status: DNR/DNI 
Problem List:
 1. UCHE (acute kidney injury)
 
 2. CHF exacerbation
 
 3. COPD exacerbation
 
 4. Thrombocytopenia
 
 5. CKD stage 3 secondary to diabetes
 
Pain Ratin
Pain Location:
No pain
Pain Goal: Remain pain free
Pain Plan:
Pain management  pathway
Tomorrow's Labs & Rationales:
CBCs
 
 
Alejandro GLASS,Amir 18 1226:
Attending MD Review Statement
 
Attending Statement
Attending MD Statement: examined this patient, discuss w/resident/PA/NP, agreed 
w/resident/PA/NP, reviewed EMR data (avail), discussed with nursing
Attending Assessment/Plan:
Pt was seen and evaluated.  Chart reviewed.  Reports BRYANT and b/l LE swelling.  
--appreciate cards eval, f/u ECHO results
--cont current meds for now
--rest of the plan as per resident's note

## 2018-08-26 VITALS — DIASTOLIC BLOOD PRESSURE: 66 MMHG | SYSTOLIC BLOOD PRESSURE: 128 MMHG

## 2018-08-26 VITALS — SYSTOLIC BLOOD PRESSURE: 110 MMHG | DIASTOLIC BLOOD PRESSURE: 68 MMHG

## 2018-08-26 VITALS — SYSTOLIC BLOOD PRESSURE: 119 MMHG | DIASTOLIC BLOOD PRESSURE: 73 MMHG

## 2018-08-26 NOTE — PN- ATT ADDEND
Attending Addendum
Attending Brief Note
Mr. Jeanne Dickerson was seen and evaluated.  Chart reviewed.  This am had bleeding 
at the site of Hep inject
 Vital Signs
 
 
Date Time Temp Pulse Resp B/P B/P Pulse O2 O2 Flow FiO2
 
     Mean Ox Delivery Rate 
 
08/26 0830      92 Nasal 2.0L 
 
       Cannula  
 
08/26 0639 97.9 70 20 110/68  94   
 
08/26 0000       Nasal 2.0L 
 
       Cannula  
 
08/25 2152  77  118/69     
 
08/25 2152  77  118/69     
 
08/25 2151  77  118/69     
 
08/25 2112 97.6 77 18 118/69  97 Nasal 2.0L 
 
       Cannula  
 
08/25 1815      93 Nasal 2.0L 
 
       Cannula  
 
08/25 1509 97.5 83 18 126/73  93 Room Air  
 
 
 Intake & Output
 
 
 08/26 1600 08/26 0800 08/26 0000
 
Intake Total  480 250
 
Output Total  1050 
 
Balance  -570 250
 
    
 
Intake, IV  0 10
 
Intake, Oral  480 240
 
Number  0 0
 
Bowel   
 
Movements   
 
Output, Urine  1050 
 
Patient  111.187 kg 
 
Weight   
 
Weight  Bed scale 
 
Measurement   
 
Method   
 
 
GEN:  Lying comfortably, AAOx3
HEENT: moist mucosa
LUNGS: CTAB
HEART: s1s2
ABD: soft, Obese
Skin: abd area ecchymotic
EXT: b/l LE edema with some oozing of clear fluids
 
Labs/Diagnostics: reviewed
 
CXR: IMPRESSION:
1. There has been interval decrease in the left-sided pleural effusion
compared to the prior chest x-ray. There may be underlying consolidation.
 
 
A/P:
73 year old man with PMHx:  s/p CABG (1989), CAD with stents (1990s), aortic 
stenosis, h/o DVT (5 yrs ago), IDDM (dx 3months ago), COPD, Small Cell lung CA (
last dose of radiation 3 months ago), ischemic cardiomyopathy with EF 20% s/p 
AICD placement (Jan 2018), AAA s/p EVAR, s/p carotid endarterectomy right side (
1998), hypothryroid, HTN, BPH, JEREMY on CPAP at [facility]w [facility] [place] and found to have 
acute COPD exacerbation.  During hospitalization, he was evaluated by Cards.  
--cont IV diuretics, monitor weight I&Os, cont other cardiac meds
--prednisone taper
--f/u ECHO results
--D/C heparin as pt ambulating

## 2018-08-27 VITALS — SYSTOLIC BLOOD PRESSURE: 122 MMHG | DIASTOLIC BLOOD PRESSURE: 68 MMHG

## 2018-08-27 VITALS — DIASTOLIC BLOOD PRESSURE: 79 MMHG | SYSTOLIC BLOOD PRESSURE: 145 MMHG

## 2018-08-27 VITALS — SYSTOLIC BLOOD PRESSURE: 128 MMHG | DIASTOLIC BLOOD PRESSURE: 74 MMHG

## 2018-08-27 VITALS — DIASTOLIC BLOOD PRESSURE: 72 MMHG | SYSTOLIC BLOOD PRESSURE: 124 MMHG

## 2018-08-27 VITALS — DIASTOLIC BLOOD PRESSURE: 68 MMHG | SYSTOLIC BLOOD PRESSURE: 128 MMHG

## 2018-08-27 VITALS — DIASTOLIC BLOOD PRESSURE: 72 MMHG | SYSTOLIC BLOOD PRESSURE: 122 MMHG

## 2018-08-27 VITALS — DIASTOLIC BLOOD PRESSURE: 80 MMHG | SYSTOLIC BLOOD PRESSURE: 152 MMHG

## 2018-08-27 LAB
ABSOLUTE GRANULOCYTE CT: 9.6 /CUMM (ref 1.4–6.5)
BASOPHILS # BLD: 0 /CUMM (ref 0–0.2)
BASOPHILS NFR BLD: 0 % (ref 0–2)
EOSINOPHIL # BLD: 0 /CUMM (ref 0–0.7)
EOSINOPHIL NFR BLD: 0.3 % (ref 0–5)
ERYTHROCYTE [DISTWIDTH] IN BLOOD BY AUTOMATED COUNT: 16.7 % (ref 11.5–14.5)
GRANULOCYTES NFR BLD: 87.7 % (ref 42.2–75.2)
HCT VFR BLD CALC: 34 % (ref 42–52)
LYMPHOCYTES # BLD: 0.6 /CUMM (ref 1.2–3.4)
MCH RBC QN AUTO: 34.7 PG (ref 27–31)
MCHC RBC AUTO-ENTMCNC: 34.1 G/DL (ref 33–37)
MCV RBC AUTO: 101.9 FL (ref 80–94)
MONOCYTES # BLD: 0.7 /CUMM (ref 0.1–0.6)
PLATELET # BLD: 116 /CUMM (ref 130–400)
PMV BLD AUTO: 8.6 FL (ref 7.4–10.4)
RED BLOOD CELL CT: 3.33 /CUMM (ref 4.7–6.1)
WBC # BLD AUTO: 10.9 /CUMM (ref 4.8–10.8)

## 2018-08-27 NOTE — CONS- NEPHROLOGY
General Information and HPI
 
Consulting Request
Date of Consult: 18
Requested By:
Arianne GLASS,Jannette
 
Reason for Consult:
CKD
History of Present Illness:
72 YO male with extensive vascular disease, s/p CABG, hypertension, ischemic 
cardiomyopathy with EF of 25% on recent echo, moderate AS, COPD/JEREMY , left , 
EVAR. Recently received RT for small cell lung lauren.   He has chronic kidney 
disease with baseline creatinine around 1.5, followed by Dr. Morejon. This is 
felt to be a result of hypertension/vascular disease with perhaps some component
of cardiorenal syndrome. He was relatively stable as an outapatient although 
over the several days PTA he developed increasing leg edema, SOB, BRYANT and non 
productive cough. CXR showed a left pleural effusion.  He was treated for COPD 
exacerbation and Lasix, but only slight improvement in edema/BRYANT.  Except for a 
creatinien of 2.8 reported on  his creatinine has remained stable and near 
usuual baseline. 
 
FH:  no kdieny disease
 
SH:  no ethanol abuse, ex smoker
 
Allergies/Medications
Allergies:
Coded Allergies:
NO KNOWN ALLERGIES (NONE 18)
 
Home Med List:
Acetaminophen 325 MG CAPSULE   1 CAP PO PRN PAIN  (Reported)
Albuterol Sulfate (Proair Hfa) 90 MCG HFA.AER.AD   2 PUF INH Q4 COPD
Aspirin (Aspirin*) 81 MG TAB.CHEW   1 TAB PO DAILY heart  (Reported)
Atorvastatin Calcium (Lipitor) 40 MG TABLET   1 TAB PO DAILY cholesterol  (
Reported)
Budesonide/Formoterol Fumarate (Symbicort 160-4.5 Mcg Inhaler) 160 MCG-4.5 MCG/
ACTUATION HFA.AER.AD   2 PUF INH BID COPD  (Reported)
Carvedilol 6.25 MG TABLET   1 TAB PO BID HEART/BP  (Reported)
Clopidogrel Bisulfate (Clopidogrel) 75 MG TABLET   1 TAB PO DAILY BLOOD THINNER 
(Reported)
Empagliflozin (Jardiance) 10 MG TABLET   1 TAB PO DAILY DM  (Reported)
Furosemide (Lasix) 20 MG TABLET   1 TAB PO DAILY CHF  (Reported)
Insulin Aspart, Recombinant (Novolog Flexpen) 100 UNIT/ML INSULN.PEN   0 SC SEE 
ADMIN CRITERIA DM
     PLEASE check your blood sugar 3 times daily before meals and at
     bedtime and .
     FOLLOW
     80 - 150 MG/DL   9 UNITS
     151- 200 MG/DL   11 UNITS
     201- 250 MG/DL   13 UNITS
     251- 300 MG/DL   15 UNITS
     301- 350 MG/DL   17 UNITS
     351 - 400MG/DL   19 UNITS
     >400 MG/DL       20 UNITS AND CALL YOUR DR.
Insulin Detemir (Levemir) 100 UNIT/ML VIAL   20 UNITS SC QAM DM  (Reported)
Levothyroxine Sodium 100 MCG TABLET   1.5 TAB PO DAILY HYPOTHYROID  (Reported)
Nitroglycerin (Nitrostat) 0.4 MG TAB.SUBL   1 TAB SL AD PRN CHEST PAIN  (
Reported)
     1st sign of attack; may repeat every 5 minutes until relief; if pain 
persists after 3 tablets in 15 minutes, prompt medical att
Omeprazole 40 MG CAPSULE.DR   1 CAP PO DAILY GI  (Reported)
Prednisone 10 MG TABLET   1.5 TAB PO DAILY STEROID  (Reported)
Ranolazine (Ranexa) 500 MG TAB.ER.12H   1 TAB PO BID heart  (Reported)
Sertraline HCl 50 MG TABLET   1 TAB PO DAILY MENTAL HEALTH  (Reported)
Tamsulosin HCl (Flomax) 0.4 MG CAP.ER.24H   1 CAP PO QHS prostate
     Please take at bed time to avoid low blood pressure during the day.
Tiotropium Bromide (Spiriva) 18 MCG CAP.W.DEV   1 CAP INH DAILY COPD  (Reported)
 
Current Medications:
 Current Medications
 
 
  Sig/Ly Start time  Last
 
Medication Dose Route Stop Time Status Admin
 
Acetaminophen 650 MG Q6P PRN 5 AC 
 
  PO   
 
Albuterol Sulfate 2 PUF Q4P PRN  1700 AC 
 
  INH   0819
 
Albuterol Sulfate 3 ML EVERY 4 HRS/AWAKE  2000 AC 
 
  INH   0832
 
Aspirin 81 MG DAILY  09 AC 
 
  PO   0813
 
Atorvastatin Calcium 40 MG DAILY  09 AC 
 
  PO   0813
 
Azithromycin 250 MG DAILY@2200  2200 DC 
 
  PO   2222
 
Benzonatate 100 MG TID PRN  2315 AC 
 
  PO   1450
 
Budesonide/ 2 PUF BID  2100 AC 
 
Formoterol Fumarate  INH   08
 
Carvedilol 6.25 MG BID  2100 AC 
 
  PO   0813
 
Clopidogrel Bisulfate 75 MG DAILY  09 AC 
 
  PO   0818
 
Furosemide 40 MG DAILY  0902 AC 
 
  IV   0812
 
Guaifenesin 600 MG Q12  1037 AC 
 
  PO   0813
 
Heparin Sodium  5,000 UNIT Q8  2200 DC 
 
(Porcine)  SC   0603
 
Insulin Aspart 0 TIDAC/HS  2100 AC 
 
  SC   0811
 
Insulin Detemir 25 UNITS QAM  0900 AC 
 
  SC   0810
 
Levothyroxine Sodium 0.15 MG DAILY AC  0700 AC 
 
  PO   0536
 
Nitroglycerin 0.5 GM Q6P PRN  1300 AC 
 
  TOP   
 
Omeprazole 40 MG DAILY AC  0700 AC 
 
  PO   0537
 
Oxycodone/ 1 TAB Q6P PRN  2045 DC 
 
Acetaminophen  PO   
 
Patient Medication  1 ED ONE ONE  0930 DC 
 
Teaching  ED  0931  
 
Prednisone 40 MG DAILY  09 AC 
 
  PO   0813
 
Ramelteon 8 MG AT BEDTIME AS NEED..  2215 AC 
 
  PO   2133
 
Ranolazine 500 MG BID  2100 AC 
 
  PO   0814
 
Sertraline HCl 50 MG DAILY  0900 AC 
 
  PO   0814
 
Tamsulosin HCl 0.4 MG AT BEDTIME  2100 AC 
 
  PO   2224
 
Tiotropium [place] 1 PUF DAILY  09 AC 
 
  INH   0812
 
 
 
 
Review of Systems
Review of Systems:
Negative except as noted above. 
 
Past History
 
Travel History
Traveled to [place] past 21 day No
 
Medical History
Blood Transfusion Hx: No
Neurological: NONE
EENT: NONE
Cardiovascular: CARDIAC STENTS CABG CAROTID ARTERY BYPASS DEFIBRILLATOR
Respiratory: COPD
Gastrointestinal: NONE
Hepatic: NONE
Renal: NONE
Musculoskeletal: NONE
Psychiatric: NONE
Endocrine: hypothyroidism
Blood Disorders: DVT
Cancer(s): lung cancer
GYN/Reproductive: NONE
 
Surgical History
Surgical History: CABG, CAROTID ARTECTOMY nerve thermoablation to reduce back 
pain 
 
Family History
Relations & Conditions If Any:
Relation not specified for:
  *No pertinent family history
 
 
Psychosocial History
Who Do You Live With? spouse
Services at Home: None
Primary Language: English
Smoking Status: Former Smoker
ETOH Use: denies use
Illicit Drug Use: denies illicit drug use
Living Will? no
 
Functional Ability
ADLs
Independent: dressing, eating, toileting, bathing. 
Ambulation: independent
 
Employment History
Employment: Retired
 
Exam & Diagnostic Data
Vital Signs and I&O
Vital Signs
 
 
Date Time Temp Pulse Resp B/P B/P Pulse O2 O2 Flow FiO2
 
     Mean Ox Delivery Rate 
 
 0910      93 Room Air  
 
 0813    118/64     
 
 0800      92 Room Air  
 
 0635 98.7 72 18 124/72  92 Room Air  
 
 0000      96 Nasal 2.0L 
 
       Cannula  
 
4  78  119/73     
 
 2224  78  119/73     
 
 2223  78  119/73     
 
 2133 97.9 78 19 119/73  96 Nasal 2.0L 
 
       Cannula  
 
 1630      88 Room Air  
 
 1437 97.9 81 20 128/66  95 Nasal 2.0L 
 
       Cannula  
 
 
 Intake & Output
 
 
  1600  0400  1600  0400  1600  0400
 
Intake Total  250 800 750
 
Output Total  500 1900 600
 
Balance -200 -250 30 -250 -1100 150
 
       
 
Intake, IV   0 10  
 
Intake, Oral  240 800 750
 
Number   1 0 1 0
 
Bowel      
 
Movements      
 
Output, Urine  500 1900 600
 
Patient 248 lb  245 lb  244 lb 
 
Weight      
 
Weight   Bed scale  Bed scale 
 
Measurement      
 
Method      
 
 
 
Physical Exam:
NAD VS as above.  Eyes: anicteric, PERRLA  Neck: no mass or thyromegally  Nodes:
negative cervical/inguinla  Skin: no rash or induration.   CV: no rub or murmur 
Lungs: decreased breath sound left base, dull to pecussion   Abd: non-tender, no
organomegaly, BS positive  Exts: 2+ pretibial edema and 1+ sacral edema, absent 
pedal pulses.  Neuro: A&O, CN intact, no asterixis.  
 
Results
Pertinent Lab Results:
 Laboratory Tests
 
 
 
 
 0716 0658
 
Chemistry  
 
  Sodium (137 - 145 mmol/L) 134  L 136  L
 
  Potassium (3.5 - 5.1 mmol/L) 4.1 3.8
 
  Chloride (98 - 107 mmol/L) 97  L 97  L
 
  Carbon Dioxide (22 - 30 mmol/L) 31  H 33  H
 
  Anion Gap (5 - 16) 5 5
 
  BUN (9 - 20 mg/dL) 37  H 47  H
 
  Creatinine (0.7 - 1.2 mg/dL) 1.5  H 1.4  H
 
  Estimated GFR (>60 ml/min) 46  L 49  L
 
  BUN/Creatinine Ratio (7 - 25 %) 24.7 33.6  H
 
Hematology  
 
  CBC w Diff Pending MAN DIFF ORDERED
 
  WBC (4.8 - 10.8 /CUMM) Pending 9.1
 
  RBC (4.70 - 6.10 /CUMM) Pending 3.55  L
 
  Hgb (14.0 - 18.0 G/DL) Pending 12.1  L
 
  Hct (42 - 52 %) Pending 36.3  L
 
  MCV (80.0 - 94.0 FL) Pending 102.3  H
 
  MCH (27.0 - 31.0 PG) Pending 34.0  H
 
  MCHC (33.0 - 37.0 G/DL) Pending 33.3
 
  RDW (11.5 - 14.5 %) Pending 16.8  H
 
  Plt Count (130 - 400 /CUMM) Pending 123  L
 
  MPV (7.4 - 10.4 FL) Pending 8.8
 
  Gran % (42.2 - 75.2 %) Pending 84.3  H
 
  Lymphocytes % (20.5 - 51.1 %) Pending 6.7  L
 
  Monocytes % (1.7 - 9.3 %) Pending 8.4
 
  Eosinophils % (0 - 5 %) Pending 0.6
 
  Basophils % (0.0 - 2.0 %) Pending 0
 
  Absolute Granulocytes (1.4 - 6.5 /CUMM) Pending 7.7  H
 
  Absolute Lymphocytes (1.2 - 3.4 /CUMM) Pending 0.6  L
 
  Absolute Monocytes (0.10 - 0.60 /CUMM) Pending 0.8  H
 
  Absolute Eosinophils (0.0 - 0.7 /CUMM) Pending 0.1
 
  Absolute Basophils (0.0 - 0.2 /CUMM) Pending 0
 
  Platelet Estimate (ADEQUATE)  DECREASED
 
  Polychromasia  1+
 
  Anisocytosis  1+
 
  Macrocytic Cells  1+
 
 
 
Imaging/Other Studies:
CXR as noted above.  
 
Assessment/Plan
 
Assessment/Recommendations
Assessment:
CKD stage 3 as noted above. Never has had high grade proteinuria but no UA this 
admisison.  Suspect worsening cardiac function either from LV or AS.  Still 
volume overloaded on exam. 
Recommendations:
Continue Lasix and diuresis.  Getting a trial of dobutamine today with repeat 
echo to see if AS is largely functional from low flow.  May need to tolerate 
some worsening pre-renal azotemia to keep edema/BRYANT under control.

## 2018-08-27 NOTE — CONS- PULMONARY
General Information and HPI
 
Consulting Request
Date of Consult: 08/27/18
Requested By:
House staff
Reason for Consult:
Duong of breath
History of Present Illness:
Patient is 73-year-old with severe COPD severe ischemic cardiomyopathy admitted 
with increasing shortness of breath associated with significant lower extremity 
edema and orthopnea.  He has history of prior lung cancer and CT scan of the 
chest shows no evidence of recurrence.  His BNP is markedly elevated.  He has 
been diuresing with stable creatinine.  He remains comfortable on room air but 
has baseline shortness of breath.  Echocardiogram continues to show marked 
cardiomyopathy and aortic stenosis and regurgitation
 
Allergies/Medications
Allergies:
Coded Allergies:
NO KNOWN ALLERGIES (NONE 02/20/18)
 
Home Med List:
Acetaminophen 325 MG CAPSULE   1 CAP PO PRN PAIN  (Reported)
Albuterol Sulfate (Proair Hfa) 90 MCG HFA.AER.AD   2 PUF INH Q4 COPD
Aspirin (Aspirin*) 81 MG TAB.CHEW   1 TAB PO DAILY heart  (Reported)
Atorvastatin Calcium (Lipitor) 40 MG TABLET   1 TAB PO DAILY cholesterol  (
Reported)
Budesonide/Formoterol Fumarate (Symbicort 160-4.5 Mcg Inhaler) 160 MCG-4.5 MCG/
ACTUATION HFA.AER.AD   2 PUF INH BID COPD  (Reported)
Carvedilol 6.25 MG TABLET   1 TAB PO BID HEART/BP  (Reported)
Clopidogrel Bisulfate (Clopidogrel) 75 MG TABLET   1 TAB PO DAILY BLOOD THINNER 
(Reported)
Empagliflozin (Jardiance) 10 MG TABLET   1 TAB PO DAILY DM  (Reported)
Furosemide (Lasix) 20 MG TABLET   1 TAB PO DAILY CHF  (Reported)
Insulin Aspart, Recombinant (Novolog Flexpen) 100 UNIT/ML INSULN.PEN   0 SC SEE 
ADMIN CRITERIA DM
     PLEASE check your blood sugar 3 times daily before meals and at
     bedtime and .
     FOLLOW
     80 - 150 MG/DL   9 UNITS
     151- 200 MG/DL   11 UNITS
     201- 250 MG/DL   13 UNITS
     251- 300 MG/DL   15 UNITS
     301- 350 MG/DL   17 UNITS
     351 - 400MG/DL   19 UNITS
     >400 MG/DL       20 UNITS AND CALL YOUR DR.
Insulin Detemir (Levemir) 100 UNIT/ML VIAL   20 UNITS SC QAM DM  (Reported)
Levothyroxine Sodium 100 MCG TABLET   1.5 TAB PO DAILY HYPOTHYROID  (Reported)
Nitroglycerin (Nitrostat) 0.4 MG TAB.SUBL   1 TAB SL AD PRN CHEST PAIN  (
Reported)
     1st sign of attack; may repeat every 5 minutes until relief; if pain 
persists after 3 tablets in 15 minutes, prompt medical att
Omeprazole 40 MG CAPSULE.DR   1 CAP PO DAILY GI  (Reported)
Prednisone 10 MG TABLET   1.5 TAB PO DAILY STEROID  (Reported)
Ranolazine (Ranexa) 500 MG TAB.ER.12H   1 TAB PO BID heart  (Reported)
Sertraline HCl 50 MG TABLET   1 TAB PO DAILY MENTAL HEALTH  (Reported)
Tamsulosin HCl (Flomax) 0.4 MG CAP.ER.24H   1 CAP PO QHS prostate
     Please take at bed time to avoid low blood pressure during the day.
Tiotropium Bromide (Spiriva) 18 MCG CAP.W.DEV   1 CAP INH DAILY COPD  (Reported)
 
 
Review of Systems
 
Review of Systems
Constitutional:
Denies: chills, fever. 
Cardiovascular:
Reports: edema, peripheral edema.  Denies: chest pain. 
Respiratory:
Reports: cough, short of breath, wheezing. 
GI:
Denies: abdominal pain, diarrhea, melena. 
 
Past History
 
Travel History
Traveled to [place] past 21 day No
 
Medical History
Blood Transfusion Hx: No
Neurological: NONE
EENT: NONE
Cardiovascular: CARDIAC STENTS CABG CAROTID ARTERY BYPASS DEFIBRILLATOR
Respiratory: COPD
Gastrointestinal: NONE
Hepatic: NONE
Renal: NONE
Musculoskeletal: NONE
Psychiatric: NONE
Endocrine: hypothyroidism
Blood Disorders: DVT
Cancer(s): lung cancer
GYN/Reproductive: NONE
 
Surgical History
Surgical History: CABG, CAROTID ARTECTOMY nerve thermoablation to reduce back 
pain 
 
Family History
Relations & Conditions If Any:
Relation not specified for:
  *No pertinent family history
 
 
Psychosocial History
Who Do You Live With? spouse
Services at Home: None
Primary Language: English
Smoking Status: Former Smoker
ETOH Use: denies use
Illicit Drug Use: denies illicit drug use
Living Will? no
 
Functional Ability
ADLs
Independent: dressing, eating, toileting, bathing. 
Ambulation: independent
 
Employment History
Employment: Retired
 
Exam & Diagnostic Data
Last 24 Hrs of Vital Signs/I&O
 Vital Signs
 
 
Date Time Temp Pulse Resp B/P B/P Pulse O2 O2 Flow FiO2
 
     Mean Ox Delivery Rate 
 
08/27 1113       Room Air  
 
08/27 1104 97.5 85 20 122/68  90 Room Air  
 
08/27 0910      93 Room Air  
 
08/27 0813    118/64     
 
08/27 0800      92 Room Air  
 
08/27 0635 98.7 72 18 124/72  92 Room Air  
 
08/27 0000      96 Nasal 2.0L 
 
       Cannula  
 
08/26 2224  78  119/73     
 
08/26 2224  78  119/73     
 
08/26 2223  78  119/73     
 
08/26 2133 97.9 78 19 119/73  96 Nasal 2.0L 
 
       Cannula  
 
08/26 1630      88 Room Air  
 
08/26 1437 97.9 81 20 128/66  95 Nasal 2.0L 
 
       Cannula  
 
 
 Intake & Output
 
 
 08/27 1600 08/27 0800 08/27 0000
 
Intake Total  600 300
 
Output Total 800 200 350
 
Balance -800 400 -50
 
    
 
Intake, Oral  600 300
 
Output, Urine 800 200 350
 
Patient  248 lb 
 
Weight   
 
 
Room air oxygen saturation 90-93% and his chest shows scattered expiratory 
wheezing cardiac exam shows a regular S1 and S2 soft systolic ejection murmur 
abdomen is soft nontender obese extremities have 2-3+ symmetrical lower 
extremity pitting edema
Last 48 Hrs of Labs/[place]:
 Laboratory Tests
 
08/27/18 0716:
Anion Gap 5, Estimated GFR 46  L, BUN/Creatinine Ratio 24.7, CBC w Diff NO MAN 
DIFF REQ, RBC 3.33  L, .9  H, MCH 34.7  H, MCHC 34.1, RDW 16.7  H, MPV 
8.6, Gran % 87.7  H, Lymphocytes % 5.8  L, Monocytes % 6.2, Eosinophils % 0.3, 
Basophils % 0, Absolute Granulocytes 9.6  H, Absolute Lymphocytes 0.6  L, 
Absolute Monocytes 0.7  H, Absolute Eosinophils 0, Absolute Basophils 0
 
 
Assessment/Plan
Impression/Plan:
73-year-old gentleman with severe cardiomyopathy and aortic stenosis COPD 
admitted with shortness of breath.  BNP is markedly elevated with normal right 
ventricular estimated systolic pressures.  The sputum is clear without purulence
or hemoptysis.  Shortness of breath is likely multifactorial related to volume 
overload in the setting of severe COPD.
Recommendations:
Continue negative fluid balance with stable creatinine.  Continue prednisone 40 
mg a day and bronchodilator regimen.  Assess oxygen saturation with ambulation. 
Await decision regarding further evaluation of his aortic stenosis.
 
 
Consult Acknowledgment
- Thank you for your consult request.

## 2018-08-27 NOTE — TRANSFER OF CARE SUMMARY
Hospital Course
 
Course
Hospital Course:
73 year old male with extensive PMH s/p CABG (1989), CAD with stents (1990s), h/
o DVT (5 yrs ago), IDDM (dx 3months ago), COPD, Small Cell lung CA (last dose of
radiation 3 months ago), ischemic cardiomyopathy with EF 20% s/p defibrillator 
placement (Jan 2018), s/p carotid endarterectomy right side (1998), hypothryroid
, HTN, BPH, JEREMY on CPAP at [facility] was admitted to the [facility] service for the 
evaluation and treatment of shortness of breath. His symptoms apparently 
developed suddenly. He endorses orthopnea and PND. In the setting of acutely 
elevated Pro BNP, volume overload was a concern. An ECHO was done with the 
following results:  
 
 
 CONCLUSIONS 
 Severe global hypokinesis of the LV, with LVEF estimated at 25%. 
 Pacer lead is visible in the RV. 
 Moderately enlarged LA. 
 Mild-moderate central mitral regurgitation. 
 Sclerotic aortic leaflets, with probably moderate aortic stenosis (  
 discordant MICHELLE and mean gradient), and mild aortic regurgitation. 
 Mild tricuspid regurgitation with RVSP estimated at 35 mmHg. 
 Trace pulmonic regurgitation.
 
He is currently being treated for the following conditions: 
 
 
Problem List:
1. Acute COPD exacerbation vs CHF exacerbation
2. CKD stage 3
3. Elevated ALT
4. Thrombocytopenia
5. h/o IDDM
 
 
 
1.Acute COPD exacerbation with CHF exacerbation ( Chronic Systolic Heart Failure
with EF 20-25%)
-The patient had bilateral wheeze on admission and got short of breath walking 
as little as 25 ft
-He also complains of orthopnea and has to sleep propped up. He uses 1 pillow at
baseline at home.
-He came to the hospital on Lasix 20mg PO which was increased to 80mg iv BID, 
was reduced to 40mg BID in the setting of worsening kidney function
-He was started on Prednisone 40mg and contiues to be on the same dose
-He was given a 5 day course of Azithromycin
-Duonebs as needed
-He has been saturating well on room air. However, he did need Oxygen yestreday 
when his saturations dropped to 88%.
-He had an episode of 7/10 substernal chest pain along with sinus tachycardia to
130s on 08/24. He was given Morphine 2mg along with one dose of Nitroglycerin.
-Cardiology was consulted and we were recommended to transfer the patient to 
Telemetry for Dobutamine followed by a repeat ECHO and a subsequent decision to 
proceed with a possible TAVR in the setitng of his severe aortic stenosis
 
2.CKD stage 3
-We continued to monitor him. Lasix was switched from BID to once daily in the 
setting of worsening creatinine.
-Creatinine stable at  baseline of 1.4 today. 
 
3.Elevated ALT-may be medication related
-Medication related vs Congestive hepatopathy secondary to heart failure
 
4.Thrombocytopenia-may be 2/2 viral infection vs bacterial vs medication induced
vs nutritional deficiency (albumin 3.3)
-Was being monitored
-The patient is not on chemical prophylaxis at this time. DVT prophylaxis with 
ALPS and ambulation only
 
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 
patient with Small cell lung cancer
-Sliding scale insulin
-levemir home dose
-finger sticks
 
DVT prophylaxis: ALPS/ambulation
Code Status: DNR/DNI 
Significant Procedures:
ECHO
 
 CONCLUSIONS 
 Severe global hypokinesis of the LV, with LVEF estimated at 25%. 
 Pacer lead is visible in the RV. 
 Moderately enlarged LA. 
 Mild-moderate central mitral regurgitation. 
 Sclerotic aortic leaflets, with probably moderate aortic stenosis (  
 discordant MICHELLE and mean gradient), and mild aortic regurgitation. 
 Mild tricuspid regurgitation with RVSP estimated at 35 mmHg. 
 Trace pulmonic regurgitation.
*   
Assessment/Plan:
 
 
 
 
1.Acute COPD exacerbation with CHF exacerbation ( Chronic Systolic Heart Failure
with EF 20-25%)
-He contiues to have bilateral scattered wheeze which is unchanged since his 
admission
-He is on Prednisone 40mg. Please call  for Pulmonary consult
-He was given a 5 day course of Azithromycin
-Duonebs as needed
-He has been saturating well on room air. However, he did need Oxygen yestreday 
when his saturations dropped to 88%.
-He reports orthopnea and has not been able to sleep flat. He uses only one 
pillow at baseline
-Cardiology was consulted and we were recommended to transfer the patient to 
Telemetry for Dobutamine followed by a repeat ECHO and a subsequent decision to 
proceed with a possible TAVR in the setitng of his severe aortic stenosis
 
2.CKD stage 3
-We continued to monitor him. Lasix was switched from BID to once daily in the 
setting of worsening creatinine.
-Creatinine stable at  baseline of 1.4 today.
-The patient follows  regularly. He is due to be consulted for his 
recommendations
 
3.Elevated ALT-may be medication related
-Medication related vs Congestive hepatopathy secondary to heart failure
-We continue to monitor
 
4.Thrombocytopenia-may be 2/2 viral infection vs bacterial vs medication induced
vs nutritional deficiency (albumin 3.3)
-Was being monitored
-The patient is not on chemical prophylaxis at this time. DVT prophylaxis with 
ALPS and ambulation only
 
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 
patient with Small cell lung cancer
-Sliding scale insulin
-levemir home dose
-finger sticks

## 2018-08-27 NOTE — PN- CARDIOLOGY
Subjective
Subjective:
 
Patient still reports feeling short of breath with a component of orthopnea and 
feels his lower extremity edema has not improved.  Minimal cough without much 
sputum.
 
Objective
Vital Signs and I&Os
Vital Signs
 
 
Date Time Temp Pulse Resp B/P B/P Pulse O2 O2 Flow FiO2
 
     Mean Ox Delivery Rate 
 
08/27 1113       Room Air  
 
08/27 1104 97.5 85 20 122/68  90 Room Air  
 
08/27 0910      93 Room Air  
 
08/27 0813    118/64     
 
08/27 0800      92 Room Air  
 
08/27 0635 98.7 72 18 124/72  92 Room Air  
 
08/27 0000      96 Nasal 2.0L 
 
       Cannula  
 
08/26 2224  78  119/73     
 
08/26 2224  78  119/73     
 
08/26 2223  78  119/73     
 
08/26 2133 97.9 78 19 119/73  96 Nasal 2.0L 
 
       Cannula  
 
08/26 1630      88 Room Air  
 
08/26 1437 97.9 81 20 128/66  95 Nasal 2.0L 
 
       Cannula  
 
 
 Intake & Output
 
 
 08/27 1600 08/27 0800 08/27 0000 08/26 1600 08/26 0800 08/26 0000
 
Intake Total  600 300 600 480 250
 
Output Total 800 200  
 
Balance -800 400 -50 100 -570 250
 
       
 
Intake, IV     0 10
 
Intake, Oral  600 300 600 480 240
 
Number    1 0 0
 
Bowel      
 
Movements      
 
Output, Urine 800 200  
 
Patient  248 lb   245 lb 
 
Weight      
 
Weight     Bed scale 
 
Measurement      
 
Method      
 
 
 
Physical Exam:
 
General: no apparent distress. Alert.
Eyes: No obvious scleral icterus.
HEENT: No jugular venous distention or abnormal jugular venous pulsations.
Cardiovascular: Normal intensity S1/S2. Regular. ICD noted, 2 out of 6 systolic 
murmur
Respiratory: Increased air entry at the bases
Abdomen: Soft, nontender with no guarding or rebound tenderness.
Musculoskeletal: No clubbing or cyanosis noted; 2+ lower extremity edema
Skin: warm
Neurologic: No gross focal deficits noted.
Current Medications:
 Current Medications
 
 
  Sig/Ly Start time  Last
 
Medication Dose Route Stop Time Status Admin
 
Acetaminophen 650 MG Q6P PRN 08/19 2045 AC 
 
  PO   
 
Albuterol Sulfate 2 PUF Q4P PRN 08/21 1700 AC 08/25
 
  INH   0819
 
Albuterol Sulfate 3 ML EVERY 4 HRS/AWAKE 08/20 2000 AC 08/27
 
  INH   0832
 
Aspirin 81 MG DAILY 08/20 0900 AC 08/27
 
  PO   0813
 
Atorvastatin Calcium 40 MG DAILY 08/20 0900 AC 08/27
 
  PO   0813
 
Azithromycin 250 MG DAILY@2200 08/19 2200 DC 08/26
 
  PO   2222
 
Benzonatate 100 MG TID PRN 08/19 2315 AC 08/26
 
  PO   1450
 
Budesonide/ 2 PUF BID 08/19 2100 AC 08/27
 
Formoterol Fumarate  INH   0812
 
Carvedilol 6.25 MG BID 08/19 2100 AC 08/27
 
  PO   0813
 
Clopidogrel Bisulfate 75 MG DAILY 08/20 0900 AC 08/27
 
  PO   0818
 
Furosemide 40 MG DAILY 08/24 0902 AC 08/27
 
  IV   0812
 
Guaifenesin 600 MG Q12 08/24 1037 AC 08/27
 
  PO   0813
 
Heparin Sodium  5,000 UNIT Q8 08/19 2200 DC 08/26
 
(Porcine)  SC   0603
 
Insulin Aspart 0 TIDAC/HS 08/21 2100 AC 08/27
 
  SC   0811
 
Insulin Detemir 25 UNITS QAM 08/22 0900 AC 08/27
 
  SC   0810
 
Levothyroxine Sodium 0.15 MG DAILY AC 08/20 0700 AC 08/27
 
  PO   0536
 
Nitroglycerin 0.5 GM Q6P PRN 08/23 1300 AC 
 
  TOP   
 
Omeprazole 40 MG DAILY AC 08/20 0700 AC 08/27
 
  PO   0537
 
Oxycodone/ 1 TAB Q6P PRN 08/19 2045 DC 
 
Acetaminophen  PO   
 
Patient Medication  1 ED ONE ONE 08/27 0930 DC 
 
Teaching  ED 08/27 0931  
 
Prednisone 40 MG DAILY 08/25 0900 AC 08/27
 
  PO   0813
 
Ramelteon 8 MG AT BEDTIME AS NEED.. 08/22 2215 AC 08/24
 
  PO   2133
 
Ranolazine 500 MG BID 08/19 2100 AC 08/27
 
  PO   0814
 
Sertraline HCl 50 MG DAILY 08/20 0900 AC 08/27
 
  PO   0814
 
Tamsulosin HCl 0.4 MG AT BEDTIME 08/19 2100 AC 08/26
 
  PO   2224
 
Tiotropium [place] 1 PUF DAILY 08/20 0900 AC 08/27
 
  INH   0812
 
 
 
 
Results
Last 48 Hrs of Labs/Mics:
 Laboratory Tests
 
08/27/18 0716:
Anion Gap 5, Estimated GFR 46  L, BUN/Creatinine Ratio 24.7, CBC w Diff NO MAN 
DIFF REQ, RBC 3.33  L, .9  H, MCH 34.7  H, MCHC 34.1, RDW 16.7  H, MPV 
8.6, Gran % 87.7  H, Lymphocytes % 5.8  L, Monocytes % 6.2, Eosinophils % 0.3, 
Basophils % 0, Absolute Granulocytes 9.6  H, Absolute Lymphocytes 0.6  L, 
Absolute Monocytes 0.7  H, Absolute Eosinophils 0, Absolute Basophils 0
 
Recent Imaging Studies:
 
echo
Severe global hypokinesis of the LV, with LVEF estimated at 25%. 
 Pacer lead is visible in the RV. 
 Moderately enlarged LA. 
 Mild-moderate central mitral regurgitation. 
 Sclerotic aortic leaflets, with probably moderate aortic stenosis (  
 discordant MICHELLE and mean gradient), and mild aortic regurgitation. 
 Mild tricuspid regurgitation with RVSP estimated at 35 mmHg. 
 Trace pulmonic regurgitation. 
 
 Mily Diego M.D. 
 (Electronically Signed) 
 Final Date:      26 August 2018  
                  15:41 
 
Assessment/Plan
Assessment/Plan
 
1.  Shortness of breath likely multifactorial
2.  Ischemic cardiomyopathy with prior CABG/PCI
3.  AICD in situ
4.  Abdominal aortic aneurysm status post prior EVAR
5.  Aortic stenosis
6.  Small cell lung CA
7.  Hypothyroidism
8.  Not on ACE inhibitor per reoprt due to CKD with hyperkalemia
9.  Acute on chronic systolic congestive heart failure
10. COPD/JEREMY
11.  Diabetes
 
 
The patient continues to feel short of breath and still has significant lower 
extremity edema.  His diuresis was previously limited by increasing creatinine 
and echocardiogram confirms continued low ejection fraction with possible aortic
stenosis; cannot exclude a component of cardiorenal syndrome and recommend 
initiating dobutamine infusion while continuing to attempt further diuresis; 
would try and get the dobutamine infusion up to 10 mcg/kg/minute and will 
eventually need to obtain a repeat limited echocardiogram while on dobutamine to
help exclude the possibility of pseudo-aortic stenosis. Nephrology input 
appreciated. There is an order for ICD interrogation in [employer]; please make 
sure this interrogation is performed today.  Plan was discussed with the patient
and the medical team earlier.  The patient is being transferred to telemetry. 
Continue strict I's and O's, daily weights, and daily metabolic panels.
 
 
James Mc MD [facility]
Continue telemetry? Yes

## 2018-08-27 NOTE — EVENT NOTE
Event Note
Event Note:
 Patient had severe SOB and significant LE edema requiring dobutamine infusion 
along with diuresis. he has an ICD  that required to be interrogated 
 
I received a call from [email or web address], Jany Lamberto, at 2.00pm - reported that the 
device was interrogated and has been functioning appropriately.  There have been
a few Non sustained events, that do not require any treatment. also a couple of 
? A fibrillation although it had P waves in it. The results will be updated on 
their website

## 2018-08-27 NOTE — PN- HOUSESTAFF
**See Addendum**
Subjective
Follow-up For:
CHF exacerbation
COPD exacerbation
Subjective:
The patient was seen and examined at bedside. He does not report any improvement
in his symptoms. He was updated on his ECHO results and made aware about the 
plan of care regarding transferring him down to [facility] floor for further 
management of his heart failure and further intervention. 
 
Review of Systems
Constitutional:
Denies: chills, diaphoresis, fever, malaise, weakness. 
 
Objective
Last 24 Hrs of Vital Signs/I&O
 Vital Signs
 
 
Date Time Temp Pulse Resp B/P B/P Pulse O2 O2 Flow FiO2
 
     Mean Ox Delivery Rate 
 
 1104 97.5 85 20 122/68  90 Room Air  
 
 0910      93 Room Air  
 
 0813    118/64     
 
 0800      92 Room Air  
 
 0635 98.7 72 18 124/72  92 Room Air  
 
 0000      96 Nasal 2.0L 
 
       Cannula  
 
 2224  78  119/73     
 
 2224  78  119/73     
 
 2223  78  119/73     
 
 2133 97.9 78 19 119/73  96 Nasal 2.0L 
 
       Cannula  
 
 1630      88 Room Air  
 
 1437 97.9 81 20 128/66  95 Nasal 2.0L 
 
       Cannula  
 
 
 Intake & Output
 
 
  1600  0800  0000
 
Intake Total  600 300
 
Output Total 800 200 350
 
Balance -800 400 -50
 
    
 
Intake, Oral  600 300
 
Output, Urine 800 200 350
 
Patient  248 lb 
 
Weight   
 
 
 
 
Physical Exam
General Appearance: Alert, Oriented X3, Cooperative, No Acute Distress
Neck: Supple
Cardiovascular: Normal S1, Normal S2
Lungs: bilateral scattered wheeze
Abdomen: Normal Bowel Sounds, Soft, No Tenderness
Vascular: Pulses Symmetrical
 
Assessment/Plan
Assessment:
1.Acute COPD exacerbation with CHF exacerbation ( Chronic Systolic Heart Failure
with EF 20-25%)
-He contiues to have bilateral scattered wheeze which is unchanged since his 
admission
-He is on Prednisone 40mg. Please call  for Pulmonary consult
-He was given a 5 day course of Azithromycin
-Duonebs as needed
-He has been saturating well on room air. However, he did need Oxygen yestreday 
when his saturations dropped to 88%.
-He reports orthopnea and has not been able to sleep flat. He uses only one 
pillow at baseline
-Cardiology was consulted and we were recommended to transfer the patient to 
Telemetry for Dobutamine followed by a repeat ECHO and a subsequent decision to 
proceed with a possible TAVR in the setitng of his severe aortic stenosis
 
2.CKD stage 3
-We continued to monitor him. Lasix was switched from BID to once daily in the 
setting of worsening creatinine.
-Creatinine stable at  baseline of 1.4 today.
-The patient follows  regularly. He is due to be consulted for his 
recommendations
 
3.Elevated ALT-may be medication related
-Medication related vs Congestive hepatopathy secondary to heart failure
-We continue to monitor
 
4.Thrombocytopenia-may be 2/2 viral infection vs bacterial vs medication induced
vs nutritional deficiency (albumin 3.3)
-Was being monitored
-The patient is not on chemical prophylaxis at this time. DVT prophylaxis with 
ALPS and ambulation only
 
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 
patient with Small cell lung cancer
-Sliding scale insulin
-levemir home dose
-finger sticks
 
The patient is transferred to the [facility] floor for further management of his
heart failure.
Problem List:
 1. CKD stage 3 secondary to diabetes
 
 2. Small cell lung cancer in adult
 
 3. IDDM (insulin dependent diabetes mellitus)
 
 4. Thrombocytopenia
 
 5. CHF exacerbation
 
 6. COPD exacerbation
 
 7. Exertional dyspnea
 
 8. Pedal edema
 
Pain Ratin
Pain Location:
none
Pain Goal: Remain pain free
Pain Plan:
none
Tomorrow's Labs & Rationales:
cbc and bep

## 2018-08-28 VITALS — SYSTOLIC BLOOD PRESSURE: 112 MMHG | DIASTOLIC BLOOD PRESSURE: 62 MMHG

## 2018-08-28 VITALS — SYSTOLIC BLOOD PRESSURE: 128 MMHG | DIASTOLIC BLOOD PRESSURE: 78 MMHG

## 2018-08-28 VITALS — DIASTOLIC BLOOD PRESSURE: 62 MMHG | SYSTOLIC BLOOD PRESSURE: 118 MMHG

## 2018-08-28 VITALS — SYSTOLIC BLOOD PRESSURE: 138 MMHG | DIASTOLIC BLOOD PRESSURE: 84 MMHG

## 2018-08-28 VITALS — DIASTOLIC BLOOD PRESSURE: 70 MMHG | SYSTOLIC BLOOD PRESSURE: 142 MMHG

## 2018-08-28 LAB
ABSOLUTE GRANULOCYTE CT: 8.6 /CUMM (ref 1.4–6.5)
BASOPHILS # BLD: 0 /CUMM (ref 0–0.2)
BASOPHILS NFR BLD: 0 % (ref 0–2)
EOSINOPHIL # BLD: 0 /CUMM (ref 0–0.7)
EOSINOPHIL NFR BLD: 0.2 % (ref 0–5)
ERYTHROCYTE [DISTWIDTH] IN BLOOD BY AUTOMATED COUNT: 17 % (ref 11.5–14.5)
GRANULOCYTES NFR BLD: 83.6 % (ref 42.2–75.2)
HCT VFR BLD CALC: 33.7 % (ref 42–52)
LYMPHOCYTES # BLD: 0.7 /CUMM (ref 1.2–3.4)
MCH RBC QN AUTO: 34.5 PG (ref 27–31)
MCHC RBC AUTO-ENTMCNC: 33.7 G/DL (ref 33–37)
MCV RBC AUTO: 102.5 FL (ref 80–94)
MONOCYTES # BLD: 1 /CUMM (ref 0.1–0.6)
PLATELET # BLD: 108 /CUMM (ref 130–400)
PMV BLD AUTO: 8.4 FL (ref 7.4–10.4)
RED BLOOD CELL CT: 3.29 /CUMM (ref 4.7–6.1)
WBC # BLD AUTO: 10.3 /CUMM (ref 4.8–10.8)

## 2018-08-28 NOTE — PN- HOUSESTAFF
Subjective
Follow-up For:
Shortness of breath secondary to COPD, acute on chronic CHF
Complaints: bilateral lower leg swelling
Tele-Events Since Last Visit:
Episodes of wide-complex intermittent tachycardia; already on AICD.
Subjective:
Patient is seen and examined at the bedside.  He was saying he felt much 
improved than before.  He is still having bilateral lower leg swelling.  He 
wanted to go home.  We discussed about the plans for the discharge.
 
Review of Systems
Constitutional:
Reports: no symptoms. 
Comments:
Bilateral lower leg swelling
 
Objective
Last 24 Hrs of Vital Signs/I&O
 Vital Signs
 
 
Date Time Temp Pulse Resp B/P B/P Pulse O2 O2 Flow FiO2
 
     Mean Ox Delivery Rate 
 
 1559      92 Room Air Room Air 
 
 1448 97.9 72 18 112/62  93 Room Air  
 
 1236  75  118/62     
 
 1217 98.2 75 20 118/62  95 Room Air  
 
 0919      97 Nasal 2.0L 
 
       Cannula  
 
 0914  82  124/58     
 
 0913  82  124/58     
 
 0836 97.7 90 20 128/78  97 Room Air  
 
 0800       Room Air Room Air 
 
 0424  82  142/70     
 
 0400 97.6 82 20 142/70  97 Nasal  
 
       Cannula  
 
2127  99  122/72     
 
2127  99  122/72     
 
2127  99  122/72     
 
2127  99  122/72     
 
2011 97.6 99 18 122/72  92 Room Air  
 
 
 Intake & Output
 
 
  1600  0800  0000
 
Intake Total 990 389.6 254.8
 
Output Total 1700  450
 
Balance -710 389.6 -195.2
 
    
 
Intake,  269.6 134.8
 
Intake, Oral 700 120 120
 
Number 1  
 
Bowel   
 
Movements   
 
Output, Urine 1700  450
 
Patient   111.584 kg
 
Weight   
 
Weight   Bed scale
 
Measurement   
 
Method   
 
 
 
 
Physical Exam
General Appearance: Alert, Oriented X3, Cooperative, No Acute Distress
Cardiovascular: Normal S1, Normal S2
Lungs: bilateral decreased air entry and generalized wheezing
Abdomen: Soft
Extremities: bilatreal pitting edema
 
Assessment/Plan
Assessment:
Patient is a 73-year-old male presented with chief complaints of gradually 
progressive shortness of breath.
 
Past medical history-
* Small Cell lung CA (last dose of radiation 3 months ago),
* IDDM (dx 3months ago),
* Ischemic cardiomyopathy with EF 20% s/p defibrillator placement (2018),
* s/p carotid endarterectomy right side (),
* CABG (), CAD with stents (), 
* h/o DVT (5 yrs ago), 
* COPD, 
* Hypothyroidism
* Hypertension
* BPH
* Obstructive sleep apnea on nocturnal CPAP 
 
Vital signs-temperature 97.9, pulse 72, respiratory rate 18, blood pressure 112/
62, SPO2 93% on room air.
 
Blood workup-WBC 10.3, hemoglobin 11.4, .5, platelet count 108, sodium 
135, potassium 4.4, carbon dioxide 34, anion gap 3, BUN 36, creatinine 1.4,
 
Assessment and plan-
* Patient is currently on dobutamine drip and afterwards his aortic valve area 
has been improved from 0.6 to 2 cm.  Discussed with Dr. Bender he thinks patient
has functional aortic stenosis.  We will continue dobutamine drip and give IV 
diuretics.
* According to pulmonologist we should start dropping the prednisone doses.
* We will continue TRC/nebulization
* Will continue diuretic at the same dose
* Strict intake output charting
* Patient had episodes of nonsustained VT.  He already had AICD.  Discussed with
the patient and according to him he did not fired.  According to  [employer] it 
is working well.
* CODE STATUS-DNR/DNI
* DVT prophylaxis-ALPS/heparin
 
 
Problem List:
 1. Small cell lung cancer in adult
 
 2. CKD stage 3 secondary to diabetes
 
 3. IDDM (insulin dependent diabetes mellitus)
 
 4. Thrombocytopenia
 
 5. CHF exacerbation
 
Pain Ratin
Pain Location:
n/a
Pain Goal: Remain pain free
Pain Plan:
avoid NSAIDs
Tomorrow's Labs & Rationales:
f/u CBC, BEp, mag
DVT/Prophylaxis: mechanical, pharmacological

## 2018-08-28 NOTE — PN- PULMONARY
Subjective
HPI/Critical Care Issues:
Patient is less short of breath continues with significant lower extremity edema
 
Objective
Current Medications:
 Current Medications
 
 
  Sig/Ly Start time  Last
 
Medication Dose Route Stop Time Status Admin
 
Acetaminophen 650 MG Q6P PRN 08/19 2045 AC 
 
  PO   
 
Albuterol Sulfate 2 PUF Q4P PRN 08/21 1700 AC 08/25
 
  INH   0819
 
Albuterol Sulfate 3 ML EVERY 4 HRS/AWAKE 08/20 2000 AC 08/27
 
  INH   2020
 
Aspirin 81 MG DAILY 08/20 0900 AC 08/27
 
  PO   0813
 
Atorvastatin Calcium 40 MG DAILY 08/20 0900 AC 08/27
 
  PO   0813
 
Benzonatate 100 MG TID PRN 08/19 2315 AC 08/27
 
  PO   2127
 
Budesonide/ 2 PUF BID 08/19 2100 AC 08/27
 
Formoterol Fumarate  INH   2127
 
Carvedilol 6.25 MG BID 08/19 2100 AC 08/27
 
  PO   2127
 
Clopidogrel Bisulfate 75 MG DAILY 08/20 0900 AC 08/27
 
  PO   0818
 
Dobutamine HCl 250 MG Q4H 08/27 1300 AC 08/28
 
Dextrose/Water 250 ML IV   0424
 
Furosemide 40 MG DAILY 08/24 0902 AC 08/27
 
  IV   0812
 
Guaifenesin 600 MG Q12 08/24 1037 AC 08/27
 
  PO   2127
 
Insulin Aspart 0 TIDAC/HS 08/21 2100 AC 08/27
 
  SC   2128
 
Insulin Detemir 25 UNITS QAM 08/22 0900 AC 08/27
 
  SC   0810
 
Levothyroxine Sodium 0.15 MG DAILY AC 08/20 0700 AC 08/28
 
  PO   0521
 
Nitroglycerin 0.5 GM Q6P PRN 08/23 1300 AC 
 
  TOP   
 
Omeprazole 40 MG DAILY AC 08/20 0700 DC 08/27
 
  PO   0537
 
Pantoprazole Sodium 40 MG BID 08/27 2100 AC 08/27
 
  IV   1615
 
Patient Medication  1 ED ONE ONE 08/27 0930 DC 08/27
 
Teaching  ED 08/27 0931  1303
 
Prednisone 40 MG DAILY 08/25 0900 AC 08/27
 
  PO   0813
 
Ramelteon 8 MG AT BEDTIME AS NEED.. 08/22 2215 AC 08/27
 
  PO   2127
 
Ranolazine 500 MG BID 08/19 2100 AC 08/27
 
  PO   2127
 
Sertraline HCl 50 MG DAILY 08/20 0900 AC 08/27
 
  PO   0814
 
Tamsulosin HCl 0.4 MG AT BEDTIME 08/19 2100 AC 08/27
 
  PO   2127
 
Tiotropium [place] 1 PUF DAILY 08/20 0900 AC 08/27
 
  INH   0812
 
 
 
 
Vital Signs & I&O
Last 24 Hrs of Vitals and I&O:
 Vital Signs
 
 
Date Time Temp Pulse Resp B/P B/P Pulse O2 O2 Flow FiO2
 
     Mean Ox Delivery Rate 
 
08/28 0424  82  142/70     
 
08/27 2127  99  122/72     
 
08/27 2127  99  122/72     
 
08/27 2127  99  122/72     
 
08/27 2127  99  122/72     
 
08/27 2011 97.6 99 18 122/72  92 Room Air  
 
08/27 1616  105  150/80     
 
08/27 1615  109 20 152/80  90 Room Air  
 
08/27 1615      93 Room Air  
 
08/27 1545 97.7 96 20 145/79  93 Room Air  
 
08/27 1523  105  128/68     
 
08/27 1450  82  128/74     
 
08/27 1423 97.3 82 20 128/74  91 Room Air  
 
08/27 1113       Room Air  
 
08/27 1104 97.5 85 20 122/68  90 Room Air  
 
08/27 0910      93 Room Air  
 
08/27 0813    118/64     
 
08/27 0800      92 Room Air  
 
 
 Intake & Output
 
 
 08/28 0800 08/28 0000 08/27 1600
 
Intake Total 389.6 254.8 
 
Output Total  450 800
 
Balance 389.6 -195.2 -800
 
    
 
Intake, .6 134.8 
 
Intake, Oral 120 120 
 
Output, Urine  450 800
 
Patient  246 lb 
 
Weight   
 
Weight  Bed scale 
 
Measurement   
 
Method   
 
 
Room air oxygen saturation 92% exam of his chest shows diminished breath sounds 
there are no wheezes heard cardiac exam shows regular S1 and S2 extremities have
2-3+ symmetrical edema
 
Impression/Plan
 
Impression/Plan
Impression/Plan:
73-year-old gentleman with cardiomyopathy COPD admitted with increasing 
shortness of breath.  He continues to slowly diurese with stable renal function.
 Is now on room air
Recommendations:
Continue negative fluid balance with stable creatinine.  .  Assess oxygen 
saturation with ambulation.  Await decision regarding further evaluation of his 
aortic stenosis.  Begin prednisone taper by 10 mg a day

## 2018-08-28 NOTE — PN- CARDIOLOGY
Subjective
Subjective:
The patient is awake, alert, overall feels improved from a respiratory 
standpoint
The events of the last 24 hours as well as telemetry were reviewed.
 
Review of Systems:
The review of systems is negative for chest pains, palpitations nor 
lightheadedness.
The remainder of the 14 point review of systems is noncontributory with the 
exception of above.
 
Objective
Vital Signs and I&Os
Vital Signs
 
 
Date Time Temp Pulse Resp B/P B/P Pulse O2 O2 Flow FiO2
 
     Mean Ox Delivery Rate 
 
08/28 0919      97 Nasal 2.0L 
 
       Cannula  
 
08/28 0914  82  124/58     
 
08/28 0913  82  124/58     
 
08/28 0836 97.7 90 20 128/78  97 Room Air  
 
08/28 0424  82  142/70     
 
08/28 0400 97.6 82 20 142/70  97 Nasal  
 
       Cannula  
 
08/27 2127  99  122/72     
 
08/27 2127  99  122/72     
 
08/27 2127  99  122/72     
 
08/27 2127  99  122/72     
 
08/27 2011 97.6 99 18 122/72  92 Room Air  
 
08/27 1616  105  150/80     
 
08/27 1615  109 20 152/80  90 Room Air  
 
08/27 1615      93 Room Air  
 
08/27 1545 97.7 96 20 145/79  93 Room Air  
 
08/27 1523  105  128/68     
 
08/27 1450  82  128/74     
 
08/27 1423 97.3 82 20 128/74  91 Room Air  
 
 
 Intake & Output
 
 
 08/28 1600 08/28 0800 08/28 0000 08/27 1600 08/27 0800 08/27 0000
 
Intake Total  389.6 254.8  600 300
 
Output Total   450 800 200 350
 
Balance  389.6 -195.2 -800 400 -50
 
       
 
Intake, IV  269.6 134.8   
 
Intake, Oral  120 120  600 300
 
Output, Urine   450 800 200 350
 
Patient   246 lb  248 lb 
 
Weight      
 
Weight   Bed scale   
 
Measurement      
 
Method      
 
 
 
Physical Exam:
General: Nontoxic, no apparent distress.
HEENT: Sclera and conjunctiva within normal limits, without xanthelasmas.
Neck: Carotids 2+ without bruits.
Respiratory: Scattered rhonchi and rales, air movement is decreased at the bases
, without accessory respiratory muscle use.
Heart: Regular rate and rhythm, without murmurs, without JVD.
Abdomen: Soft, nontender, no masses, normoactive bowel sounds.
Extremities: Without clubbing, cyanosis, without edema.
Neuro: Nonfocal exam, strength, 5 out of 5
Skin: Within normal limits without lesions.
Psych: Mood and affect: Normal
Current Medications:
 Current Medications
 
 
  Sig/Ly Start time  Last
 
Medication Dose Route Stop Time Status Admin
 
Acetaminophen 650 MG Q6P PRN 08/19 2045 AC 
 
  PO   
 
Albuterol Sulfate 2 PUF Q4P PRN 08/21 1700 AC 08/25
 
  INH   0819
 
Albuterol Sulfate 3 ML EVERY 4 HRS/AWAKE 08/20 2000 AC 08/28
 
  INH   0915
 
Aspirin 81 MG DAILY 08/20 0900 AC 08/28
 
  PO   0911
 
Atorvastatin Calcium 40 MG DAILY 08/20 0900 AC 08/28
 
  PO   0913
 
Benzonatate 100 MG TID PRN 08/19 2315 AC 08/27
 
  PO   2127
 
Budesonide/ 2 PUF BID 08/19 2100 AC 08/28
 
Formoterol Fumarate  INH   0916
 
Carvedilol 6.25 MG BID 08/19 2100 AC 08/28
 
  PO   0913
 
Clopidogrel Bisulfate 75 MG DAILY 08/20 0900 AC 08/28
 
  PO   0914
 
Dobutamine HCl 250 MG Q4H 08/27 1300 AC 08/28
 
Dextrose/Water 250 ML IV   0424
 
Furosemide 40 MG DAILY 08/24 0902 AC 08/28
 
  IV   0913
 
Guaifenesin 600 MG Q12 08/24 1037 AC 08/28
 
  PO   0913
 
Insulin Aspart 0 TIDAC/HS 08/21 2100 AC 08/28
 
  SC   0858
 
Insulin Detemir 25 UNITS QAM 08/22 0900 AC 08/28
 
  SC   0909
 
Levothyroxine Sodium 0.15 MG DAILY AC 08/20 0700 AC 08/28
 
  PO   0521
 
Nitroglycerin 0.5 GM Q6P PRN 08/23 1300 AC 
 
  TOP   
 
Omeprazole 40 MG DAILY AC 08/20 0700 DC 08/27
 
  PO   0537
 
Pantoprazole Sodium 40 MG BID 08/27 2100 AC 08/28
 
  IV   0908
 
Prednisone 40 MG DAILY 08/25 0900 AC 08/28
 
  PO   0914
 
Ramelteon 8 MG AT BEDTIME AS NEED.. 08/22 2215 AC 08/27
 
  PO   2127
 
Ranolazine 500 MG BID 08/19 2100 AC 08/28
 
  PO   0914
 
Sertraline HCl 50 MG DAILY 08/20 0900 AC 08/28
 
  PO   0914
 
Tamsulosin HCl 0.4 MG AT BEDTIME 08/19 2100 AC 08/27
 
  PO   2127
 
Tiotropium [place] 1 PUF DAILY 08/20 0900 AC 08/28
 
  INH   0916
 
 
 
 
Results
Last 48 Hrs of Labs/Mics:
 Laboratory Tests
 
08/28/18 0640:
Anion Gap 3  L, Estimated GFR 49  L, BUN/Creatinine Ratio 25.7  H, CBC w Diff NO
MAN DIFF REQ, RBC 3.29  L, .5  H, MCH 34.5  H, MCHC 33.7, RDW 17.0  H, 
MPV 8.4, Gran % 83.6  H, Lymphocytes % 6.6  L, Monocytes % 9.6  H, Eosinophils %
0.2, Basophils % 0, Absolute Granulocytes 8.6  H, Absolute Lymphocytes 0.7  L, 
Absolute Monocytes 1.0  H, Absolute Eosinophils 0, Absolute Basophils 0
 
08/27/18 1720:
Troponin I 0.04
 
08/27/18 0716:
Anion Gap 5, Estimated GFR 46  L, BUN/Creatinine Ratio 24.7, CBC w Diff NO MAN 
DIFF REQ, RBC 3.33  L, .9  H, MCH 34.7  H, MCHC 34.1, RDW 16.7  H, MPV 
8.6, Gran % 87.7  H, Lymphocytes % 5.8  L, Monocytes % 6.2, Eosinophils % 0.3, 
Basophils % 0, Absolute Granulocytes 9.6  H, Absolute Lymphocytes 0.6  L, 
Absolute Monocytes 0.7  H, Absolute Eosinophils 0, Absolute Basophils 0
 
 
Assessment/Plan
Assessment/Plan
1.  Shortness of breath likely multifactorial
2.  Ischemic cardiomyopathy with prior CABG/PCI, LVEF is 25-30%
3.  AICD in situ
4.  Abdominal aortic aneurysm status post prior EVAR
5.  Aortic stenosis
6.  Small cell lung CA
7.  Hypothyroidism
8.  Not on ACE inhibitor per reoprt due to CKD with hyperkalemia
9.  Acute on chronic systolic congestive heart failure
10. COPD/JEREMY
11.  Diabetes
 
The repeat echocardiogram while on dobutamine was reviewed.  This demonstrated a
mean transvalvular gradient of 21 mmHg, with a calculated aortic valve area that
is not stenotic.  I have discussed this with the patient including the lack of 
benefit which perceiving and aortic valve replacement would have.  We will 
continue treatment with further diuresis while on dobutamine, targeting a net 
output of approximately 1 L per day.  Nephrology input is appreciated.
Continue telemetry? Yes

## 2018-08-28 NOTE — PN- ATT ADDEND
Attending Addendum
Attending Brief Note
 Laboratory Tests
 
 
 
08/28/18 0640:
Anion Gap 3  L, Estimated GFR 49  L, BUN/Creatinine Ratio 25.7  H, CBC w Diff NO
MAN DIFF REQ, RBC 3.29  L, .5  H, MCH 34.5  H, MCHC 33.7, RDW 17.0  H, 
MPV 8.4, Gran % 83.6  H, Lymphocytes % 6.6  L, Monocytes % 9.6  H, Eosinophils %
0.2, Basophils % 0, Absolute Granulocytes 8.6  H, Absolute Lymphocytes 0.7  L, 
Absolute Monocytes 1.0  H, Absolute Eosinophils 0, Absolute Basophils 0
 
08/27/18 1720:
Troponin I 0.04
Vital Signs
 
 
Date Time Temp Pulse Resp B/P B/P Pulse O2 O2 Flow FiO2
 
     Mean Ox Delivery Rate 
 
08/28 0919      97 Nasal 2.0L 
 
       Cannula  
 
08/28 0914  82  124/58     
 
08/28 0913  82  124/58     
 
08/28 0836 97.7 90 20 128/78  97 Room Air  
 
08/28 0800       Room Air Room Air 
 
08/28 0424  82  142/70     
 
08/28 0400 97.6 82 20 142/70  97 Nasal  
 
       Cannula  
 
08/27 2127  99  122/72     
 
08/27 2127  99  122/72     
 
08/27 2127  99  122/72     
 
08/27 2127  99  122/72     
 
08/27 2011 97.6 99 18 122/72  92 Room Air  
 
08/27 1616  105  150/80     
 
08/27 1615  109 20 152/80  90 Room Air  
 
08/27 1615      93 Room Air  
 
08/27 1545 97.7 96 20 145/79  93 Room Air  
 
08/27 1523  105  128/68     
 
08/27 1450  82  128/74     
 
08/27 1423 97.3 82 20 128/74  91 Room Air  
 
 
 
 
Acute CHF exacerbation with CKD with resistantce to diuresis- pt was started on 
dobutamine drip to help with diuresis. Repeat ECHO while on dobutamine drip 
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 
cont with medical management to improve his cardiac function. Cont on dobutamine
drip for now and f/u on his diuresis and see how he does. 
 
d/w pt and cardiology the care plan.

## 2018-08-28 NOTE — ECHOCARDIOGRAM REPORT
JULIO CÉSAR CRUZ 
 
 Age:    73     :    1945      Gender:     M 
 
 MRN:    [id number] 
 
 Exam Date:     2018  
                19:21 
 
 Exam Location: 1  
 [place] 
 
 Ht (in):     72      Wt (lb):      247     BSA:    2.42 
 
 BP:          122     /     68 
 
 Ordering Physician:        Lorrie Mcclure MD 
 
 Referring Physician:       Regis Mc M.D. 
 
 Technologist:              Micki Hendrix [facility] 
 
 Room Number:               179-02 
 
 Indications:       Heart failure 
 
 Rhythm:                 Other 
 
 Technical Quality:      poor 
 
 
 FINDINGS 
 Left Ventricle 
 Mild left ventricular dilatation. Left ventricular wall thickness  
 mildly increased. Moderately abnormal left ventricular ejection  
 fraction estimated at 25-30%. Akinetic inferior wall. 
 
 Right Ventricle 
 Right ventricle not well visualized, grossly normal.  
 Catheter/pacemaker wire in the right ventricular cavity. 
 
 Right Atrium 
 Mild right atrial dilatation. 
 
 Left Atrium 
 Moderate left atrial dilatation. 
 
 Mitral Valve 
 Moderate mitral annular calcification. Mild-to-moderate mitral  
 regurgitation. 
 
 Aortic Valve 
 Diffuse thickening of the aortic valve cusps with reduced excursion.  
 Mild-to-moderate aortic stenosis. 
 
 Tricuspid Valve 
 Tricuspid valve not well visualized, grossly normal. Moderate  
 tricuspid regurgitation. 
 
 Pulmonic Valve 
 Pulmonic valve not well visualized, grossly normal. 
 
 Pericardium 
 No pericardial effusion. 
 
 Great Vessels 
 Aortic root and proximal ascending aorta not well visualized. 
 
 CONCLUSIONS 
 Limited study. Moderate to severely reduced left ventricular  
 systolic function. Moderate Aortic stenosis. 
 
 Jason Bowers M.D. 
 (Electronically Signed) 
 Final Date:      2018  
                  08:00 
 
 MEASUREMENTS  (Male / Female) Normal Values 
 
 2D ECHO 
 LV Diastolic Diameter PLAX                          6.0 cm           4.2 - 5.9 
/ 3.9 - 5.3 cm 
 LV Systolic Diameter PLAX                           5.2 cm           2.1 - 4.0 
cm 
 LV Fractional Shortening PLAX                       13.3 %           25 - 46  %
 
 LV Ejection Fraction 2D Teich                       28.1 %            
 IVS Diastolic Thickness                             1.3 cm            
 LVPW Diastolic Thickness                            1.3 cm            
 LV Relative Wall Thickness                          0.4               
 LVOT Diameter                                       2.1 cm            
 LV Diastolic Length 4C                              9.4 cm           6.9 - 10.3
cm 
 LV Diastolic Area 4C                                46.2 cm          
 LV Diastolic Volume MOD 4C                          187.0 cm         
 LV Ejection Fraction MOD 4C                         38.0 %            
 LV Stroke Volume MOD 4C                             71.0 cm          
 LV Systolic Length 4C                               8.3 cm            
 LV Systolic Area 4C                                 34.4 cm          
 LV Systolic Volume MOD 4C                           116.0 cm         
 LV Diastolic Volume 4C AL                           193.2 cm        85 - 139 /
69 - 109 cm 
 LV Systolic Volume 4C AL                            121.3 cm         
 LV Ejection Fraction 4C AL                          37.2 %            
 LV Stroke Volume 4C AL                              71.9 cm          
 
 DOPPLER 
 AV Peak Velocity                                    308.0 cm/s        
 AV Peak Gradient                                    37.9 mmHg         
 AV Mean Velocity                                    206.0 cm/s        
 AV Mean Gradient                                    21.0 mmHg         
 AV Velocity Time Integral                           53.3 cm           
 LVOT Peak Velocity                                  180.0 cm/s        
 LVOT Peak Gradient                                  13.0 mmHg         
 LVOT Mean Velocity                                  117.0 cm/s        
 LVOT Mean Gradient                                  7.0 mmHg          
 LVOT Velocity Time Integral                         31.2 cm           
 LVOT Stroke Volume                                  108.1 cm         
 AV Area Cont Eq vti                                 2.0 cm           
 AV Area Cont Eq pk                                  2.0 cm

## 2018-08-29 VITALS — DIASTOLIC BLOOD PRESSURE: 76 MMHG | SYSTOLIC BLOOD PRESSURE: 126 MMHG

## 2018-08-29 VITALS — DIASTOLIC BLOOD PRESSURE: 68 MMHG | SYSTOLIC BLOOD PRESSURE: 118 MMHG

## 2018-08-29 VITALS — SYSTOLIC BLOOD PRESSURE: 124 MMHG | DIASTOLIC BLOOD PRESSURE: 78 MMHG

## 2018-08-29 VITALS — SYSTOLIC BLOOD PRESSURE: 116 MMHG | DIASTOLIC BLOOD PRESSURE: 70 MMHG

## 2018-08-29 VITALS — SYSTOLIC BLOOD PRESSURE: 116 MMHG | DIASTOLIC BLOOD PRESSURE: 62 MMHG

## 2018-08-29 VITALS — DIASTOLIC BLOOD PRESSURE: 78 MMHG | SYSTOLIC BLOOD PRESSURE: 118 MMHG

## 2018-08-29 NOTE — PN- STUDENT
[facility] 08/29/18 0829:
Subjective
Subjective:
The patient is seen and examined this morning. He reports that he did not sleep 
well due to persistent SOB when lying flat, and he is only able to get relief 
with sitting in the recliner. He reports worsening leg edema and has noticed 
that his legs have been weeping more than is normal for him. He is able to get 
up to walk to the restroom although doing so exacerbates his difficulty 
breathing. He denies fevers, chills, n/v/d, chest pain, or palptitations.  
 
Objective
Objective:
Vital signs - blood pressure 126/76, temperature 98.2F, heart rate 80, 
respiratory rate 24, O2 saturation 92% on RA 
General: well-developed, well-nourished male sitting up in recliner, increased 
work of breathing noted but patient is in no acute distress 
CV: RRR, 2/6 systolic murmur heard best over the left upper sternal border 
Pulmonary: decreased air movement throughout all lung fields, diffuse scattered 
wheezing and rhonchi, radial pulses 2+ bilaterally 
Abdomen: obese, soft, nontender, nondistended, diffuse ecchymoses present 
Lower extremities: 2+ pitting edema bilaterally, legs weeping scant amounts of 
clear fluid, DP pulses not palpable due to swelling but detectable with use of 
doppler 
Neuro: patient is awake, alert, and cooperative 
Psych: affect and concentration normal 
 
Results
Results:
Laboratory Tests
 
08/29/18 0628:
Sodium Pending, Potassium Pending, Chloride Pending, Carbon Dioxide Pending, 
Anion Gap Pending, BUN Pending, Creatinine Pending, BUN/Creatinine Ratio Pending
, Magnesium Pending
 
08/28/18 0640:
Anion Gap 3  L, Estimated GFR 49  L, BUN/Creatinine Ratio 25.7  H, CBC w Diff NO
MAN DIFF REQ, RBC 3.29  L, .5  H, MCH 34.5  H, MCHC 33.7, RDW 17.0  H, 
MPV 8.4, Gran % 83.6  H, Lymphocytes % 6.6  L, Monocytes % 9.6  H, Eosinophils %
0.2, Basophils % 0, Absolute Granulocytes 8.6  H, Absolute Lymphocytes 0.7  L, 
Absolute Monocytes 1.0  H, Absolute Eosinophils 0, Absolute Basophils 0
 
08/27/18 1720:
Troponin I 0.04
 
08/27/18 0716:
Anion Gap 5, Estimated GFR 46  L, BUN/Creatinine Ratio 24.7, CBC w Diff NO MAN 
DIFF REQ, RBC 3.33  L, .9  H, MCH 34.7  H, MCHC 34.1, RDW 16.7  H, MPV 
8.6, Gran % 87.7  H, Lymphocytes % 5.8  L, Monocytes % 6.2, Eosinophils % 0.3, 
Basophils % 0, Absolute Granulocytes 9.6  H, Absolute Lymphocytes 0.6  L, 
Absolute Monocytes 0.7  H, Absolute Eosinophils 0, Absolute Basophils 0
 
 
Assessment/Plan
Assessment:
74 y/o male with history of CAD s/p CABG (1994) and stent placement, ischemic 
cardiomyopathy with estimated EF of 25-30% per echocardiogram on 8/27, HTN, COPD
with a > 50 pack year smoking history (quit smoking 8 months ago), small cell 
lung cancer s/p radiation treatment (last dose of radiation 3 months ago), JEREMY, 
hypothyroidim, CKD, and IDDM admitted on 8/19/2018 for worsneing SOB.
Plan:
Plan: 
 
1. IDDM - continue with sliding scale insulin Aspart and long-acting insulin 
Detemir, and monitor blood glucose prior to each meal. 
 
2. CHF - continue with dobutamine drip at current rate (250 mL at 33.681 mL/hr).
We will consult with cardiology regarding appropriate duration of treatment. 
Continue with lasix therapy (increase to 40 mg IV BID) for diuresis and monitor 
strict I/Os and weight. Patient has been informed that his most recent 
echocardiogram on 8/27 was consistent with pseudostensosis of the aortic valve 
and that he would not benefit from valve replacement. He has demonstrated 
understanding of these findings and has asked us to communicate this with his 
wife, [place]. Patient is encouraged to wear compression stockings and to elevate 
his legs when sitting to help with the swelling. 
 
3. COPD - TRC/nebulization with Proventil Q4hr, Spiriva 1 puff/day, and Ventolin
PRN. Patient is tapering off prednisone currently (on 40 mg today, will decrease
to 30 mg tomorrow). 
 
4. Activity - OOB to chair and restroom, ambulate as tolerated. 
 
5. Diet - heart healthy/renal diet 
 
6. DVT prophylaxis - compression stockings and ambulation as tolerated. Patient 
was previously on heparin therapy and had resultant thrombyocytopenia, heparin 
has been discontinued and platelets are being monitored. 
 
7. CKD - Continue to monitor electrolyte function with daily BMP. 
 
8. Labs - Repeat CBC and BMP in the am. 
 
8. Disposition - anticipated discharge in several days after continued therapy 
with dobutamine drip.

## 2018-08-29 NOTE — PN- PULMONARY
Subjective
HPI/Critical Care Issues:
Patient continues to have dyspnea on exertion he is diuresing more effectively 
and creatinine has improved
 
Objective
Current Medications:
 Current Medications
 
 
  Sig/Ly Start time  Last
 
Medication Dose Route Stop Time Status Admin
 
Acetaminophen 650 MG Q6P PRN 08/19 2045 AC 
 
  PO   
 
Albuterol Sulfate 2 PUF Q4P PRN 08/21 1700 AC 08/28
 
  INH   2059
 
Albuterol Sulfate 3 ML EVERY 4 HRS/AWAKE 08/20 2000 AC 08/28
 
  INH   1955
 
Aspirin 81 MG DAILY 08/20 0900 AC 08/28
 
  PO   0911
 
Atorvastatin Calcium 40 MG DAILY 08/20 0900 AC 08/28
 
  PO   0913
 
Benzonatate 100 MG TID PRN 08/19 2315 AC 08/28
 
  PO   2053
 
Budesonide/ 2 PUF BID 08/19 2100 AC 08/28
 
Formoterol Fumarate  INH   2044
 
Carvedilol 6.25 MG BID 08/19 2100 AC 08/28
 
  PO   2043
 
Clopidogrel Bisulfate 75 MG DAILY 08/20 0900 AC 08/28
 
  PO   0914
 
Dobutamine HCl 250 MG Q4H 08/27 1300 AC 08/29
 
Dextrose/Water 250 ML IV   0433
 
Furosemide 40 MG DAILY 08/24 0902 AC 08/28
 
  IV   0913
 
Guaifenesin 600 MG Q12 08/24 1037 AC 08/28
 
  PO   2042
 
Insulin Aspart 0 TIDAC/HS 08/21 2100 AC 08/28
 
  SC   2051
 
Insulin Detemir 25 UNITS QAM 08/22 0900 AC 08/28
 
  SC   0909
 
Levothyroxine Sodium 0.15 MG DAILY AC 08/20 0700 AC 08/29
 
  PO   0434
 
Nitroglycerin 0.5 GM Q6P PRN 08/23 1300 AC 
 
  TOP   
 
Pantoprazole Sodium 40 MG BID 08/27 2100 AC 08/28
 
  IV   2048
 
Prednisone 10 MG DAILY 09/01 0900 AC 
 
  PO 09/01 0901  
 
Prednisone 20 MG DAILY 08/31 0900 AC 
 
  PO 08/31 0901  
 
Prednisone 30 MG DAILY 08/30 0900 AC 
 
  PO 08/30 0901  
 
Prednisone 40 MG DAILY 08/29 0900 CAN 
 
  PO 09/02 0859  
 
Prednisone 40 MG DAILY 08/29 0900 AC 
 
  PO 08/29 0901  
 
Prednisone 40 MG DAILY 08/25 0900 DC 08/28
 
  PO   0914
 
Ramelteon 8 MG AT BEDTIME AS NEED.. 08/22 2215 AC 08/28
 
  PO   2046
 
Ranolazine 500 MG BID 08/19 2100 AC 08/28
 
  PO   2043
 
Sertraline HCl 50 MG DAILY 08/20 0900 AC 08/28
 
  PO   0914
 
Tamsulosin HCl 0.4 MG AT BEDTIME 08/19 2100 AC 08/28
 
  PO   2044
 
Tiotropium [place] 1 PUF DAILY 08/20 0900 AC 08/28
 
  INH   0916
 
 
 
 
Vital Signs & I&O
Last 24 Hrs of Vitals and I&O:
 Vital Signs
 
 
Date Time Temp Pulse Resp B/P B/P Pulse O2 O2 Flow FiO2
 
     Mean Ox Delivery Rate 
 
08/29 0433  80  126/76     
 
08/29 0340 98.2 80 24 126/76  92   
 
08/29 0016 97.6 93 18 124/78  94   
 
08/28 2125       Room Air  
 
08/28 2124 97.6 93 19 138/84  92   
 
08/28 2047  95  122/78     
 
08/28 2044  95  122/78     
 
08/28 2043  95  122/78     
 
08/28 2043  95  122/78     
 
08/28 1559      92 Room Air Room Air 
 
08/28 1448 97.9 72 18 112/62  93 Room Air  
 
08/28 1236  75  118/62     
 
08/28 1217 98.2 75 20 118/62  95 Room Air  
 
08/28 0919      97 Nasal 2.0L 
 
       Cannula  
 
08/28 0914  82  124/58     
 
08/28 0913  82  124/58     
 
08/28 0836 97.7 90 20 128/78  97 Room Air  
 
08/28 0800       Room Air Room Air 
 
 
 Intake & Output
 
 
 08/29 0800 08/29 0000 08/28 1600
 
Intake Total 360 300 990
 
Output Total 575 1550 1700
 
Balance -215 -1250 -710
 
    
 
Intake, IV   290
 
Intake, Oral 360 300 700
 
Number   1
 
Bowel   
 
Movements   
 
Output, Urine 575 1550 1700
 
Patient  247 lb 
 
Weight   
 
 
Murmur oxygen saturation 92% exam of his chest shows clear lung fields are no 
wheezes cardiac exam shows regular S1 and S2 with soft systolic ejection murmur 
there continues to be lower extremity edema
 
Impression/Plan
 
Impression/Plan
Impression/Plan:
73-year-old gentleman with COPD congestive heart failure has improved diuresis 
and improving renal function.
Recommendations:
Continue negative fluid balance with improved creatinine.  .  Assess oxygen 
saturation with ambulation.  . Continue prednisone taper by 10 mg a day further 
management of congestive heart failure per cardiology with improved creatinine 
consider increasing Lasix to twice daily

## 2018-08-29 NOTE — PN- CARDIOLOGY
Subjective
Subjective:
 
Patient still with some shortness of breath and lower extremity edema.
 
Objective
Vital Signs and I&Os
Vital Signs
 
 
Date Time Temp Pulse Resp B/P B/P Pulse O2 O2 Flow FiO2
 
     Mean Ox Delivery Rate 
 
08/29 0916  89  118/68     
 
08/29 0916  89  118/68     
 
08/29 0851      95 Room Air  
 
08/29 0820 98.2 89 18 118/68  97   
 
08/29 0800       Room Air Room Air 
 
08/29 0433  80  126/76     
 
08/29 0340 98.2 80 24 126/76  92   
 
08/29 0016 97.6 93 18 124/78  94   
 
08/28 2125       Room Air  
 
08/28 2124 97.6 93 19 138/84  92   
 
08/28 2047  95  122/78     
 
08/28 2044  95  122/78     
 
08/28 2043  95  122/78     
 
08/28 2043  95  122/78     
 
08/28 1559      92 Room Air Room Air 
 
08/28 1448 97.9 72 18 112/62  93 Room Air  
 
08/28 1236  75  118/62     
 
08/28 1217 98.2 75 20 118/62  95 Room Air  
 
 
 Intake & Output
 
 
 08/29 1600 08/29 0800 08/29 0000 08/28 1600 08/28 0800 08/28 0000
 
Intake Total  360 300 990 389.6 254.8
 
Output Total  1700  450
 
Balance -350 -215 -1250 -710 389.6 -195.2
 
       
 
Intake, IV    290 269.6 134.8
 
Intake, Oral  360 300 700 120 120
 
Number    1  
 
Bowel      
 
Movements      
 
Output, Urine  1700  450
 
Patient   247 lb   246 lb
 
Weight      
 
Weight      Bed scale
 
Measurement      
 
Method      
 
 
 
Physical Exam:
 
General: no apparent distress. Alert.
Eyes: No obvious scleral icterus.
HEENT: No jugular venous distention or abnormal jugular venous pulsations.
Cardiovascular: Normal intensity S1/S2. Regular. ICD noted, 2 out of 6 systolic 
murmur
Respiratory: decreased air entry at the bases
Abdomen: Soft, nontender with no guarding or rebound tenderness.
Musculoskeletal: No clubbing or cyanosis noted; 2+ lower extremity edema
Skin: warm
Neurologic: No gross focal deficits noted.
Current Medications:
 Current Medications
 
 
  Sig/Ly Start time  Last
 
Medication Dose Route Stop Time Status Admin
 
Acetaminophen 650 MG Q6P PRN 08/19 2045 AC 
 
  PO   
 
Albuterol Sulfate 2 PUF Q4P PRN 08/21 1700 AC 08/28
 
  INH   2059
 
Albuterol Sulfate 3 ML EVERY 4 HRS/AWAKE 08/20 2000 AC 08/29
 
  INH   0812
 
Aspirin 81 MG DAILY 08/20 0900 AC 08/29
 
  PO   0915
 
Atorvastatin Calcium 40 MG DAILY 08/20 0900 AC 08/29
 
  PO   0915
 
Benzonatate 100 MG TID PRN 08/19 2315 AC 08/28
 
  PO   2053
 
Budesonide/ 2 PUF BID 08/19 2100 AC 08/29
 
Formoterol Fumarate  INH   0908
 
Carvedilol 6.25 MG BID 08/19 2100 AC 08/29
 
  PO   0916
 
Clopidogrel Bisulfate 75 MG DAILY 08/20 0900 AC 08/29
 
  PO   0916
 
Dobutamine HCl 250 MG Q4H 08/27 1300 AC 08/29
 
Dextrose/Water 250 ML IV   0433
 
Furosemide 40 MG DAILY 08/24 0902 AC 08/29
 
  IV   0909
 
Guaifenesin 600 MG Q12 08/24 1037 AC 08/29
 
  PO   0916
 
Insulin Aspart 0 TIDAC/HS 08/21 2100 AC 08/29
 
  SC   0922
 
Insulin Detemir 25 UNITS QAM 08/22 0900 AC 08/29
 
  SC   0921
 
Levothyroxine Sodium 0.15 MG DAILY AC 08/20 0700 AC 08/29
 
  PO   0434
 
Nitroglycerin 0.5 GM Q6P PRN 08/23 1300 AC 
 
  TOP   
 
Pantoprazole Sodium 40 MG BID 08/27 2100 AC 08/29
 
  IV   0909
 
Prednisone 10 MG DAILY 09/01 0900 AC 
 
  PO 09/01 0901  
 
Prednisone 20 MG DAILY 08/31 0900 AC 
 
  PO 08/31 0901  
 
Prednisone 30 MG DAILY 08/30 0900 AC 
 
  PO 08/30 0901  
 
Prednisone 40 MG DAILY 08/29 0900 CAN 
 
  PO 09/02 0859  
 
Prednisone 40 MG DAILY 08/29 0900 DC 08/29
 
  PO 08/29 0901  0916
 
Prednisone 40 MG DAILY 08/25 0900 DC 08/28
 
  PO   0914
 
Ramelteon 8 MG AT BEDTIME AS NEED.. 08/22 2215 AC 08/28
 
  PO   2046
 
Ranolazine 500 MG BID 08/19 2100 AC 08/29
 
  PO   0916
 
Sertraline HCl 50 MG DAILY 08/20 0900 AC 08/29
 
  PO   0916
 
Tamsulosin HCl 0.4 MG AT BEDTIME 08/19 2100 AC 08/28
 
  PO   2044
 
Tiotropium [place] 1 PUF DAILY 08/20 0900 AC 08/29
 
  INH   0908
 
 
 
 
Results
Last 48 Hrs of Labs/Mics:
 Laboratory Tests
 
08/29/18 0628:
Anion Gap 5, Estimated GFR 54  L, BUN/Creatinine Ratio 29.2  H, Magnesium 1.8
 
08/28/18 0640:
Anion Gap 3  L, Estimated GFR 49  L, BUN/Creatinine Ratio 25.7  H, CBC w Diff NO
MAN DIFF REQ, RBC 3.29  L, .5  H, MCH 34.5  H, MCHC 33.7, RDW 17.0  H, 
MPV 8.4, Gran % 83.6  H, Lymphocytes % 6.6  L, Monocytes % 9.6  H, Eosinophils %
0.2, Basophils % 0, Absolute Granulocytes 8.6  H, Absolute Lymphocytes 0.7  L, 
Absolute Monocytes 1.0  H, Absolute Eosinophils 0, Absolute Basophils 0
 
08/27/18 1720:
Troponin I 0.04
 
Recent Imaging Studies:
 
Telemetry tracings were personally reviewed and shows sinus rhythm with PVCs
 
 
 
Assessment/Plan
Assessment/Plan
 
1.  Shortness of breath likely multifactorial
2.  Ischemic cardiomyopathy with prior CABG/PCI
3.  AICD in situ
4.  Abdominal aortic aneurysm status post prior EVAR
5.  Aortic stenosis; moderate
6.  Small cell lung CA
7.  Hypothyroidism
8.  Not on ACE inhibitor per reoprt due to CKD with hyperkalemia
9.  Acute on chronic systolic congestive heart failure
10. COPD/JEREMY
11.  Diabetes
 
 
The patient remains volume overloaded. I would recommend increasing the Lasix to
40 mg IV twice daily while continuing the dobutamine infusion and monitoring 
strict I's and O's, daily weights, and daily metabolic panels. He will likely 
benefit from Entresto in the future but will need to discuss with nephrology as 
well. Per report his ICD was interrogated but the report is not currently in his
chart; please locate the ICD interrogation report and place in his chart for 
cardiology review.
 
 
James Mc MD [facility]
 
 
 
Continue telemetry? Yes

## 2018-08-29 NOTE — PN- NEPHROLOGY
Assessment/Plan Nephrology
Assessment:
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
renal perfusion.  Good response to low dose dobutamine.  Is 
Suggestion:
Agree with increasing Lasix to 40 mg IV bid for greater diuresis. 
 
 
Subjective
Subjective:
Patient diuresing better on dobuamine but still very volume overloaded. 
 
Objective
Vital Signs and I&Os
Vital Signs
 
 
Date Time Temp Pulse Resp B/P B/P Pulse O2 O2 Flow FiO2
 
     Mean Ox Delivery Rate 
 
08/29 1217 97.5 83 18 116/70  97   
 
08/29 0916  89  118/68     
 
08/29 0916  89  118/68     
 
08/29 0851      95 Room Air  
 
08/29 0820 98.2 89 18 118/68  97   
 
08/29 0800       Room Air Room Air 
 
08/29 0433  80  126/76     
 
08/29 0340 98.2 80 24 126/76  92   
 
08/29 0016 97.6 93 18 124/78  94   
 
08/28 2125       Room Air  
 
08/28 2124 97.6 93 19 138/84  92   
 
08/28 2047  95  122/78     
 
08/28 2044  95  122/78     
 
08/28 2043  95  122/78     
 
08/28 2043  95  122/78     
 
08/28 1559      92 Room Air Room Air 
 
08/28 1448 97.9 72 18 112/62  93 Room Air  
 
08/28 1236  75  118/62     
 
 
 Intake & Output
 
 
 08/29 1600 08/29 0400 08/28 1600 08/28 0400 08/27 1600 08/27 0400
 
Intake Total .6 254.8 600 300
 
Output Total 1500 1975 1700 450 800 550
 
Balance -1140 -1675 -320.4 -195.2 -200 -250
 
       
 
Intake, IV   559.6 134.8  
 
Intake, Oral 360 300 820 120 600 300
 
Number   1   
 
Bowel      
 
Movements      
 
Output, Urine 1500 1975 1700 450 800 550
 
Patient  247 lb  246 lb 248 lb 
 
Weight      
 
Weight    Bed scale  
 
Measurement      
 
Method      
 
 
 
Physical Exam:
NAD VS: VS as above  Lungs: clear CV: 3/6 CARLOS ENRIQUE Abd: nontender Exts: 3 + edema  
Neuro A&O
Current Medications:
 Current Medications
 
 
  Sig/Ly Start time  Last
 
Medication Dose Route Stop Time Status Admin
 
Acetaminophen 650 MG Q6P PRN 08/19 2045 AC 
 
  PO   
 
Albuterol Sulfate 2 PUF Q4P PRN 08/21 1700 AC 08/28
 
  INH   2059
 
Albuterol Sulfate 3 ML EVERY 4 HRS/AWAKE 08/20 2000 AC 08/29
 
  INH   1201
 
Aspirin 81 MG DAILY 08/20 0900 AC 08/29
 
  PO   0915
 
Atorvastatin Calcium 40 MG DAILY 08/20 0900 AC 08/29
 
  PO   0915
 
Benzonatate 100 MG TID PRN 08/19 2315 AC 08/28
 
  PO   2053
 
Budesonide/ 2 PUF BID 08/19 2100 AC 08/29
 
Formoterol Fumarate  INH   0908
 
Carvedilol 6.25 MG BID 08/19 2100 AC 08/29
 
  PO   0916
 
Clopidogrel Bisulfate 75 MG DAILY 08/20 0900 AC 08/29
 
  PO   0916
 
Dobutamine HCl 250 MG Q4H 08/27 1300 AC 08/29
 
Dextrose/Water 250 ML IV   0433
 
Furosemide 40 MG DAILY 08/24 0902 AC 08/29
 
  IV   0909
 
Guaifenesin 600 MG Q12 08/24 1037 AC 08/29
 
  PO   0916
 
Insulin Aspart 0 TIDAC/HS 08/21 2100 AC 08/29
 
  SC   0922
 
Insulin Detemir 25 UNITS QAM 08/22 0900 AC 08/29
 
  SC   0921
 
Levothyroxine Sodium 0.15 MG DAILY AC 08/20 0700 AC 08/29
 
  PO   0434
 
Nitroglycerin 0.5 GM Q6P PRN 08/23 1300 AC 
 
  TOP   
 
Pantoprazole Sodium 40 MG BID 08/27 2100 AC 08/29
 
  IV   0909
 
Prednisone 10 MG DAILY 09/01 0900 AC 
 
  PO 09/01 0901  
 
Prednisone 20 MG DAILY 08/31 0900 AC 
 
  PO 08/31 0901  
 
Prednisone 30 MG DAILY 08/30 0900 AC 
 
  PO 08/30 0901  
 
Prednisone 40 MG DAILY 08/29 0900 CAN 
 
  PO 09/02 0859  
 
Prednisone 40 MG DAILY 08/29 0900 DC 08/29
 
  PO 08/29 0901  0916
 
Prednisone 40 MG DAILY 08/25 0900 DC 08/28
 
  PO   0914
 
Ramelteon 8 MG AT BEDTIME AS NEED.. 08/22 2215 AC 08/28
 
  PO   2046
 
Ranolazine 500 MG BID 08/19 2100 AC 08/29
 
  PO   0916
 
Sertraline HCl 50 MG DAILY 08/20 0900 AC 08/29
 
  PO   0916
 
Tamsulosin HCl 0.4 MG AT BEDTIME 08/19 2100 AC 08/28
 
  PO   2044
 
Tiotropium [place] 1 PUF DAILY 08/20 0900 AC 08/29
 
  INH   0908
 
 
 
 
Results
Pertinent Lab Results:
 Laboratory Tests
 
 
 08/29 08/28 08/27
 
 0628 0640 1720
 
Chemistry   
 
  Sodium (137 - 145 mmol/L) 134  L 135  L 
 
  Potassium (3.5 - 5.1 mmol/L) 4.2 4.4 
 
  Chloride (98 - 107 mmol/L) 98 97  L 
 
  Carbon Dioxide (22 - 30 mmol/L) 31  H 34  H 
 
  Anion Gap (5 - 16) 5 3  L 
 
  BUN (9 - 20 mg/dL) 38  H 36  H 
 
  Creatinine (0.7 - 1.2 mg/dL) 1.3  H 1.4  H 
 
  Estimated GFR (>60 ml/min) 54  L 49  L 
 
  BUN/Creatinine Ratio (7 - 25 %) 29.2  H 25.7  H 
 
  Magnesium (1.6 - 2.3 mg/dL) 1.8  
 
  Troponin I (<0.11 ng/ml)   0.04
 
Hematology   
 
  CBC w Diff  NO MAN DIFF REQ 
 
  WBC (4.8 - 10.8 /CUMM)  10.3 
 
  RBC (4.70 - 6.10 /CUMM)  3.29  L 
 
  Hgb (14.0 - 18.0 G/DL)  11.4  L 
 
  Hct (42 - 52 %)  33.7  L 
 
  MCV (80.0 - 94.0 FL)  102.5  H 
 
  MCH (27.0 - 31.0 PG)  34.5  H 
 
  MCHC (33.0 - 37.0 G/DL)  33.7 
 
  RDW (11.5 - 14.5 %)  17.0  H 
 
  Plt Count (130 - 400 /CUMM)  108  L 
 
  MPV (7.4 - 10.4 FL)  8.4 
 
  Gran % (42.2 - 75.2 %)  83.6  H 
 
  Lymphocytes % (20.5 - 51.1 %)  6.6  L 
 
  Monocytes % (1.7 - 9.3 %)  9.6  H 
 
  Eosinophils % (0 - 5 %)  0.2 
 
  Basophils % (0.0 - 2.0 %)  0 
 
  Absolute Granulocytes (1.4 - 6.5 /CUMM)  8.6  H 
 
  Absolute Lymphocytes (1.2 - 3.4 /CUMM)  0.7  L 
 
  Absolute Monocytes (0.10 - 0.60 /CUMM)  1.0  H 
 
  Absolute Eosinophils (0.0 - 0.7 /CUMM)  0 
 
  Absolute Basophils (0.0 - 0.2 /CUMM)  0 
 
 
 
 
 08/27
 
 0716
 
Chemistry 
 
  Sodium (137 - 145 mmol/L) 134  L
 
  Potassium (3.5 - 5.1 mmol/L) 4.1
 
  Chloride (98 - 107 mmol/L) 97  L
 
  Carbon Dioxide (22 - 30 mmol/L) 31  H
 
  Anion Gap (5 - 16) 5
 
  BUN (9 - 20 mg/dL) 37  H
 
  Creatinine (0.7 - 1.2 mg/dL) 1.5  H
 
  Estimated GFR (>60 ml/min) 46  L
 
  BUN/Creatinine Ratio (7 - 25 %) 24.7
 
Hematology 
 
  CBC w Diff NO MAN DIFF REQ
 
  WBC (4.8 - 10.8 /CUMM) 10.9  H
 
  RBC (4.70 - 6.10 /CUMM) 3.33  L
 
  Hgb (14.0 - 18.0 G/DL) 11.6  L
 
  Hct (42 - 52 %) 34.0  L
 
  MCV (80.0 - 94.0 FL) 101.9  H
 
  MCH (27.0 - 31.0 PG) 34.7  H
 
  MCHC (33.0 - 37.0 G/DL) 34.1
 
  RDW (11.5 - 14.5 %) 16.7  H
 
  Plt Count (130 - 400 /CUMM) 116  L
 
  MPV (7.4 - 10.4 FL) 8.6
 
  Gran % (42.2 - 75.2 %) 87.7  H
 
  Lymphocytes % (20.5 - 51.1 %) 5.8  L
 
  Monocytes % (1.7 - 9.3 %) 6.2
 
  Eosinophils % (0 - 5 %) 0.3
 
  Basophils % (0.0 - 2.0 %) 0
 
  Absolute Granulocytes (1.4 - 6.5 /CUMM) 9.6  H
 
  Absolute Lymphocytes (1.2 - 3.4 /CUMM) 0.6  L
 
  Absolute Monocytes (0.10 - 0.60 /CUMM) 0.7  H
 
  Absolute Eosinophils (0.0 - 0.7 /CUMM) 0
 
  Absolute Basophils (0.0 - 0.2 /CUMM) 0

## 2018-08-29 NOTE — PN- HOUSESTAFF
Rigoberto GLASS,Laura 18 0705:
Subjective
Follow-up For:
Congestive heart failure/COPD exacerbation
Complaints: no complaints
Tele-Events Since Last Visit:
Normal sinus rhythm
Subjective:
Patient seen and examined at bedside.  Patient was sitting comfortably in his 
recliner.  No evidence of any acute distress.  Patient says he had decreased 
sleep overnight.  He denies chest pain, palpitation, shortness of breath, nausea
, vomiting, abdominal pain
 
Review of Systems
Constitutional:
Reports: no symptoms. 
 
Objective
Last 24 Hrs of Vital Signs/I&O
 Vital Signs
 
 
Date Time Temp Pulse Resp B/P B/P Pulse O2 O2 Flow FiO2
 
     Mean Ox Delivery Rate 
 
 0916  89  118/68     
 
 0916  89  118/68     
 
 0851      95 Room Air  
 
 0820 98.2 89 18 118/68  97   
 
 0800       Room Air Room Air 
 
 0433  80  126/76     
 
 0340 98.2 80 24 126/76  92   
 
 0016 97.6 93 18 124/78  94   
 
 2125       Room Air  
 
 2124 97.6 93 19 138/84  92   
 
 2047  95  122/78     
 
 2044  95  122/78     
 
 2043  95  122/78     
 
 2043  95  122/78     
 
 1559      92 Room Air Room Air 
 
 1448 97.9 72 18 112/62  93 Room Air  
 
 1236  75  118/62     
 
 1217 98.2 75 20 118/62  95 Room Air  
 
 
 Intake & Output
 
 
  1600  0800  0000
 
Intake Total  360 300
 
Output Total  575 1550
 
Balance  -215 -1250
 
    
 
Intake, Oral  360 300
 
Output, Urine  575 1550
 
Patient   247 lb
 
Weight   
 
 
 
 
Physical Exam
General Appearance: Alert, Oriented X3, Cooperative, No Acute Distress
Cardiovascular: Normal S1, Normal S2, No Murmurs
Lungs: b/l wheeze
Abdomen: obese
Neurological: Normal Gait, Normal Speech, Strength at 5/5 X4 Ext, Normal Tone
Extremities: b/l 1+edema
Vascular: Normal Pulses
Current Medications:
 Current Medications
 
 
  Sig/Ly Start time  Last
 
Medication Dose Route Stop Time Status Admin
 
Acetaminophen 650 MG Q6P PRN  2045 AC 
 
  PO   
 
Albuterol Sulfate 2 PUF Q4P PRN 08/21 1700 AC 
 
  INH   205
 
Albuterol Sulfate 3 ML EVERY 4 HRS/AWAKE  2000 AC 
 
  INH   08
 
Aspirin 81 MG DAILY  0900 AC 
 
  PO   0915
 
Atorvastatin Calcium 40 MG DAILY  0900 AC 
 
  PO   0915
 
Benzonatate 100 MG TID PRN  2315 AC 
 
  PO   2053
 
Budesonide/ 2 PUF BID  2100 AC 
 
Formoterol Fumarate  INH   0908
 
Carvedilol 6.25 MG BID  2100 AC 
 
  PO   0916
 
Clopidogrel Bisulfate 75 MG DAILY  09 AC 
 
  PO   0916
 
Dobutamine HCl 250 MG Q4H  1300 AC 
 
Dextrose/Water 250 ML IV   0433
 
Furosemide 40 MG DAILY  0902 AC 
 
  IV   0909
 
Guaifenesin 600 MG Q12  1037 AC 
 
  PO   0916
 
Insulin Aspart 0 TIDAC/HS  2100 AC 
 
  SC   0922
 
Insulin Detemir 25 UNITS QAM  0900 AC 
 
  SC   0921
 
Levothyroxine Sodium 0.15 MG DAILY AC  0700 AC 
 
  PO   0434
 
Nitroglycerin 0.5 GM Q6P PRN  1300 AC 
 
  TOP   
 
Pantoprazole Sodium 40 MG BID  2100 AC 
 
  IV   0909
 
Prednisone 10 MG DAILY  09 AC 
 
  PO  0901  
 
Prednisone 20 MG DAILY  09 AC 
 
  PO  0901  
 
Prednisone 30 MG DAILY  09 AC 
 
  PO  09  
 
Prednisone 40 MG DAILY  09 CAN 
 
  PO  0859  
 
Prednisone 40 MG DAILY  0900 DC 
 
  PO  0901  0916
 
Prednisone 40 MG DAILY  0900 DC 
 
  PO   0914
 
Ramelteon 8 MG AT BEDTIME AS NEED.. 2215 AC 
 
  PO   204
 
Ranolazine 500 MG BID  2100 AC 
 
  PO   0916
 
Sertraline HCl 50 MG DAILY  09 AC 
 
  PO   0916
 
Tamsulosin HCl 0.4 MG AT BEDTIME  2100 AC 
 
  PO   204
 
Tiotropium [place] 1 PUF DAILY 08/20 0900 AC 
 
  INH   0908
 
 
 
 
Last 24 Hrs of Lab/[place] Results
Last 24 Hrs of Labs/Mics:
 Laboratory Tests
 
18 0628:
Anion Gap 5, Estimated GFR 54  L, BUN/Creatinine Ratio 29.2  H, Magnesium 1.8
 
 
Assessment/Plan
Assessment:
Patient is a 73-year-old male presented with chief complaints of gradually 
progressive shortness of breath.
 
Past medical history-
* Small Cell lung CA (last dose of radiation 3 months ago),
* IDDM (dx 3months ago),
* Ischemic cardiomyopathy with EF 20% s/p defibrillator placement (2018),
* s/p carotid endarterectomy right side (),
* CABG (), CAD with stents (), 
* h/o DVT (5 yrs ago), 
* COPD, 
* Hypothyroidism
* Hypertension
* BPH
* Obstructive sleep apnea on nocturnal CPAP 
 
Assessment and plan
CHF exacerbation with ischemic cardiomyopathy with ejection fraction of 20% 
status post AICD 2018
Patient is treated with Lasix.  
Compression stockings
Strict I's and O's
Daily weights
Leg elevation patient will benefit from Increased dose of Lasix.
Patient's aortic valve area has been improved from 0.6 to 2 cm after starting 
him on dobutamine which signifies that he does not have moderate/severe aortic 
stenosis.  Results of echocardiogram was discussed with the patient.  For now we
will continue dobutamine drip and discuss with cardiology regarding stopping it.
 
COPD exacerbation
Pulmonology on board
Continue TRC nebulization
 
Diet-heart healthy diet
CODE STATUS DNR/DNI
DVT prophylaxis heparin
 
Problem List:
 1. COPD exacerbation
 
 2. CHF exacerbation
 
Pain Ratin
Pain Location:
none
Pain Goal: Remain pain free
Pain Plan:
Tylenol
Tomorrow's Labs & Rationales:
C BC, BP
 
 
Marlee Lamar 18 1237:
Attending MD Review Statement
 
Attending Statement
Attending MD Statement: examined this patient, discuss w/resident/PA/NP, agreed 
w/resident/PA/NP, reviewed EMR data (avail), discussed with nursing, discussed 
with case mgmt
Attending Assessment/Plan:
Acute CHF exacerbation with CKD with resistantce to diuresis- pt was started on 
dobutamine drip to help with diuresis. Repeat ECHO while on dobutamine drip 
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 
cont with medical management to improve his cardiac function. Cont on dobutamine
drip for now and f/u on his diuresis and see how he does. Increase lasix to 40mg
iv bid. 
 
d/w pt the  care plan and explained him the echo results . Answered all his 
questions.

## 2018-08-30 VITALS — DIASTOLIC BLOOD PRESSURE: 76 MMHG | SYSTOLIC BLOOD PRESSURE: 140 MMHG

## 2018-08-30 VITALS — SYSTOLIC BLOOD PRESSURE: 136 MMHG | DIASTOLIC BLOOD PRESSURE: 68 MMHG

## 2018-08-30 VITALS — SYSTOLIC BLOOD PRESSURE: 108 MMHG | DIASTOLIC BLOOD PRESSURE: 58 MMHG

## 2018-08-30 VITALS — DIASTOLIC BLOOD PRESSURE: 62 MMHG | SYSTOLIC BLOOD PRESSURE: 118 MMHG

## 2018-08-30 NOTE — PN- NEPHROLOGY
Assessment/Plan Nephrology
Assessment:
CKD with large component of cardiorenal syndrome.  Creatiine up slightly \.
Suggestion:
Will need to tolerate some worsening of creatinine as try and diurese. 
 
 
Subjective
Subjective:
Patient with 2.5 liters of UO yesterday, feels edema decreasing. 
 
Objective
Vital Signs and I&Os
Vital Signs
 
 
Date Time Temp Pulse Resp B/P B/P Pulse O2 O2 Flow FiO2
 
     Mean Ox Delivery Rate 
 
08/30 0816 98.1 93 18 136/68     
 
08/30 0816 98.1 93 18 136/68     
 
08/30 0810      96 Room Air  
 
08/30 0630 98.1 93 18 136/68  94   
 
08/30 0328  87  140/76     
 
08/30 0000       Nasal 2.0L 
 
       Cannula  
 
08/30 0000 98.0 87 18 140/76  93 Room Air  
 
08/29 2203  92  112/70     
 
08/29 2157  92  118/70     
 
08/29 2157  92  118/70     
 
08/29 2157  92  118/70     
 
08/29 2100 97.8 92 16 118/78  91 Room Air  
 
08/29 1810  78  116/62     
 
08/29 1615      93 Room Air  
 
08/29 1600 98.2 78 18 116/62  93 Room Air  
 
08/29 1229  83  116/70     
 
08/29 1217 97.5 83 18 116/70  97   
 
08/29 0916  89  118/68     
 
08/29 0916  89  118/68     
 
 
 Intake & Output
 
 
 08/30 1600 08/30 0400 08/29 1600 08/29 0400 08/28 1600 08/28 0400
 
Intake Total 389.6 254.8 6822 022 3700.6 254.8
 
Output Total  1975 1700 450
 
Balance -10.4 -145.2 -210 -1675 -320.4 -195.2
 
       
 
Intake, .6 134.8 280  559.6 134.8
 
Intake, Oral  300 820 120
 
Number   1  1 
 
Bowel      
 
Movements      
 
Output, Urine  1975 1700 450
 
Patient  247 lb  247 lb  246 lb
 
Weight      
 
Weight      Bed scale
 
Measurement      
 
Method      
 
 
 
Physical Exam:
NAD VS: VS as above  Lungs: clear CV: 3/6 CARLOS ENRIQUE Abd: nontender Exts: 3 + edema  
Neuro A&O
Current Medications:
 Current Medications
 
 
  Sig/Ly Start time  Last
 
Medication Dose Route Stop Time Status Admin
 
Acetaminophen 650 MG Q6P PRN 08/19 2045 AC 
 
  PO   
 
Albuterol Sulfate 2 PUF Q4P PRN 08/21 1700 AC 08/28
 
  INH   2059
 
Albuterol Sulfate 3 ML EVERY 4 HRS/AWAKE 08/20 2000 AC 08/30
 
  INH   0811
 
Aspirin 81 MG DAILY 08/20 0900 AC 08/30
 
  PO   0816
 
Atorvastatin Calcium 40 MG DAILY 08/20 0900 AC 08/30
 
  PO   0816
 
Benzonatate 100 MG TID PRN 08/19 2315 AC 08/28
 
  PO   2053
 
Budesonide/ 2 PUF BID 08/19 2100 AC 08/30
 
Formoterol Fumarate  INH   0818
 
Carvedilol 6.25 MG BID 08/19 2100 AC 08/30
 
  PO   0816
 
Clopidogrel Bisulfate 75 MG DAILY 08/20 0900 AC 08/30
 
  PO   0816
 
Dobutamine HCl 250 MG Q4H 08/27 1300 AC 08/30
 
Dextrose/Water 250 ML IV   0328
 
Furosemide 40 MG BID 08/29 2100 AC 08/30
 
  IV   0817
 
Furosemide 40 MG DAILY 08/24 0902 DC 08/29
 
  IV   0909
 
Guaifenesin 600 MG Q12 08/24 1037 AC 08/30
 
  PO   0816
 
Insulin Aspart 0 TIDAC/HS 08/21 2100 AC 08/30
 
  SC   0817
 
Insulin Detemir 25 UNITS QAM 08/22 0900 AC 08/30
 
  SC   0817
 
Levothyroxine Sodium 0.15 MG DAILY AC 08/20 0700 AC 08/30
 
  PO   0554
 
Nitroglycerin 0.5 GM Q6P PRN 08/23 1300 AC 
 
  TOP   
 
Omeprazole 40 MG DAILY AC 08/30 0700 AC 08/30
 
  PO   0554
 
Pantoprazole Sodium 40 MG BID 08/27 2100 DC 08/29
 
  IV   0909
 
Prednisone 10 MG DAILY 09/01 0900 AC 
 
  PO 09/01 0901  
 
Prednisone 20 MG DAILY 08/31 0900 AC 
 
  PO 08/31 0901  
 
Prednisone 30 MG DAILY 08/30 0900 DC 08/30
 
  PO 08/30 0901  0816
 
Ramelteon 8 MG AT BEDTIME AS NEED.. 08/22 2215 AC 08/29
 
  PO   2157
 
Ranolazine 500 MG BID 08/19 2100 AC 08/30
 
  PO   0816
 
Sertraline HCl 50 MG DAILY 08/20 0900 AC 08/30
 
  PO   0815
 
Tamsulosin HCl 0.4 MG AT BEDTIME 08/19 2100 AC 08/29
 
  PO   2157
 
Tiotropium [place] 1 PUF DAILY 08/20 0900 AC 08/29
 
  INH   0908
 
 
 
 
Results
Pertinent Lab Results:
 Laboratory Tests
 
 
 08/30 08/29 08/28 08/27
 
 0625 0628 0669 1720
 
Chemistry    
 
  Sodium (137 - 145 mmol/L) 137 134  L 135  L 
 
  Potassium (3.5 - 5.1 mmol/L) 3.7 4.2 4.4 
 
  Chloride (98 - 107 mmol/L) 99 98 97  L 
 
  Carbon Dioxide (22 - 30 mmol/L) 31  H 31  H 34  H 
 
  Anion Gap (5 - 16) 6 5 3  L 
 
  BUN (9 - 20 mg/dL) 36  H 38  H 36  H 
 
  Creatinine (0.7 - 1.2 mg/dL) 1.6  H 1.3  H 1.4  H 
 
  Estimated GFR (>60 ml/min) 42  L 54  L 49  L 
 
  BUN/Creatinine Ratio (7 - 25 %) 22.5 29.2  H 25.7  H 
 
  Magnesium (1.6 - 2.3 mg/dL) 1.7 1.8  
 
  Troponin I (<0.11 ng/ml)    0.04
 
Hematology    
 
  CBC w Diff   NO MAN DIFF REQ 
 
  WBC (4.8 - 10.8 /CUMM)   10.3 
 
  RBC (4.70 - 6.10 /CUMM)   3.29  L 
 
  Hgb (14.0 - 18.0 G/DL)   11.4  L 
 
  Hct (42 - 52 %)   33.7  L 
 
  MCV (80.0 - 94.0 FL)   102.5  H 
 
  MCH (27.0 - 31.0 PG)   34.5  H 
 
  MCHC (33.0 - 37.0 G/DL)   33.7 
 
  RDW (11.5 - 14.5 %)   17.0  H 
 
  Plt Count (130 - 400 /CUMM)   108  L 
 
  MPV (7.4 - 10.4 FL)   8.4 
 
  Gran % (42.2 - 75.2 %)   83.6  H 
 
  Lymphocytes % (20.5 - 51.1 %)   6.6  L 
 
  Monocytes % (1.7 - 9.3 %)   9.6  H 
 
  Eosinophils % (0 - 5 %)   0.2 
 
  Basophils % (0.0 - 2.0 %)   0 
 
  Absolute Granulocytes (1.4 - 6.5 /CUMM)   8.6  H 
 
  Absolute Lymphocytes (1.2 - 3.4 /CUMM)   0.7  L 
 
  Absolute Monocytes (0.10 - 0.60 /CUMM)   1.0  H 
 
  Absolute Eosinophils (0.0 - 0.7 /CUMM)   0 
 
  Absolute Basophils (0.0 - 0.2 /CUMM)   0

## 2018-08-30 NOTE — PN- CARDIOLOGY
Subjective
Subjective:
Telemetry reviewed.  Paced rhythm.  Patient appears comfortable.  On IV 
dobutamine and IV Lasix.
 
Objective
Vital Signs and I&Os
Vital Signs
 
 
Date Time Temp Pulse Resp B/P B/P Pulse O2 O2 Flow FiO2
 
     Mean Ox Delivery Rate 
 
08/30 0914 98.1 74 18 120/68     
 
08/30 0816 98.1 93 18 136/68     
 
08/30 0816 98.1 93 18 136/68     
 
08/30 0810      96 Room Air  
 
08/30 0630 98.1 93 18 136/68  94   
 
08/30 0328  87  140/76     
 
08/30 0000       Nasal 2.0L 
 
       Cannula  
 
08/30 0000 98.0 87 18 140/76  93 Room Air  
 
08/29 2203  92  112/70     
 
08/29 2157  92  118/70     
 
08/29 2157  92  118/70     
 
08/29 2157  92  118/70     
 
08/29 2100 97.8 92 16 118/78  91 Room Air  
 
08/29 1810  78  116/62     
 
08/29 1615      93 Room Air  
 
08/29 1600 98.2 78 18 116/62  93 Room Air  
 
08/29 1229  83  116/70     
 
08/29 1217 97.5 83 18 116/70  97   
 
 
 Intake & Output
 
 
 08/30 1600 08/30 0800 08/30 0000 08/29 1600 08/29 0800 08/29 0000
 
Intake Total  389.6 254.8 1130 360 300
 
Output Total   575 1550
 
Balance  -10.4 -145.2 -420 -215 -1250
 
       
 
Intake, IV  269.6 134.8 280  
 
Intake, Oral  120 120 850 360 300
 
Number    1  
 
Bowel      
 
Movements      
 
Output, Urine   575 1550
 
Patient   247 lb   247 lb
 
Weight      
 
 
 
Physical Exam:
On general exam patient appeared comfortable
Head normocephalic atraumatic
Eyes sclera anicteric conjunctiva showed no bilateral muscle normal
Neck no jugular venous distention no thyroid masses
Chest lungs with few scattered crackles
Heart irregular rhythm with a grade 2/6 systolic murmur
Abdomen protuberant
Extremities with 1+ edema
Neurological no gross motor or sensory deficits.
 
Assessment/Plan
Assessment/Plan
In summary this 73-year-old gentleman has a following problems
1.  Shortness of breath likely multifactorial
2.  Ischemic cardiomyopathy with prior CABG/PCI
3.  AICD in situ
4.  Abdominal aortic aneurysm status post prior EVAR
5.  Aortic stenosis; moderate
6.  Small cell lung CA
7.  Hypothyroidism
8.  Not on ACE inhibitor per reoprt due to CKD with hyperkalemia
9.  Acute on chronic systolic congestive heart failure
10. COPD/JEREMY
11.  Diabetes
 
I would continue IV dobutamine for a total of 48 hours.  May change Lasix to 60 
mg p.o. twice daily.  Hesitant to yet use an ACE inhibitor.  Get record of AICD 
check.  Probably anticipate discharge in 24-48 hours.
Continue telemetry? Yes

## 2018-08-30 NOTE — PN- PULMONARY
Subjective
HPI/Critical Care Issues:
He continues to diuresis with improving creatine 
 
Objective
Current Medications:
 Current Medications
 
 
  Sig/Ly Start time  Last
 
Medication Dose Route Stop Time Status Admin
 
Acetaminophen 650 MG Q6P PRN 08/19 2045 AC 
 
  PO   
 
Albuterol Sulfate 2 PUF Q4P PRN 08/21 1700 AC 08/28
 
  INH   2059
 
Albuterol Sulfate 3 ML EVERY 4 HRS/AWAKE 08/20 2000 AC 08/30
 
  INH   0811
 
Aspirin 81 MG DAILY 08/20 0900 AC 08/30
 
  PO   0816
 
Atorvastatin Calcium 40 MG DAILY 08/20 0900 AC 08/30
 
  PO   0816
 
Benzonatate 100 MG TID PRN 08/19 2315 AC 08/28
 
  PO   2053
 
Budesonide/ 2 PUF BID 08/19 2100 AC 08/30
 
Formoterol Fumarate  INH   0818
 
Carvedilol 6.25 MG BID 08/19 2100 AC 08/30
 
  PO   0816
 
Clopidogrel Bisulfate 75 MG DAILY 08/20 0900 AC 08/30
 
  PO   0816
 
Dobutamine HCl 250 MG Q4H 08/27 1300 AC 08/30
 
Dextrose/Water 250 ML IV   0328
 
Furosemide 40 MG BID 08/29 2100 AC 08/30
 
  IV   0817
 
Furosemide 40 MG DAILY 08/24 0902 DC 08/29
 
  IV   0909
 
Guaifenesin 600 MG Q12 08/24 1037 AC 08/30
 
  PO   0816
 
Insulin Aspart 0 TIDAC/HS 08/21 2100 AC 08/30
 
  SC   0817
 
Insulin Detemir 25 UNITS QAM 08/22 0900 AC 08/30
 
  SC   0817
 
Levothyroxine Sodium 0.15 MG DAILY AC 08/20 0700 AC 08/30
 
  PO   0554
 
Nitroglycerin 0.5 GM Q6P PRN 08/23 1300 AC 
 
  TOP   
 
Omeprazole 40 MG DAILY AC 08/30 0700 AC 08/30
 
  PO   0554
 
Pantoprazole Sodium 40 MG BID 08/27 2100 DC 08/29
 
  IV   0909
 
Prednisone 10 MG DAILY 09/01 0900 AC 
 
  PO 09/01 0901  
 
Prednisone 20 MG DAILY 08/31 0900 AC 
 
  PO 08/31 0901  
 
Prednisone 30 MG DAILY 08/30 0900 AC 08/30
 
  PO 08/30 0901  0816
 
Prednisone 40 MG DAILY 08/29 0900 DC 08/29
 
  PO 08/29 0901  0916
 
Ramelteon 8 MG AT BEDTIME AS NEED.. 08/22 2215 AC 08/29
 
  PO   2157
 
Ranolazine 500 MG BID 08/19 2100 AC 08/30
 
  PO   0816
 
Sertraline HCl 50 MG DAILY 08/20 0900 AC 08/30
 
  PO   0815
 
Tamsulosin HCl 0.4 MG AT BEDTIME 08/19 2100 AC 08/29
 
  PO   2157
 
Tiotropium [place] 1 PUF DAILY 08/20 0900 AC 08/29
 
  INH   0908
 
 
 
 
Vital Signs & I&O
Last 24 Hrs of Vitals and I&O:
 Vital Signs
 
 
Date Time Temp Pulse Resp B/P B/P Pulse O2 O2 Flow FiO2
 
     Mean Ox Delivery Rate 
 
08/30 0816 98.1 93 18 136/68     
 
08/30 0816 98.1 93 18 136/68     
 
08/30 0630 98.1 93 18 136/68  94   
 
08/30 0328  87  140/76     
 
08/30 0000       Nasal 2.0L 
 
       Cannula  
 
08/30 0000 98.0 87 18 140/76  93 Room Air  
 
08/29 2203  92  112/70     
 
08/29 2157  92  118/70     
 
08/29 2157  92  118/70     
 
08/29 2157  92  118/70     
 
08/29 2100 97.8 92 16 118/78  91 Room Air  
 
08/29 1810  78  116/62     
 
08/29 1615      93 Room Air  
 
08/29 1600 98.2 78 18 116/62  93 Room Air  
 
08/29 1229  83  116/70     
 
08/29 1217 97.5 83 18 116/70  97   
 
08/29 0916  89  118/68     
 
08/29 0916  89  118/68     
 
08/29 0851      95 Room Air  
 
 
 Intake & Output
 
 
 08/30 1600 08/30 0800 08/30 0000
 
Intake Total  389.6 254.8
 
Output Total  400 400
 
Balance  -10.4 -145.2
 
    
 
Intake, IV  269.6 134.8
 
Intake, Oral  120 120
 
Output, Urine  400 400
 
Patient   247 lb
 
Weight   
 
 
Room air oxygen saturation 94% exam for chest shows clear lung fields cardiac 
exam shows regular S1 and S2 there's 2+ symmetrical lower extremity edema
 
Impression/Plan
 
Impression/Plan
Impression/Plan:
73-year-old with COPD congestive heart failure continues to diurese with 
increased Lasix and dobutamine and improving renal function
Recommendations:
Continue negative fluid balance with improved creatinine.  .  Assess oxygen 
saturation with ambulation.  . Continue prednisone taper by 10 mg a day further 
management of congestive heart failure per cardiology with improved creatinine 
consider increasing Lasix to twice daily consider physical therapy evaluation to
increased mobilization

## 2018-08-30 NOTE — PN- HOUSESTAFF
Rigoberto GLASS,Laura 18 0740:
Subjective
Follow-up For:
Congestive heart failure to
Complaints: no complaints
Tele-Events Since Last Visit:
Normal sinus rhythm
Subjective:
Patient seen and examined at bedside no overnight events.  Slept well overnight.
 Denies shortness of breath, chest pain, palpitation.  He is anxious to go home 
today.
 
Review of Systems
Constitutional:
Reports: no symptoms. 
 
Objective
Last 24 Hrs of Vital Signs/I&O
 Vital Signs
 
 
Date Time Temp Pulse Resp B/P B/P Pulse O2 O2 Flow FiO2
 
     Mean Ox Delivery Rate 
 
 1436 98.6 67 16 108/58  92 Room Air  
 
 0914 98.1 74 18 120/68     
 
 0816 98.1 93 18 136/68     
 
 0816 98.1 93 18 136/68     
 
 0810      96 Room Air  
 
 0800       Room Air  
 
 0630 98.1 93 18 136/68  94   
 
 0328  87  140/76     
 
 0000       Nasal 2.0L 
 
       Cannula  
 
 0000 98.0 87 18 140/76  93 Room Air  
 
 2203  92  112/70     
 
 2157  92  118/70     
 
 2157  92  118/70     
 
 2157  92  118/70     
 
 2100 97.8 92 16 118/78  91 Room Air  
 
 1810  78  116/62     
 
 1615      93 Room Air  
 
 1600 98.2 78 18 116/62  93 Room Air  
 
 
 Intake & Output
 
 
  1600  0800 08 0000
 
Intake Total 67 389.6 254.8
 
Output Total  400 400
 
Balance 67 -10.4 -145.2
 
    
 
Intake, IV 67 269.6 134.8
 
Intake, Oral  120 120
 
Output, Urine  400 400
 
Patient   247 lb
 
Weight   
 
 
 
 
Physical Exam
General Appearance: Alert, Oriented X3, Cooperative, No Acute Distress
Cardiovascular: Normal S1, Normal S2, No Murmurs
Lungs: Clear to Auscultation
Abdomen: Soft, No Tenderness, No Hepatospenomegaly
Neurological: Strength at 5/5 X4 Ext, Normal Tone, Sensation Intact, Cranial 
Nerves 3-12 NL
Extremities: 1+pe
Current Medications:
 Current Medications
 
 
  Sig/Ly Start time  Last
 
Medication Dose Route Stop Time Status Admin
 
Acetaminophen 650 MG Q6P PRN 2045 AC 
 
  PO   
 
Albuterol Sulfate 2 PUF Q4P PRN  1700 AC 
 
  INH   2059
 
Albuterol Sulfate 3 ML EVERY 4 HRS/AWAKE 2000 AC 
 
  INH   1248
 
Aspirin 81 MG DAILY  09 AC 
 
  PO   0816
 
Atorvastatin Calcium 40 MG DAILY  09 AC 
 
  PO   0816
 
Benzonatate 100 MG TID PRN  2315 AC 
 
  PO   2053
 
Budesonide/ 2 PUF BID 2100 AC 
 
Formoterol Fumarate  INH   0818
 
Carvedilol 6.25 MG BID 2100 AC 
 
  PO   0816
 
Clopidogrel Bisulfate 75 MG DAILY  09 AC 
 
  PO   0816
 
Dobutamine HCl 250 MG Q4H  1300 DC 
 
Dextrose/Water 250 ML IV   0914
 
Furosemide 60 MG BID  2100 AC 
 
  PO   
 
Furosemide 40 MG BID  2100 DC 
 
  IV   0817
 
Furosemide 40 MG DAILY  0902 DC 
 
  IV   0909
 
Guaifenesin 600 MG Q12  1037 AC 
 
  PO   0816
 
Insulin Aspart 0 TIDAC/HS  2100 AC 
 
  SC   1257
 
Insulin Detemir 25 UNITS QAM  0900 AC 
 
  SC   0817
 
Levothyroxine Sodium 0.15 MG DAILY AC  07 AC 
 
  PO   0554
 
Losartan Potassium 25 MG DAILY 930 DC 
 
  PO   
 
Nitroglycerin 0.5 GM Q6P PRN  1300 AC 
 
  TOP   
 
Omeprazole 40 MG DAILY AC  07 AC 
 
  PO   0554
 
Pantoprazole Sodium 40 MG BID  2100 DC 
 
  IV   0909
 
Prednisone 10 MG DAILY 900 AC 
 
  PO  09  
 
Prednisone 20 MG DAILY 900 AC 
 
  PO  09  
 
Prednisone 30 MG DAILY  09 DC 
 
  PO  0901  0816
 
Ramelteon 8 MG AT BEDTIME AS NEED.. 2215 AC 
 
  PO   215
 
Ranolazine 500 MG BID 2100 AC 
 
  PO   0816
 
Sertraline HCl 50 MG DAILY  09 AC 
 
  PO   0815
 
Tamsulosin HCl 0.4 MG AT BEDTIME 2100 AC 
 
  PO   2157
 
Tiotropium [place] 1 PUF DAILY  0900 AC 
 
  INH   0921
 
 
 
 
Last 24 Hrs of Lab/[place] Results
Last 24 Hrs of Labs/Mics:
 Laboratory Tests
 
18 0625:
Anion Gap 6, Estimated GFR 42  L, BUN/Creatinine Ratio 22.5, Magnesium 1.7
 
 
Assessment/Plan
Assessment:
Patient is a 73-year-old male presented with chief complaints of gradually 
progressive shortness of breath.
 
Past medical history-
* Small Cell lung CA (last dose of radiation 3 months ago),
* IDDM (dx 3months ago),
* Ischemic cardiomyopathy with EF 20% s/p defibrillator placement (2018),
* s/p carotid endarterectomy right side (),
* CABG (), CAD with stents (), 
* h/o DVT (5 yrs ago), 
* COPD, 
* Hypothyroidism
* Hypertension
* BPH
* Obstructive sleep apnea on nocturnal CPAP 
 
Assessment and plan
CHF exacerbation with ischemic cardiomyopathy with ejection fraction of 20% 
status post AICD 2018
Patient is treated with Lasix.  Patient's IV Lasix 40 twice daily change to 60 
p.o. twice daily after discussing with cardiology.  Patient's lower extremity 
swelling has come down.  Advised continue
Compression stockings.
Strict I's and O's
Daily weights
Leg elevation
Patient's aortic valve area has been improved from 0.6 to 2 cm after starting 
him on dobutamine which signifies that he does not have moderate/severe aortic 
stenosis.  Results of echocardiogram was discussed with the patient.  Dobutamine
drip was stopped.  Patient should be on ACE inhibitors but given that his 
chronic kidney disease at this point we will hold a/Arps.
 
COPD exacerbation
Pulmonology on board
Continue TRC nebulization
 
Diet-heart healthy diet
CODE STATUS DNR/DNI
DVT prophylaxis heparin
Problem List:
 1. CKD stage 3 secondary to diabetes
 
 2. CHF exacerbation
 
 3. COPD exacerbation
 
Pain Ratin
Pain Location:
none
Pain Goal: Remain pain free
Pain Plan:
tylenol
Tomorrow's Labs & Rationales:
cbc,bep
 
 
Marlee Lamar 18 1352:
Attending MD Review Statement
 
Attending Statement
Attending MD Statement: examined this patient, discuss w/resident/PA/NP, agreed 
w/resident/PA/NP, reviewed EMR data (avail), discussed with nursing, discussed 
with case mgmt
Attending Assessment/Plan:
Acute CHF exacerbation with CKD with resistantce to diuresis- pt was started on 
dobutamine drip to help with diuresis. Repeat ECHO while on dobutamine drip 
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 
cont with medical management to improve his cardiac function. Stopped dobutamine
drip and switched to po lasix 60mg bid today. will see how he does with that. 
Cardio consult appreciated. d/w pt the care plan.

## 2018-08-31 VITALS — DIASTOLIC BLOOD PRESSURE: 64 MMHG | SYSTOLIC BLOOD PRESSURE: 118 MMHG

## 2018-08-31 VITALS — SYSTOLIC BLOOD PRESSURE: 132 MMHG | DIASTOLIC BLOOD PRESSURE: 64 MMHG

## 2018-08-31 VITALS — SYSTOLIC BLOOD PRESSURE: 106 MMHG | DIASTOLIC BLOOD PRESSURE: 52 MMHG

## 2018-08-31 VITALS — SYSTOLIC BLOOD PRESSURE: 108 MMHG | DIASTOLIC BLOOD PRESSURE: 68 MMHG

## 2018-08-31 VITALS — SYSTOLIC BLOOD PRESSURE: 103 MMHG | DIASTOLIC BLOOD PRESSURE: 55 MMHG

## 2018-08-31 VITALS — SYSTOLIC BLOOD PRESSURE: 115 MMHG | DIASTOLIC BLOOD PRESSURE: 62 MMHG

## 2018-08-31 VITALS — DIASTOLIC BLOOD PRESSURE: 60 MMHG | SYSTOLIC BLOOD PRESSURE: 108 MMHG

## 2018-08-31 VITALS — SYSTOLIC BLOOD PRESSURE: 118 MMHG | DIASTOLIC BLOOD PRESSURE: 68 MMHG

## 2018-08-31 VITALS — DIASTOLIC BLOOD PRESSURE: 56 MMHG | SYSTOLIC BLOOD PRESSURE: 110 MMHG

## 2018-08-31 VITALS — SYSTOLIC BLOOD PRESSURE: 120 MMHG | DIASTOLIC BLOOD PRESSURE: 62 MMHG

## 2018-08-31 NOTE — PN- CARDIOLOGY
Subjective
Subjective:
 
Patient reports orthopnea improving but still with significant lower extremity 
edema
 
Objective
Vital Signs and I&Os
Vital Signs
 
 
Date Time Temp Pulse Resp B/P B/P Pulse O2 O2 Flow FiO2
 
     Mean Ox Delivery Rate 
 
08/31 0850    132/64     
 
08/31 0850    132/64     
 
08/31 0820      94 Room Air  
 
08/31 0630 98.2 72 18 132/64  93 Room Air  
 
08/30 2253 97.9 80 16 118/62  91 Room Air  
 
08/30 2032       Room Air  
 
08/30 2026  67  108/58     
 
08/30 2026  67  108/58     
 
08/30 1605      93 Room Air  
 
08/30 1600       Room Air  
 
08/30 1436 98.6 67 16 108/58  92 Room Air  
 
 
 Intake & Output
 
 
 08/31 1600 08/31 0800 08/31 0000 08/30 1600 08/30 0800 08/30 0000
 
Intake Total  425 320 927 389.6 254.8
 
Output Total  1600 800  400 400
 
Balance  -1175 -480 927 -10.4 -145.2
 
       
 
Intake, IV    67 269.6 134.8
 
Intake, Oral  425 320 860 120 120
 
Output, Urine  1600 800  400 400
 
Patient      247 lb
 
Weight      
 
 
 
Physical Exam:
 
General: no apparent distress. Alert.
Eyes: No obvious scleral icterus.
HEENT: No jugular venous distention or abnormal jugular venous pulsations.
Cardiovascular: Normal intensity S1/S2. Regular. ICD noted, 2 out of 6 systolic 
murmur
Respiratory: No rales or rhonchi
Abdomen: Soft, nontender with no guarding or rebound tenderness.
Musculoskeletal: No clubbing or cyanosis noted; 2+ lower extremity edema
Skin: warm
Neurologic: No gross focal deficits noted.
Current Medications:
 Current Medications
 
 
  Sig/Ly Start time  Last
 
Medication Dose Route Stop Time Status Admin
 
Acetaminophen 650 MG Q6P PRN 08/19 2045 AC 
 
  PO   
 
Albuterol Sulfate 2 PUF Q4P PRN 08/21 1700 AC 08/28
 
  INH   2059
 
Albuterol Sulfate 3 ML EVERY 4 HRS/AWAKE 08/20 2000 AC 08/31
 
  INH   0820
 
Aspirin 81 MG DAILY 08/20 0900 AC 08/31
 
  PO   0849
 
Atorvastatin Calcium 40 MG DAILY 08/20 0900 AC 08/31
 
  PO   0850
 
Benzonatate 100 MG TID PRN 08/19 2315 AC 08/30
 
  PO   2026
 
Budesonide/ 2 PUF BID 08/19 2100 AC 08/31
 
Formoterol Fumarate  INH   0849
 
Carvedilol 6.25 MG BID 08/19 2100 AC 08/31
 
  PO   0850
 
Clopidogrel Bisulfate 75 MG DAILY 08/20 0900 AC 08/31
 
  PO   0850
 
Furosemide 60 MG BID 08/30 2100 AC 08/31
 
  PO   0850
 
Guaifenesin 600 MG Q12 08/24 1037 AC 08/31
 
  PO   0850
 
Insulin Aspart 0 TIDAC/HS 08/21 2100 AC 08/31
 
  SC   0804
 
Insulin Detemir 25 UNITS QAM 08/22 0900 AC 08/31
 
  SC   0849
 
Levothyroxine Sodium 0.15 MG DAILY AC 08/20 0700 AC 08/31
 
  PO   0614
 
Nitroglycerin 0.5 GM Q6P PRN 08/23 1300 AC 
 
  TOP   
 
Omeprazole 40 MG DAILY AC 08/30 0700 AC 08/31
 
  PO   0614
 
Patient Medication  1 ED ONE ONE 08/31 0930 DC 
 
Teaching  ED 08/31 0931  
 
Prednisone 10 MG DAILY 09/01 0900 AC 
 
  PO 09/01 0901  
 
Prednisone 20 MG DAILY 08/31 0900 DC 08/31
 
  PO 08/31 0901  0850
 
Ramelteon 8 MG AT BEDTIME AS NEED.. 08/22 2215 AC 08/29
 
  PO   2157
 
Ranolazine 500 MG BID 08/19 2100 AC 08/31
 
  PO   0850
 
Sertraline HCl 50 MG DAILY 08/20 0900 AC 08/31
 
  PO   0850
 
Tamsulosin HCl 0.4 MG AT BEDTIME 08/19 2100 AC 08/30
 
  PO   2026
 
Tiotropium [place] 1 PUF DAILY 08/20 0900 AC 08/31
 
  INH   0849
 
 
 
 
Results
Last 48 Hrs of Labs/Mics:
 Laboratory Tests
 
08/31/18 1020:
Sodium Pending, Potassium Pending, Chloride Pending, Carbon Dioxide Pending, 
Anion Gap Pending, BUN Pending, Creatinine Pending, BUN/Creatinine Ratio Pending
 
08/30/18 0625:
Anion Gap 6, Estimated GFR 42  L, BUN/Creatinine Ratio 22.5, Magnesium 1.7
 
Recent Imaging Studies:
 
Telemetry tracings were personally reviewed and shows sinus rhythm with PVCs
 
Assessment/Plan
Assessment/Plan
 
1.  Shortness of breath likely multifactorial
2.  Ischemic cardiomyopathy with prior CABG/PCI
3.  AICD in situ
4.  Abdominal aortic aneurysm status post prior EVAR
5.  Aortic stenosis; moderate
6.  Small cell lung CA
7.  Hypothyroidism
8.  Not on ACE inhibitor per reoprt due to CKD with hyperkalemia
9.  Acute on chronic systolic congestive heart failure
10. COPD/JEREMY
11.  Diabetes
 
While the patient is improving he is still volume overloaded; discussed with 
nephrology and the medical team and the patient will likely benefit from further
diuresis with inotropic support until he is back to baseline; recommend 
reinitiating his dobutamine infusion; metabolic panel from today is still 
pending but will likely also benefit from going back on twice daily IV Lasix for
now; daily metabolic panels should be checked along with strict I's and O's and 
daily weights. ICD interrogation report was personally reviewed and shows no 
significant events. When the patient is at baseline he will likely benefit from 
the initiation of Entresto.
 
 
James Mc MD [facility]
Continue telemetry? Yes

## 2018-08-31 NOTE — PN- PULMONARY
Subjective
HPI/Critical Care Issues:
Shortness breath is markedly improved he continues to diurese to creatinine has 
increased from 1.3-1.6
 
Objective
Current Medications:
 Current Medications
 
 
  Sig/Ly Start time  Last
 
Medication Dose Route Stop Time Status Admin
 
Acetaminophen 650 MG Q6P PRN 08/19 2045 AC 
 
  PO   
 
Albuterol Sulfate 2 PUF Q4P PRN 08/21 1700 AC 08/28
 
  INH   2059
 
Albuterol Sulfate 3 ML EVERY 4 HRS/AWAKE 08/20 2000 AC 08/31
 
  INH   0820
 
Aspirin 81 MG DAILY 08/20 0900 AC 08/30
 
  PO   0816
 
Atorvastatin Calcium 40 MG DAILY 08/20 0900 AC 08/30
 
  PO   0816
 
Benzonatate 100 MG TID PRN 08/19 2315 AC 08/30
 
  PO   2026
 
Budesonide/ 2 PUF BID 08/19 2100 AC 08/30
 
Formoterol Fumarate  INH   2026
 
Carvedilol 6.25 MG BID 08/19 2100 AC 08/30
 
  PO   2026
 
Clopidogrel Bisulfate 75 MG DAILY 08/20 0900 AC 08/30
 
  PO   0816
 
Dobutamine HCl 250 MG Q4H 08/27 1300 DC 08/30
 
Dextrose/Water 250 ML IV   0914
 
Furosemide 60 MG BID 08/30 2100 AC 08/30
 
  PO   2026
 
Furosemide 40 MG BID 08/29 2100 DC 08/30
 
  IV   0817
 
Guaifenesin 600 MG Q12 08/24 1037 AC 08/30
 
  PO   2026
 
Insulin Aspart 0 TIDAC/HS 08/21 2100 AC 08/31
 
  SC   0804
 
Insulin Detemir 25 UNITS QAM 08/22 0900 AC 08/30
 
  SC   0817
 
Levothyroxine Sodium 0.15 MG DAILY AC 08/20 0700 AC 08/31
 
  PO   0614
 
Losartan Potassium 25 MG DAILY 08/30 0930 DC 
 
  PO   
 
Nitroglycerin 0.5 GM Q6P PRN 08/23 1300 AC 
 
  TOP   
 
Omeprazole 40 MG DAILY AC 08/30 0700 AC 08/31
 
  PO   0614
 
Prednisone 10 MG DAILY 09/01 0900 AC 
 
  PO 09/01 0901  
 
Prednisone 20 MG DAILY 08/31 0900 AC 
 
  PO 08/31 0901  
 
Prednisone 30 MG DAILY 08/30 0900 DC 08/30
 
  PO 08/30 0901  0816
 
Ramelteon 8 MG AT BEDTIME AS NEED.. 08/22 2215 AC 08/29
 
  PO   2157
 
Ranolazine 500 MG BID 08/19 2100 AC 08/30
 
  PO   2026
 
Sertraline HCl 50 MG DAILY 08/20 0900 AC 08/30
 
  PO   0815
 
Tamsulosin HCl 0.4 MG AT BEDTIME 08/19 2100 AC 08/30
 
  PO   2026
 
Tiotropium [place] 1 PUF DAILY 08/20 0900 AC 08/30
 
  INH   0921
 
 
 
 
Vital Signs & I&O
Last 24 Hrs of Vitals and I&O:
 Vital Signs
 
 
Date Time Temp Pulse Resp B/P B/P Pulse O2 O2 Flow FiO2
 
     Mean Ox Delivery Rate 
 
08/31 0630 98.2 72 18 132/64  93 Room Air  
 
08/30 2253 97.9 80 16 118/62  91 Room Air  
 
08/30 2032       Room Air  
 
08/30 2026  67  108/58     
 
08/30 2026  67  108/58     
 
08/30 1605      93 Room Air  
 
08/30 1600       Room Air  
 
08/30 1436 98.6 67 16 108/58  92 Room Air  
 
08/30 0914 98.1 74 18 120/68     
 
 
 Intake & Output
 
 
 08/31 1600 08/31 0800 08/31 0000
 
Intake Total  425 320
 
Output Total  1600 800
 
Balance  -1175 -480
 
    
 
Intake, Oral  425 320
 
Output, Urine  1600 800
 
 
Tumor oximetry 93% exam of his chest shows clear lung fields are no wheezes 
cardiac exam shows normal S1 and S2 lower extremity edema is markedly improved
 
Impression/Plan
 
Impression/Plan
Impression/Plan:
73-year-old gentleman with history of COPD congestive heart failure has improved
significantly with diuresis.
Recommendations:
Complete prednisone taper.  Reduce Lasix with increasing creatinine.  Patient 
was advised to utilize compression stockings for edema control and instructed as
to their usage.  She can be followed in the office as an outpatient in the next 
week

## 2018-08-31 NOTE — PN- STUDENT
Subjective
Subjective:
The patient is seen and examined this morning. He states that his SOB is no 
worse but reports persistent orthopnea. He feels that the swelling in his legs 
has improved but is still not back to his baseline. Reports that he would like 
to be discharged home. He denies any CP, palpitations, fevers, or chills. 
 
Objective
Objective:
Vitals: temperature 98.2F, pulse 72, respiratory rate 18, blood pressure 132/64,
oxygen saturation 93% on room air
 
General: well-developed well-nourished male sitting up in bed, mildly increased 
work of breathing at the patient's baseline, no apparent respiratory distress 
CV: regular rate and rhythm, 2/6 systolic ejection murmur heard best over the 
left upper sternal border, normal S1 and S2
Pulmonary: mild scant expiratory wheezing that is much improved from previous 
exam, patient has some increased work of breathing at his baseline but there is 
no evidenc of accessory muscle use, no respiratory distress, no crackles 
appreciated
Extremities: 2+ pitting edema of the lower extremities bilaterally, DP 1+ on the
right and not palpable on the left due to persistent edema, left DP pulse able 
to be detected with doppler yesterday 
 
Assessment/Plan
Assessment:
74 y/o male with history of CAD s/p CABG (1994) and stent placement with AICD in
place, ischemic cardiomyopathy with estimated EF of 25-30% per echocardiogram on
8/27, HTN, COPD with a > 50 pack year smoking history (quit smoking 8 months ago
) not on home oxygen, small cell lung cancer s/p radiation treatment (last dose 
of radiation 3 months ago), JEREMY, hypothyroidim, CKD stage III, and IDDM admitted
on 8/19/2018 for worsneing SOB.
Plan:
Problem list: 
 
#COPD exacerbation - patient was evaluated by pulmonologist Dr. Harris today (8
/31) who recommended that the patient complete his prednisone taper. He is on 10
mg prednisone today. The patient can follow up in the office as an outpatient. 
Continue with TRC as needed in the hospital. 
 
#CHF - patient continues to be volume overloaded and was in positive fluid 
balance of 436.6 L yesterday. Cardiology would like the patient to have a net 
fluid output of approximately 1L per day. The  patient was evaluated by Dr. Mc today (8/31) who recommended restarting dobutamine drip for continued 
diuresis. We will also restart IV Lasix (60 mg IV BID). Monitor strict Is and Os
as well as daily weights and metabolic panels. Per cardiology patient will be a 
good candidate for Entresto once he is discharged from the hospital. Patient 
will be referred to the CHF clinic for outpatient follow up. He is encouraged to
wear compression stockings and to elevate legs to help with edema. 
 
#CKD III - patient has been evaluated by Dr. Saxena today (8/31) who agrees that 
the patient will likely need to tolerate some prerenal azotemia with continued 
diuresis. Repeat creatinine today was 1.7. Metabolic panel will be monitored 
daily. Per cardiology report from yesterday patient may not be a good candidate 
for ACE inhibitor given his CKD. Potassium today was 3.8. 
 
#IDDM - continue with sliding scale insulin and 25 units long-acting insulin 
detemir. Check blood glucose prior to each meal. 
 
#Hypothyroidism - continue with levothyroxine 0.15 mg QD 
 
#Thrombocytopenia - platelets last measured at 108 on 8/28. 
 
#DVT prophylaxis - compression stockings and ambulation as tolerated 
 
#Diet - CHF diet 
 
#Activity - OOB as tolerated

## 2018-08-31 NOTE — PN- NEPHROLOGY
Assessment/Plan Nephrology
Assessment:
Still volume overloaded although better.  No new labs today.  
Suggestion:
As stated yesterday may need to tolerate some prerenal azotemia to keep out of 
symptomatic CHF.  Discussed with cardiology and could consider another day or 
two of dobutamine/IV Lasix to see if we can get his volume status better. 
 
 
Subjective
Subjective:
Patient taken off Dobutamine, changed to po Lasix.  UO down yesterday but 
patient states some of urine discarded and not recorded.  Still gets SOB when 
walks. 
 
Objective
Vital Signs and I&Os
Vital Signs
 
 
Date Time Temp Pulse Resp B/P B/P Pulse O2 O2 Flow FiO2
 
     Mean Ox Delivery Rate 
 
08/31 0850    132/64     
 
08/31 0850    132/64     
 
08/31 0820      94 Room Air  
 
08/31 0630 98.2 72 18 132/64  93 Room Air  
 
08/30 2253 97.9 80 16 118/62  91 Room Air  
 
08/30 2032       Room Air  
 
08/30 2026  67  108/58     
 
08/30 2026  67  108/58     
 
08/30 1605      93 Room Air  
 
08/30 1600       Room Air  
 
08/30 1436 98.6 67 16 108/58  92 Room Air  
 
 
 Intake & Output
 
 
 08/31 1600 08/31 0400 08/30 1600 08/30 0400 08/29 1600 08/29 0400
 
Intake Total .6 254.8 1490 300
 
Output Total 1600 800  1975
 
Balance -1175 -480 916.6 -145.2 -210 -1675
 
       
 
Intake, IV   336.6 134.8 280 
 
Intake, Oral 425 320  300
 
Number     1 
 
Bowel      
 
Movements      
 
Output, Urine 1600 800  1975
 
Patient    247 lb  247 lb
 
Weight      
 
 
 
Physical Exam:
NAD VS: VS as above  Lungs: clear CV: 3/6 CARLOS ENRIQUE Abd: nontender Exts: 2 + edema  
Neuro A&O
Current Medications:
 Current Medications
 
 
  Sig/Ly Start time  Last
 
Medication Dose Route Stop Time Status Admin
 
Acetaminophen 650 MG Q6P PRN 08/19 2045 AC 
 
  PO   
 
Albuterol Sulfate 2 PUF Q4P PRN 08/21 1700 AC 08/28
 
  INH   2059
 
Albuterol Sulfate 3 ML EVERY 4 HRS/AWAKE 08/20 2000 AC 08/31
 
  INH   0820
 
Aspirin 81 MG DAILY 08/20 0900 AC 08/31
 
  PO   0849
 
Atorvastatin Calcium 40 MG DAILY 08/20 0900 AC 08/31
 
  PO   0850
 
Benzonatate 100 MG TID PRN 08/19 2315 AC 08/30
 
  PO   2026
 
Budesonide/ 2 PUF BID 08/19 2100 AC 08/31
 
Formoterol Fumarate  INH   0849
 
Carvedilol 6.25 MG BID 08/19 2100 AC 08/31
 
  PO   0850
 
Clopidogrel Bisulfate 75 MG DAILY 08/20 0900 AC 08/31
 
  PO   0850
 
Furosemide 60 MG BID 08/30 2100 AC 08/31
 
  PO   0850
 
Furosemide 40 MG BID 08/29 2100 DC 08/30
 
  IV   0817
 
Guaifenesin 600 MG Q12 08/24 1037 AC 08/31
 
  PO   0850
 
Insulin Aspart 0 TIDAC/HS 08/21 2100 AC 08/31
 
  SC   0804
 
Insulin Detemir 25 UNITS QAM 08/22 0900 AC 08/31
 
  SC   0849
 
Levothyroxine Sodium 0.15 MG DAILY AC 08/20 0700 AC 08/31
 
  PO   0614
 
Losartan Potassium 25 MG DAILY 08/30 0930 DC 
 
  PO   
 
Nitroglycerin 0.5 GM Q6P PRN 08/23 1300 AC 
 
  TOP   
 
Omeprazole 40 MG DAILY AC 08/30 0700 AC 08/31
 
  PO   0614
 
Patient Medication  1 ED ONE ONE 08/31 0930 DC 
 
Teaching  ED 08/31 0931  
 
Prednisone 10 MG DAILY 09/01 0900 AC 
 
  PO 09/01 0901  
 
Prednisone 20 MG DAILY 08/31 0900 DC 08/31
 
  PO 08/31 0901  0850
 
Ramelteon 8 MG AT BEDTIME AS NEED.. 08/22 2215 AC 08/29
 
  PO   2157
 
Ranolazine 500 MG BID 08/19 2100 AC 08/31
 
  PO   0850
 
Sertraline HCl 50 MG DAILY 08/20 0900 AC 08/31
 
  PO   0850
 
Tamsulosin HCl 0.4 MG AT BEDTIME 08/19 2100 AC 08/30
 
  PO   2026
 
Tiotropium [place] 1 PUF DAILY 08/20 0900 AC 08/31
 
  INH   0849
 
 
 
 
Results
Pertinent Lab Results:
 Laboratory Tests
 
 
 08/30 08/29
 
 0625 0628
 
Chemistry  
 
  Sodium (137 - 145 mmol/L) 137 134  L
 
  Potassium (3.5 - 5.1 mmol/L) 3.7 4.2
 
  Chloride (98 - 107 mmol/L) 99 98
 
  Carbon Dioxide (22 - 30 mmol/L) 31  H 31  H
 
  Anion Gap (5 - 16) 6 5
 
  BUN (9 - 20 mg/dL) 36  H 38  H
 
  Creatinine (0.7 - 1.2 mg/dL) 1.6  H 1.3  H
 
  Estimated GFR (>60 ml/min) 42  L 54  L
 
  BUN/Creatinine Ratio (7 - 25 %) 22.5 29.2  H
 
  Magnesium (1.6 - 2.3 mg/dL) 1.7 1.8

## 2018-08-31 NOTE — PATIENT DISCHARGE INSTRUCTIONS
**See Addendum**
Discharge Instructions
 
General Discharge Information
You were seen/treated for:
COPD EXACERBATION
CHF
Special Instructions:
1- PLEASE FOLLOW UP WITH YOUR CARDIOLOGIST IN 1 WEEK OF DISCHARGE
2- PLEASE FOLLOW UP WITH YOUR NEPHROLOGIST IN 1 WEEK MF DISCHARGE
3- PLEASE FOLLOW UP WITH YOUR PCP IN 1 WEEK OF DISCHARGE
 
Diet
Continue normal diet: No
Recommended Diet: Diabetic
 
Acute Coronary Syndrome
 
Inclusion Criteria
At DC or during hospital stay patient has or had the following:
ACS DIAGNOSIS No
 
Discharge Core Measures
Meds if any: Prescribed or Continued at Discharge
Meds if any: NOT Prescribed or Continued at Discharge
 
Congestive Heart Failure
 
Inclusion Criteria
At DC or during hospital stay patient has or had the following:
CHF DIAGNOSIS Yes
 
Discharge Core Measures
Meds if any: Prescribed or Continued at Discharge
ACE/ARB for EF <40% No (CKD STAGE 3)
Meds if any: NOT Prescribed or Continued at Discharge
No ACE/ARB d/t Renal Failure/Azotemia
 
Cerebrovascular accident
 
Inclusion Criteria
At DC or during hospital stay patient has or had the following:
CVA/TIA Diagnosis No
 
Discharge Core Measures
Meds if any: Prescribed or Continued at Discharge
Meds if any: NOT Prescribed or Continued at Discharge
 
Venous thromboembolism
 
Inclusion Criteria
VTE Diagnosis No
VTE Type NONE
VTE Confirmed by (Test) NONE
 
Discharge Core Measures
- Per Current guidelines, there needs to be overlap
- treatment for the first 5 days of Warfarin therapy.
- If discharged on Warfarin prior to 5 days of
- overlap therapy, the patient will need to be
- assessed for post discharge needs including
- *Post discharge parental anticoagulation
- *Warfarin and/or parental anticoagulation education
- *Follow up date to check INR post discharge
At least 5 days overlap therapy as Inpatient No
Meds if any: Prescribed or Continued at Discharge
Note: Overlap Therapy is Warfarin and Anticoagulant
Meds if any: NOT Prescribed or Continued at Discharge

## 2018-08-31 NOTE — PN- HOUSESTAFF
Awadalla MD,Lucero 18 0755:
Subjective
Follow-up For:
Congestive heart failure 
Tele-Events Since Last Visit:
No overnight events
Subjective:
Patient was seen and examined, vital signs stable, fluid balance -1175, 
creatinine increased to 1.6.
 
Review of Systems
Constitutional:
Reports: see HPI. 
 
Objective
Last 24 Hrs of Vital Signs/I&O
 Vital Signs
 
 
Date Time Temp Pulse Resp B/P B/P Pulse O2 O2 Flow FiO2
 
     Mean Ox Delivery Rate 
 
 1420 98.6 77 16 103/55  97 Room Air  
 
 0850    132/64     
 
 0850    132/64     
 
 0820      94 Room Air  
 
 0800       Room Air  
 
 0630 98.2 72 18 132/64  93 Room Air  
 
 2253 97.9 80 16 118/62  91 Room Air  
 
 2032       Room Air  
 
 202  67  108/58     
 
 202  67  108/58     
 
 1605      93 Room Air  
 
 1600       Room Air  
 
 
 Intake & Output
 
 
  1600  0800  0000
 
Intake Total 480 425 320
 
Output Total 80 1600 800
 
Balance 400 -1175 -480
 
    
 
Intake, Oral 480 425 320
 
Output, Urine 80 1600 800
 
 
 
 
Physical Exam
General Appearance: Alert, Oriented X3, Cooperative
HEENT: Atraumatic, PERRLA, EOMI, Mucous Membr. moist/pink
Neck: Supple, No JVD
Cardiovascular: Normal S1, Normal S2
Lungs: Clear to Auscultation
Abdomen: Normal Bowel Sounds, Soft, No Tenderness
Extremities: 2 + piting edema bilateral
 
Assessment/Plan
Assessment:
Patient is a 73-year-old male presented with chief complaints of gradually 
progressive shortness of breath.
 
Past medical history-
* Small Cell lung CA (last dose of radiation 3 months ago),
* IDDM (dx 3months ago),
* Ischemic cardiomyopathy with EF 20% s/p defibrillator placement (2018),
* s/p carotid endarterectomy right side (),
* CABG (), CAD with stents (), 
* h/o DVT (5 yrs ago), 
* COPD, 
* Hypothyroidism
* Hypertension
* BPH
* Obstructive sleep apnea on nocturnal CPAP 
 
Assessment and plan
CHF exacerbation with ischemic cardiomyopathy with ejection fraction of 20% 
status post AICD 2018 
restart dobutamine drip at 5 
Restart IV Lasix
Monitor BEP
DC p.o. Lasix 
patient's lower extremity swelling has come down.  Advised continue
Compression stockings.
Strict I's and O's
Daily weights
Leg elevation
Patient's aortic valve area has been improved from 0.6 to 2 cm after starting 
him on dobutamine which signifies that he does not have moderate/severe aortic 
stenosis.  Results of echocardiogram was discussed with the patient.   Patient 
should be on ACE inhibitors but given that his chronic kidney disease at this 
point we will hold a/ABSs.
 
COPD exacerbation
On prednisone taper
Pulmonology on board
Continue TRC nebulization
 
Diet-heart healthy diet
CODE STATUS DNR/DNI
DVT prophylaxis heparin
Problem List:
 1. CHF exacerbation
 
 2. COPD exacerbation
 
Pain Ratin
Pain Location:
N/A
Pain Goal: Remain pain free
Pain Plan:
Pathway
Tomorrow's Labs & Rationales:
CBC
BEP
DVT/Prophylaxis: mechanical, pharmacological
 
 
Marlee Lamar 18 1111:
Attending MD Review Statement
 
Attending Statement
Attending MD Statement: examined this patient, discuss w/resident/PA/NP, agreed 
w/resident/PA/NP, reviewed EMR data (avail), discussed with nursing, discussed 
with case mgmt
Attending Assessment/Plan:
Acute systolic CHF exacerbation with CKD with resistantce to diuresis- pt was 
started on dobutamine drip to help with diuresis. Repeat ECHO while on 
dobutamine drip shows that pt has Psedo arotic stenosis and d/w cardiology and 
plan will be to cont with medical management to improve his cardiac function. 
Restart dobutamine per cardiology and cont lasix. F/u on BEP results.
 
d/w pt the care plan.

## 2018-09-01 VITALS — SYSTOLIC BLOOD PRESSURE: 120 MMHG | DIASTOLIC BLOOD PRESSURE: 62 MMHG

## 2018-09-01 VITALS — DIASTOLIC BLOOD PRESSURE: 72 MMHG | SYSTOLIC BLOOD PRESSURE: 122 MMHG

## 2018-09-01 VITALS — DIASTOLIC BLOOD PRESSURE: 72 MMHG | SYSTOLIC BLOOD PRESSURE: 152 MMHG

## 2018-09-01 VITALS — DIASTOLIC BLOOD PRESSURE: 50 MMHG | SYSTOLIC BLOOD PRESSURE: 100 MMHG

## 2018-09-01 VITALS — DIASTOLIC BLOOD PRESSURE: 68 MMHG | SYSTOLIC BLOOD PRESSURE: 106 MMHG

## 2018-09-01 LAB
ABSOLUTE GRANULOCYTE CT: 10.3 /CUMM (ref 1.4–6.5)
BASOPHILS # BLD: 0 /CUMM (ref 0–0.2)
BASOPHILS NFR BLD: 0 % (ref 0–2)
EOSINOPHIL # BLD: 0 /CUMM (ref 0–0.7)
EOSINOPHIL NFR BLD: 0.1 % (ref 0–5)
ERYTHROCYTE [DISTWIDTH] IN BLOOD BY AUTOMATED COUNT: 16.9 % (ref 11.5–14.5)
GRANULOCYTES NFR BLD: 86.8 % (ref 42.2–75.2)
HCT VFR BLD CALC: 37 % (ref 42–52)
LYMPHOCYTES # BLD: 0.8 /CUMM (ref 1.2–3.4)
MCH RBC QN AUTO: 34.4 PG (ref 27–31)
MCHC RBC AUTO-ENTMCNC: 33.7 G/DL (ref 33–37)
MCV RBC AUTO: 102.2 FL (ref 80–94)
MONOCYTES # BLD: 0.8 /CUMM (ref 0.1–0.6)
PLATELET # BLD: 106 /CUMM (ref 130–400)
PMV BLD AUTO: 8.8 FL (ref 7.4–10.4)
RED BLOOD CELL CT: 3.62 /CUMM (ref 4.7–6.1)
WBC # BLD AUTO: 11.9 /CUMM (ref 4.8–10.8)

## 2018-09-01 NOTE — PN- HOUSESTAFF
**See Addendum**
Subjective
Follow-up For:
Congestive heart failure 
Subjective:
Afebrile overnight. Patient is seen and examined this morning. Patient blood 
pressure was 90/52 at around 9 AM but has come up to 100/50 after checking in 
one hour. Patient denies any chest pain or palpitations this morning. Patient 
does state he has some broncial congestion and has shortness of breath at 
baseline but otherwise is having stable breathing. Patient also denies any 
fevers, chills, fatigue, dizziness, ligtheadedness, and n/v.
 
Review of Systems
Constitutional:
Reports: see HPI. 
 
Objective
Last 24 Hrs of Vital Signs/I&O
 Vital Signs
 
 
Date Time Temp Pulse Resp B/P B/P Pulse O2 O2 Flow FiO2
 
     Mean Ox Delivery Rate 
 
 0835      94 Room Air Room Air 
 
 0554 97.8 77 18 122/72  93 Room Air  
 
 0203 97.7 75 18 120/62  95 Room Air  
 
 0106  77       
 
 2150 98.2 92 18 108/68  97 Room Air  
 
 2040 97.5 81 19 120/62  94 Room Air Room Air 
 
 1945 98.1 77 18 106/52  94 Room Air  
 
 1923      95 Room Air Room Air 
 
 1840 98.2 77 18 118/68  93 Room Air Room Air 
 
 1825 98.2 79 18 118/64  94 Room Air Room Air 
 
 1810 98.3 83 18 115/62  93 Room Air Room Air 
 
 1755 98.0 81 18 108/60  93 Room Air Room Air 
 
 1740  80 18 110/56     
 
 1740  80 18 110/56  94 Room Air Room Air 
 
 1420 98.6 77 16 103/55  97 Room Air  
 
 0850    132/64     
 
 0850    132/64     
 
 
 Intake & Output
 
 
  1600  0800 09 0000
 
Intake Total  519.6 256.8
 
Output Total  1000 1400
 
Balance  -480.4 -1143.2
 
    
 
Intake, IV  269.6 156.8
 
Intake, Oral  250 100
 
Output, Urine  1000 1400
 
Patient   233 lb
 
Weight   
 
Weight   Bed scale
 
Measurement   
 
Method   
 
 
 
 
Physical Exam
General Appearance: Alert, Oriented X3, Cooperative, No Acute Distress
HEENT: Atraumatic
Neck: Supple, No JVD
Cardiovascular: Regular Rate, Normal S1, Normal S2
Lungs: decreased breath sounds noted b/l
Neurological: Normal Speech
Extremities: 2 + piting edema bilateral
 
Assessment/Plan
Assessment:
73-year-old male presented with chief complaints of gradually progressive 
shortness of breath.
 
Past medical history-
* Small Cell lung CA (last dose of radiation 3 months ago),
* IDDM (dx 3months ago),
* Ischemic cardiomyopathy with EF 20% s/p defibrillator placement (2018),
* s/p carotid endarterectomy right side (),
* CABG (), CAD with stents (), 
* h/o DVT (5 yrs ago), 
* COPD, 
* Hypothyroidism
* Hypertension
* BPH
* Obstructive sleep apnea on nocturnal CPAP 
 
#CHF exacerbation with ischemic cardiomyopathy with ejection fraction of 20% 
status post AICD 2018 
-Restarted dobutamine drip at 5 
-Restarted IV Lasix 60 MG bid
-Monitor BEP
-Patient's lower extremity swelling has come down.  Advised continue
-Compression stockings.
-Strict I's and O's
-Daily weights
-Leg elevation
-Patient's aortic valve area has been improved from 0.6 to 2 cm after starting 
him on dobutamine which signifies that he does not have moderate/severe aortic 
stenosis.  Results of echocardiogram was discussed with the patient.   Patient 
should be on ACE inhibitors but given that his chronic kidney disease at this 
point we will hold a/ABSs.
 
#COPD exacerbation
-On prednisone taper
-Pulmonology on board
-Continue TRC nebulization
 
Heart healthy diet
DNR/DNI
DVT prophylaxis heparin
Problem List:
 1. COPD exacerbation
 
 2. CHF exacerbation
 
Pain Ratin
Pain Location:
na
Pain Goal: Remain pain free
Pain Plan:
na
Tomorrow's Labs & Rationales:
routine
 
routine

## 2018-09-01 NOTE — PN- CARDIOLOGY
Subjective
Subjective:
The patient is sitting in the bedside chair.  He feels slightly better today.  
Diuresis noted.  Currently maintained on dobutamine.
 
Objective
Vital Signs and I&Os
Vital Signs
 
 
Date Time Temp Pulse Resp B/P B/P Pulse O2 O2 Flow FiO2
 
     Mean Ox Delivery Rate 
 
09/01 1135  87  112/70     
 
09/01 1135  87  112/70     
 
09/01 1002  87  100/50     
 
09/01 0852  77  90/52     
 
09/01 0835      94 Room Air Room Air 
 
09/01 0800      94 Room Air Room Air 
 
09/01 0554 97.8 77 18 122/72  93 Room Air  
 
09/01 0203 97.7 75 18 120/62  95 Room Air  
 
09/01 0106  77       
 
08/31 2150 98.2 92 18 108/68  97 Room Air  
 
08/31 2040 97.5 81 19 120/62  94 Room Air Room Air 
 
08/31 1945 98.1 77 18 106/52  94 Room Air  
 
08/31 1923      95 Room Air Room Air 
 
08/31 1840 98.2 77 18 118/68  93 Room Air Room Air 
 
08/31 1825 98.2 79 18 118/64  94 Room Air Room Air 
 
08/31 1810 98.3 83 18 115/62  93 Room Air Room Air 
 
08/31 1755 98.0 81 18 108/60  93 Room Air Room Air 
 
08/31 1740  80 18 110/56     
 
08/31 1740  80 18 110/56  94 Room Air Room Air 
 
08/31 1420 98.6 77 16 103/55  97 Room Air  
 
 
 Intake & Output
 
 
 09/01 1600 09/01 0800 09/01 0000 08/31 1600 08/31 0800 08/31 0000
 
Intake Total  519.6 256.8 480 425 320
 
Output Total  1000 1400 80 1600 800
 
Balance  -480.4 -1143.2 400 -1175 -480
 
       
 
Intake, IV  269.6 156.8   
 
Intake, Oral  250 100 480 425 320
 
Output, Urine  1000 1400 80 1600 800
 
Patient   233 lb   
 
Weight      
 
Weight   Bed scale   
 
Measurement      
 
Method      
 
 
 
Physical Exam:
General: no apparent distress. Alert.
Eyes: No obvious scleral icterus.
HEENT: No jugular venous distention or abnormal jugular venous pulsations.
Cardiovascular: Normal intensity S1/S2. Regular. ICD noted, 2 out of 6 systolic 
murmur
Respiratory: No rales or rhonchi
Abdomen: Soft, nontender with no guarding or rebound tenderness.
Musculoskeletal: No clubbing or cyanosis noted; 2+ lower extremity edema
Skin: warm
Neurologic: No gross focal deficits noted.
Current Medications:
 Current Medications
 
 
  Sig/Ly Start time  Last
 
Medication Dose Route Stop Time Status Admin
 
Acetaminophen 650 MG Q6P PRN 08/19 2045 AC 
 
  PO   
 
Albuterol Sulfate 2 PUF Q4P PRN 08/21 1700 AC 08/28
 
  INH   2059
 
Albuterol Sulfate 3 ML EVERY 4 HRS/AWAKE 08/20 2000 AC 09/01
 
  INH   1150
 
Aspirin 81 MG DAILY 08/20 0900 AC 09/01
 
  PO   0841
 
Atorvastatin Calcium 40 MG DAILY 08/20 0900 AC 09/01
 
  PO   0841
 
Benzonatate 0 .STK-MED ONE 08/31 2005 VT 
 
  PO   
 
Benzonatate 100 MG TID PRN 08/19 2315 AC 08/31
 
  PO   2003
 
Budesonide/ 2 PUF BID 08/19 2100 AC 09/01
 
Formoterol Fumarate  INH   0836
 
Carvedilol 6.25 MG BID 08/19 2100 AC 09/01
 
  PO   1135
 
Clopidogrel Bisulfate 75 MG DAILY 08/20 0900 AC 09/01
 
  PO   0842
 
Dobutamine HCl 250 MG Q7H 08/31 1500 AC 09/01
 
Dextrose/Water 250 ML IV   0852
 
Dobutamine HCl 250 MG Q4H 08/31 1430 CAN 
 
Dextrose/Water 250 ML IV   
 
Furosemide 60 MG BID 08/31 2100 AC 09/01
 
  IV   1135
 
Furosemide 60 MG BID 08/30 2100 DC 08/31
 
  PO   0850
 
Guaifenesin 600 MG Q12 08/24 1037 AC 09/01
 
  PO   0842
 
Insulin Aspart 0 TIDAC/HS 08/21 2100 AC 09/01
 
  SC   0807
 
Insulin Detemir 25 UNITS QAM 08/22 0900 AC 09/01
 
  SC   0807
 
Levothyroxine Sodium 0.15 MG DAILY AC 08/20 0700 AC 09/01
 
  PO   0519
 
Nitroglycerin 0.5 GM Q6P PRN 08/23 1300 AC 
 
  TOP   
 
Omeprazole 40 MG DAILY AC 08/30 0700 AC 09/01
 
  PO   0519
 
Potassium Chloride 40 MEQ ONCE ONE 09/01 0900 DC 09/01
 
  PO 09/01 0901  1135
 
Prednisone 10 MG DAILY 09/01 0900 DC 09/01
 
  PO 09/01 0901  0842
 
Ramelteon 8 MG AT BEDTIME AS NEED.. 08/22 2215 AC 08/31
 
  PO   2003
 
Ranolazine 500 MG BID 08/19 2100 AC 09/01
 
  PO   1135
 
Sertraline HCl 50 MG DAILY 08/20 0900 AC 09/01
 
  PO   0842
 
Tamsulosin HCl 0.4 MG AT BEDTIME 08/19 2100 AC 08/31
 
  PO   1954
 
Tiotropium [place] 1 PUF DAILY 08/20 0900 AC 09/01
 
  INH   0836
 
 
 
 
Results
Last 48 Hrs of Labs/Mics:
 Laboratory Tests
 
09/01/18 0621:
Anion Gap 7, Estimated GFR 39  L, BUN/Creatinine Ratio 28.2  H, Magnesium 1.8, 
CBC w Diff NO MAN DIFF REQ, RBC 3.62  L, .2  H, MCH 34.4  H, MCHC 33.7, 
RDW 16.9  H, MPV 8.8, Gran % 86.8  H, Lymphocytes % 6.3  L, Monocytes % 6.8, 
Eosinophils % 0.1, Basophils % 0, Absolute Granulocytes 10.3  H, Absolute 
Lymphocytes 0.8  L, Absolute Monocytes 0.8  H, Absolute Eosinophils 0, Absolute 
Basophils 0
 
08/31/18 1020:
Anion Gap 6, Estimated GFR 39  L, BUN/Creatinine Ratio 28.2  H
 
 
Assessment/Plan
Assessment/Plan
Assessment:
1.  Shortness of breath likely multifactorial
2.  Ischemic cardiomyopathy with prior CABG/PCI
3.  AICD in situ
4.  Abdominal aortic aneurysm status post prior EVAR
5.  Aortic stenosis; moderate
6.  Small cell lung CA
7.  Hypothyroidism
8.  Not on ACE inhibitor per reoprt due to CKD with hyperkalemia
9.  Acute on chronic systolic congestive heart failure
10. COPD/JEREMY
11.  Diabetes
 
Recommendations:
-continue current treatment regimen for the next 48 hours.
-Continue IV dobutamine
-Continue to monitor intakes, outputs, daily weights
-Follow-up labs in the morning
-Further plans tomorrow morning after we see how the patient does over the next 
24 hours.
 
 
Continue telemetry? Yes

## 2018-09-02 VITALS — DIASTOLIC BLOOD PRESSURE: 76 MMHG | SYSTOLIC BLOOD PRESSURE: 122 MMHG

## 2018-09-02 VITALS — SYSTOLIC BLOOD PRESSURE: 116 MMHG | DIASTOLIC BLOOD PRESSURE: 54 MMHG

## 2018-09-02 VITALS — DIASTOLIC BLOOD PRESSURE: 62 MMHG | SYSTOLIC BLOOD PRESSURE: 112 MMHG

## 2018-09-02 VITALS — DIASTOLIC BLOOD PRESSURE: 68 MMHG | SYSTOLIC BLOOD PRESSURE: 108 MMHG

## 2018-09-02 VITALS — SYSTOLIC BLOOD PRESSURE: 110 MMHG | DIASTOLIC BLOOD PRESSURE: 93 MMHG

## 2018-09-02 VITALS — DIASTOLIC BLOOD PRESSURE: 62 MMHG | SYSTOLIC BLOOD PRESSURE: 110 MMHG

## 2018-09-02 LAB
ABSOLUTE GRANULOCYTE CT: 9.4 /CUMM (ref 1.4–6.5)
BASOPHILS # BLD: 0 /CUMM (ref 0–0.2)
BASOPHILS NFR BLD: 0.1 % (ref 0–2)
EOSINOPHIL # BLD: 0.1 /CUMM (ref 0–0.7)
EOSINOPHIL NFR BLD: 0.8 % (ref 0–5)
ERYTHROCYTE [DISTWIDTH] IN BLOOD BY AUTOMATED COUNT: 16.5 % (ref 11.5–14.5)
GRANULOCYTES NFR BLD: 84.9 % (ref 42.2–75.2)
HCT VFR BLD CALC: 38.5 % (ref 42–52)
LYMPHOCYTES # BLD: 0.7 /CUMM (ref 1.2–3.4)
MCH RBC QN AUTO: 34.2 PG (ref 27–31)
MCHC RBC AUTO-ENTMCNC: 33.2 G/DL (ref 33–37)
MCV RBC AUTO: 103.1 FL (ref 80–94)
MONOCYTES # BLD: 0.9 /CUMM (ref 0.1–0.6)
PLATELET # BLD: 100 /CUMM (ref 130–400)
PMV BLD AUTO: 8.9 FL (ref 7.4–10.4)
RED BLOOD CELL CT: 3.73 /CUMM (ref 4.7–6.1)
WBC # BLD AUTO: 11.1 /CUMM (ref 4.8–10.8)

## 2018-09-02 NOTE — PN- HOUSESTAFF
ErwinCrrambo 18 0839:
Subjective
Follow-up For:
CHF exacerbation
Subjective:
Overnight patient was in normal sinus rhythm heart rate ranged between 80-95 
patient has no complaint of this morning.  Patient is inquiring if he can go 
home, understands his diagnosis and prognosis.  Explained to patient that he is 
currently at 33.7 on dobutamine.  Patient has no complaint of today, denies 
chest pain, palpitations, shortness of breath, swelling.
 
Review of Systems
Constitutional:
Denies: chills, diaphoresis, fever. 
Cardiovascular:
Denies: chest pain, edema, orthopena. 
Respiratory:
Denies: cough, short of breath. 
Gastrointestinal:
Denies: abdominal pain, melena, bloody stool, changes in stool. 
 
Objective
Last 24 Hrs of Vital Signs/I&O
 Vital Signs
 
 
Date Time Temp Pulse Resp B/P B/P Pulse O2 O2 Flow FiO2
 
     Mean Ox Delivery Rate 
 
 0942  82  118/64     
 
 0941  82  118/64     
 
 0836      97 Room Air Room Air 
 
 0738  76  110/62     
 
 0605 97.7 76 20 110/62  96 Room Air  
 
 0200 97.8 92 18 108/68  97 Room Air  
 
 2326  86  106/68     
 
 2241 97.8 86 20 106/68  93   
 
/ 2102      94 Room Air Room Air 
 
 2100       Room Air  
 
 2037  68  152/72     
 
 2036  68  152/72     
 
 2036  68  152/72     
 
 1617  68  152/72     
 
 1551      96 Room Air Room Air 
 
 1428 98.1 68 20 152/72  95 Room Air  
 
 1135  87  112/70     
 
 1135  87  112/70     
 
 
 Intake & Output
 
 
  1600 02 0800  0000
 
Intake Total  600 480
 
Output Total  1350 555
 
Balance  -750 -75
 
    
 
Intake, IV  200 100
 
Intake, Oral  400 380
 
Output, Urine  1350 555
 
 
 
 
Physical Exam
General Appearance: Alert, Oriented X3, Cooperative
Neck: Supple, No JVD
Cardiovascular: Regular Rate, Normal S1, Normal S2
Lungs: Clear to Auscultation, Normal Air Movement
Extremities: No Cyanosis, No Edema, Normal Pulses
 
Assessment/Plan
Assessment:
73-year-old male presented with chief complaints of gradually progressive 
shortness of breath.
 
Past medical history-
* Small Cell lung CA (last dose of radiation 3 months ago),
* IDDM (dx 3months ago),
* Ischemic cardiomyopathy with EF 20% s/p defibrillator placement (2018),
* s/p carotid endarterectomy right side (),
* CABG (), CAD with stents (), 
* h/o DVT (5 yrs ago), 
* COPD, 
* Hypothyroidism
* Hypertension
* BPH
* Obstructive sleep apnea on nocturnal CPAP 
 
#CHF exacerbation with ischemic cardiomyopathy with ejection fraction of 20% 
status post AICD 2018 
-continue dobutamine drip at 5 for 48 hours
-continue IV Lasix 60 MG bid for 48 hours
-Monitor BEP
-Patient's lower extremity swelling has come down.  Advised continue Compression
stockings.
-Strict I's and O's
-Daily weights
-Leg elevation
-Patient's aortic valve area has been improved from 0.6 to 2 cm after starting 
him on dobutamine which signifies that he does not have moderate/severe aortic 
stenosis.  Results of echocardiogram was discussed with the patient.   Patient 
should be on ACE inhibitors but given that his chronic kidney disease at this 
point we will hold a/ABSs.
 
#COPD exacerbation
-Completed prednisone taper
-Pulmonology on board
-Continue TRC nebulization
 
Heart healthy diet
DNR/DNI
DVT prophylaxis heparin
Problem List:
 1. CHF exacerbation
 
Pain Ratin
Pain Location:
n/a
Pain Goal: Remain pain free
Pain Plan:
tylenol
Tomorrow's Labs & Rationales:
sharon Beyer MD,Zenia 18 1308:
Attending MD Review Statement
 
Attending Statement
Attending MD Statement: examined this patient, discuss w/resident/PA/NP, agreed 
w/resident/PA/NP, reviewed EMR data (avail)
Attending Assessment/Plan:
Patient seen and examined at bedside.  Agree with resident assessment and plan.
 
Patient feels well with no complaints.  No tele events.  Good diruesis net 
negative 825mL yesterday.  Renal function stable.
 
Plan
- Continue on telemetry
- COntinue Dobutamine and diuresis
- Monitor electrolytes
- Follow cardiology recommendations
- Continue home medications
- DVT PPx

## 2018-09-02 NOTE — PN- CARDIOLOGY
Subjective
Subjective:
Patient is lying comfortably in bed watching TV.  No new cardiac issues.  
Somewhat despondent about being still stuck in the hospital.
 
Objective
Vital Signs and I&Os
Vital Signs
 
 
Date Time Temp Pulse Resp B/P B/P Pulse O2 O2 Flow FiO2
 
     Mean Ox Delivery Rate 
 
09/02 1200 97.9 92 18 110/93  93 Room Air  
 
09/02 0942  82  118/64     
 
09/02 0941  82  118/64     
 
09/02 0836      97 Room Air Room Air 
 
09/02 0800      93 Room Air  
 
09/02 0738  76  110/62     
 
09/02 0605 97.7 76 20 110/62  96 Room Air  
 
09/02 0200 97.8 92 18 108/68  97 Room Air  
 
09/01 2326  86  106/68     
 
09/01 2241 97.8 86 20 106/68  93   
 
09/01 2102      94 Room Air Room Air 
 
09/01 2100       Room Air  
 
09/01 2037  68  152/72     
 
09/01 2036  68  152/72     
 
09/01 2036  68  152/72     
 
09/01 1617  68  152/72     
 
09/01 1551      96 Room Air Room Air 
 
09/01 1428 98.1 68 20 152/72  95 Room Air  
 
 
 Intake & Output
 
 
 09/02 1600 09/02 0800 09/02 0000 09/01 1600 09/01 0800 09/01 0000
 
Intake Total  600 480 930 519.6 256.8
 
Output Total  4838 319 7907 1000 1400
 
Balance  -750 -75 -270 -480.4 -1143.2
 
       
 
Intake, IV  200 100 280 269.6 156.8
 
Intake, Oral  400 380 650 250 100
 
Output, Urine  7647 378 0049 1000 1400
 
Patient      233 lb
 
Weight      
 
Weight      Bed scale
 
Measurement      
 
Method      
 
 
 
Physical Exam:
General: no apparent distress. Alert.
Eyes: No obvious scleral icterus.
HEENT: No jugular venous distention or abnormal jugular venous pulsations.
Cardiovascular: Normal intensity S1/S2. Regular. ICD noted, 2 out of 6 systolic 
murmur
Respiratory: No rales or rhonchi
Abdomen: Soft, nontender with no guarding or rebound tenderness.
Musculoskeletal: No clubbing or cyanosis noted; 2+ lower extremity edema
Skin: warm
Neurologic: No gross focal deficits noted.
Current Medications:
 Current Medications
 
 
  Sig/Ly Start time  Last
 
Medication Dose Route Stop Time Status Admin
 
Acetaminophen 650 MG Q6P PRN 08/19 2045 AC 
 
  PO   
 
Albuterol Sulfate 2 PUF Q4P PRN 08/21 1700 AC 08/28
 
  INH   2059
 
Albuterol Sulfate 3 ML EVERY 4 HRS/AWAKE 08/20 2000 AC 09/02
 
  INH   1143
 
Aspirin 81 MG DAILY 08/20 0900 AC 09/02
 
  PO   0942
 
Atorvastatin Calcium 40 MG DAILY 08/20 0900 AC 09/02
 
  PO   0942
 
Benzonatate 100 MG TID PRN 08/19 2315 AC 09/01
 
  PO   2036
 
Budesonide/ 2 PUF BID 08/19 2100 AC 09/02
 
Formoterol Fumarate  INH   0937
 
Carvedilol 6.25 MG BID 08/19 2100 AC 09/02
 
  PO   0942
 
Clopidogrel Bisulfate 75 MG DAILY 08/20 0900 AC 09/02
 
  PO   0941
 
Dobutamine HCl 250 MG Q7H 08/31 1500 AC 09/02
 
Dextrose/Water 250 ML IV   0738
 
Furosemide 60 MG BID 08/31 2100 AC 09/02
 
  IV   0937
 
Guaifenesin 600 MG Q12 08/24 1037 AC 09/02
 
  PO   0941
 
Insulin Aspart 0 TIDAC/HS 08/21 2100 AC 09/02
 
  SC   1228
 
Insulin Detemir 25 UNITS QAM 08/22 0900 AC 09/02
 
  SC   0756
 
Levothyroxine Sodium 0.15 MG DAILY AC 08/20 0700 AC 09/02
 
  PO   0621
 
Magnesium Oxide 400 MG ONE ONE 09/02 1100 DC 09/02
 
  PO 09/02 1101  1124
 
Nitroglycerin 0.5 GM Q6P PRN 08/23 1300 AC 
 
  TOP   
 
Omeprazole 40 MG DAILY AC 08/30 0700 AC 09/02
 
  PO   0621
 
Potassium Chloride 40 MEQ ONCE ONE 09/02 1100 DC 09/02
 
  PO 09/02 1101  1123
 
Ramelteon 8 MG AT BEDTIME AS NEED.. 08/22 2215 AC 08/31
 
  PO   2003
 
Ranolazine 500 MG BID 08/19 2100 AC 09/02
 
  PO   0941
 
Sertraline HCl 50 MG DAILY 08/20 0900 AC 09/02
 
  PO   0940
 
Tamsulosin HCl 0.4 MG AT BEDTIME 08/19 2100 AC 09/01
 
  PO   2036
 
Tiotropium [place] 1 PUF DAILY 08/20 0900 AC 09/02
 
  INH   0939
 
 
 
 
Results
Last 48 Hrs of Labs/Mics:
 Laboratory Tests
 
09/02/18 0724:
Anion Gap 8, Estimated GFR 42  L, BUN/Creatinine Ratio 27.5  H, Magnesium 1.7, 
CBC w Diff NO MAN DIFF REQ, RBC 3.73  L, .1  H, MCH 34.2  H, MCHC 33.2, 
RDW 16.5  H, MPV 8.9, Gran % 84.9  H, Lymphocytes % 6.2  L, Monocytes % 8.0, 
Eosinophils % 0.8, Basophils % 0.1, Absolute Granulocytes 9.4  H, Absolute 
Lymphocytes 0.7  L, Absolute Monocytes 0.9  H, Absolute Eosinophils 0.1, 
Absolute Basophils 0
 
09/01/18 0621:
Anion Gap 7, Estimated GFR 39  L, BUN/Creatinine Ratio 28.2  H, Magnesium 1.8, 
CBC w Diff NO MAN DIFF REQ, RBC 3.62  L, .2  H, MCH 34.4  H, MCHC 33.7, 
RDW 16.9  H, MPV 8.8, Gran % 86.8  H, Lymphocytes % 6.3  L, Monocytes % 6.8, 
Eosinophils % 0.1, Basophils % 0, Absolute Granulocytes 10.3  H, Absolute 
Lymphocytes 0.8  L, Absolute Monocytes 0.8  H, Absolute Eosinophils 0, Absolute 
Basophils 0
 
 
Assessment/Plan
Assessment/Plan
Assessment:
1.  Shortness of breath likely multifactorial
2.  Ischemic cardiomyopathy with prior CABG/PCI
3.  AICD in situ
4.  Abdominal aortic aneurysm status post prior EVAR
5.  Aortic stenosis; moderate
6.  Small cell lung CA
7.  Hypothyroidism
8.  Not on ACE inhibitor per reoprt due to CKD with hyperkalemia
9.  Acute on chronic systolic congestive heart failure
10. COPD/JEREMY
11.  Diabetes
 
Recommendations:
-continue current treatment regimen for the next 48 hours.
-Continue IV dobutamine
-Continue to monitor intakes, outputs, daily weights; the patient is about 3400 
cc negative on his fluid balance since the reinstitution of dobutamine on 8/31
-Follow-up labs in the morning
-Further plans tomorrow morning after we see how the patient does over the next 
24 hours.
 
 
Continue telemetry? Yes

## 2018-09-03 VITALS — SYSTOLIC BLOOD PRESSURE: 112 MMHG | DIASTOLIC BLOOD PRESSURE: 64 MMHG

## 2018-09-03 VITALS — SYSTOLIC BLOOD PRESSURE: 104 MMHG | DIASTOLIC BLOOD PRESSURE: 60 MMHG

## 2018-09-03 VITALS — DIASTOLIC BLOOD PRESSURE: 50 MMHG | SYSTOLIC BLOOD PRESSURE: 128 MMHG

## 2018-09-03 VITALS — SYSTOLIC BLOOD PRESSURE: 92 MMHG | DIASTOLIC BLOOD PRESSURE: 60 MMHG

## 2018-09-03 NOTE — PN- CARDIOLOGY
Subjective
Subjective:
Clinically stable.  Occasional ectopy noted on telemetry monitor but no other 
significant arrhythmias detected.  Respiratory status about the same but 
slightly better.  Lower extremity edema improving.  The patient is diuresed 
about 5 L over the last 72 hours.
 
Objective
Vital Signs and I&Os
Vital Signs
 
 
Date Time Temp Pulse Resp B/P B/P Pulse O2 O2 Flow FiO2
 
     Mean Ox Delivery Rate 
 
09/03 0856  72  110/64     
 
09/03 0855  72  110/64     
 
09/03 0852      94 Room Air Room Air 
 
09/03 0800       Room Air  
 
09/03 0649  72  110/64     
 
09/03 0358 97.7 70 20 112/64  93 Room Air  
 
09/03 0000       Room Air Room Air 
 
09/02 2354 97.7 76 20 112/62  94 Room Air  
 
09/02 2053 97.5 86 20 122/76  92   
 
09/02 2014  90       
 
09/02 1951      95 Room Air  
 
09/02 1554      93 Room Air  
 
09/02 1519 97.8 76 18 116/54     
 
09/02 1516  93  118/64     
 
09/02 1434 97.8 76 20 116/54  94 Room Air  
 
09/02 1200 97.9 92 18 110/93  93 Room Air  
 
 
 Intake & Output
 
 
 09/03 1600 09/03 0800 09/03 0000 09/02 1600 09/02 0800 09/02 0000
 
Intake Total  419.6 167 440 600 480
 
Output Total  700  555
 
Balance  -280.4 -558 -410 -750 -75
 
       
 
Intake, IV  269.6 67 200 200 100
 
Intake, Oral  150 100 240 400 380
 
Number    0  
 
Bowel      
 
Movements      
 
Output, Urine  700  555
 
Patient   231 lb   
 
Weight      
 
 
 
Physical Exam:
General: no apparent distress. Alert.
Eyes: No obvious scleral icterus.
HEENT: No jugular venous distention or abnormal jugular venous pulsations.
Cardiovascular: Normal intensity S1/S2. Regular. ICD noted, 2/6 systolic murmur
Respiratory: No rales or rhonchi
Abdomen: Soft, nontender with no guarding or rebound tenderness.
Musculoskeletal: No clubbing or cyanosis noted; 1+ lower extremity edema
Skin: warm
Neurologic: No gross focal deficits noted.
Current Medications:
 Current Medications
 
 
  Sig/Ly Start time  Last
 
Medication Dose Route Stop Time Status Admin
 
Acetaminophen 650 MG Q6P PRN 08/19 2045 AC 
 
  PO   
 
Albuterol Sulfate 2 PUF Q4P PRN 08/21 1700 AC 08/28
 
  INH   2059
 
Albuterol Sulfate 3 ML EVERY 4 HRS/AWAKE 08/20 2000 AC 09/03
 
  INH   0852
 
Aspirin 81 MG DAILY 08/20 0900 AC 09/03
 
  PO   0855
 
Atorvastatin Calcium 40 MG DAILY 08/20 0900 AC 09/03
 
  PO   0855
 
Benzonatate 0 .STK-MED ONE 09/02 2023 DC 
 
  PO   
 
Benzonatate 100 MG TID PRN 08/19 2315 AC 09/02
 
  PO   2021
 
Budesonide/ 2 PUF BID 08/19 2100 AC 09/03
 
Formoterol Fumarate  INH   0901
 
Carvedilol 6.25 MG BID 08/19 2100 AC 09/03
 
  PO   0855
 
Clopidogrel Bisulfate 75 MG DAILY 08/20 0900 AC 09/03
 
  PO   0855
 
Dobutamine HCl 250 MG Q7H 08/31 1500 AC 09/03
 
Dextrose/Water 250 ML IV   0649
 
Furosemide 60 MG BID 08/31 2100 AC 09/03
 
  IV   0856
 
Guaifenesin 600 MG Q12 08/24 1037 AC 09/03
 
  PO   0855
 
Guaifenesin/ 0 .STK-MED ONE 09/02 2213 DC 
 
Dextromethorphan  PO   
 
Guaifenesin/ 10 ML ONCE ONE 09/02 2200 DC 09/02
 
Dextromethorphan  PO 09/02 2201  2210
 
Insulin Aspart 0 TIDAC/HS 08/21 2100 AC 09/03
 
  SC   0855
 
Insulin Detemir 25 UNITS QAM 08/22 0900 AC 09/03
 
  SC   0901
 
Levothyroxine Sodium 0.15 MG DAILY AC 08/20 0700 AC 09/03
 
  PO   0653
 
Nitroglycerin 0.5 GM Q6P PRN 08/23 1300 AC 
 
  TOP   
 
Omeprazole 40 MG DAILY AC 08/30 0700 AC 09/03
 
  PO   0653
 
Ramelteon 8 MG AT BEDTIME AS NEED.. 08/22 2215 AC 08/31
 
  PO   2003
 
Ranolazine 500 MG BID 08/19 2100 AC 09/03
 
  PO   0856
 
Sertraline HCl 50 MG DAILY 08/20 0900 AC 09/03
 
  PO   0855
 
Tamsulosin HCl 0.4 MG AT BEDTIME 08/19 2100 AC 09/02
 
  PO   2014
 
Tiotropium Bromide 1 PUF DAILY 08/20 0900 AC 09/03
 
  INH   1105
 
 
 
 
Results
Last 48 Hrs of Labs/Mics:
 Laboratory Tests
 
09/03/18 0625:
Anion Gap 9, Estimated GFR 39  L, BUN/Creatinine Ratio 28.8  H, Magnesium 1.8
 
09/02/18 0724:
Anion Gap 8, Estimated GFR 42  L, BUN/Creatinine Ratio 27.5  H, Magnesium 1.7, 
CBC w Diff NO MAN DIFF REQ, RBC 3.73  L, .1  H, MCH 34.2  H, MCHC 33.2, 
RDW 16.5  H, MPV 8.9, Gran % 84.9  H, Lymphocytes % 6.2  L, Monocytes % 8.0, 
Eosinophils % 0.8, Basophils % 0.1, Absolute Granulocytes 9.4  H, Absolute 
Lymphocytes 0.7  L, Absolute Monocytes 0.9  H, Absolute Eosinophils 0.1, 
Absolute Basophils 0
 
 
Assessment/Plan
Assessment/Plan
Assessment:
1.  Shortness of breath likely multifactorial
2.  Ischemic cardiomyopathy with prior CABG/PCI
3.  AICD in situ
4.  Abdominal aortic aneurysm status post prior EVAR
5.  Aortic stenosis; moderate
6.  Small cell lung CA
7.  Hypothyroidism
8.  Not on ACE inhibitor per reoprt due to CKD with hyperkalemia
9.  Acute on chronic systolic congestive heart failure
10. COPD/JEREMY
11.  Diabetes
12.  Mild hyponatremia-sodium 133 today; renal function otherwise about the same
with creatinine 1.7
 
Recommendations:
-continue current treatment regimen 
-The patient has been on IV dobutamine over the last 72+ hours.  Discontinue 
dobutamine today.
-Continue to monitor intakes, outputs, daily weights; the patient is about 5000 
cc negative on his fluid balance since the reinstitution of dobutamine on 8/31
-Continue IV Lasix
-Follow-up labs in the morning
-Further plans tomorrow morning after we see how the patient does over the next 
24 hours.
Continue telemetry? Yes

## 2018-09-03 NOTE — PN- HOUSESTAFF
Awadalla MD,Lucero 18 0829:
Subjective
Follow-up For:
CHF exacerbation
Tele-Events Since Last Visit:
No overnight events
Subjective:
Patient was seen and examined, afebrile with stable vital signs, still on 
dobutamine drip and IV Lasix 60 mg twice daily, negative fluid balance of around
2 L in the last 48 hours,
Patient had an episode of shortness of breath, cough and chest congestion last 
night which improved after taking Robitussin.  In the morning he was still 
complaining of mild shortness of breath which he said  has always had
 
Review of Systems
Constitutional:
Reports: see HPI. 
 
Objective
Last 24 Hrs of Vital Signs/I&O
 Vital Signs
 
 
Date Time Temp Pulse Resp B/P B/P Pulse O2 O2 Flow FiO2
 
     Mean Ox Delivery Rate 
 
 0856  72  110/64     
 
 0855  72  110/64     
 
 0852      94 Room Air Room Air 
 
 0649  72  110/64     
 
 0358 97.7 70 20 112/64  93 Room Air  
 
 0000       Room Air Room Air 
 
 2354 97.7 76 20 112/62  94 Room Air  
 
 2053 97.5 86 20 122/76  92   
 
09/02 2014  90       
 
09/02 1951      95 Room Air  
 
 1554      93 Room Air  
 
 1519 97.8 76 18 116/54     
 
09 1516  93  118/64     
 
02 1434 97.8 76 20 116/54  94 Room Air  
 
/ 1200 97.9 92 18 110/93  93 Room Air  
 
 
 Intake & Output
 
 
  1600  0800  0000
 
Intake Total  419.6 167
 
Output Total  700 725
 
Balance  -280.4 -558
 
    
 
Intake, IV  269.6 67
 
Intake, Oral  150 100
 
Output, Urine  700 725
 
Patient   231 lb
 
Weight   
 
 
 
 
Physical Exam
General Appearance: Alert, Oriented X3, Cooperative, No Acute Distress
HEENT: Atraumatic, PERRLA, EOMI, Mucous Membr. moist/pink
Neck: Supple, No JVD
Cardiovascular: Normal S1, Normal S2
Abdomen: Normal Bowel Sounds, Soft
Extremities: 2+ PITTING EDEMA, WEARING ELSTIC STOCKING
 
Assessment/Plan
Assessment:
73-year-old male presented with chief complaints of gradually progressive 
shortness of breath.
 
Past medical history-
* Small Cell lung CA (last dose of radiation 3 months ago),
* IDDM (dx 3months ago),
* Ischemic cardiomyopathy with EF 20% s/p defibrillator placement (2018),
* s/p carotid endarterectomy right side (),
* CABG (), CAD with stents (), 
* h/o DVT (5 yrs ago), 
* COPD, 
* Hypothyroidism
* Hypertension
* BPH
* Obstructive sleep apnea on nocturnal CPAP 
 
#CHF exacerbation with ischemic cardiomyopathy with ejection fraction of 20% 
status post AICD 2018 
-DC dobutamine drip as per cardiology recommendation
-continue IV Lasix 60 MG bid for 48 hours
-Monitor BEP
-Patient's lower extremity swelling has come down.  Advised continue Compression
stockings.
-Strict I's and O's
-Daily weights
-Leg elevation
-Patient's aortic valve area has been improved from 0.6 to 2 cm after starting 
him on dobutamine which signifies that he does not have moderate/severe aortic 
stenosis.  Results of echocardiogram was discussed with the patient.   Patient 
should be on ACE inhibitors but given that his chronic kidney disease at this 
point we will hold a/ABSs.
 
#COPD exacerbation
-Completed prednisone taper
-Pulmonology on board
-Continue TRC nebulization
 
Heart healthy diet
DNR/DNI
DVT prophylaxis heparin
Problem List:
 1. CHF exacerbation
 
Pain Ratin
Pain Location:
N/A
Pain Goal: Remain pain free
Pain Plan:
PATHWAY
Tomorrow's Labs & Rationales:
CBC
BEP
 
 
Zenia Beyer MD 18 1419:
Attending MD Review Statement
 
Attending Statement
Attending MD Statement: examined this patient, discuss w/resident/PA/NP, agreed 
w/resident/PA/NP, reviewed EMR data (avail)
Attending Assessment/Plan:
Patient seen and examined at bedside.  Agree with resident assessment and plan.
 
Patient feels well with no complaints.  No tele events.  Good diruesis net 
negative 1700mL yesterday.  Renal function stable.
 
Plan
- Continue on telemetry
- COntinue Dobutamine and diuresis
- Monitor electrolytes
- Follow cardiology recommendations
- Continue home medications
- DVT PPx

## 2018-09-04 VITALS — SYSTOLIC BLOOD PRESSURE: 100 MMHG | DIASTOLIC BLOOD PRESSURE: 60 MMHG

## 2018-09-04 VITALS — DIASTOLIC BLOOD PRESSURE: 52 MMHG | SYSTOLIC BLOOD PRESSURE: 94 MMHG

## 2018-09-04 VITALS — SYSTOLIC BLOOD PRESSURE: 116 MMHG | DIASTOLIC BLOOD PRESSURE: 64 MMHG

## 2018-09-04 VITALS — DIASTOLIC BLOOD PRESSURE: 66 MMHG | SYSTOLIC BLOOD PRESSURE: 100 MMHG

## 2018-09-04 VITALS — SYSTOLIC BLOOD PRESSURE: 110 MMHG | DIASTOLIC BLOOD PRESSURE: 62 MMHG

## 2018-09-04 LAB
ABSOLUTE GRANULOCYTE CT: 8 /CUMM (ref 1.4–6.5)
BASOPHILS # BLD: 0 /CUMM (ref 0–0.2)
BASOPHILS NFR BLD: 0 % (ref 0–2)
EOSINOPHIL # BLD: 0 /CUMM (ref 0–0.7)
EOSINOPHIL NFR BLD: 0.4 % (ref 0–5)
ERYTHROCYTE [DISTWIDTH] IN BLOOD BY AUTOMATED COUNT: 15.8 % (ref 11.5–14.5)
GRANULOCYTES NFR BLD: 85.6 % (ref 42.2–75.2)
HCT VFR BLD CALC: 37.2 % (ref 42–52)
LYMPHOCYTES # BLD: 0.5 /CUMM (ref 1.2–3.4)
MCH RBC QN AUTO: 34.4 PG (ref 27–31)
MCHC RBC AUTO-ENTMCNC: 33.7 G/DL (ref 33–37)
MCV RBC AUTO: 102.2 FL (ref 80–94)
MONOCYTES # BLD: 0.8 /CUMM (ref 0.1–0.6)
PLATELET # BLD: 95 /CUMM (ref 130–400)
PMV BLD AUTO: 8.7 FL (ref 7.4–10.4)
RED BLOOD CELL CT: 3.64 /CUMM (ref 4.7–6.1)
WBC # BLD AUTO: 9.4 /CUMM (ref 4.8–10.8)

## 2018-09-04 NOTE — DISCHARGE SUMMARY
Visit Information
 
Visit Dates
Admission Date:
08/20/18
 
Discharge Date:
9/5/18
 
 
Hospital Course
 
Course
Attending Physician:
Willard GLASS,Marlee HERNANDEZ
 
Primary Care Physician:
Sherlyn GLASS,[facility]
 
Hospital Course:
73 year old male with extensive PMH s/p CABG (1989), CAD with stents (1990s), h/
o DVT (5 yrs ago), IDDM (dx 3months ago), COPD, Small Cell lung CA (last dose of
radiation 3 months ago), ischemic cardiomyopathy with EF 20% s/p defibrillator 
placement (Jan 2018), s/p carotid endarterectomy right side (1998), hypothryroid
, HTN, BPH, JEREMY on CPAP at noc presenting with two day history of progressive 
SOB. He states his symptoms developed suddenly. Patient reports associated dry 
cough but denies fever, chills, n/v/d, chest pain, dizziness.  He states he 
becomes severely SOB ambulating from kitchen to living room (approx 25ft).  He 
is unable to climb stairs without getting on his hands and knees and taking 
breaks.  He denies recent illness, travel, or sick contacts.  Patient also 
reports 'itching' to his extremities and scabs from scratching. Patient was last
admitted in May 2018 for COPD exacerbation.     
 
 
Shortness of breath
COPD with CHF exacerbation
COPD-patient treated with azithromycin and tapering dose of prednisone.  Patient
was initially admitted to general
CHF-patient was on 20 of Lasix p.o. which was increased to 80 IV twice daily.  
Given his CKD his Lasix was reduced to 60 IV twice daily.  During the hospital 
course patient had an echocardiogram done which showed significant aortic 
stenosis.  Given that patient had a low ejection fraction of 25% he was started 
on IV dobutamine drip and subsequent limited echo showed an improvement of his 
aortic valve area to 2 cm2.  Since patient condition clinically improved with 
the dobutamine drip he may be a candidate for entrance to as outpatient.  
Patient is not on is a nurse in view of chronic kidney disease.
Upon discharge patient Lasix IV 60 can be switched to 60 p.o. twice daily.  
Cardiology agrees with the plan.  Patient was initially admitted to [facility] 
for COPD exacerbation and given his heart failure exacerbation during the 
hospital course associated with chest pain patient was transferred to telemetry.
 Patient's ICD was interrogated which showed no events.
 
CKD stage III
His renal function was monitored while he was on Lasix.  Patient will follow 
with Dr. Gonzalez as outpatient.
 
Thrombocytopenia
Patient was not started on any heparin products.  His thrombocytopenia can be 
chronic/IDP.  Patient advised to follow-up with his primary care physician.
 
 
Diabetes mellitus
On Levemir and sliding scale insulin.  She was well controlled.
 
Allergies:
Coded Allergies:
NO KNOWN ALLERGIES (NONE 02/20/18)
 
Significant Procedures:
Chest CT
- Small left pleural effusion and atelectasis of the left more than right
lung bases.
- No dense consolidation or mass. Background changes of emphysema.
- Cardiomegaly.
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
placement.
 
 
Echocardiogram
Severe global hypokinesis of the LV, with LVEF estimated at 25%. 
 Pacer lead is visible in the RV. 
 Moderately enlarged LA. 
 Mild-moderate central mitral regurgitation. 
 Sclerotic aortic leaflets, with probably moderate aortic stenosis (  
 discordant MICHELLE and mean gradient), and mild aortic regurgitation. 
 Mild tricuspid regurgitation with RVSP estimated at 35 mmHg. 
 Trace pulmonic regurgitation. 
 
 
Chest x-ray
1. There has been interval decrease in the left-sided pleural effusion
compared to the prior chest x-ray. There may be underlying consolidation.
 
 
Limited echo August 28
Limited study. Moderate to severely reduced left ventricular  
 systolic function. Moderate Aortic stenosis. 
 
 
Disposition Summary
 
Disposition
Principal Diagnosis:
Acute on chronic congestive heart
Additional Diagnosis:
COPD exacerbation
Discharge Disposition: home or self care
 
Discharge Instructions
 
General Discharge Information
Code Status: Do Not Resucitate/Intubat
Patient's Diet:
Diabetic diet
Patient's Activity:
As tolerated
Follow-Up Instructions/Appts:
Please follow-up with your primary care physician/cardiologist within 1-2 weeks 
of discharge and let them know about your recent admission at [facility].
 
Medications at Discharge
Discharge Medications:
Stop taking the following medications:
Levothyroxine Sodium (Levothyroxine Sodium) 100 MCG TABLET ORAL DAILY Qty = 90
 
Furosemide (Lasix) 20 MG TABLET ORAL DAILY Qty = 30
 
Levothyroxine Sodium (Levothyroxine Sodium) 25 MCG TABLET ORAL DAILY Qty = 15
 
Prednisone (Prednisone) 10 MG TABLET ORAL DAILY 
 
Continue taking these medications:
Aspirin (Aspirin*) 81 MG TAB.CHEW
    1 Tablet ORAL DAILY
    Qty = 30
    Comments:
       Last Taken: 9/5/18
             Time: 8:00 AM
 
Atorvastatin Calcium (Lipitor) 40 MG TABLET
    1 Tablet ORAL DAILY
    Qty = 30
    Comments:
       Last Taken: 9/5/18
             Time: 8:00 AM
 
Ranolazine (Ranexa) 500 MG TAB.ER.12H
    1 Tablet ORAL TWICE DAILY
    Qty = 30
    Comments:
       Last Taken: 9/4/18
             Time: 8:30 PM 
        
        
        
        
        
        
        
        
        
        
        
        
        
        
        
        
        
        
        
        
        
        
        
        
        
        
        
        
        
        
        
        
        
        
        
        
        
        
        
        
        
        
        
        
        
        
        
        
        
        
        
        
        
        
        
        
        
        
        
        
        
        
        
        
        
        
        
        
        
        
        
        
        
        
        
        
        
        
        
        
        
        
        
        
        
        
        
        
        
        
        
        
        
        
        
        
        
        
        
        
        
        
        
        
        
        
        
        
        
        
        
        
        
        
        
        
       Last Taken:5/4/18
             Time: 8:16 AM
 
Clopidogrel Bisulfate (Clopidogrel) 75 MG TABLET
    1 Tablet ORAL DAILY
    Qty = 90
    Comments:
       Last Taken: 9/5/18
             Time: 8:00 AM
 
Acetaminophen (Acetaminophen) 325 MG CAPSULE
    1 Capsule ORAL  as needed for PAIN
    Comments:
       Last Taken: 9/5/18
             Time: 11:30 AM
       650MG GIVEN AT THIS TIME
             
 
Albuterol Sulfate (Proair Hfa) 90 MCG HFA.AER.AD
    2 Puff Inhale through mouth Every 4 hours
    Qty = 1
    Comments:
       Last Taken: 8/28/18
             Time: 9:00 PM
 
Tamsulosin HCl (Flomax) 0.4 MG CAP.ER.24H
    1 Capsule ORAL TAKE AT BEDTIME
    Qty = 30
    Instructions:
       Please take at bed time to avoid low blood pressure during the day.
    Comments:
       Last Taken: 9/4/18
             Time: 8:30 PM
 
Tiotropium Bromide (Spiriva) 18 MCG CAP.W.DEV
    1 Capsule Inhale through mouth DAILY
    Qty = 30
    Comments:
       Last Taken: 9/5/18
             Time: 8:00 AM
 
Budesonide/Formoterol Fumarate (Symbicort 160-4.5 Mcg Inhaler) 160 MCG-4.5 MCG/
ACTUATION HFA.AER.AD
    2 Puff Inhale through mouth TWICE DAILY
    Qty = 10
    Comments:
       Last Taken: 9/5/18
             Time: 8:00 AM
 
Nitroglycerin (Nitrostat) 0.4 MG TAB.SUBL
    1 Tablet SUBLINGUAL As Directed as needed for CHEST PAIN
    Instructions:
       1st sign of attack; may repeat every 5 minutes until relief; if pain 
persists after 3 tablets in 15 minutes, prompt medical att
    Comments:
       NOT GIVEN THIS ADMISSION
 
Carvedilol (Carvedilol) 6.25 MG TABLET
    1 Tablet ORAL TWICE DAILY
    Comments:
       Last Taken: 9/4/18
             Time: 8:30 PM
 
Omeprazole (Omeprazole) 40 MG CAPSULE.DR
    1 Capsule ORAL DAILY
    Qty = 30
    Comments:
       Last Taken: 9/5/18
             Time: 5:30 AM
 
Sertraline HCl (Sertraline HCl) 50 MG TABLET
    1 Tablet ORAL DAILY
    Qty = 30
    Comments:
       Last Taken: 9/5/18
             Time: 8:00 AM
 
Insulin Aspart, Recombinant (Novolog Flexpen) 100 UNIT/ML INSULN.PEN
    0 SC SEE INSTRUCTIONS
    Qty = 30
    Instructions:
       PLEASE check your blood sugar 3 times daily before meals and at
       bedtime and .
       FOLLOW
       80 - 150 MG/DL   9 UNITS
       151- 200 MG/DL   11 UNITS
       201- 250 MG/DL   13 UNITS
       251- 300 MG/DL   15 UNITS
       301- 350 MG/DL   17 UNITS
       351 - 400MG/DL   19 UNITS
       >400 MG/DL       20 UNITS AND CALL YOUR DR.
    Comments:
       Last Taken: 9/5/18
             Time: 12:00 PM
 
Insulin Detemir (Levemir) 100 UNIT/ML VIAL
    25 Units SC Every Morning
    Comments:
       Last Taken: 9/5/18
             Time: 9:30 AM
 
Empagliflozin (Jardiance) 10 MG TABLET
    1 Tablet ORAL DAILY
    Qty = 30
    Comments:
       NOT GIVEN IN HOSPITAL
 
Furosemide (Lasix) 20 MG TABLET
    3 Tablet ORAL TWICE DAILY
    Qty = 60
    Instructions:
       .
    Comments:
       Last Taken: 9/4/18
             Time: 5:30 PM
 
Levothyroxine Sodium (Levothyroxine Sodium) 100 MCG TABLET
    1.5 Tablet ORAL DAILY
    Comments:
       Last Taken: 9/5/18
             Time: 5:30 AM
 
 
Copies To:
Tomasa GLASS,Jason SILVERIO; Sherlyn GLASS,Durga

## 2018-09-04 NOTE — PN- HOUSESTAFF
Rigoberto GLASS,Laura 18 0710:
Subjective
Follow-up For:
Congestive heart failure
Complaints: no complaints
Tele-Events Since Last Visit:
Paced rhythm, PVCs
Subjective:
Patient seen and examined at bedside.  He was lying in his bed comfortably.  No 
acute distress.  Had a good sleep.  Denies chest pain, shortness of breath, 
nausea, vomiting, abdominal pain
 
Review of Systems
Constitutional:
Reports: no symptoms. 
 
Objective
Last 24 Hrs of Vital Signs/I&O
 Vital Signs
 
 
Date Time Temp Pulse Resp B/P B/P Pulse O2 O2 Flow FiO2
 
     Mean Ox Delivery Rate 
 
 09    96/44     
 
 0905  82  96/44     
 
 0832      93 Room Air  
 
 0646 97.8 78 18 100/60  93 Room Air  
 
 0259 97.9 74 20 116/64  95 Room Air  
 
 2309 97.8 75 20 92/60  94 Room Air  
 
 2200       Room Air  
 
 1956  76  104/60     
 
 1956  76  104/60     
 
 1955  76  104/60     
 
 1930 97.7 76 20 104/60  96 Room Air  
 
 1831      96 Room Air Room Air 
 
 1416 97.8 77 18 128/50  94 Room Air  
 
 
 Intake & Output
 
 
  1600  0800  0000
 
Intake Total  240 480
 
Output Total  350 1325
 
Balance  -110 -845
 
    
 
Intake, Oral  240 480
 
Output, Urine  350 1325
 
Patient   220 lb
 
Weight   
 
 
 
 
Physical Exam
General Appearance: Alert, Oriented X3, Cooperative, No Acute Distress
Cardiovascular: Normal S1, Normal S2, systolic murmur
Lungs: basal cackles
Abdomen: Soft, No Tenderness, No Hepatospenomegaly
Extremities: no pe
Current Medications:
 Current Medications
 
 
  Sig/Ly Start time  Last
 
Medication Dose Route Stop Time Status Admin
 
Acetaminophen 650 MG Q6P PRN 2045 AC 
 
  PO   
 
Albuterol Sulfate 2 PUF Q4P PRN  170 AC 
 
  INH   
 
Albuterol Sulfate 3 ML EVERY 4 HRS/AWAKE 2000 AC 
 
  INH   08
 
Aspirin 81 MG DAILY 900 AC 
 
  PO   0904
 
Atorvastatin Calcium 40 MG DAILY 900 AC 
 
  PO   0905
 
Benzonatate 100 MG TID PRN  2315 AC 
 
  PO   0555
 
Budesonide/ 2 PUF BID 2100 AC 
 
Formoterol Fumarate  INH   904
 
Carvedilol 6.25 MG BID 2100 AC 
 
  PO   09
 
Clopidogrel Bisulfate 75 MG DAILY  09 AC 
 
  PO   09
 
Dobutamine HCl 250 MG Q7H  1500 DC 
 
Dextrose/Water 250 ML IV   0649
 
Furosemide 60 MG 7:30 AM, & 4:30 PM  1630 AC 
 
  PO   
 
Furosemide 60 MG BID  2100 DC 
 
  IV   1955
 
Guaifenesin 0 .STK-MED ONE  2302 DC 
 
  PO   
 
Guaifenesin 600 MG Q12  1037 AC 
 
  PO   0905
 
Guaifenesin/ 0 .STK-MED ONE  2306 DC 
 
Dextromethorphan  PO   
 
Guaifenesin/ 10 ML DAILY AS NEEDED  2300 AC 
 
Dextromethorphan  PO   2305
 
Guaifenesin/ 10 ML ONCE ONE  1145 DC 
 
Dextromethorphan  PO  1146  1206
 
Insulin Aspart 0 TIDAC/HS  2100 AC 
 
  SC   2104
 
Insulin Detemir 25 UNITS  AC 
 
  SC   0911
 
Levothyroxine Sodium 0.15 MG DAILY AC  07 AC 
 
  PO   0555
 
Nitroglycerin 0.5 GM Q6P PRN  1300 AC 
 
  TOP   
 
Omeprazole 40 MG DAILY AC  07 AC 
 
  PO   0555
 
Ramelteon 8 MG AT BEDTIME AS NEED.. 2215 AC 
 
  PO   
 
Ranolazine 500 MG BID 2100 AC 
 
  PO   905
 
Sertraline HCl 50 MG DAILY 900 AC 
 
  PO   905
 
Tamsulosin HCl 0.4 MG AT BEDTIME 2100 AC 
 
  PO   
 
Tiotropium [place] 1 PUF DAILY  09 AC 
 
  INH   904
 
 
 
 
Assessment/Plan
Assessment:
Patient is a 73-year-old male presented with chief complaints of gradually 
progressive shortness of breath.
 
Past medical history-
* Small Cell lung CA (last dose of radiation 3 months ago),
* IDDM (dx 3months ago),
* Ischemic cardiomyopathy with EF 20% s/p defibrillator placement (2018),
* s/p carotid endarterectomy right side (),
* CABG (), CAD with stents (), 
* h/o DVT (5 yrs ago), 
* COPD, 
* Hypothyroidism
* Hypertension
* BPH
* Obstructive sleep apnea on nocturnal CPAP 
 
Assessment and plan
CHF exacerbation with ischemic cardiomyopathy with ejection fraction of 20% 
status post AICD 2018
Patient is treated with IV Lasix.  Patient is on IV 60 Lasix twice daily we will
change to p.o. 60 twice daily today.  We will see how the patient is able to 
tolerate and plan for possible discharge tomorrow.  Discussed with cardiology.  
Patient has negative fluid balance with improved pedal edema.
Compression stockings.
Strict I's and O's
Daily weights
Leg elevation
Patient's aortic valve area has been improved from 0.6 to 2 cm after starting 
him on dobutamine which signifies that he does not have moderate/severe aortic 
stenosis.  Patient's output improved while on dobutamine.  He is now off 
dobutamine.  We will send him home with the current medications and plan for 
starting him  on Entresto as outpatient.  
Problem List:
 1. CHF exacerbation
 
Pain Ratin
Pain Location:
None
Pain Goal: Remain pain free
Pain Plan:
Tylenol
Tomorrow's Labs & Rationales:
RBENDA BELTRAN, ALEXUS Beyer MD,Chaya 18 1018:
Attending MD Review Statement
 
Attending Statement
Attending MD Statement: examined this patient, discuss w/resident/PA/NP, agreed 
w/resident/PA/NP, reviewed EMR data (avail)
Attending Assessment/Plan:
Patient seen and examined at bedside.  Agree with resident assessment and plan.
 
Patient feels well with no complaints.  No tele events.  Good diruesis net 
negative 1300mL yesterday.  Renal function stable.
 
Plan
- Continue on telemetry
- Discontinue Dobutamine
- Switch to PO Lasix
- Monitor electrolytes
- Follow cardiology recommendations
- Continue home medications
- DVT PPx
- Anticipated discharge tomorrow

## 2018-09-04 NOTE — PN- CARDIOLOGY
Subjective
Subjective:
Patient feels well. He is anxious to be discharged.
No chest pain or shortness of breath.
He diuresed 1300 cc yesterday
Review of Systems:
Eyes no blurred or double vision
Ears no deafness or ringing
Nose and throat no recurrent sinusitis
Lungs per history of present illness
Heart per history of present illness
Abdomen no nausea vomiting
Musculoskeletal occasional muscle and joint pains
Psych no anxiety or depression
Neuro without recurrent headache or seizures
Endocrine no heat or cold intolerance
 
Objective
Vital Signs and I&Os
Vital Signs
 
 
Date Time Temp Pulse Resp B/P B/P Pulse O2 O2 Flow FiO2
 
     Mean Ox Delivery Rate 
 
09/04 0905    96/44 09/04 0905  82  96/44 09/04 0832      93 Room Air  
 
09/04 0646 97.8 78 18 100/60  93 Room Air  
 
09/04 0259 97.9 74 20 116/64  95 Room Air  
 
09/03 2309 97.8 75 20 92/60  94 Room Air  
 
09/03 2200       Room Air  
 
09/03 1956  76  104/60     
 
09/03 1956  76  104/60     
 
09/03 1955  76  104/60     
 
09/03 1930 97.7 76 20 104/60  96 Room Air  
 
09/03 1831      96 Room Air Room Air 
 
09/03 1416 97.8 77 18 128/50  94 Room Air  
 
 
 Intake & Output
 
 
 09/04 1600 09/04 0800 09/04 0000 09/03 1600 09/03 0800 09/03 0000
 
Intake Total  240 480 700 419.6 167
 
Output Total  350 1325 900 700 725
 
Balance  -110 -845 -200 -280.4 -558
 
       
 
Intake, IV    200 269.6 67
 
Intake, Oral  240 480 500 150 100
 
Output, Urine  350 1325 900 700 725
 
Patient   220 lb   231 lb
 
Weight      
 
 
 
Physical Exam:
Patient is a well-developed well-nourished male appearing in no acute distress
HEENT is unremarkable
Neck is supple there is no JVD
Lungs are clear
Heart regular rhythm S1 and S2 are normal no gallops or rubs 2/6 CARLOS ENRIQUE RUSB
Abdomen bowel sounds positive
Extremities trace edema
 
 
Current Medications:
 Current Medications
 
 
  Sig/Ly Start time  Last
 
Medication Dose Route Stop Time Status Admin
 
Acetaminophen 650 MG Q6P PRN 08/19 2045 AC 
 
  PO   
 
Albuterol Sulfate 2 PUF Q4P PRN 08/21 1700 AC 08/28
 
  INH   2059
 
Albuterol Sulfate 3 ML EVERY 4 HRS/AWAKE 08/20 2000 AC 09/04
 
  INH   0830
 
Aspirin 81 MG DAILY 08/20 0900 AC 09/04
 
  PO   0904
 
Atorvastatin Calcium 40 MG DAILY 08/20 0900 AC 09/04
 
  PO   0905
 
Benzonatate 100 MG TID PRN 08/19 2315 AC 09/04
 
  PO   0555
 
Budesonide/ 2 PUF BID 08/19 2100 AC 09/04
 
Formoterol Fumarate  INH   0904
 
Carvedilol 6.25 MG BID 08/19 2100 AC 09/04
 
  PO   0905
 
Clopidogrel Bisulfate 75 MG DAILY 08/20 0900 AC 09/04
 
  PO   0905
 
Dobutamine HCl 250 MG Q7H 08/31 1500 DC 09/03
 
Dextrose/Water 250 ML IV   0649
 
Furosemide 60 MG 7:30 AM, & 4:30 PM 09/04 1630 UNVr 
 
  PO   
 
Furosemide 60 MG BID 08/31 2100 DC 09/03
 
  IV   1955
 
Guaifenesin 0 .STK-MED ONE 09/03 2302 DC 
 
  PO   
 
Guaifenesin 600 MG Q12 08/24 1037 AC 09/04
 
  PO   0905
 
Guaifenesin/ 0 .STK-MED ONE 09/03 2306 DC 
 
Dextromethorphan  PO   
 
Guaifenesin/ 10 ML DAILY AS NEEDED 09/03 2300 AC 09/03
 
Dextromethorphan  PO   2305
 
Guaifenesin/ 10 ML ONCE ONE 09/03 1145 DC 09/03
 
Dextromethorphan  PO 09/03 1146  1206
 
Insulin Aspart 0 TIDAC/HS 08/21 2100 AC 09/03
 
  SC   2104
 
Insulin Detemir 25 UNITS QAM 08/22 0900 AC 09/03
 
  SC   0901
 
Levothyroxine Sodium 0.15 MG DAILY AC 08/20 0700 AC 09/04
 
  PO   0555
 
Nitroglycerin 0.5 GM Q6P PRN 08/23 1300 AC 
 
  TOP   
 
Omeprazole 40 MG DAILY AC 08/30 0700 AC 09/04
 
  PO   0555
 
Ramelteon 8 MG AT BEDTIME AS NEED.. 08/22 2215 AC 09/03
 
  PO   1956
 
Ranolazine 500 MG BID 08/19 2100 AC 09/04
 
  PO   0905
 
Sertraline HCl 50 MG DAILY 08/20 0900 AC 09/04
 
  PO   0905
 
Tamsulosin HCl 0.4 MG AT BEDTIME 08/19 2100 AC 09/03
 
  PO   1955
 
Tiotropium [place] 1 PUF DAILY 08/20 0900 AC 09/04
 
  INH   0904
 
 
 
 
Results
Last 48 Hrs of Labs/Mics:
 Laboratory Tests
 
09/04/18 0633:
Anion Gap 6, Estimated GFR 39  L, BUN/Creatinine Ratio 24.1, Magnesium 1.9, CBC 
w Diff NO MAN DIFF REQ, RBC 3.64  L, .2  H, MCH 34.4  H, MCHC 33.7, RDW 
15.8  H, MPV 8.7, Gran % 85.6  H, Lymphocytes % 5.2  L, Monocytes % 8.8, 
Eosinophils % 0.4, Basophils % 0, Absolute Granulocytes 8.0  H, Absolute 
Lymphocytes 0.5  L, Absolute Monocytes 0.8  H, Absolute Eosinophils 0, Absolute 
Basophils 0
 
09/03/18 0625:
Anion Gap 9, Estimated GFR 39  L, BUN/Creatinine Ratio 28.8  H, Magnesium 1.8
 
 
Telemetry personally reviewed sinus rhythm with rare PVCs and couplets
 
Assessment/Plan
Assessment/Plan
1.  Shortness of breath likely multifactorial improved
2.  Ischemic cardiomyopathy with prior CABG/PCI
3.  AICD in situ
4.  Abdominal aortic aneurysm status post prior EVAR
5.  Aortic stenosis; moderate
6.  Small cell lung CA
7.  Hypothyroidism
8.  Not on ACE inhibitor per reoprt due to CKD with hyperkalemia
9.  Acute on chronic systolic congestive heart failure ef 25-30%
10. COPD/JEREMY
11.  Diabetes
12.  Mild hyponatremia-sodium 133 today; renal function otherwise about the same
with creatinine 1.7
 
Recommendations:
1.  As discussed with the residents will plan to change Lasix to p.o. today
2.  Physical therapy for ambulation
3.  Plan is for discharge tomorrow if the patient remained stable
4.  Monitor renal function as an outpatient and consider Entresto 
5.  Patient may shower off telemetry
Continue telemetry? Yes

## 2018-09-05 VITALS — SYSTOLIC BLOOD PRESSURE: 96 MMHG | DIASTOLIC BLOOD PRESSURE: 56 MMHG

## 2018-09-05 VITALS — SYSTOLIC BLOOD PRESSURE: 110 MMHG | DIASTOLIC BLOOD PRESSURE: 52 MMHG

## 2018-09-05 VITALS — DIASTOLIC BLOOD PRESSURE: 50 MMHG | SYSTOLIC BLOOD PRESSURE: 100 MMHG

## 2018-09-05 VITALS — SYSTOLIC BLOOD PRESSURE: 86 MMHG | DIASTOLIC BLOOD PRESSURE: 40 MMHG

## 2018-09-05 VITALS — SYSTOLIC BLOOD PRESSURE: 100 MMHG | DIASTOLIC BLOOD PRESSURE: 56 MMHG

## 2018-09-05 NOTE — PN- CARDIOLOGY
Subjective
Subjective:
 
Patient is doing quite well.  His weight and his edema have decreased 
significantly compared with last week.  He feels his breathing is at baseline.
 
Objective
Vital Signs and I&Os
Vital Signs
 
 
Date Time Temp Pulse Resp B/P B/P Pulse O2 O2 Flow FiO2
 
     Mean Ox Delivery Rate 
 
09/05 0935    100/50     
 
09/05 0832      95 Room Air  
 
09/05 0745    96/56     
 
09/05 0722 98.7 75 20 100/56  96 Room Air  
 
09/05 0205  70  110/52     
 
09/05 0155  66  86/40     
 
09/04 2209 98.1 70 20 94/52  95 Room Air  
 
1943      94 Room Air Room Air 
 
09/04 1600       Room Air  
 
09/04 1431 98.4 73 20 110/62  92 Room Air  
 
09/04 1354    100/66     
 
 
 Intake & Output
 
 
 09/05 1600 09/05 0800 09/05 0000 09/04 1600 09/04 0800 09/04 0000
 
Intake Total  150 100 540 240 480
 
Output Total  900 650 
 
Balance  -750 -550 240 -110 -845
 
       
 
Intake, Oral  150 100 540 240 480
 
Output, Urine  900 650 
 
Patient   220 lb   220 lb
 
Weight      
 
 
 
Physical Exam:
 
General: no apparent distress. Alert.
Eyes: No obvious scleral icterus.
HEENT: No jugular venous distention or abnormal jugular venous pulsations.
Cardiovascular: Normal intensity S1/S2. Regular. ICD noted, 2 out of 6 systolic 
murmur
Respiratory: No rales or rhonchi
Abdomen: Soft, nontender with no guarding or rebound tenderness.
Musculoskeletal: No clubbing or cyanosis noted; trace lower extremity edema
Skin: warm
Neurologic: No gross focal deficits noted.
Current Medications:
 Current Medications
 
 
  Sig/Ly Start time  Last
 
Medication Dose Route Stop Time Status Admin
 
Acetaminophen 650 MG Q6P PRN 08/19 2045 AC 09/05
 
  PO   0056
 
Albuterol Sulfate 2 PUF Q4P PRN 08/21 1700 AC 08/28
 
  INH   2059
 
Albuterol Sulfate 3 ML EVERY 4 HRS/AWAKE 08/20 2000 AC 09/05
 
  INH   0831
 
Aspirin 81 MG DAILY 08/20 0900 AC 09/05
 
  PO   0754
 
Atorvastatin Calcium 40 MG DAILY 08/20 0900 AC 09/05
 
  PO   0754
 
Benzonatate 100 MG TID PRN 08/19 2315 AC 09/04
 
  PO   2020
 
Budesonide/ 2 PUF BID 08/19 2100 AC 09/05
 
Formoterol Fumarate  INH   0755
 
Carvedilol 6.25 MG BID 08/19 2100 AC 09/04
 
  PO   2020
 
Clopidogrel Bisulfate 75 MG DAILY 08/20 0900 AC 09/05
 
  PO   0754
 
Furosemide 60 MG 7:30 AM, & 4:30 PM 09/04 1630 AC 09/04
 
  PO   1719
 
Furosemide 60 MG ONCE ONE 09/04 1400 DC 09/04
 
  PO 09/04 1401  1352
 
Furosemide 0 .STK-MED ONE 09/04 1350 DC 
 
  PO   
 
Guaifenesin 600 MG Q12 08/24 1037 AC 09/05
 
  PO   0754
 
Guaifenesin/ 10 ML DAILY AS NEEDED 09/03 2300 AC 09/05
 
Dextromethorphan  PO   0729
 
Insulin Aspart 0 TIDAC/HS 08/21 2100 AC 09/04
 
  SC   2154
 
Insulin Detemir 25 UNITS QAM 08/22 0900 AC 09/05
 
  SC   0932
 
Levothyroxine Sodium 0.15 MG DAILY AC 08/20 0700 AC 09/05
 
  PO   0541
 
Nitroglycerin 0.5 GM Q6P PRN 08/23 1300 AC 
 
  TOP   
 
Omeprazole 40 MG DAILY AC 08/30 0700 AC 09/05
 
  PO   0541
 
Ramelteon 8 MG AT BEDTIME AS NEED.. 08/22 2215 AC 09/04
 
  PO   2020
 
Ranolazine 500 MG BID 08/19 2100 AC 09/04
 
  PO   2020
 
Sertraline HCl 50 MG DAILY 08/20 0900 AC 09/05
 
  PO   0754
 
Tamsulosin HCl 0.4 MG AT BEDTIME 08/19 2100 AC 09/04
 
  PO   2020
 
Tiotropium [place] 1 PUF DAILY 08/20 0900 AC 09/05
 
  INH   0755
 
 
 
 
Results
Last 48 Hrs of Labs/Mics:
 Laboratory Tests
 
09/05/18 0835:
Anion Gap 7, Estimated GFR 35  L, BUN/Creatinine Ratio 21.1, Magnesium 1.9
 
09/04/18 0633:
Anion Gap 6, Estimated GFR 39  L, BUN/Creatinine Ratio 24.1, Magnesium 1.9, CBC 
w Diff NO MAN DIFF REQ, RBC 3.64  L, .2  H, MCH 34.4  H, MCHC 33.7, RDW 
15.8  H, MPV 8.7, Gran % 85.6  H, Lymphocytes % 5.2  L, Monocytes % 8.8, 
Eosinophils % 0.4, Basophils % 0, Absolute Granulocytes 8.0  H, Absolute 
Lymphocytes 0.5  L, Absolute Monocytes 0.8  H, Absolute Eosinophils 0, Absolute 
Basophils 0
 
Recent Imaging Studies:
 
Telemetry tracings were personally reviewed and shows sinus rhythm with 
occasional PVCs
 
Assessment/Plan
Assessment/Plan
 
1.  Shortness of breath likely multifactorial improved
2.  Ischemic cardiomyopathy with prior CABG/PCI
3.  AICD in situ
4.  Abdominal aortic aneurysm status post prior EVAR
5.  Aortic stenosis; moderate
6.  Small cell lung CA
7.  Hypothyroidism
8.  Not on ACE inhibitor per reoprt due to CKD with hyperkalemia
9.  Acute on chronic systolic congestive heart failure ef 25-30%
10. COPD/JEREMY
11.  Diabetes
 
 
Patient has been transitioned to oral Lasix and is doing well. His weight and 
lower extremity edema have decreased quite significantly compared with last 
week. Minimal elevation in creatinine today; would continue on the current oral 
Lasix regimen and plan for repeat metabolic panel as an outpatient within 1 week
; if kidney function is stable he can be considered for Entresto initiation as 
an outpatient if clinical status allows. He should follow-up in our office 
within 1 week of discharge.  He should also follow-up with his primary care 
physician and his nephrologist after discharge.
 
 
James Mc MD [facility]
 
 
 
Continue telemetry? No

## 2018-09-05 NOTE — PN- HOUSESTAFF
Rigoberto GLASS,Laura 18 0714:
Subjective
Follow-up For:
Congestive heart failure
Tele-Events Since Last Visit:
Normal sinus rhythm
Subjective:
Patient seen and examined at bedside.  No overnight events.  Patient says he 
slept well.  Denies shortness of breath, chest pain, palpitation
 
Review of Systems
Constitutional:
Reports: no symptoms. 
 
Objective
Last 24 Hrs of Vital Signs/I&O
 Vital Signs
 
 
Date Time Temp Pulse Resp B/P B/P Pulse O2 O2 Flow FiO2
 
     Mean Ox Delivery Rate 
 
 1130    100/50     
 
/ 0935    100/50     
 
/ 0832      95 Room Air  
 
 0745    96/56     
 
 0722 98.7 75 20 100/56  96 Room Air  
 
 0205  70  110/52     
 
 0155  66  86/40     
 
 2209 98.1 70 20 94/52  95 Room Air  
 
 1943      94 Room Air Room Air 
 
 1600       Room Air  
 
 1431 98.4 73 20 110/62  92 Room Air  
 
 1354    100/66     
 
 
 Intake & Output
 
 
  1600  0800  0000
 
Intake Total 400 150 100
 
Output Total  900 650
 
Balance 400 -750 -550
 
    
 
Intake, Oral 400 150 100
 
Output, Urine  900 650
 
Patient   220 lb
 
Weight   
 
 
 
 
Physical Exam
General Appearance: Alert, Oriented X3, Cooperative, No Acute Distress
Cardiovascular: Regular Rate, Normal S1, Normal S2, No Murmurs
Lungs: Normal Air Movement
Abdomen: Soft, No Tenderness, No Hepatospenomegaly
Neurological: Normal Speech, Strength at 5/5 X4 Ext, Normal Tone, Cranial Nerves
3-12 NL
Current Medications:
 Current Medications
 
 
  Sig/Ly Start time  Last
 
Medication Dose Route Stop Time Status Admin
 
Acetaminophen 650 MG Q6P PRN 2045 AC 
 
  PO   1139
 
Albuterol Sulfate 2 PUF Q4P PRN  170 AC 
 
  INH   205
 
Albuterol Sulfate 3 ML EVERY 4 HRS/AWAKE 2000 AC 
 
  INH   1153
 
Aspirin 81 MG DAILY 900 AC 
 
  PO   075
 
Atorvastatin Calcium 40 MG DAILY 900 AC 
 
  PO   075
 
Benzonatate 100 MG TID PRN  2315 AC 
 
  PO   2020
 
Budesonide/ 2 PUF BID 2100 AC 
 
Formoterol Fumarate  INH   0755
 
Carvedilol 6.25 MG BID 2100 AC 
 
  PO   2020
 
Clopidogrel Bisulfate 75 MG DAILY  09 AC 
 
  PO   0754
 
Furosemide 60 MG 7:30 AM, & 4:30 PM  1630 AC 
 
  PO   1719
 
Furosemide 60 MG ONCE ONE  1400 DC 
 
  PO  1401  1352
 
Furosemide 0 .STK-MED ONE  1350 DC 
 
  PO   
 
Guaifenesin 600 MG Q12  1037 AC 
 
  PO   0754
 
Guaifenesin/ 10 ML DAILY AS NEEDED  2300 AC 
 
Dextromethorphan  PO   0729
 
Insulin Aspart 0 TIDAC/HS  2100 AC 
 
  SC   1205
 
Insulin Detemir 25 UNITS QAM  0900 AC 
 
  SC   0932
 
Levothyroxine Sodium 0.15 MG DAILY AC  0700 AC 
 
  PO   0541
 
Nitroglycerin 0.5 GM Q6P PRN  1300 AC 
 
  TOP   
 
Omeprazole 40 MG DAILY AC  0700 AC 
 
  PO   0541
 
Ramelteon 8 MG AT BEDTIME AS NEED..  2215 AC 
 
  PO   2020
 
Ranolazine 500 MG BID 2100 AC 
 
  PO   2020
 
Sertraline HCl 50 MG DAILY  09 AC 
 
  PO   0754
 
Tamsulosin HCl 0.4 MG AT BEDTIME 2100 AC 
 
  PO   2020
 
Tiotropium [place] 1 PUF DAILY  09 AC 
 
  INH   0755
 
 
 
 
Last 24 Hrs of Lab/[place] Results
Last 24 Hrs of Labs/Mics:
 Laboratory Tests
 
18 0835:
Anion Gap 7, Estimated GFR 35  L, BUN/Creatinine Ratio 21.1, Magnesium 1.9
 
 
Assessment/Plan
Assessment:
Patient is a 73-year-old male presented with chief complaints of gradually 
progressive shortness of breath.
 
Past medical history-
* Small Cell lung CA (last dose of radiation 3 months ago),
* IDDM (dx 3months ago),
* Ischemic cardiomyopathy with EF 20% s/p defibrillator placement (2018),
* s/p carotid endarterectomy right side (),
* CABG (), CAD with stents (1990s), 
* h/o DVT (5 yrs ago), 
* COPD, 
* Hypothyroidism
* Hypertension
* BPH
* Obstructive sleep apnea on nocturnal CPAP 
 
Assessment and plan
CHF exacerbation with ischemic cardiomyopathy with ejection fraction of 20% 
status post AICD 2018
Patient being treated for congestive heart failure with IV Lasix.  Patient 
improved well and slowly transition to p.o. Lasix.  Patient will be going home 
with new change in dose of his Lasix to 60 mg twice daily.  Given his kidney 
function we will repeat BP in 1 week and follow-up with Dr. Jason Spaulding and 
his primary care physician in 1 week.  Patient is planned to be started on 
interest to.  
Problem List:
 1. CHF exacerbation
 
Pain Ratin
Pain Location:
NONE
Pain Goal: Remain pain free
Pain Plan:
TYLENOL
Tomorrow's Labs & Rationales:
CBC,BEP
 
 
Pepito GLASS,Chaya 18 1213:
Attending MD Review Statement
 
Attending Statement
Attending MD Statement: examined this patient, discuss w/resident/PA/NP, agreed 
w/resident/PA/NP, reviewed EMR data (avail)
Attending Assessment/Plan:
Patient seen and examined at bedside.  Agree with resident assessment and plan.
 
Patient feels well with no complaints.  No tele events.  Renal function stable.
 
Plan
- Stable for discharge
- Lasix 60mg PO BID
- Outpatient cardiology follow up
- Continue home medications

## 2018-09-11 ENCOUNTER — HOSPITAL ENCOUNTER (EMERGENCY)
Dept: HOSPITAL 68 - ERH | Age: 73
End: 2018-09-11
Payer: COMMERCIAL

## 2018-09-11 VITALS — WEIGHT: 230 LBS | BODY MASS INDEX: 31.15 KG/M2 | HEIGHT: 72 IN

## 2018-09-11 VITALS — SYSTOLIC BLOOD PRESSURE: 125 MMHG | DIASTOLIC BLOOD PRESSURE: 70 MMHG

## 2018-09-11 DIAGNOSIS — J44.9: ICD-10-CM

## 2018-09-11 DIAGNOSIS — Z79.84: ICD-10-CM

## 2018-09-11 DIAGNOSIS — E03.9: ICD-10-CM

## 2018-09-11 DIAGNOSIS — Z79.82: ICD-10-CM

## 2018-09-11 DIAGNOSIS — E11.9: ICD-10-CM

## 2018-09-11 DIAGNOSIS — Z79.01: ICD-10-CM

## 2018-09-11 DIAGNOSIS — R07.9: Primary | ICD-10-CM

## 2018-09-11 LAB
ABSOLUTE GRANULOCYTE CT: 4.3 /CUMM (ref 1.4–6.5)
BASOPHILS # BLD: 0 /CUMM (ref 0–0.2)
BASOPHILS NFR BLD: 0 % (ref 0–2)
EOSINOPHIL # BLD: 0.1 /CUMM (ref 0–0.7)
EOSINOPHIL NFR BLD: 2.4 % (ref 0–5)
ERYTHROCYTE [DISTWIDTH] IN BLOOD BY AUTOMATED COUNT: 15.6 % (ref 11.5–14.5)
GRANULOCYTES NFR BLD: 75.4 % (ref 42.2–75.2)
HCT VFR BLD CALC: 33.5 % (ref 42–52)
LYMPHOCYTES # BLD: 0.6 /CUMM (ref 1.2–3.4)
MCH RBC QN AUTO: 34.3 PG (ref 27–31)
MCHC RBC AUTO-ENTMCNC: 34.1 G/DL (ref 33–37)
MCV RBC AUTO: 100.7 FL (ref 80–94)
MONOCYTES # BLD: 0.6 /CUMM (ref 0.1–0.6)
PLATELET # BLD: 134 /CUMM (ref 130–400)
PMV BLD AUTO: 7.8 FL (ref 7.4–10.4)
PROTHROMBIN TIME: 12.5 SEC (ref 9.4–12.5)
RED BLOOD CELL CT: 3.33 /CUMM (ref 4.7–6.1)
WBC # BLD AUTO: 5.8 /CUMM (ref 4.8–10.8)

## 2018-09-11 NOTE — ED GENERAL ADULT
History of Present Illness
 
General
Chief Complaint: Chest Pain
Stated Complaint: PT C/O CHEST PAIN " MAJOR CARDIAC HX"
Source: patient
Exam Limitations: no limitations
 
Vital Signs & Intake/Output
Vital Signs & Intake/Output
 Vital Signs
 
 
Date Time Temp Pulse Resp B/P B/P Pulse O2 O2 Flow FiO2
 
     Mean Ox Delivery Rate 
 
 1446 98.0 106 18 125/70  96 Room Air  
 
 1253    109/64     
 
 1251    112/64     
 
 1249 97.9 92 18 119/67  97 Room Air  
 
 1139 97.9 96 18 124/57  95 Room Air  
 
 0853 97.8 88 18 115/67  97 Room Air  
 
 0741       Room Air  
 
 0635  89 22 106/70  97 Room Air  
 
 
 
Allergies
Coded Allergies:
No Known Allergies (18)
 
Reconcile Medications
Acetaminophen 325 MG CAPSULE   1 CAP PO PRN PAIN  (Reported)
Albuterol Sulfate (Proair Hfa) 90 MCG HFA.AER.AD   2 PUF INH Q4 COPD
Aspirin (Aspirin*) 81 MG TAB.CHEW   1 TAB PO DAILY heart  (Reported)
Atorvastatin Calcium (Lipitor) 40 MG TABLET   1 TAB PO DAILY cholesterol  (
Reported)
Budesonide/Formoterol Fumarate (Symbicort 160-4.5 Mcg Inhaler) 160 MCG-4.5 MCG/
ACTUATION HFA.AER.AD   2 PUF INH BID COPD  (Reported)
Carvedilol 6.25 MG TABLET   1 TAB PO BID HEART/BP  (Reported)
Clopidogrel Bisulfate (Clopidogrel) 75 MG TABLET   1 TAB PO DAILY BLOOD THINNER 
(Reported)
Empagliflozin (Jardiance) 10 MG TABLET   1 TAB PO DAILY DM  (Reported)
Furosemide (Lasix) 20 MG TABLET   3 TAB PO BID HEART  (Reported)
     .
Insulin Aspart, Recombinant (Novolog Flexpen) 100 UNIT/ML INSULN.PEN   0 SC SEE 
ADMIN CRITERIA DM
     PLEASE check your blood sugar 3 times daily before meals and at
     bedtime and .
     FOLLOW
     80 - 150 MG/DL   9 UNITS
     151- 200 MG/DL   11 UNITS
     201- 250 MG/DL   13 UNITS
     251- 300 MG/DL   15 UNITS
     301- 350 MG/DL   17 UNITS
     351 - 400MG/DL   19 UNITS
     >400 MG/DL       20 UNITS AND CALL YOUR DR.
Insulin Detemir (Levemir) 100 UNIT/ML VIAL   25 UNITS SC QAM DM  (Reported)
Levothyroxine Sodium 100 MCG TABLET   1.5 TAB PO DAILY HYPOTHYROID  (Reported)
Nitroglycerin (Nitrostat) 0.4 MG TAB.SUBL   1 TAB SL AD PRN CHEST PAIN  (
Reported)
     1st sign of attack; may repeat every 5 minutes until relief; if pain 
persists after 3 tablets in 15 minutes, prompt medical att
Omeprazole 40 MG CAPSULE.DR   1 CAP PO DAILY GI  (Reported)
Ranolazine (Ranexa) 500 MG TAB.ER.12H   1 TAB PO BID heart  (Reported)
Sertraline HCl 50 MG TABLET   1 TAB PO DAILY MENTAL HEALTH  (Reported)
Tamsulosin HCl (Flomax) 0.4 MG CAP.ER.24H   1 CAP PO QHS prostate
     Please take at bed time to avoid low blood pressure during the day.
Tiotropium Bromide (Spiriva) 18 MCG CAP.W.DEV   1 CAP INH DAILY COPD  (Reported)
 
Triage Nurses Notes Reviewed? yes
HPI:
Mr Cruz is a 72 y/o M with extensive cardiac history, presents with presyncope 
and crushing substernal chest pain nonradiating that was improved with 
nitroglycerin.  Patient is walked into the shower when he felt chest pain and 
lightheadedness following a pass out at which time he presented to the emergency
department.  9 out of 10 at home, after nitroglycerin reduced to a 6 out of 10. 
Negative for diaphoresis. Mr Cruz is uncertain of exertional exacerbation.  
Denies shortness of breath.
(Fredrick GLASS,Ahsan)
 
Past History
 
Travel History
Traveled to [place] past 21 day No
 
Medical History
Any Pertinent Medical History? see below for history
Neurological: NONE
EENT: NONE
Cardiovascular: CARDIAC STENTS CABG CAROTID ARTERY BYPASS DEFIBRILLATOR
Respiratory: COPD
Gastrointestinal: NONE
Hepatic: NONE
Renal: NONE
Musculoskeletal: NONE
Psychiatric: NONE
Endocrine: hypothyroidism, INSULIN DEP DIABETIC
Blood Disorders: DVT
Cancer(s): lung cancer
GYN/Reproductive: NONE
History of MRSA: No
History of VRE: No
History of CDIFF: No
 
Surgical History
Surgical History: CABG, CAROTID ARTECTOMY nerve thermoablation to reduce back 
pain 
 
Psychosocial History
Who do you live with Spouse
Services at Home None
What is your primary language English
 
Family History
Family History, If Any:
Relation not specified for:
  *No pertinent family history
 
Hx Contributory? No
(Ahsan Alcala MD)
 
Review of Systems
 
Review of Systems
Constitutional:
Reports: no symptoms, see HPI. 
EENTM:
Reports: no symptoms. 
Respiratory:
Reports: no symptoms. 
Cardiovascular:
Reports: no symptoms. 
GI:
Reports: no symptoms. 
Genitourinary:
Reports: no symptoms. 
Musculoskeletal:
Reports: no symptoms. 
Skin:
Reports: no symptoms. 
Neurological/Psychological:
Reports: no symptoms. 
Hematologic/Endocrine:
Reports: no symptoms. 
Immunologic/Allergic:
Reports: no symptoms. 
All Other Systems: Reviewed and Negative
(Ahsan Alcala MD)
 
Physical Exam
 
Physical Exam
General Appearance: no apparent distress, comfortable
Comments:
Gen.: Well-nourished, well-developed, no acute respiratory distress.
Head: Normocephalic, atraumatic.
Eyes: Normal inspection bilaterally
Ears: Normal inspection bilaterally
Nose: Normal inspection
Throat/mouth : Moist mucosa 
Neck: Supple, full range of motion
Heart: Regular rate and rhythm, no murmurs rubs or gallops
Lungs: Clear to auscultation bilaterally with normal air entry
Chest: Nontender
Back: Normal range of motion
Abdomen: Soft, nontender, nondistended, normal bowel sounds
Extremities: Normal range of motion grossly, equal radial pulses, no cyanosis 
clubbing. 1+ pitting edema lower extremities bilaterally. 
Neurologic: Cranial nerves grossly intact, speech is clear
Skin: Extensive bruising from prior hospital evaluation
Psychiatric: Calm, cooperative, no apparent delusions or hallucinations
 
 
 
Core Measures
ACS in differential dx? Yes
CVA/TIA Diagnosis: No
Sepsis Present: No
Sepsis Focused Exam Completed? No
(Ahsan Alcala MD)
 
Progress
Differential Diagnoses
I considered the following diagnoses in my evaluation of the patient: I 
considered the following:
Pericarditis- but the patient had no pericardial friction rub, no preceding 
viral illnesses and no EKG changes consistent with this.
 
Acute coronary syndrome but the patient's EKG showed no acute changes, the 
troponin level was normal, the patient had a paucity of risk factors and the 
patient's clinical presentation wasn't consistent with this.
 
Pneumothorax but the chest x-ray did not show this.
 
Pneumonia but the chest x-ray did not show infiltrate.
 
Pulmonary embolus but the patient had no resting tachycardia, was not hypoxic, 
had a paucity of risk factors and was PERC negative.
 
Aortic aneurysm but the description of the patient's pain was inconsistent with 
this.  The chest x-ray showed no widened mediastinum or other changes consistent
with this.  In addition the patient had a paucity of risk factors.
 
Plan of Care:
 Orders
 
 
Procedure Date/time Status
 
Heart Healthy Diet  L Active
 
TROPONIN LEVEL  1238 Complete
 
EKG  1238 Active
 
MISTAKE  1236 Active
 
PROTHROMBIN TIME  0645 Complete
 
TROPONIN LEVEL  0644 Complete
 
MAGNESIUM 644 Complete
 
COMPREHENSIVE METABOLIC PANEL 644 Complete
 
CHOLESTEROL 644 Complete
 
CBC WITHOUT DIFFERENTIAL 644 Complete
 
EKG  0629 Active
 
 
 Laboratory Tests
 
 
 
18 1314:
Troponin I 0.06
 
18 0644:
Anion Gap 8, Estimated GFR 39  L, BUN/Creatinine Ratio 15.9, Glucose 168  H, 
Calcium 9.0, Magnesium 1.6, Total Bilirubin 0.3, AST 23, ALT 42, Alkaline 
Phosphatase 126, Troponin I 0.07, Total Protein 5.4  L, Albumin 3.2  L, Globulin
2.2, Albumin/Globulin Ratio 1.5, Cholesterol 141, PT 12.5, INR 1.15, CBC w Diff 
NO MAN DIFF REQ, RBC 3.33  L, .7  H, MCH 34.3  H, MCHC 34.1, RDW 15.6  H,
MPV 7.8, Gran % 75.4  H, Lymphocytes % 11.1  L, Monocytes % 11.1  H, Eosinophils
% 2.4, Basophils % 0, Absolute Granulocytes 4.3, Absolute Lymphocytes 0.6  L, 
Absolute Monocytes 0.6, Absolute Eosinophils 0.1, Absolute Basophils 0
 
Initial ED EKG: normal axis, LBBB, nonspecific ST T wave chg, Single PVC. 
Prior EKG: unchanged
Hand-Off
   Endorsed To:
Nunu GLASS,Neftaly PANTOJA
   Endorsed Time: 704
   Pending: labs, Xray
(Fredrick GLASS,Ahsan)
Differential Diagnoses
I considered the following diagnoses in my evaluation of the patient: 
 
Diagnostic Imaging:
Discussed w/RAD: CT Scan. 
Radiology Impression: PATIENT: JULIO CÉSAR CRUZ JR  MEDICAL RECORD NO: [id number] 
PRESENT AGE: 73  PATIENT ACCOUNT NO: [id number] : 43  LOCATION: [facility] 
ORDERING PHYSICIAN: Neftaly Eddy MD     SERVICE DATE:  EXAM TYPE
: CAT - CT CHEST WO IV CONTRAST EXAMINATION: CT CHEST WITHOUT CONTRAST CLINICAL 
INFORMATION: Left base parenchymal/pleural disease seen on x-ray. Presumptive 
diagnosis of pneumonitis, pneumonia, effusion. COMPARISON: Chest x-ray dated 2018. CT scan of the chest dated 2018, 2018, and 2017. PET CT 
scan dated 8/15/2017. TECHNIQUE: Multidetector volumetric CT imaging of the 
chest was obtained noncontrast. Sagittal and coronal reformations were obtained.
DLP: 527.50 mGy-cm. FINDINGS: LUNGS: A small left pleural effusion is seen, 
decreased in size when compared to 2018. Previously seen trace right-sided 
pleural effusion has completely resolved. There is persistent patchy parenchymal
opacity seen in the left lower lobe, minimally improved when compared to 2018. Minimal linear reticular opacity in the lingula is noted, unchanged. No 
new area of dense consolidation is seen. The central airways are narrowed but 
remain patent. Mild centrilobular emphysema is noted. Evaluation is limited due 
to motion artifact and beam hardening artifact related to the pacer generator in
the left supralateral lateral chest. There is a spiculated approximately 8 x 6 
mm solid noncalcified nodule in the left lower lobe superior segment, causing 
retractile change of the left major fissure, consistent with the previously 
biopsy-proven moderately differentiated squamous cell carcinoma. No new focal 
lung nodule or mass. No effusion or pneumothorax. Central airways patent. 
LYMPHOVASCULAR STRUCTURES: Abdominal aortic aneurysm is again noted with changes
of an aortic stent graft. The abdominal aorta measures 5.6 cm in maximal AP 
diameter at the level of the renal harry, unchanged. There is also aneurysmal 
dilatation of the descending thoracic aorta, measuring up to 5 cm in diameter at
the diaphragmatic hiatus, unchanged. The aortic arch and ascending aorta are 
normal in caliber. Moderate atherosclerotic calcifications of the aorta are 
seen. Severe coronary artery calcifications are noted. Heart size borderline 
enlarged. AICD lead is seen in the right ventricle. No pericardial effusion. No 
mediastinal, hilar or axillary adenopathy. THYROID GLAND: Unremarkable to the 
extent included. UPPER ABDOMEN: Exophytic 1.3 cm and 1.1 cm upper pole right 
renal cysts are seen, unchanged. Small 1 cm accessory splenule is seen along the
anterior margin of the spleen. Included portions of the solid organs in the 
upper abdomen otherwise unremarkable. BONES: Mild S-shaped thoracolumbar 
scoliosis and multilevel mild vertebral spondylosis seen. Prominent Schmorl's 
nodes noted in midthoracic vertebral bodies. Median sternotomy is seen. No 
suspicious bone findings. IMPRESSION: 1. Patient's known left lower lobe cancer 
is unchanged when compared to most recent prior exam. 2. Interval slight 
decrease in size of the left-sided pleural effusion and associated volume loss 
and presumed atelectatic changes in the left lower lobe and lingula. 3. Right-
sided pleural effusion has completely resolved. Background of mild emphysema 
remains. 4. Distal thoracic and abdominal aortic aneurysm, unchanged. Abdominal 
aortic stent graft is partially imaged. 5. Severe atherosclerotic coronary 
artery calcification. Patient is status post median sternotomy and CABG surgery.
6. Right renal cysts. DICTATED BY: Rosa Salas MD  DATE/TIME DICTATED:1139 :RAD.SYKES  DATE/TIME TRANSCRIBED:18 
CONFIDENTIAL, DO NOT COPY WITHOUT APPROPRIATE AUTHORIZATION.  <Electronically 
signed in Other Vendor System>                                                  
                                     SIGNED BY: Rosa Salas MD 18 1205
Repeat EKG: unchanged
Comments:
2018 8:36:51 AM patient signed out to me by Dr. Alcala at shift change over.
 
2018 9:34:34 AM I have updated Julio César on test results.  He has no complaint
at this time, specifically he states he has no chest pain.  He states initially 
he had a left sided upper chest pain that felt like a burning sensation.  I will
attempt to discuss his case with Dr. Spaulding, his cardiologist and Dr. Harris, 
his pulmonologist.
 
2018 10:17:58 AM patient's case discussed with Dr. Harris who recommends a
decubitus view of the chest or a CAT scan of the chest to better evaluate the 
possibility of a pleural effusion.
 
2018 11:27:06 AM patient's case discussed with Dr. Bishop who will evaluate 
the patient in the emergency department shortly.
 
2018 12:28:14 PM patient is being evaluated by Dr. Bishop.
 
2018 3:41:27 PM patient is awaiting interrogation of his defibrillator at 
the request of Dr. Bishop, who stated he would contact his office to arrange the 
interrogation.
 
2018 4:42:44 PM Per [employer], single lead device sensing properly. no 
apparent recent episodes of dysrhythmia. 
(Nunu GLASS,Neftaly PANTOJA)
 
Departure
 
Departure
Condition: Stable
Clinical Impression
Primary Impression: Chest pain
Qualifiers:  Chest pain type: unspecified Qualified Code: R07.9 - Chest pain, 
unspecified
Secondary Impressions: Pre-syncope
Referrals:
Durga Plata MD (PCP/Family)
 
Departure Forms:
Customer Survey
General Discharge Information
(Ahsan Alcala MD)
 
Departure
Disposition: HOME OR SELF CARE
Additional Instructions:
Follow-up with Dr. Spaulding this week.  Notify your primary care doctor of this 
emergency department visit and treatment plan.  Return if any concerns or sudden
worsening.
 
You may take your medications as usual.
(Nunu GLASS,Neftaly PANTOJA)
 
Critical Care Note
 
Critical Care Note
Critical Care Time: non-applicable
(Ahsan Alcala MD)

## 2018-09-11 NOTE — CT SCAN REPORT
EXAMINATION:
CT CHEST WITHOUT CONTRAST
 
CLINICAL INFORMATION:
Left base parenchymal/pleural disease seen on x-ray. Presumptive diagnosis of
pneumonitis, pneumonia, effusion.
 
COMPARISON:
Chest x-ray dated 9/11/2018. CT scan of the chest dated 8/20/2018, 2/20/2018,
and 9/26/2017. PET CT scan dated 8/15/2017.
 
TECHNIQUE:
Multidetector volumetric CT imaging of the chest was obtained noncontrast.
Sagittal and coronal reformations were obtained.
 
DLP:
527.50 mGy-cm.
 
FINDINGS:
 
LUNGS: A small left pleural effusion is seen, decreased in size when compared
to 8/20/2018. Previously seen trace right-sided pleural effusion has
completely resolved. There is persistent patchy parenchymal opacity seen in
the left lower lobe, minimally improved when compared to 8/20/2018. Minimal
linear reticular opacity in the lingula is noted, unchanged. No new area of
dense consolidation is seen. The central airways are narrowed but remain
patent. Mild centrilobular emphysema is noted. Evaluation is limited due to
motion artifact and beam hardening artifact related to the pacer generator in
the left supralateral lateral chest. There is a spiculated approximately 8 x
6 mm solid noncalcified nodule in the left lower lobe superior segment,
causing retractile change of the left major fissure, consistent with the
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
No new focal lung nodule or mass. No effusion or pneumothorax. Central
airways patent.
 
LYMPHOVASCULAR STRUCTURES: Abdominal aortic aneurysm is again noted with
changes of an aortic stent graft. The abdominal aorta measures 5.6 cm in
maximal AP diameter at the level of the renal harry, unchanged. There is also
aneurysmal dilatation of the descending thoracic aorta, measuring up to 5 cm
in diameter at the diaphragmatic hiatus, unchanged. The aortic arch and
ascending aorta are normal in caliber. Moderate atherosclerotic
calcifications of the aorta are seen. Severe coronary artery calcifications
are noted. Heart size borderline enlarged. AICD lead is seen in the right
ventricle. No pericardial effusion. No mediastinal, hilar or axillary
adenopathy.
 
THYROID GLAND: Unremarkable to the extent included.
 
UPPER ABDOMEN: Exophytic 1.3 cm and 1.1 cm upper pole right renal cysts are
seen, unchanged. Small 1 cm accessory splenule is seen along the anterior
margin of the spleen. Included portions of the solid organs in the upper
abdomen otherwise unremarkable.
 
BONES: Mild S-shaped thoracolumbar scoliosis and multilevel mild vertebral
spondylosis seen. Prominent Schmorl's nodes noted in midthoracic vertebral
bodies. Median sternotomy is seen. No suspicious bone findings.
 
IMPRESSION:
 
1. Patient's known left lower lobe cancer is unchanged when compared to most
recent prior exam.
2. Interval slight decrease in size of the left-sided pleural effusion and
associated volume loss and presumed atelectatic changes in the left lower
lobe and lingula.
3. Right-sided pleural effusion has completely resolved. Background of mild
emphysema remains.
4. Distal thoracic and abdominal aortic aneurysm, unchanged. Abdominal aortic
stent graft is partially imaged.
5. Severe atherosclerotic coronary artery calcification. Patient is status
post median sternotomy and CABG surgery.
6. Right renal cysts.

## 2018-09-11 NOTE — RADIOLOGY REPORT
EXAMINATION:
XR PORTABLE CHEST
 
CLINICAL INFORMATION:
Chest pain
 
COMPARISON:
8/23/2018
 
TECHNIQUE:
Portable frontal view of the chest was obtained.
 
FINDINGS: Evidence of previous cardiac surgery. AICD in place grossly stable.
Progressive airspace disease left base consistent with presumably
pneumonitis. Underlying layering pleural effusion suspected. Hazy density
right midlung laterally favor summation shadow. No ectopic air. No overt CHF.
Heart size remains mildly enlarged.
No significant abnormality otherwise is noted involving the heart, lungs,
mediastinum, bony thorax or soft tissues.
 
IMPRESSION:
Mild progression of pleural-parenchymal disease left base as above.

## 2024-09-05 NOTE — PN- CARDIOLOGY
Subjective
Subjective:
 
Feels about the same today.  No new complaints.  No further NSVT episodes on 
telemetry
 
Objective
Vital Signs and I&Os
Vital Signs
 
 
Date Time Temp Pulse Resp B/P B/P Pulse O2 O2 Flow FiO2
 
     Mean Ox Delivery Rate 
 
02/15 0818  80  94/62     
 
02/15 0818  80  94/62     
 
02/15 0817  80  94/62     
 
02/15 0817  80  94/62     
 
02/15 0800       Nasal 3.0L 
 
       Cannula  
 
02/15 0754      96 Nasal 3.0L 
 
       Cannula  
 
02/15 0712 98.2 80 18 94/62  95 Nasal  
 
       Cannula  
 
02/15 0414  78    98   
 
02/15 0000       Nasal 3.0L 
 
       Cannula  
 
02/14 2223  80    96   
 
02/14 2221 98.4 87 18 114/70  91   
 
02/14 2134  88  98/62     
 
02/14 2133  88  98/62     
 
02/14 2133  88  98/62     
 
02/14 2015 91 Nasal 3.0L 
 
       Cannula  
 
02/14 1600      95 Nasal 2.0L 
 
       Cannula  
 
02/14 1425 98.1 90 20 118/68  95 Nasal 3.0L 
 
       Cannula  
 
 
 Intake & Output
 
 
 02/15 1600 02/15 0800 02/15 0000 02/14 1600 02/14 0800 02/14 0000
 
Intake Total  480 400 400 110 110
 
Output Total  750 950 950 725 400
 
Balance  -270 -550 -550 -615 -290
 
       
 
Intake, IV     10 10
 
Intake, Oral  480 400 400 100 100
 
Number  0 0   
 
Bowel      
 
Movements      
 
Output, Urine  750 950 950 725 400
 
Patient 247 lb    245 lb 244 lb
 
Weight      
 
Weight Bed scale    Bed scale 
 
Measurement      
 
Method      
 
 
 
Physical Exam:
 
General: no apparent distress. Alert.
Eyes: No obvious scleral icterus.
HEENT: No jugular venous distention or abnormal jugular venous pulsations.
Cardiovascular: Normal intensity S1/S2.  ICD noted
Respiratory: Trace basilar crackles
Abdomen: Soft, nontender with no guarding or rebound tenderness.
Musculoskeletal: No clubbing or cyanosis noted; 1+ lower extremity edema
Skin: No obvious rashes or ulcerations.
Neurologic: No gross focal deficits noted.
Lymph: No gross lymphadenopathy. 
Current Medications:
 Current Medications
 
 
  Sig/Ly Start time  Last
 
Medication Dose Route Stop Time Status Admin
 
Acetaminophen 325 MG Q6P PRN 02/13 0015 AC 
 
  PO   
 
Albuterol Sulfate 3 ML TID 02/13 2200 AC 02/15
 
  INH   0751
 
Aspirin 81 MG DAILY 02/13 1000 AC 02/15
 
  PO   0817
 
Atorvastatin Calcium 40 MG 1700 02/13 1700 AC 02/14
 
  PO   1724
 
Azithromycin 500 MG DAILY 02/14 1000 AC 02/15
 
  PO   0817
 
Budesonide/ 2 PUF BID 02/13 1000 AC 02/15
 
Formoterol Fumarate  INH   0818
 
Calcium Carbonate 500 MG ONCE ONE 02/14 2000 DC 02/14
 
  PO 02/14 2001 2004
 
Carvedilol 12.5 MG BID 02/14 2200 AC 02/15
 
  PO   0818
 
Carvedilol 6.25 MG BID 02/13 0007 DC 02/14
 
  PO   0908
 
Clopidogrel Bisulfate 75 MG DAILY 02/13 1000 AC 02/15
 
  PO   0817
 
Furosemide 20 MG BID 02/14 2200 AC 02/15
 
  IV   0823
 
Heparin Sodium  5,000 UNIT Q8 02/12 2255 AC 02/15
 
(Porcine)  SC   0555
 
Isosorbide  60 MG DAILY 02/13 1000 AC 02/15
 
Mononitrate  PO   0818
 
Levothyroxine Sodium 0.1 MG DAILY AC 02/13 0700 AC 02/15
 
  PO   0559
 
Losartan Potassium 25 MG DAILY 02/13 1457 AC 02/15
 
  PO   0817
 
Methylprednisolone 40 MG Q12 02/13 1000 DC 02/14
 
  IV   0908
 
Nitroglycerin 0.4 MG SEE ADMIN CRITERIA.. 02/13 0015 [facility]   
 
Patient Medication  1 ED ONE ONE 02/15 1145 DC 
 
Teaching  ED 02/15 1146  
 
Prednisone 30 MG DAILY 02/16 1000 UNVr 
 
  PO 02/19 0959  
 
Prednisone 40 MG DAILY 02/15 1000 DC 02/15
 
  PO   0817
 
Ranolazine 500 MG BID 02/13 0007 AC 02/15
 
  PO   0817
 
Tamsulosin HCl 0.4 MG AT BEDTIME 02/13 0015 AC 02/14
 
  PO   2133
 
Tiotropium [place] 1 PUF DAILY 02/13 1000 AC 02/15
 
  INH   0818
 
 
 
 
Results
Last 48 Hrs of Labs/Mics:
 Laboratory Tests
 
02/15/18 0626:
Anion Gap 8, Estimated GFR 40  L, BUN/Creatinine Ratio 21.8
 
02/14/18 0619:
Anion Gap 10, Estimated GFR 40  L, BUN/Creatinine Ratio 21.2
 
Recent Imaging Studies:
 
Telemetry tracings were personally reviewed and shows sinus rhythm with SVT 
burst
 
Echo:
Moderate left ventricular dilatation. Left ventricular wall  
 thickness mildly increased.  
 There is moderate global hypokinesis with akinesis of the inferior  
 wall.  
 Left ventricular ejection fraction is estimated at 20-25 %.  
 Normal right ventricular size and function. Catheter/pacemaker wire  
 in the right ventricular cavity.  
 Mild left atrial dilatation.  
 Mild-to-moderate aortic stenosis (mean gradient 15 mmHg).  
 Unable to estimate the right ventricular systolic pressure.  
 
 Regis Mc M.D. 
 (Electronically Signed) 
 Final Date:      14 February 2018  
                  15:30 
 
Assessment/Plan
Assessment/Plan
 
1.  Shortness of breath most likely a combination of acute exacerbation of COPD 
along with acute on chronic systolic heart failure with elevated BNP and edema.
2.  Ischemic cardiomyopathy ejection fraction 25% status post AICD
3.  Coronary artery disease status post coronary bypass surgery known occluded 
vein grafts
4.  Hypertension
5.  Chronic COPD with nicotine dependence in remission
6.  Carcinoma of the lung status post radiation therapy
7.  Chronic renal insufficiency
8.  Hx of aortic stenosis
9.  Hx of Carotid artery disease/aortic aneurysm and peripheral vascular disease
 
 
We increased the carvedilol for additional afterload reduction along with 
treatment of his NSVT. No recurrent NSVT on telemetry. Would continue on IV 
Lasix today. Repeat echocardiogram shows no significant change. He is on an oral
prednisone taper.
 
 
James Mc MD [facility]
Continue telemetry? Yes insulin    Generalized abdominal pain   Chronic, not at goal (unstable), changes made today: will get CT without contrast